# Patient Record
Sex: FEMALE | Race: WHITE | NOT HISPANIC OR LATINO
[De-identification: names, ages, dates, MRNs, and addresses within clinical notes are randomized per-mention and may not be internally consistent; named-entity substitution may affect disease eponyms.]

---

## 2016-12-05 RX ORDER — LANOLIN ALCOHOL/MO/W.PET/CERES
1 CREAM (GRAM) TOPICAL
Qty: 0 | Refills: 0 | DISCHARGE
Start: 2016-12-05

## 2016-12-05 RX ORDER — LANOLIN ALCOHOL/MO/W.PET/CERES
0.5 CREAM (GRAM) TOPICAL
Qty: 0 | Refills: 0 | COMMUNITY
Start: 2016-12-05

## 2016-12-05 RX ORDER — ALPRAZOLAM 0.25 MG
0.75 TABLET ORAL
Qty: 0 | Refills: 0 | COMMUNITY
Start: 2016-12-05

## 2016-12-05 RX ORDER — ALPRAZOLAM 0.25 MG
0.5 TABLET ORAL
Qty: 0 | Refills: 0 | COMMUNITY
Start: 2016-12-05

## 2017-01-05 ENCOUNTER — APPOINTMENT (OUTPATIENT)
Dept: THORACIC SURGERY | Facility: CLINIC | Age: 72
End: 2017-01-05

## 2017-01-18 ENCOUNTER — OUTPATIENT (OUTPATIENT)
Dept: OUTPATIENT SERVICES | Facility: HOSPITAL | Age: 72
LOS: 1 days | End: 2017-01-18
Payer: MEDICARE

## 2017-01-18 DIAGNOSIS — C50.919 MALIGNANT NEOPLASM OF UNSPECIFIED SITE OF UNSPECIFIED FEMALE BREAST: Chronic | ICD-10-CM

## 2017-01-18 DIAGNOSIS — C22.9 MALIGNANT NEOPLASM OF LIVER, NOT SPECIFIED AS PRIMARY OR SECONDARY: Chronic | ICD-10-CM

## 2017-01-18 PROCEDURE — A9577: CPT

## 2017-01-18 PROCEDURE — 74183 MRI ABD W/O CNTR FLWD CNTR: CPT | Mod: 26

## 2017-01-18 PROCEDURE — 74183 MRI ABD W/O CNTR FLWD CNTR: CPT

## 2017-03-22 ENCOUNTER — OUTPATIENT (OUTPATIENT)
Dept: OUTPATIENT SERVICES | Facility: HOSPITAL | Age: 72
LOS: 1 days | End: 2017-03-22
Payer: MEDICARE

## 2017-03-22 DIAGNOSIS — C50.919 MALIGNANT NEOPLASM OF UNSPECIFIED SITE OF UNSPECIFIED FEMALE BREAST: Chronic | ICD-10-CM

## 2017-03-22 DIAGNOSIS — C22.9 MALIGNANT NEOPLASM OF LIVER, NOT SPECIFIED AS PRIMARY OR SECONDARY: Chronic | ICD-10-CM

## 2017-03-22 PROCEDURE — 71250 CT THORAX DX C-: CPT

## 2017-03-22 PROCEDURE — 71250 CT THORAX DX C-: CPT | Mod: 26

## 2017-03-24 ENCOUNTER — APPOINTMENT (OUTPATIENT)
Dept: THORACIC SURGERY | Facility: CLINIC | Age: 72
End: 2017-03-24

## 2017-03-24 VITALS
RESPIRATION RATE: 18 BRPM | HEART RATE: 82 BPM | TEMPERATURE: 97.1 F | SYSTOLIC BLOOD PRESSURE: 135 MMHG | OXYGEN SATURATION: 97 % | DIASTOLIC BLOOD PRESSURE: 60 MMHG

## 2017-03-31 ENCOUNTER — APPOINTMENT (OUTPATIENT)
Dept: NEUROSURGERY | Facility: CLINIC | Age: 72
End: 2017-03-31

## 2017-03-31 VITALS
SYSTOLIC BLOOD PRESSURE: 115 MMHG | DIASTOLIC BLOOD PRESSURE: 70 MMHG | HEART RATE: 74 BPM | HEIGHT: 62 IN | OXYGEN SATURATION: 98 % | BODY MASS INDEX: 30.18 KG/M2 | WEIGHT: 164 LBS

## 2017-04-05 ENCOUNTER — RESULT REVIEW (OUTPATIENT)
Age: 72
End: 2017-04-05

## 2017-04-05 VITALS
OXYGEN SATURATION: 97 % | TEMPERATURE: 98 F | HEART RATE: 68 BPM | HEIGHT: 63 IN | WEIGHT: 170.2 LBS | RESPIRATION RATE: 16 BRPM | DIASTOLIC BLOOD PRESSURE: 64 MMHG | SYSTOLIC BLOOD PRESSURE: 132 MMHG

## 2017-04-05 NOTE — PATIENT PROFILE ADULT. - PMH
Carcinoid tumor    Malignant neoplasm of bronchus and lung, unspecified site  RUL Lobectomy, RLL wedge resection  Malignant neoplasm of female breast  Left breast CA with liver mets Brain tumor    Carcinoid tumor    Liver cancer    Malignant neoplasm of bronchus and lung, unspecified site  RUL Lobectomy, RLL wedge resection  Malignant neoplasm of female breast  Left breast CA with liver mets Brain tumor    Carcinoid tumor    COPD (chronic obstructive pulmonary disease)    Liver cancer    Malignant neoplasm of bronchus and lung, unspecified site  RUL Lobectomy, RLL wedge resection  Malignant neoplasm of female breast  Left breast CA with liver mets Brain tumor    Carcinoid tumor    COPD (chronic obstructive pulmonary disease)    Liver cancer    Lung collapse  2x  Malignant neoplasm of bronchus and lung, unspecified site  RUL Lobectomy, RLL wedge resection  Malignant neoplasm of female breast  Left breast CA with liver mets

## 2017-04-05 NOTE — PATIENT PROFILE ADULT. - PSH
Breast cancer  left and right  Cancer of liver  mets from breast  Surgery, elective  Ovaries removed 1980"s  Surgery, elective  Left wrist Breast cancer  left mastectomy  Surgery, elective  Thoractomy with lung resection-  Right upper lobe lobectomy  Right  lower lobe wedge  Surgery, elective  Ovaries removed 1980"s  Surgery, elective  Left wrist

## 2017-04-05 NOTE — PATIENT PROFILE ADULT. - ANESTHESIA, PREVIOUS REACTION, PROFILE
woke up during surgery, aspiration woke up during surgery, aspiration during colonoscopy  resulting in Pn

## 2017-04-06 ENCOUNTER — RESULT REVIEW (OUTPATIENT)
Age: 72
End: 2017-04-06

## 2017-04-06 ENCOUNTER — INPATIENT (INPATIENT)
Facility: HOSPITAL | Age: 72
LOS: 1 days | Discharge: HOME | DRG: 26 | End: 2017-04-08
Attending: NEUROLOGICAL SURGERY | Admitting: NEUROLOGICAL SURGERY
Payer: MEDICARE

## 2017-04-06 ENCOUNTER — APPOINTMENT (OUTPATIENT)
Dept: NEUROSURGERY | Facility: HOSPITAL | Age: 72
End: 2017-04-06

## 2017-04-06 DIAGNOSIS — Z41.9 ENCOUNTER FOR PROCEDURE FOR PURPOSES OTHER THAN REMEDYING HEALTH STATE, UNSPECIFIED: Chronic | ICD-10-CM

## 2017-04-06 DIAGNOSIS — C50.919 MALIGNANT NEOPLASM OF UNSPECIFIED SITE OF UNSPECIFIED FEMALE BREAST: Chronic | ICD-10-CM

## 2017-04-06 LAB
ALBUMIN SERPL ELPH-MCNC: 2.9 G/DL — LOW (ref 3.4–5)
ALP SERPL-CCNC: 85 U/L — SIGNIFICANT CHANGE UP (ref 40–120)
ALT FLD-CCNC: 72 U/L — HIGH (ref 12–42)
ANION GAP SERPL CALC-SCNC: 9 MMOL/L — SIGNIFICANT CHANGE UP (ref 9–16)
APTT BLD: 22.5 SEC — LOW (ref 27.5–37.4)
AST SERPL-CCNC: 22 U/L — SIGNIFICANT CHANGE UP (ref 15–37)
BASE EXCESS BLDA CALC-SCNC: 0.7 MMOL/L — SIGNIFICANT CHANGE UP (ref -2–3)
BASOPHILS NFR BLD AUTO: 0 % — SIGNIFICANT CHANGE UP (ref 0–2)
BILIRUB SERPL-MCNC: 0.2 MG/DL — SIGNIFICANT CHANGE UP (ref 0.2–1.2)
BUN SERPL-MCNC: 24 MG/DL — HIGH (ref 7–23)
CA-I BLDA-SCNC: 1.04 MMOL/L — LOW (ref 1.1–1.3)
CALCIUM SERPL-MCNC: 8 MG/DL — LOW (ref 8.5–10.5)
CHLORIDE SERPL-SCNC: 104 MMOL/L — SIGNIFICANT CHANGE UP (ref 96–108)
CO2 SERPL-SCNC: 25 MMOL/L — SIGNIFICANT CHANGE UP (ref 22–31)
COHGB MFR BLDA: 0.3 % — SIGNIFICANT CHANGE UP
CREAT SERPL-MCNC: 0.65 MG/DL — SIGNIFICANT CHANGE UP (ref 0.5–1.3)
EOSINOPHIL NFR BLD AUTO: 0 % — SIGNIFICANT CHANGE UP (ref 0–6)
GAS PNL BLDA: SIGNIFICANT CHANGE UP
GLUCOSE SERPL-MCNC: 105 MG/DL — HIGH (ref 70–99)
HCO3 BLDA-SCNC: 24 MMOL/L — SIGNIFICANT CHANGE UP (ref 21–28)
HCT VFR BLD CALC: 31.1 % — LOW (ref 34.5–45)
HGB BLD-MCNC: 10.2 G/DL — LOW (ref 11.5–15.5)
HGB BLDA-MCNC: 10.3 G/DL — LOW (ref 11.5–15.5)
INR BLD: 0.91 — SIGNIFICANT CHANGE UP (ref 0.88–1.16)
LYMPHOCYTES # BLD AUTO: 13.1 % — SIGNIFICANT CHANGE UP (ref 13–44)
MCHC RBC-ENTMCNC: 27.9 PG — SIGNIFICANT CHANGE UP (ref 27–34)
MCHC RBC-ENTMCNC: 32.8 G/DL — SIGNIFICANT CHANGE UP (ref 32–36)
MCV RBC AUTO: 85.2 FL — SIGNIFICANT CHANGE UP (ref 80–100)
METHGB MFR BLDA: 0.6 % — SIGNIFICANT CHANGE UP
MONOCYTES NFR BLD AUTO: 6.1 % — SIGNIFICANT CHANGE UP (ref 2–14)
NEUTROPHILS NFR BLD AUTO: 80.8 % — HIGH (ref 43–77)
O2 CT VFR BLDA CALC: 15.3 ML/DL — SIGNIFICANT CHANGE UP (ref 15–23)
OXYHGB MFR BLDA: 98 % — SIGNIFICANT CHANGE UP (ref 94–100)
PCO2 BLDA: 34 MMHG — SIGNIFICANT CHANGE UP (ref 32–45)
PH BLDA: 7.47 — HIGH (ref 7.35–7.45)
PLATELET # BLD AUTO: 345 K/UL — SIGNIFICANT CHANGE UP (ref 150–400)
PO2 BLDA: 396 MMHG — HIGH (ref 83–108)
POTASSIUM BLDA-SCNC: 4.4 MMOL/L — SIGNIFICANT CHANGE UP (ref 3.5–4.9)
POTASSIUM SERPL-MCNC: 4.6 MMOL/L — SIGNIFICANT CHANGE UP (ref 3.5–5.3)
POTASSIUM SERPL-SCNC: 4.6 MMOL/L — SIGNIFICANT CHANGE UP (ref 3.5–5.3)
PROT SERPL-MCNC: 5.8 G/DL — LOW (ref 6.4–8.2)
PROTHROM AB SERPL-ACNC: 10.1 SEC — SIGNIFICANT CHANGE UP (ref 9.8–12.7)
RBC # BLD: 3.65 M/UL — LOW (ref 3.8–5.2)
RBC # FLD: 14.9 % — SIGNIFICANT CHANGE UP (ref 10.3–16.9)
SAO2 % BLDA: 99 % — SIGNIFICANT CHANGE UP (ref 95–100)
SODIUM BLDA-SCNC: 133 MMOL/L — LOW (ref 138–146)
SODIUM SERPL-SCNC: 138 MMOL/L — SIGNIFICANT CHANGE UP (ref 135–145)
WBC # BLD: 14.2 K/UL — HIGH (ref 3.8–10.5)
WBC # FLD AUTO: 14.2 K/UL — HIGH (ref 3.8–10.5)

## 2017-04-06 PROCEDURE — 61510 CRNEC TREPH EXC BRN TUM STTL: CPT

## 2017-04-06 PROCEDURE — 70552 MRI BRAIN STEM W/DYE: CPT | Mod: 26

## 2017-04-06 PROCEDURE — 61781 SCAN PROC CRANIAL INTRA: CPT

## 2017-04-06 PROCEDURE — 61510 CRNEC TREPH EXC BRN TUM STTL: CPT | Mod: AS

## 2017-04-06 PROCEDURE — 15750 NEUROVASCULAR PEDICLE FLAP: CPT | Mod: AS

## 2017-04-06 PROCEDURE — 15750 NEUROVASCULAR PEDICLE FLAP: CPT

## 2017-04-06 PROCEDURE — 61781 SCAN PROC CRANIAL INTRA: CPT | Mod: AS

## 2017-04-06 RX ORDER — LABETALOL HCL 100 MG
10 TABLET ORAL
Qty: 0 | Refills: 0 | Status: DISCONTINUED | OUTPATIENT
Start: 2017-04-06 | End: 2017-04-08

## 2017-04-06 RX ORDER — CEFAZOLIN SODIUM 1 G
1000 VIAL (EA) INJECTION EVERY 8 HOURS
Qty: 0 | Refills: 0 | Status: COMPLETED | OUTPATIENT
Start: 2017-04-06 | End: 2017-04-07

## 2017-04-06 RX ORDER — FAMOTIDINE 10 MG/ML
20 INJECTION INTRAVENOUS ONCE
Qty: 0 | Refills: 0 | Status: COMPLETED | OUTPATIENT
Start: 2017-04-06 | End: 2017-04-06

## 2017-04-06 RX ORDER — ESCITALOPRAM OXALATE 10 MG/1
30 TABLET, FILM COATED ORAL DAILY
Qty: 0 | Refills: 0 | Status: DISCONTINUED | OUTPATIENT
Start: 2017-04-06 | End: 2017-04-08

## 2017-04-06 RX ORDER — DEXAMETHASONE 0.5 MG/5ML
4 ELIXIR ORAL EVERY 6 HOURS
Qty: 0 | Refills: 0 | Status: DISCONTINUED | OUTPATIENT
Start: 2017-04-06 | End: 2017-04-07

## 2017-04-06 RX ORDER — MORPHINE SULFATE 50 MG/1
2 CAPSULE, EXTENDED RELEASE ORAL ONCE
Qty: 0 | Refills: 0 | Status: DISCONTINUED | OUTPATIENT
Start: 2017-04-06 | End: 2017-04-06

## 2017-04-06 RX ORDER — ALBUTEROL 90 UG/1
2.5 AEROSOL, METERED ORAL ONCE
Qty: 0 | Refills: 0 | Status: DISCONTINUED | OUTPATIENT
Start: 2017-04-06 | End: 2017-04-08

## 2017-04-06 RX ORDER — LETROZOLE 2.5 MG/1
2.5 TABLET, FILM COATED ORAL DAILY
Qty: 0 | Refills: 0 | Status: DISCONTINUED | OUTPATIENT
Start: 2017-04-06 | End: 2017-04-08

## 2017-04-06 RX ORDER — ACETAMINOPHEN 500 MG
1000 TABLET ORAL EVERY 6 HOURS
Qty: 0 | Refills: 0 | Status: COMPLETED | OUTPATIENT
Start: 2017-04-06 | End: 2017-04-06

## 2017-04-06 RX ORDER — LORATADINE 10 MG/1
10 TABLET ORAL DAILY
Qty: 0 | Refills: 0 | Status: DISCONTINUED | OUTPATIENT
Start: 2017-04-06 | End: 2017-04-08

## 2017-04-06 RX ORDER — HYDRALAZINE HCL 50 MG
10 TABLET ORAL
Qty: 0 | Refills: 0 | Status: DISCONTINUED | OUTPATIENT
Start: 2017-04-06 | End: 2017-04-08

## 2017-04-06 RX ORDER — ONDANSETRON 8 MG/1
4 TABLET, FILM COATED ORAL EVERY 6 HOURS
Qty: 0 | Refills: 0 | Status: DISCONTINUED | OUTPATIENT
Start: 2017-04-06 | End: 2017-04-08

## 2017-04-06 RX ORDER — INSULIN LISPRO 100/ML
VIAL (ML) SUBCUTANEOUS
Qty: 0 | Refills: 0 | Status: DISCONTINUED | OUTPATIENT
Start: 2017-04-06 | End: 2017-04-08

## 2017-04-06 RX ORDER — FAMOTIDINE 10 MG/ML
20 INJECTION INTRAVENOUS
Qty: 0 | Refills: 0 | Status: DISCONTINUED | OUTPATIENT
Start: 2017-04-06 | End: 2017-04-07

## 2017-04-06 RX ORDER — LEVETIRACETAM 250 MG/1
500 TABLET, FILM COATED ORAL EVERY 12 HOURS
Qty: 0 | Refills: 0 | Status: DISCONTINUED | OUTPATIENT
Start: 2017-04-06 | End: 2017-04-07

## 2017-04-06 RX ORDER — PANTOPRAZOLE SODIUM 20 MG/1
40 TABLET, DELAYED RELEASE ORAL
Qty: 0 | Refills: 0 | Status: DISCONTINUED | OUTPATIENT
Start: 2017-04-06 | End: 2017-04-06

## 2017-04-06 RX ORDER — SENNA PLUS 8.6 MG/1
1 TABLET ORAL THREE TIMES A DAY
Qty: 0 | Refills: 0 | Status: DISCONTINUED | OUTPATIENT
Start: 2017-04-06 | End: 2017-04-08

## 2017-04-06 RX ORDER — ESCITALOPRAM OXALATE 10 MG/1
30 TABLET, FILM COATED ORAL DAILY
Qty: 0 | Refills: 0 | Status: DISCONTINUED | OUTPATIENT
Start: 2017-04-06 | End: 2017-04-06

## 2017-04-06 RX ORDER — SODIUM CHLORIDE 9 MG/ML
1000 INJECTION INTRAMUSCULAR; INTRAVENOUS; SUBCUTANEOUS
Qty: 0 | Refills: 0 | Status: DISCONTINUED | OUTPATIENT
Start: 2017-04-06 | End: 2017-04-07

## 2017-04-06 RX ADMIN — LEVETIRACETAM 420 MILLIGRAM(S): 250 TABLET, FILM COATED ORAL at 23:38

## 2017-04-06 RX ADMIN — SODIUM CHLORIDE 75 MILLILITER(S): 9 INJECTION INTRAMUSCULAR; INTRAVENOUS; SUBCUTANEOUS at 16:07

## 2017-04-06 RX ADMIN — MORPHINE SULFATE 2 MILLIGRAM(S): 50 CAPSULE, EXTENDED RELEASE ORAL at 22:00

## 2017-04-06 RX ADMIN — Medication 4 MILLIGRAM(S): at 23:42

## 2017-04-06 RX ADMIN — MORPHINE SULFATE 2 MILLIGRAM(S): 50 CAPSULE, EXTENDED RELEASE ORAL at 20:00

## 2017-04-06 RX ADMIN — Medication 400 MILLIGRAM(S): at 17:50

## 2017-04-06 RX ADMIN — Medication 100 MILLIGRAM(S): at 20:17

## 2017-04-06 RX ADMIN — Medication 1000 MILLIGRAM(S): at 18:08

## 2017-04-06 RX ADMIN — MORPHINE SULFATE 2 MILLIGRAM(S): 50 CAPSULE, EXTENDED RELEASE ORAL at 21:40

## 2017-04-06 RX ADMIN — Medication 4 MILLIGRAM(S): at 18:08

## 2017-04-06 RX ADMIN — SENNA PLUS 1 TABLET(S): 8.6 TABLET ORAL at 21:40

## 2017-04-06 RX ADMIN — FAMOTIDINE 20 MILLIGRAM(S): 10 INJECTION INTRAVENOUS at 21:40

## 2017-04-06 RX ADMIN — MORPHINE SULFATE 2 MILLIGRAM(S): 50 CAPSULE, EXTENDED RELEASE ORAL at 18:35

## 2017-04-06 NOTE — H&P ADULT - ASSESSMENT
This is a 69 y/o female presenting with history of lung and breast cancer.  She is here after discovering she had a right frontal brain tumor secondary to a fatigue work up.  Plan for today is elective resection of right frontal brain tumor.    1)  Consent signed by patient and attending in chart  2)  PRE- op medical clearance in chart  3)  Will continue oral chemo regimen for breast ca  4)  will continue bronchodialators  5)  Plan for SICU admission post op  6)  Mechanical DVT prophylaxis

## 2017-04-06 NOTE — H&P ADULT - NSHPREVIEWOFSYSTEMS_GEN_ALL_CORE
REVIEW OF SYSTEMS      General:	NAD, well nourished, well groomed    Skin/Breast:  intact  	  Ophthalmologic:  negative   	  ENMT:	negative    Respiratory and Thorax: no coughing, wheezing, recent URI  	  Cardiovascular: no chest pain, DAMON    Gastrointestinal:	soft, non tender    Genitourinary: no frequency, dysuria    Musculoskeletal:	negative    Neurological:	see HPI    Psychiatric:	negative    Hematology/Lymphatics:	negative    Endocrine:	negative    Allergic/Immunologic:  Negative

## 2017-04-06 NOTE — PROGRESS NOTE ADULT - SUBJECTIVE AND OBJECTIVE BOX
HPI:  This is a 70 y/o female presenting with history of breast and lung Ca.  She reported fatigue to her oncologist and further brain imaging revealed a right frontal enhancing mass.    She has some left sided facial drooping but denies any siezures, vision loss, numbness or tingling. (06 Apr 2017 09:41)    She had a R frontal craniotomy,       PAST MEDICAL & SURGICAL HISTORY:  Lung collapse: 2x  COPD (chronic obstructive pulmonary disease)  Brain tumor  Liver cancer  Carcinoid tumor  Malignant neoplasm of female breast: Left breast CA with liver mets  Malignant neoplasm of bronchus and lung, unspecified site: RUL Lobectomy, RLL wedge resection  Surgery, elective: Thoractomy with lung resection-  Right upper lobe lobectomy  Right  lower lobe wedge  Surgery, elective: Ovaries removed 1980&quot;s  Surgery, elective: Left wrist  Cancer of liver: mets from breast  Breast cancer: left mastectomy      ROS: See HPI    MEDICATIONS  (STANDING):  escitalopram Solution 30milliGRAM(s) Oral daily  loratadine 10milliGRAM(s) Oral daily  letrozole 2.5milliGRAM(s) Oral daily  pantoprazole    Tablet 40milliGRAM(s) Oral before breakfast  sodium chloride 0.9%. 1000milliLiter(s) IV Continuous <Continuous>  dexamethasone  Injectable 4milliGRAM(s) IV Push every 6 hours  ceFAZolin   IVPB 1000milliGRAM(s) IV Intermittent every 8 hours  senna 1Tablet(s) Oral three times a day  levETIRAcetam  IVPB 500milliGRAM(s) IV Intermittent every 12 hours  insulin lispro (HumaLOG) corrective regimen sliding scale  SubCutaneous Before meals and at bedtime    MEDICATIONS  (PRN):  ALBUTerol    0.083%. 2.5milliGRAM(s) Nebulizer once PRN Shortness of Breath  labetalol Injectable 10milliGRAM(s) IV Push every 1 hour PRN Systolic blood pressure > 160mmHg and HR > 65  hydrALAZINE Injectable 10milliGRAM(s) IV Push every 1 hour PRN SBP > 160 HR <65  ondansetron Injectable 4milliGRAM(s) IV Push every 6 hours PRN Nausea and/or Vomiting      Allergies    adhesives (Rash)  No Known Drug Allergies  Originally Entered as [Unknown] reaction to [Other][Tape] (Unknown)    Intolerances        SOCIAL HISTORY:  Smoke: Never Smoker  EtOH: occasional    Vital Signs Last 24 Hrs  T(C): 36.2, Max: 36.9 (04-06 @ 14:25)  T(F): 97.1, Max: 98.4 (04-06 @ 14:25)  HR: 78 (75 - 79)  BP: 106/48 (106/48 - 125/48)  BP(mean): --  RR: 18 (16 - 18)  SpO2: 99% (97% - 100%)    PHYSICAL EXAM  Neurological:A&OX3 Cranial nerves intact  RIOS 5/5 without drift dysmetria  Crani incision CDI  BRYANT  Cardiovascular:RRR  Respiratory:Lungs CTA thru out O2 Sat 100% on 2L nasal canula  Gastrointestinal:+Bowel Sounds  denies nausea or vomiting  Genitourinary :Voids via zepeda clear yellow urine  Extremities: warm and dry  + Capillary refill  no edema  Incision/Wound:  BRYANT CDI      LABS:                        10.2   14.2  )-----------( 345      ( 06 Apr 2017 14:58 )             31.1     04-06    138  |  104  |  24<H>  ----------------------------<  105<H>  4.6   |  25  |  0.65    Ca    8.0<L>      06 Apr 2017 14:58    TPro  5.8<L>  /  Alb  2.9<L>  /  TBili  0.2  /  DBili  x   /  AST  22  /  ALT  72<H>  /  AlkPhos  85  04-06    PT/INR - ( 06 Apr 2017 14:58 )   PT: 10.1 sec;   INR: 0.91          PTT - ( 06 Apr 2017 14:58 )  PTT:22.5 sec

## 2017-04-06 NOTE — H&P ADULT - HISTORY OF PRESENT ILLNESS
This is a 72 y/o female presenting with history of breast and lung Ca.  She reported fatigue to her oncologist and further brain imaging revealed a right frontal enhancing mass.    She has some left sided facial drooping but denies any siezures, vision loss, numbness or tingling.

## 2017-04-06 NOTE — PROGRESS NOTE ADULT - ASSESSMENT
72 yo F hx lung CA and second primary breast CA with mets to liver p/w fatigue found to have R frontal metastatic tumor.     Neuro: decadron 4q6, q1h neuro checks, keppra 500 BID, zofran prn, lexapro, tylenol PRN  CV: HD stable, hydralazine prn SBP goal  100- 160, labetalol prn  Pulm: albuterol prn, claritin, singulair  GI/FEN: NPO,  NS@75, colace, protonix, senna  : Matias  ID: Ancef post op  Endo: ISS  Heme: Femara   PPX: SCDs  Lines: Prema

## 2017-04-06 NOTE — H&P ADULT - NSHPPHYSICALEXAM_GEN_ALL_CORE
AAOX3. Verbal function intact  Cranial Nerves: II-XII intact  Motor: 5/5 power in b/l UE and LE  Sensation: intact to touch in al extremities  Pronator Drift: ***  Dysmetria: ***

## 2017-04-06 NOTE — PROGRESS NOTE ADULT - SUBJECTIVE AND OBJECTIVE BOX
NP note Neurosurgery Post Op Note  Dr Muniz          HPI:  This is a 72 y/o female presenting with history of breast and lung Ca.  She reported fatigue to her oncologist and further brain imaging revealed a right frontal enhancing mass.    She has some left sided facial drooping but denies any siezures, vision loss, numbness or tingling. (06 Apr 2017 09:41)    OVERNIGHT EVENTS:  Vital Signs Last 24 Hrs  T(C): 36.2, Max: 36.9 (04-06 @ 14:25)  T(F): 97.1, Max: 98.4 (04-06 @ 14:25)  HR: 78 (75 - 79)  BP: 106/48 (106/48 - 125/48)  BP(mean): --  RR: 18 (16 - 18)  SpO2: 99% (97% - 100%)    I&O's Summary    I & Os for current day (as of 06 Apr 2017 17:07)  =============================================  IN: 150 ml / OUT: 310 ml / NET: -160 ml      PHYSICAL EXAM:  Neurological:A&OX3 Cranial nerves intact  RIOS 5/5 without drift dysmetria  Crani incision CDI  BRYANT      Cardiovascular:RRR  Respiratory:Lungs CTA thru out O2 Sat 100% on 2L nasal cnnula  Gastrointestinal:+Bowel Sounds  denies nausea or vomiting  Genitourinary :Voids via zepeda clear yellow urine  Extremities: warm and dry  + Capillary refill  no edema  Incision/Wound:  BRYANT CDI    TUBES/LINES:  [x] Zepeda  Boyle      DIET:  [x] NPO  [] Mechanical  [] Tube feeds    LABS:                        10.2   14.2  )-----------( 345      ( 06 Apr 2017 14:58 )             31.1     04-06    138  |  104  |  24<H>  ----------------------------<  105<H>  4.6   |  25  |  0.65    Ca    8.0<L>      06 Apr 2017 14:58    TPro  5.8<L>  /  Alb  2.9<L>  /  TBili  0.2  /  DBili  x   /  AST  22  /  ALT  72<H>  /  AlkPhos  85  04-06    PT/INR - ( 06 Apr 2017 14:58 )   PT: 10.1 sec;   INR: 0.91          PTT - ( 06 Apr 2017 14:58 )  PTT:22.5 sec        CAPILLARY BLOOD GLUCOSE  98 (06 Apr 2017 14:25)      Drug Levels: [] N/A    CSF Analysis: [] N/A      Allergies    adhesives (Rash)  No Known Drug Allergies  Originally Entered as [Unknown] reaction to [Other][Tape] (Unknown)    Intolerances      MEDICATIONS:  Antibiotics:  ceFAZolin   IVPB 1000milliGRAM(s) IV Intermittent every 8 hours    Neuro:  ondansetron Injectable 4milliGRAM(s) IV Push every 6 hours PRN  levETIRAcetam  IVPB 500milliGRAM(s) IV Intermittent every 12 hours    Anticoagulation:    OTHER:  ALBUTerol    0.083%. 2.5milliGRAM(s) Nebulizer once PRN  labetalol Injectable 10milliGRAM(s) IV Push every 1 hour PRN  hydrALAZINE Injectable 10milliGRAM(s) IV Push every 1 hour PRN  dexamethasone  Injectable 4milliGRAM(s) IV Push every 6 hours  insulin lispro (HumaLOG) corrective regimen sliding scale  SubCutaneous Before meals and at bedtime    IVF:  sodium chloride 0.9%. 1000milliLiter(s) IV Continuous <Continuous>    CULTURES:    RADIOLOGY & ADDITIONAL TESTS:      ASSESSMENT:  71y Female s/p POD#0 Crani resection mass    PLAN:  NEURO:    Monitor neuro status  Continue steroids and AED  MRi Head w/i  48 hrs post op  Pain Management  OT/PT in AM      CARDIOVASCULAR:  Continue telemetry  -140sbp    PULMONARY:  Monitor O2 Sats  Incentive Spirometry prn    RENAL:  Zepeda to SDB  Monitor I/O  DC Zepeda in AM      GI:   Antiemetics prn  Advance diet as tolerated  Bowel Regime     HEME:  F/U Post op Labs    ID:  Ancef X3 doses total post op  Monitor VS      DVT PROPHYLAXIS:  [ Venodynes   Start lovenox in AM                                  FALL RISK:  [] Low Risk                                    [] Impulsive    Dispo: Discussed with attending

## 2017-04-07 LAB
ALBUMIN SERPL ELPH-MCNC: 2.8 G/DL — LOW (ref 3.4–5)
ALP SERPL-CCNC: 90 U/L — SIGNIFICANT CHANGE UP (ref 40–120)
ALT FLD-CCNC: 58 U/L — HIGH (ref 12–42)
ANION GAP SERPL CALC-SCNC: 10 MMOL/L — SIGNIFICANT CHANGE UP (ref 9–16)
AST SERPL-CCNC: 13 U/L — LOW (ref 15–37)
BASOPHILS NFR BLD AUTO: 0.1 % — SIGNIFICANT CHANGE UP (ref 0–2)
BILIRUB DIRECT SERPL-MCNC: 0.08 MG/DL — SIGNIFICANT CHANGE UP
BILIRUB INDIRECT FLD-MCNC: 0.3 MG/DL — SIGNIFICANT CHANGE UP (ref 0.2–1)
BILIRUB SERPL-MCNC: 0.4 MG/DL — SIGNIFICANT CHANGE UP (ref 0.2–1.2)
BUN SERPL-MCNC: 20 MG/DL — SIGNIFICANT CHANGE UP (ref 7–23)
CALCIUM SERPL-MCNC: 8.1 MG/DL — LOW (ref 8.5–10.5)
CHLORIDE SERPL-SCNC: 100 MMOL/L — SIGNIFICANT CHANGE UP (ref 96–108)
CO2 SERPL-SCNC: 27 MMOL/L — SIGNIFICANT CHANGE UP (ref 22–31)
CREAT SERPL-MCNC: 0.65 MG/DL — SIGNIFICANT CHANGE UP (ref 0.5–1.3)
GLUCOSE SERPL-MCNC: 109 MG/DL — HIGH (ref 70–99)
HCT VFR BLD CALC: 32.2 % — LOW (ref 34.5–45)
HGB BLD-MCNC: 10.9 G/DL — LOW (ref 11.5–15.5)
LYMPHOCYTES # BLD AUTO: 7.9 % — LOW (ref 13–44)
MAGNESIUM SERPL-MCNC: 2.1 MG/DL — SIGNIFICANT CHANGE UP (ref 1.6–2.4)
MCHC RBC-ENTMCNC: 29 PG — SIGNIFICANT CHANGE UP (ref 27–34)
MCHC RBC-ENTMCNC: 33.9 G/DL — SIGNIFICANT CHANGE UP (ref 32–36)
MCV RBC AUTO: 85.6 FL — SIGNIFICANT CHANGE UP (ref 80–100)
MONOCYTES NFR BLD AUTO: 6.5 % — SIGNIFICANT CHANGE UP (ref 2–14)
NEUTROPHILS NFR BLD AUTO: 85.5 % — HIGH (ref 43–77)
PHOSPHATE SERPL-MCNC: 4.9 MG/DL — HIGH (ref 2.5–4.5)
PLATELET # BLD AUTO: 341 K/UL — SIGNIFICANT CHANGE UP (ref 150–400)
POTASSIUM SERPL-MCNC: 4.3 MMOL/L — SIGNIFICANT CHANGE UP (ref 3.5–5.3)
POTASSIUM SERPL-SCNC: 4.3 MMOL/L — SIGNIFICANT CHANGE UP (ref 3.5–5.3)
PROT SERPL-MCNC: 5.9 G/DL — LOW (ref 6.4–8.2)
RBC # BLD: 3.76 M/UL — LOW (ref 3.8–5.2)
RBC # FLD: 14.9 % — SIGNIFICANT CHANGE UP (ref 10.3–16.9)
SODIUM SERPL-SCNC: 137 MMOL/L — SIGNIFICANT CHANGE UP (ref 135–145)
WBC # BLD: 17 K/UL — HIGH (ref 3.8–10.5)
WBC # FLD AUTO: 17 K/UL — HIGH (ref 3.8–10.5)

## 2017-04-07 PROCEDURE — 93970 EXTREMITY STUDY: CPT | Mod: 26

## 2017-04-07 PROCEDURE — 99291 CRITICAL CARE FIRST HOUR: CPT | Mod: 24

## 2017-04-07 RX ORDER — DEXAMETHASONE 0.5 MG/5ML
4 ELIXIR ORAL EVERY 8 HOURS
Qty: 0 | Refills: 0 | Status: DISCONTINUED | OUTPATIENT
Start: 2017-04-07 | End: 2017-04-08

## 2017-04-07 RX ORDER — ACETAMINOPHEN 500 MG
650 TABLET ORAL EVERY 6 HOURS
Qty: 0 | Refills: 0 | Status: DISCONTINUED | OUTPATIENT
Start: 2017-04-07 | End: 2017-04-08

## 2017-04-07 RX ORDER — LEVETIRACETAM 250 MG/1
500 TABLET, FILM COATED ORAL
Qty: 0 | Refills: 0 | Status: DISCONTINUED | OUTPATIENT
Start: 2017-04-07 | End: 2017-04-08

## 2017-04-07 RX ORDER — FAMOTIDINE 10 MG/ML
20 INJECTION INTRAVENOUS DAILY
Qty: 0 | Refills: 0 | Status: DISCONTINUED | OUTPATIENT
Start: 2017-04-07 | End: 2017-04-08

## 2017-04-07 RX ORDER — DOCUSATE SODIUM 100 MG
100 CAPSULE ORAL THREE TIMES A DAY
Qty: 0 | Refills: 0 | Status: DISCONTINUED | OUTPATIENT
Start: 2017-04-07 | End: 2017-04-08

## 2017-04-07 RX ORDER — MORPHINE SULFATE 50 MG/1
2 CAPSULE, EXTENDED RELEASE ORAL ONCE
Qty: 0 | Refills: 0 | Status: DISCONTINUED | OUTPATIENT
Start: 2017-04-07 | End: 2017-04-07

## 2017-04-07 RX ORDER — FLUTICASONE PROPIONATE 50 MCG
1 SPRAY, SUSPENSION NASAL
Qty: 0 | Refills: 0 | Status: DISCONTINUED | OUTPATIENT
Start: 2017-04-07 | End: 2017-04-08

## 2017-04-07 RX ORDER — ENOXAPARIN SODIUM 100 MG/ML
40 INJECTION SUBCUTANEOUS AT BEDTIME
Qty: 0 | Refills: 0 | Status: DISCONTINUED | OUTPATIENT
Start: 2017-04-07 | End: 2017-04-08

## 2017-04-07 RX ORDER — ALPRAZOLAM 0.25 MG
1 TABLET ORAL AT BEDTIME
Qty: 0 | Refills: 0 | Status: DISCONTINUED | OUTPATIENT
Start: 2017-04-07 | End: 2017-04-08

## 2017-04-07 RX ORDER — PANTOPRAZOLE SODIUM 20 MG/1
40 TABLET, DELAYED RELEASE ORAL
Qty: 0 | Refills: 0 | Status: DISCONTINUED | OUTPATIENT
Start: 2017-04-07 | End: 2017-04-07

## 2017-04-07 RX ORDER — MONTELUKAST 4 MG/1
10 TABLET, CHEWABLE ORAL DAILY
Qty: 0 | Refills: 0 | Status: DISCONTINUED | OUTPATIENT
Start: 2017-04-07 | End: 2017-04-08

## 2017-04-07 RX ADMIN — ENOXAPARIN SODIUM 40 MILLIGRAM(S): 100 INJECTION SUBCUTANEOUS at 22:11

## 2017-04-07 RX ADMIN — LEVETIRACETAM 500 MILLIGRAM(S): 250 TABLET, FILM COATED ORAL at 11:56

## 2017-04-07 RX ADMIN — Medication 4 MILLIGRAM(S): at 06:10

## 2017-04-07 RX ADMIN — MORPHINE SULFATE 2 MILLIGRAM(S): 50 CAPSULE, EXTENDED RELEASE ORAL at 03:37

## 2017-04-07 RX ADMIN — FAMOTIDINE 20 MILLIGRAM(S): 10 INJECTION INTRAVENOUS at 21:01

## 2017-04-07 RX ADMIN — SENNA PLUS 1 TABLET(S): 8.6 TABLET ORAL at 06:10

## 2017-04-07 RX ADMIN — LEVETIRACETAM 500 MILLIGRAM(S): 250 TABLET, FILM COATED ORAL at 23:43

## 2017-04-07 RX ADMIN — LETROZOLE 2.5 MILLIGRAM(S): 2.5 TABLET, FILM COATED ORAL at 13:10

## 2017-04-07 RX ADMIN — Medication 4 MILLIGRAM(S): at 17:13

## 2017-04-07 RX ADMIN — Medication 650 MILLIGRAM(S): at 22:14

## 2017-04-07 RX ADMIN — Medication 4 MILLIGRAM(S): at 11:00

## 2017-04-07 RX ADMIN — SENNA PLUS 1 TABLET(S): 8.6 TABLET ORAL at 22:11

## 2017-04-07 RX ADMIN — LORATADINE 10 MILLIGRAM(S): 10 TABLET ORAL at 11:55

## 2017-04-07 RX ADMIN — Medication 650 MILLIGRAM(S): at 16:15

## 2017-04-07 RX ADMIN — ESCITALOPRAM OXALATE 30 MILLIGRAM(S): 10 TABLET, FILM COATED ORAL at 11:55

## 2017-04-07 RX ADMIN — FAMOTIDINE 20 MILLIGRAM(S): 10 INJECTION INTRAVENOUS at 06:10

## 2017-04-07 RX ADMIN — Medication 100 MILLIGRAM(S): at 13:04

## 2017-04-07 RX ADMIN — Medication 100 MILLIGRAM(S): at 03:53

## 2017-04-07 RX ADMIN — Medication 100 MILLIGRAM(S): at 22:11

## 2017-04-07 RX ADMIN — Medication 650 MILLIGRAM(S): at 17:12

## 2017-04-07 RX ADMIN — SENNA PLUS 1 TABLET(S): 8.6 TABLET ORAL at 13:04

## 2017-04-07 RX ADMIN — MORPHINE SULFATE 2 MILLIGRAM(S): 50 CAPSULE, EXTENDED RELEASE ORAL at 04:00

## 2017-04-07 NOTE — PHYSICAL THERAPY INITIAL EVALUATION ADULT - ADDITIONAL COMMENTS
Pt lives at home with spouse with 1 FLORES and elevator access. prior to admission: independent with functional mobility, No AD. Left hand dominant; +eyeglasses for reading.

## 2017-04-07 NOTE — OCCUPATIONAL THERAPY INITIAL EVALUATION ADULT - PERTINENT HX OF CURRENT PROBLEM, REHAB EVAL
72 y/o female presenting with history of breast and lung Ca.  She reported fatigue to her oncologist and further brain imaging revealed a right frontal enhancing mass. She has some left sided facial drooping but denies any seizures, vision loss, numbness or tingling. 04/06/2017 Right Frontal Craniotomy for brain tumor removal.

## 2017-04-07 NOTE — PROGRESS NOTE ADULT - ASSESSMENT
71F with R frontal mass s/p en bloc excision 71F with R frontal mass s/p en bloc excision, h/o COPD, liver cancer, carcinoid tumor, malignant neoplasm of female breast, L breast CA, neoplasm bronchus and lung

## 2017-04-07 NOTE — DIETITIAN INITIAL EVALUATION ADULT. - OTHER INFO
70 yo F hx lung CA and second primary breast CA with mets to liver p/w fatigue found to have R frontal metastatic tumor. Ate a small amount of breakfast this morning, & states appetite was good PTA. States she has gained weight recently & would like to try to lose some weight. Encouraged adequate nutrition s/p surgery. Has not had a BM since PTA.

## 2017-04-07 NOTE — OCCUPATIONAL THERAPY INITIAL EVALUATION ADULT - GENERAL OBSERVATIONS, REHAB EVAL
Left hand dominant. Patient cleared for Occupational Therapy by AMBER Keith. Patient received seated up in B/S chair in non-acute distress, +EKG, +IV heplock, +radial a-line, +R cranial incision with staples C/D/I. Patient denies pain.

## 2017-04-07 NOTE — PHYSICAL THERAPY INITIAL EVALUATION ADULT - GENERAL OBSERVATIONS, REHAB EVAL
Patient cleared for PT and OOB activity by  by AMBER Keith. Patient received seated up in B/S chair in non-acute distress, +EKG, +IV heplock, +radial a-line, +R cranial incision with staples C/D/I. Patient denies pain.

## 2017-04-07 NOTE — PROGRESS NOTE ADULT - SUBJECTIVE AND OBJECTIVE BOX
HPI:  This is a 70 y/o female presenting with history of breast and lung Ca.  She reported fatigue to her oncologist and further brain imaging revealed a right frontal enhancing mass.    She has some left sided facial drooping but denies any siezures, vision loss, numbness or tingling. (06 Apr 2017 09:41)    OVERNIGHT EVENTS: No acute events overnight.   Vital Signs Last 24 Hrs  T(C): 36.2, Max: 36.9 (04-06 @ 14:25)  T(F): 97.2, Max: 98.4 (04-06 @ 14:25)  HR: 62 (60 - 79)  BP: 117/49 (92/49 - 143/63)  BP(mean): 74 (67 - 95)  RR: 22 (8 - 30)  SpO2: 92% (91% - 100%)    I&O's Detail  I & Os for 24h ending 07 Apr 2017 07:00  =============================================  IN:    sodium chloride 0.9%.: 1275 ml    IV PiggyBack: 350 ml    Oral Fluid: 225 ml    Total IN: 1850 ml  ---------------------------------------------  OUT:    Indwelling Catheter - Urethral: 2735 ml    Total OUT: 2735 ml  ---------------------------------------------  Total NET: -885 ml    I & Os for current day (as of 07 Apr 2017 08:12)  =============================================  IN:    Oral Fluid: 100 ml    Total IN: 100 ml  ---------------------------------------------  OUT:    Indwelling Catheter - Urethral: 65 ml    Total OUT: 65 ml  ---------------------------------------------  Total NET: 35 ml    I&O's Summary  I & Os for 24h ending 07 Apr 2017 07:00  =============================================  IN: 1850 ml / OUT: 2735 ml / NET: -885 ml    I & Os for current day (as of 07 Apr 2017 08:12)  =============================================  IN: 100 ml / OUT: 65 ml / NET: 35 ml      PHYSICAL EXAM:  Neurological: AAOx3, FC, speech coherent   CNII-    Motor exam:         [] Upper extremity              Bi(c5)  WE(c6)  EE(c7)   FF(c8)                                                R         5/5        5/5        5/5       5/5                                               L          5/5        5/5        5/5       5/5         [] Lower extremeity          HF(l2)   KE(l3)    TA(l4)   EHL(l5)  GS(s1)                                                 R        5/5        5/5        5/5       5/5         5/5                                               L         5/5        5/5       5/5       5/5          5/5                                                        [] warm well perfused; capillary refill <3 seconds     Sensation: [] intact to light touch  [] decreased:       Cardiovascular:  Respiratory:  Gastrointestinal:  Genitourinary:  Extremities:  Incision/Wound:    TUBES/LINES:  [] CVC  [] A-line  [] Lumbar Drain  [] Ventriculostomy  [] Other    DIET:  [] NPO  [] Mechanical  [] Tube feeds    LABS:                        10.9   17.0  )-----------( 341      ( 07 Apr 2017 04:59 )             32.2     04-07    137  |  100  |  20  ----------------------------<  109<H>  4.3   |  27  |  0.65    Ca    8.1<L>      07 Apr 2017 04:59  Phos  4.9     04-07  Mg     2.1     04-07    TPro  5.9<L>  /  Alb  2.8<L>  /  TBili  0.4  /  DBili  0.08  /  AST  13<L>  /  ALT  58<H>  /  AlkPhos  90  04-07    PT/INR - ( 06 Apr 2017 14:58 )   PT: 10.1 sec;   INR: 0.91          PTT - ( 06 Apr 2017 14:58 )  PTT:22.5 sec        CAPILLARY BLOOD GLUCOSE  102 (07 Apr 2017 07:00)  115 (06 Apr 2017 21:54)  115 (06 Apr 2017 21:51)  98 (06 Apr 2017 14:25)      Drug Levels: [] N/A    CSF Analysis: [] N/A      Allergies    adhesives (Rash)  No Known Drug Allergies  Originally Entered as [Unknown] reaction to [Other][Tape] (Unknown)    Intolerances      MEDICATIONS:  Antibiotics:    Neuro:  ondansetron Injectable 4milliGRAM(s) IV Push every 6 hours PRN  levETIRAcetam  IVPB 500milliGRAM(s) IV Intermittent every 12 hours  escitalopram 30milliGRAM(s) Oral daily    Anticoagulation:    OTHER:  loratadine 10milliGRAM(s) Oral daily  letrozole 2.5milliGRAM(s) Oral daily  ALBUTerol    0.083%. 2.5milliGRAM(s) Nebulizer once PRN  labetalol Injectable 10milliGRAM(s) IV Push every 1 hour PRN  hydrALAZINE Injectable 10milliGRAM(s) IV Push every 1 hour PRN  dexamethasone  Injectable 4milliGRAM(s) IV Push every 6 hours  senna 1Tablet(s) Oral three times a day  insulin lispro (HumaLOG) corrective regimen sliding scale  SubCutaneous Before meals and at bedtime  famotidine    Tablet 20milliGRAM(s) Oral before breakfast    IVF:  sodium chloride 0.9%. 1000milliLiter(s) IV Continuous <Continuous>    CULTURES:    RADIOLOGY & ADDITIONAL TESTS:      ASSESSMENT:  71y Female s/p    PLAN:  NEURO:    CARDIOVASCULAR:    PULMONARY:    RENAL:    GI:    HEME:    ID:    ENDO:    DVT PROPHYLAXIS:  [] Venodynes                                [] Heparin/Lovenox    FALL RISK:  [] Low Risk                                    [] Impulsive HPI:  This is a 70 y/o female presenting with history of breast and lung Ca.  She reported fatigue to her oncologist and further brain imaging revealed a right frontal enhancing mass.    She has some left sided facial drooping but denies any siezures, vision loss, numbness or tingling. (06 Apr 2017 09:41)    OVERNIGHT EVENTS: No acute events overnight. Pt states mild incision site pain. Denies acute changes in visio, acute loss of sensation/weakness of extremities.  Vital Signs Last 24 Hrs  T(C): 36.2, Max: 36.9 (04-06 @ 14:25)  T(F): 97.2, Max: 98.4 (04-06 @ 14:25)  HR: 62 (60 - 79)  BP: 117/49 (92/49 - 143/63)  BP(mean): 74 (67 - 95)  RR: 22 (8 - 30)  SpO2: 92% (91% - 100%)    I&O's Detail  I & Os for 24h ending 07 Apr 2017 07:00  =============================================  IN:    sodium chloride 0.9%.: 1275 ml    IV PiggyBack: 350 ml    Oral Fluid: 225 ml    Total IN: 1850 ml  ---------------------------------------------  OUT:    Indwelling Catheter - Urethral: 2735 ml    Total OUT: 2735 ml  ---------------------------------------------  Total NET: -885 ml    I & Os for current day (as of 07 Apr 2017 08:12)  =============================================  IN:    Oral Fluid: 100 ml    Total IN: 100 ml  ---------------------------------------------  OUT:    Indwelling Catheter - Urethral: 65 ml    Total OUT: 65 ml  ---------------------------------------------  Total NET: 35 ml    I&O's Summary  I & Os for 24h ending 07 Apr 2017 07:00  =============================================  IN: 1850 ml / OUT: 2735 ml / NET: -885 ml    I & Os for current day (as of 07 Apr 2017 08:12)  =============================================  IN: 100 ml / OUT: 65 ml / NET: 35 ml      PHYSICAL EXAM:  Neurological: AAOx3, FC, speech coherent   CNII-XII; EOM intact, PERRL  Motor: MAEx4 5/5 UE and LE B/L         [x] warm well perfused; capillary refill <3 seconds   Sensation: [x] intact to light touch  [] decreased:   Incision/Wound: Scalp incision site C/D/I, staples in place    TUBES/LINES:  [] CVC  [] A-line  [] Lumbar Drain  [] Ventriculostomy  [] Other    DIET:  [] NPO  [x] Mechanical  [] Tube feeds    LABS:                        10.9   17.0  )-----------( 341      ( 07 Apr 2017 04:59 )             32.2     04-07    137  |  100  |  20  ----------------------------<  109<H>  4.3   |  27  |  0.65    Ca    8.1<L>      07 Apr 2017 04:59  Phos  4.9     04-07  Mg     2.1     04-07    TPro  5.9<L>  /  Alb  2.8<L>  /  TBili  0.4  /  DBili  0.08  /  AST  13<L>  /  ALT  58<H>  /  AlkPhos  90  04-07    PT/INR - ( 06 Apr 2017 14:58 )   PT: 10.1 sec;   INR: 0.91          PTT - ( 06 Apr 2017 14:58 )  PTT:22.5 sec      CAPILLARY BLOOD GLUCOSE  102 (07 Apr 2017 07:00)  115 (06 Apr 2017 21:54)  115 (06 Apr 2017 21:51)  98 (06 Apr 2017 14:25)    Drug Levels: [] N/A  CSF Analysis: [] N/A    Allergies    adhesives (Rash)  No Known Drug Allergies  Originally Entered as [Unknown] reaction to [Other][Tape] (Unknown)    Intolerances      MEDICATIONS:  Antibiotics:    Neuro:  ondansetron Injectable 4milliGRAM(s) IV Push every 6 hours PRN  levETIRAcetam  IVPB 500milliGRAM(s) IV Intermittent every 12 hours  escitalopram 30milliGRAM(s) Oral daily    Anticoagulation:    OTHER:  loratadine 10milliGRAM(s) Oral daily  letrozole 2.5milliGRAM(s) Oral daily  ALBUTerol    0.083%. 2.5milliGRAM(s) Nebulizer once PRN  labetalol Injectable 10milliGRAM(s) IV Push every 1 hour PRN  hydrALAZINE Injectable 10milliGRAM(s) IV Push every 1 hour PRN  dexamethasone  Injectable 4milliGRAM(s) IV Push every 6 hours  senna 1Tablet(s) Oral three times a day  insulin lispro (HumaLOG) corrective regimen sliding scale  SubCutaneous Before meals and at bedtime  famotidine    Tablet 20milliGRAM(s) Oral before breakfast    IVF:  sodium chloride 0.9%. 1000milliLiter(s) IV Continuous <Continuous>    CULTURES:    RADIOLOGY & ADDITIONAL TESTS:      ASSESSMENT:  71y Female s/p right craniotomy for tumor resection    PLAN:  NEURO:  -neuro checks  -pain control  -continue keppra 500 BID  -continue decadron 4q6  -f/u with Dr. Wright for liver lesion ablation prior to dispo  -MRI tmrw    CARDIOVASCULAR:  -SBP goal 100-150    PULMONARY:  -room air    RENAL:  -IVL    GI:  -docusate/senna  -famotidine while on steroids    HEME:  -H/H stable, no issues    ID:  -afebrile, no issues    ENDO:  -ISS    DVT PROPHYLAXIS:  [x] Venodynes                                [] Heparin/Lovenox    -D/w Dr. Muniz

## 2017-04-07 NOTE — PROGRESS NOTE ADULT - ATTENDING COMMENTS
PLAN:   NEURO: neurochecks q2h, pain control with tylenol, opiates  mass: f/u histopathology; taper decadron 4mg PO q8h  seizure prophylaxis: levetiracetam 500 PO BID (stop date 04/10)  depression:  continue lexapro  REHAB:  physical therapy evaluation and management    EARLY MOB:      PULM:  Room air, incentive spirometry; COPD, cont home meds, PRN nebs  CARDIO:  SBP goal 100-150mm Hg  ENDO:  Blood sugar goals 140-180 mg/dL, continue insulin sliding scale while on steroids  GI:  PPI for GI prophylaxis wihle on steroids (switch famotidine to PPI)  DIET: regular, docusate senna  RENAL:  IV lock  HEM/ONC: Hb stable  VTE Prophylaxis:  SCDs, start SQL tonight, baseline doppler for DVT suspecgted on admission (malignancy)  ID: afebrile, no leukocytosis  Social: will update family    Patient at high risk for neurological deterioration or death due to:  seizure, intracranial bleed.  Critical care time, excluding procedures: 45 minutes. PLAN:   NEURO: neurochecks q2h, pain control with tylenol, opiates  mass: f/u histopathology; taper decadron 4mg PO q8h  seizure prophylaxis: levetiracetam 500 PO BID (stop date 04/10)  depression:  continue Lexapro  REHAB:  physical therapy evaluation and management    EARLY MOB:      PULM:  Room air, incentive spirometry; COPD, cont home meds, PRN nebs  CARDIO:  SBP goal 100-150mm Hg  ENDO:  Blood sugar goals 140-180 mg/dL, continue insulin sliding scale while on steroids  GI:  PPI for GI prophylaxis while on steroids (switch famotidine to PPI)  DIET: regular, docusate senna  RENAL:  IV lock  HEM/ONC: Hb stable  VTE Prophylaxis:  SCDs, start SQL tonight, baseline doppler for DVT suspected on admission (malignancy)  ID: afebrile, no leukocytosis  Social: will update family    Patient at high risk for neurological deterioration or death due to:  seizure, intracranial bleed.  Critical care time, excluding procedures: 45 minutes.

## 2017-04-07 NOTE — PROGRESS NOTE ADULT - SUBJECTIVE AND OBJECTIVE BOX
=================================  NEUROCRITICAL CARE ATTENDING NOTE  =================================    RAFY LO   MRN-[id number]  Summary:  71F with breast and lung CA, on imagging noted R frontal enhancing mass.  s/p R crani, en bloc excision  Overnight Events: no over night events    PAST MEDICAL & SURGICAL HISTORY:  Lung collapse: 2x COPD (chronic obstructive pulmonary disease) Brain tumor Liver cancer Carcinoid tumor Malignant neoplasm of female breast: Left breast CA with liver metsMalignant neoplasm of bronchus and lung, unspecified site: RUL Lobectomy, RLL wedge resection Surgery, elective: Thoractomy with lung resection- Right upper lobe lobectomy Right  lower lobe wedge Surgery, elective: Ovaries removed 1980&quot;s Surgery, elective: Left wrist Cancer of liver: mets from breast Breast cancer: left mastectomy  Home meds: xanax 0.5 PRN, singulair, lexapro 30, respimat 2 puffs daily, PRN ventolin, femara (letrozole), decadron 4mg dailiy, zyrtec PRN, acipex    PHYSICAL EXAMINATION  T(C): , Max: 36.9 (04-06 @ 14:25) HR: 66 (60 - 79) BP: 134/60 (92/49 - 143/63) RR: 38 (8 - 38) SpO2: 95% (91% - 100%)  NEUROLOGIC EXAMINATION:  Patient is awake, alert, fully oriented, pupils 3mm equal and briskly reactive to light, EOMs intact, muscle strength 5/5 on all 4 extremities  GENERAL:  not intubated, not in cardiorespiratory distress  EENT: anicteric  CARDIOVASC:  (+) S1 S2, normal rate and regular rhythm  PULMONARY:  clear to auscultation bilaterally  ABDOMEN:  soft, nontender, with normoactive bowel sounds  EXTREMITIES:  no edema  SKIN:  no rash  MISC:      I & Os for 24h ending 04-07-17  =============================================  IN: 1850 ml / OUT: 2735 ml / NET: -885 ml    LABS:  CAPILLARY BLOOD GLUCOSE 102 (07 Apr 2017 07:00) 115 (06 Apr 2017 21:54)  115 (06 Apr 2017 21:51) 98 (06 Apr 2017 14:25)                        10.9   17.0  )-----------( 341      ( 07 Apr 2017 04:59 )             32.2       137  |  100  |  20  ----------------------------<  109<H>  4.3   |  27  |  0.65    Ca    8.1<L>      07 Apr 2017 04:59  Phos  4.9     04-07  Mg     2.1     04-07    TPro  5.9<L>  /  Alb  2.8<L>  /  TBili  0.4  /  DBili  0.08  /  AST  13<L>  /  ALT  58<H>  /  AlkPhos  90  04-07    Neuroimaging: reviewed  Other imaging:    MEDICATIONS: loratadine 10mg daily letrozole 2.5 mg daily dexamethasone 4mg IV q6h senna levetiracetam 500 IV q12h mod ISS escitalopram 30mg daily famotidine 20mg pre breakfast albuterol PRN     IV FLUIDS: heplocked  DRIPS:  DIET: regular diet  Lines / Drains: no drains    CODE STATUS:  full code                       GOALS OF CARE:  aggressive                      DISPOSITION:  ICU =================================  NEUROCRITICAL CARE ATTENDING NOTE  =================================    RAFY LO   [id number]  Summary:  71F with breast and lung CA, on imagging noted R frontal enhancing mass.  s/p R crani, en bloc excision  Overnight Events: no over night events    PAST MEDICAL & SURGICAL HISTORY:  Lung collapse: 2x COPD (chronic obstructive pulmonary disease) Brain tumor Liver cancer Carcinoid tumor Malignant neoplasm of female breast: Left breast CA with liver metsMalignant neoplasm of bronchus and lung, unspecified site: RUL Lobectomy, RLL wedge resection Surgery, elective: Thoractomy with lung resection- Right upper lobe lobectomy Right  lower lobe wedge Surgery, elective: Ovaries removed 1980&quot;s Surgery, elective: Left wrist Cancer of liver: mets from breast Breast cancer: left mastectomy  Home meds: xanax 0.5 PRN, singulair, lexapro 30, respimat 2 puffs daily, PRN ventolin, femara (letrozole), decadron 4mg dailiy, zyrtec PRN, acipex    PHYSICAL EXAMINATION  T(C): , Max: 36.9 (04-06 @ 14:25) HR: 66 (60 - 79) BP: 134/60 (92/49 - 143/63) RR: 38 (8 - 38) SpO2: 95% (91% - 100%)  NEUROLOGIC EXAMINATION:  Patient is awake, alert, fully oriented, pupils 3mm equal and briskly reactive to light, EOMs intact, muscle strength 5/5 on all 4 extremities  GENERAL:  not intubated, not in cardiorespiratory distress  EENT: anicteric  CARDIOVASC:  (+) S1 S2, normal rate and regular rhythm  PULMONARY:  clear to auscultation bilaterally  ABDOMEN:  soft, nontender, with normoactive bowel sounds  EXTREMITIES:  no edema  SKIN:  no rash    I & Os for 24h ending 04-07-17  IN: 1850 ml / OUT: 2735 ml / NET: -885 ml    LABS:  CAPILLARY BLOOD GLUCOSE 102 (07 Apr 2017 07:00) 115 (06 Apr 2017 21:54)  115 (06 Apr 2017 21:51) 98 (06 Apr 2017 14:25)                        10.9   17.0  )-----------( 341      ( 07 Apr 2017 04:59 )             32.2       137  |  100  |  20  ----------------------------<  109<H>  4.3   |  27  |  0.65    Ca    8.1<L>      07 Apr 2017 04:59  Phos  4.9     04-07  Mg     2.1     04-07    TPro  5.9<L>  /  Alb  2.8<L>  /  TBili  0.4  /  DBili  0.08  /  AST  13<L>  /  ALT  58<H>  /  AlkPhos  90  04-07    Neuroimaging: reviewed  Other imaging:    MEDICATIONS: loratadine 10mg daily letrozole 2.5 mg daily dexamethasone 4mg IV q6h senna levetiracetam 500 IV q12h mod ISS escitalopram 30mg daily famotidine 20mg pre breakfast albuterol PRN     IV FLUIDS: heplocked  DRIPS:  DIET: regular diet  Lines / Drains: no drains    CODE STATUS:  full code                       GOALS OF CARE:  aggressive                      DISPOSITION:  ICU

## 2017-04-07 NOTE — PHYSICAL THERAPY INITIAL EVALUATION ADULT - PERTINENT HX OF CURRENT PROBLEM, REHAB EVAL
70 y/o female presenting with history of breast and lung Ca.  She reported fatigue to her oncologist and further brain imaging revealed a right frontal enhancing mass. She has some left sided facial drooping but denies any seizures, vision loss, numbness or tingling.

## 2017-04-07 NOTE — DIETITIAN INITIAL EVALUATION ADULT. - ENERGY NEEDS
79 kg ABW; 50 kg IBW; BMI 32; 158% IBW; IBW used due to pt > 120% of IBW. Increased needs secondary to catabolic state & Surgery

## 2017-04-08 ENCOUNTER — TRANSCRIPTION ENCOUNTER (OUTPATIENT)
Age: 72
End: 2017-04-08

## 2017-04-08 VITALS — TEMPERATURE: 99 F

## 2017-04-08 LAB
ANION GAP SERPL CALC-SCNC: 8 MMOL/L — LOW (ref 9–16)
BUN SERPL-MCNC: 32 MG/DL — HIGH (ref 7–23)
CALCIUM SERPL-MCNC: 8.6 MG/DL — SIGNIFICANT CHANGE UP (ref 8.5–10.5)
CHLORIDE SERPL-SCNC: 101 MMOL/L — SIGNIFICANT CHANGE UP (ref 96–108)
CO2 SERPL-SCNC: 27 MMOL/L — SIGNIFICANT CHANGE UP (ref 22–31)
CREAT SERPL-MCNC: 0.72 MG/DL — SIGNIFICANT CHANGE UP (ref 0.5–1.3)
GLUCOSE SERPL-MCNC: 78 MG/DL — SIGNIFICANT CHANGE UP (ref 70–99)
HCT VFR BLD CALC: 33.4 % — LOW (ref 34.5–45)
HGB BLD-MCNC: 11.2 G/DL — LOW (ref 11.5–15.5)
MAGNESIUM SERPL-MCNC: 2.5 MG/DL — HIGH (ref 1.6–2.4)
MCHC RBC-ENTMCNC: 28.9 PG — SIGNIFICANT CHANGE UP (ref 27–34)
MCHC RBC-ENTMCNC: 33.5 G/DL — SIGNIFICANT CHANGE UP (ref 32–36)
MCV RBC AUTO: 86.3 FL — SIGNIFICANT CHANGE UP (ref 80–100)
PHOSPHATE SERPL-MCNC: 3.5 MG/DL — SIGNIFICANT CHANGE UP (ref 2.5–4.5)
PLATELET # BLD AUTO: 316 K/UL — SIGNIFICANT CHANGE UP (ref 150–400)
POTASSIUM SERPL-MCNC: 4.4 MMOL/L — SIGNIFICANT CHANGE UP (ref 3.5–5.3)
POTASSIUM SERPL-SCNC: 4.4 MMOL/L — SIGNIFICANT CHANGE UP (ref 3.5–5.3)
RBC # BLD: 3.87 M/UL — SIGNIFICANT CHANGE UP (ref 3.8–5.2)
RBC # FLD: 14.8 % — SIGNIFICANT CHANGE UP (ref 10.3–16.9)
SODIUM SERPL-SCNC: 136 MMOL/L — SIGNIFICANT CHANGE UP (ref 135–145)
WBC # BLD: 13.3 K/UL — HIGH (ref 3.8–10.5)
WBC # FLD AUTO: 13.3 K/UL — HIGH (ref 3.8–10.5)

## 2017-04-08 PROCEDURE — 86901 BLOOD TYPING SEROLOGIC RH(D): CPT

## 2017-04-08 PROCEDURE — 85025 COMPLETE CBC W/AUTO DIFF WBC: CPT

## 2017-04-08 PROCEDURE — 85610 PROTHROMBIN TIME: CPT

## 2017-04-08 PROCEDURE — A9585: CPT

## 2017-04-08 PROCEDURE — 80053 COMPREHEN METABOLIC PANEL: CPT

## 2017-04-08 PROCEDURE — 70553 MRI BRAIN STEM W/O & W/DYE: CPT

## 2017-04-08 PROCEDURE — 36415 COLL VENOUS BLD VENIPUNCTURE: CPT

## 2017-04-08 PROCEDURE — 88307 TISSUE EXAM BY PATHOLOGIST: CPT

## 2017-04-08 PROCEDURE — 70553 MRI BRAIN STEM W/O & W/DYE: CPT | Mod: 26

## 2017-04-08 PROCEDURE — 86850 RBC ANTIBODY SCREEN: CPT

## 2017-04-08 PROCEDURE — 88360 TUMOR IMMUNOHISTOCHEM/MANUAL: CPT

## 2017-04-08 PROCEDURE — 85018 HEMOGLOBIN: CPT

## 2017-04-08 PROCEDURE — 86900 BLOOD TYPING SEROLOGIC ABO: CPT

## 2017-04-08 PROCEDURE — 88331 PATH CONSLTJ SURG 1 BLK 1SPC: CPT

## 2017-04-08 PROCEDURE — 85027 COMPLETE CBC AUTOMATED: CPT

## 2017-04-08 PROCEDURE — 88341 IMHCHEM/IMCYTCHM EA ADD ANTB: CPT

## 2017-04-08 PROCEDURE — 84295 ASSAY OF SERUM SODIUM: CPT

## 2017-04-08 PROCEDURE — 97161 PT EVAL LOW COMPLEX 20 MIN: CPT

## 2017-04-08 PROCEDURE — 80048 BASIC METABOLIC PNL TOTAL CA: CPT

## 2017-04-08 PROCEDURE — 84100 ASSAY OF PHOSPHORUS: CPT

## 2017-04-08 PROCEDURE — 97116 GAIT TRAINING THERAPY: CPT

## 2017-04-08 PROCEDURE — 70552 MRI BRAIN STEM W/DYE: CPT

## 2017-04-08 PROCEDURE — 85730 THROMBOPLASTIN TIME PARTIAL: CPT

## 2017-04-08 PROCEDURE — 82330 ASSAY OF CALCIUM: CPT

## 2017-04-08 PROCEDURE — 93970 EXTREMITY STUDY: CPT

## 2017-04-08 PROCEDURE — 80076 HEPATIC FUNCTION PANEL: CPT

## 2017-04-08 PROCEDURE — 84132 ASSAY OF SERUM POTASSIUM: CPT

## 2017-04-08 PROCEDURE — 83735 ASSAY OF MAGNESIUM: CPT

## 2017-04-08 RX ORDER — RABEPRAZOLE 20 MG/1
1 TABLET, DELAYED RELEASE ORAL
Qty: 30 | Refills: 0 | OUTPATIENT
Start: 2017-04-08 | End: 2017-05-08

## 2017-04-08 RX ORDER — DEXAMETHASONE 0.5 MG/5ML
4 ELIXIR ORAL
Qty: 0 | Refills: 0 | COMMUNITY

## 2017-04-08 RX ORDER — DEXAMETHASONE 0.5 MG/5ML
1 ELIXIR ORAL
Qty: 60 | Refills: 0 | OUTPATIENT
Start: 2017-04-08 | End: 2017-05-08

## 2017-04-08 RX ORDER — LORATADINE 10 MG/1
1 TABLET ORAL
Qty: 0 | Refills: 0 | COMMUNITY
Start: 2017-04-08

## 2017-04-08 RX ORDER — LEVETIRACETAM 250 MG/1
1 TABLET, FILM COATED ORAL
Qty: 60 | Refills: 0 | OUTPATIENT
Start: 2017-04-08 | End: 2017-05-08

## 2017-04-08 RX ORDER — DOCUSATE SODIUM 100 MG
1 CAPSULE ORAL
Qty: 90 | Refills: 0 | OUTPATIENT
Start: 2017-04-08 | End: 2017-05-08

## 2017-04-08 RX ORDER — RABEPRAZOLE 20 MG/1
1 TABLET, DELAYED RELEASE ORAL
Qty: 0 | Refills: 0 | COMMUNITY

## 2017-04-08 RX ORDER — ACETAMINOPHEN 500 MG
2 TABLET ORAL
Qty: 0 | Refills: 0 | COMMUNITY
Start: 2017-04-08

## 2017-04-08 RX ADMIN — SENNA PLUS 1 TABLET(S): 8.6 TABLET ORAL at 06:56

## 2017-04-08 RX ADMIN — FAMOTIDINE 20 MILLIGRAM(S): 10 INJECTION INTRAVENOUS at 07:02

## 2017-04-08 RX ADMIN — Medication 100 MILLIGRAM(S): at 06:56

## 2017-04-08 RX ADMIN — Medication 650 MILLIGRAM(S): at 14:32

## 2017-04-08 RX ADMIN — Medication 4 MILLIGRAM(S): at 04:04

## 2017-04-08 RX ADMIN — Medication 100 MILLIGRAM(S): at 14:33

## 2017-04-08 RX ADMIN — SENNA PLUS 1 TABLET(S): 8.6 TABLET ORAL at 14:34

## 2017-04-08 RX ADMIN — Medication 1 MILLIGRAM(S): at 00:57

## 2017-04-08 RX ADMIN — Medication 650 MILLIGRAM(S): at 15:33

## 2017-04-08 RX ADMIN — Medication 4 MILLIGRAM(S): at 11:57

## 2017-04-08 RX ADMIN — LEVETIRACETAM 500 MILLIGRAM(S): 250 TABLET, FILM COATED ORAL at 11:57

## 2017-04-08 NOTE — DISCHARGE NOTE ADULT - MEDICATION SUMMARY - MEDICATIONS TO TAKE
I will START or STAY ON the medications listed below when I get home from the hospital:    dexamethasone 2 mg oral tablet  -- 1 tab(s) by mouth 2 times a day MDD:2  -- Indication: For BRAIN TUMOR    Tylenol Caplet 325 mg oral tablet  -- 2 tab(s) by mouth every 6 hours, As needed, Moderate Pain (4 - 6)  -- Indication: For pain    Percocet 5/325 oral tablet  -- 1 tab(s) by mouth every 6 hours, As needed, Severe Pain (7 - 10) MDD:4  -- Indication: For pain    aluminum hydroxide-magnesium hydroxide 200 mg-200 mg/5 mL oral suspension  -- 30 milliliter(s) by mouth every 6 hours, As needed, Dyspepsia  -- Indication: For GERD    Keppra 500 mg oral tablet  -- 1 tab(s) by mouth 2 times a day MDD:2  -- Indication: For Seizure prevention    Lexapro  -- 30 milligram(s) by mouth once a day  -- Indication: For depression    ZyrTEC 10 mg oral tablet  -- 1 tab(s) by mouth once a day (at bedtime)  -- Indication: For allergy    loratadine 10 mg oral tablet  -- 1 tab(s) by mouth once a day  -- Indication: For allergy    Femara 2.5 mg oral tablet  -- 1 tab(s) by mouth once a day  -- Indication: For BRAIN TUMOR    ALPRAZolam 0.5 mg oral tablet  -- 1 tab(s) by mouth once a day (at bedtime), As needed, sleep  -- Indication: For anxiety    Stiolto Respimat 2.5 mcg-2.5 mcg inhalation aerosol  -- 2 puff(s) inhaled every 24 hours  -- Indication: For COPD (chronic obstructive pulmonary disease)    Ventolin Nebules 2.5 mg/3 mL (0.083%) inhalation solution  --  inhaled , As Needed - for bronchospasm  -- Indication: For COPD (chronic obstructive pulmonary disease)    Colace 100 mg oral capsule  -- 1 cap(s) by mouth 3 times a day MDD:3  -- Indication: For Bowell regimen    Singulair  --  by mouth   -- Indication: For COPD (chronic obstructive pulmonary disease)    melatonin 3 mg oral tablet  -- 1 tab(s) by mouth once a day (at bedtime), As needed, Insomnia  -- Indication: For Liver cancer    AcipHex 20 mg oral delayed release tablet  -- 1 tab(s) by mouth once a day MDD:1  -- Indication: For GERD prevention

## 2017-04-08 NOTE — DISCHARGE NOTE ADULT - CARE PROVIDERS DIRECT ADDRESSES
[email or web address]

## 2017-04-08 NOTE — DISCHARGE NOTE ADULT - PATIENT PORTAL LINK FT
“You can access the Follow[employer] Patient Portal, offered by [facility], by registering with the following website: [email or web address]”

## 2017-04-08 NOTE — DISCHARGE NOTE ADULT - CARE PROVIDER_API CALL
Nishant Muniz), Neurological Surgery  [address] [address]  Phone: [phone]  Fax: [phone]

## 2017-04-08 NOTE — DISCHARGE NOTE ADULT - HOSPITAL COURSE
Patient went for scheduled craniotomy for resection of brain tumor. There were no intra nor postoperative complication. She was cleared for discharge by PT.

## 2017-04-08 NOTE — DISCHARGE NOTE ADULT - PLAN OF CARE
Tumor resected Resume home diet and medications, limited physical activity, Follow up with Dr. Muniz . Call  for any question or problem, make appointment to remove pia in Dr. Muniz office.

## 2017-04-08 NOTE — DISCHARGE NOTE ADULT - CARE PLAN
Principal Discharge DX:	Brain tumor  Goal:	Tumor resected  Instructions for follow-up, activity and diet:	Resume home diet and medications, limited physical activity, Follow up with Dr. Muniz .  Instructions for follow-up, activity and diet:	Call  for any question or problem, make appointment to remove pia in Dr. Muniz office.

## 2017-04-10 LAB — SURGICAL PATHOLOGY STUDY: SIGNIFICANT CHANGE UP

## 2017-04-11 DIAGNOSIS — K21.9 GASTRO-ESOPHAGEAL REFLUX DISEASE WITHOUT ESOPHAGITIS: ICD-10-CM

## 2017-04-11 DIAGNOSIS — Z87.891 PERSONAL HISTORY OF NICOTINE DEPENDENCE: ICD-10-CM

## 2017-04-11 DIAGNOSIS — Z85.3 PERSONAL HISTORY OF MALIGNANT NEOPLASM OF BREAST: ICD-10-CM

## 2017-04-11 DIAGNOSIS — G47.00 INSOMNIA, UNSPECIFIED: ICD-10-CM

## 2017-04-11 DIAGNOSIS — C78.7 SECONDARY MALIGNANT NEOPLASM OF LIVER AND INTRAHEPATIC BILE DUCT: ICD-10-CM

## 2017-04-11 DIAGNOSIS — Z90.12 ACQUIRED ABSENCE OF LEFT BREAST AND NIPPLE: ICD-10-CM

## 2017-04-11 DIAGNOSIS — C79.31 SECONDARY MALIGNANT NEOPLASM OF BRAIN: ICD-10-CM

## 2017-04-11 DIAGNOSIS — Z90.722 ACQUIRED ABSENCE OF OVARIES, BILATERAL: ICD-10-CM

## 2017-04-11 DIAGNOSIS — J44.9 CHRONIC OBSTRUCTIVE PULMONARY DISEASE, UNSPECIFIED: ICD-10-CM

## 2017-04-11 DIAGNOSIS — Z85.118 PERSONAL HISTORY OF OTHER MALIGNANT NEOPLASM OF BRONCHUS AND LUNG: ICD-10-CM

## 2017-04-11 DIAGNOSIS — F41.8 OTHER SPECIFIED ANXIETY DISORDERS: ICD-10-CM

## 2017-04-11 DIAGNOSIS — Z91.09 OTHER ALLERGY STATUS, OTHER THAN TO DRUGS AND BIOLOGICAL SUBSTANCES: ICD-10-CM

## 2017-04-11 PROBLEM — D49.6 NEOPLASM OF UNSPECIFIED BEHAVIOR OF BRAIN: Chronic | Status: ACTIVE | Noted: 2017-04-05

## 2017-04-11 PROBLEM — Z87.09: Status: RESOLVED | Noted: 2017-03-31 | Resolved: 2017-04-11

## 2017-04-17 ENCOUNTER — APPOINTMENT (OUTPATIENT)
Dept: NEUROSURGERY | Facility: CLINIC | Age: 72
End: 2017-04-17

## 2017-04-17 VITALS
TEMPERATURE: 98.5 F | BODY MASS INDEX: 32.02 KG/M2 | HEART RATE: 78 BPM | HEIGHT: 62 IN | OXYGEN SATURATION: 96 % | DIASTOLIC BLOOD PRESSURE: 67 MMHG | WEIGHT: 174 LBS | SYSTOLIC BLOOD PRESSURE: 111 MMHG

## 2017-04-17 DIAGNOSIS — Z87.09 PERSONAL HISTORY OF OTHER DISEASES OF THE RESPIRATORY SYSTEM: ICD-10-CM

## 2017-04-17 RX ORDER — DEXAMETHASONE 6 MG/1
TABLET ORAL
Refills: 0 | Status: COMPLETED | COMMUNITY
End: 2017-04-17

## 2017-04-20 ENCOUNTER — RX RENEWAL (OUTPATIENT)
Age: 72
End: 2017-04-20

## 2017-04-24 ENCOUNTER — APPOINTMENT (OUTPATIENT)
Dept: RADIATION ONCOLOGY | Facility: CLINIC | Age: 72
End: 2017-04-24

## 2017-04-24 VITALS
WEIGHT: 172.5 LBS | SYSTOLIC BLOOD PRESSURE: 113 MMHG | HEART RATE: 76 BPM | DIASTOLIC BLOOD PRESSURE: 66 MMHG | OXYGEN SATURATION: 95 % | BODY MASS INDEX: 31.55 KG/M2

## 2017-04-25 ENCOUNTER — RESULT REVIEW (OUTPATIENT)
Age: 72
End: 2017-04-25

## 2017-04-26 ENCOUNTER — FORM ENCOUNTER (OUTPATIENT)
Age: 72
End: 2017-04-26

## 2017-04-26 ENCOUNTER — OUTPATIENT (OUTPATIENT)
Dept: OUTPATIENT SERVICES | Facility: HOSPITAL | Age: 72
LOS: 1 days | End: 2017-04-26
Payer: MEDICARE

## 2017-04-26 ENCOUNTER — RESULT REVIEW (OUTPATIENT)
Age: 72
End: 2017-04-26

## 2017-04-26 DIAGNOSIS — Z41.9 ENCOUNTER FOR PROCEDURE FOR PURPOSES OTHER THAN REMEDYING HEALTH STATE, UNSPECIFIED: Chronic | ICD-10-CM

## 2017-04-26 DIAGNOSIS — C50.919 MALIGNANT NEOPLASM OF UNSPECIFIED SITE OF UNSPECIFIED FEMALE BREAST: Chronic | ICD-10-CM

## 2017-04-26 PROCEDURE — 88360 TUMOR IMMUNOHISTOCHEM/MANUAL: CPT

## 2017-04-26 PROCEDURE — 47000 NEEDLE BIOPSY OF LIVER PERQ: CPT

## 2017-04-26 PROCEDURE — 77012 CT SCAN FOR NEEDLE BIOPSY: CPT | Mod: 26

## 2017-04-26 PROCEDURE — 77012 CT SCAN FOR NEEDLE BIOPSY: CPT

## 2017-04-26 PROCEDURE — 88307 TISSUE EXAM BY PATHOLOGIST: CPT

## 2017-04-26 PROCEDURE — 88341 IMHCHEM/IMCYTCHM EA ADD ANTB: CPT

## 2017-04-26 PROCEDURE — 88173 CYTOPATH EVAL FNA REPORT: CPT

## 2017-04-26 PROCEDURE — 88305 TISSUE EXAM BY PATHOLOGIST: CPT

## 2017-04-26 PROCEDURE — 88342 IMHCHEM/IMCYTCHM 1ST ANTB: CPT

## 2017-04-27 ENCOUNTER — OUTPATIENT (OUTPATIENT)
Dept: OUTPATIENT SERVICES | Facility: HOSPITAL | Age: 72
LOS: 1 days | End: 2017-04-27

## 2017-04-27 ENCOUNTER — APPOINTMENT (OUTPATIENT)
Dept: MRI IMAGING | Facility: CLINIC | Age: 72
End: 2017-04-27

## 2017-04-27 DIAGNOSIS — Z41.9 ENCOUNTER FOR PROCEDURE FOR PURPOSES OTHER THAN REMEDYING HEALTH STATE, UNSPECIFIED: Chronic | ICD-10-CM

## 2017-04-27 DIAGNOSIS — C50.919 MALIGNANT NEOPLASM OF UNSPECIFIED SITE OF UNSPECIFIED FEMALE BREAST: Chronic | ICD-10-CM

## 2017-04-28 LAB — NON-GYNECOLOGICAL CYTOLOGY STUDY: SIGNIFICANT CHANGE UP

## 2017-05-02 LAB — SURGICAL PATHOLOGY STUDY: SIGNIFICANT CHANGE UP

## 2017-05-04 ENCOUNTER — APPOINTMENT (OUTPATIENT)
Dept: RADIATION ONCOLOGY | Facility: CLINIC | Age: 72
End: 2017-05-04

## 2017-05-04 VITALS
DIASTOLIC BLOOD PRESSURE: 66 MMHG | BODY MASS INDEX: 31.13 KG/M2 | WEIGHT: 170.19 LBS | SYSTOLIC BLOOD PRESSURE: 102 MMHG | OXYGEN SATURATION: 95 % | HEART RATE: 98 BPM

## 2017-05-09 ENCOUNTER — RESULT REVIEW (OUTPATIENT)
Age: 72
End: 2017-05-09

## 2017-05-18 ENCOUNTER — APPOINTMENT (OUTPATIENT)
Dept: THORACIC SURGERY | Facility: CLINIC | Age: 72
End: 2017-05-18

## 2017-05-18 VITALS
SYSTOLIC BLOOD PRESSURE: 137 MMHG | RESPIRATION RATE: 19 BRPM | OXYGEN SATURATION: 98 % | DIASTOLIC BLOOD PRESSURE: 63 MMHG | TEMPERATURE: 97.3 F | HEART RATE: 97 BPM

## 2017-05-22 VITALS — OXYGEN SATURATION: 94 % | HEART RATE: 102 BPM | SYSTOLIC BLOOD PRESSURE: 122 MMHG | DIASTOLIC BLOOD PRESSURE: 71 MMHG

## 2017-05-25 ENCOUNTER — FORM ENCOUNTER (OUTPATIENT)
Age: 72
End: 2017-05-25

## 2017-05-26 ENCOUNTER — APPOINTMENT (OUTPATIENT)
Dept: NEUROSURGERY | Facility: CLINIC | Age: 72
End: 2017-05-26

## 2017-05-26 ENCOUNTER — OUTPATIENT (OUTPATIENT)
Dept: OUTPATIENT SERVICES | Facility: HOSPITAL | Age: 72
LOS: 1 days | End: 2017-05-26
Payer: MEDICARE

## 2017-05-26 VITALS
BODY MASS INDEX: 30.18 KG/M2 | SYSTOLIC BLOOD PRESSURE: 103 MMHG | HEIGHT: 62 IN | OXYGEN SATURATION: 97 % | WEIGHT: 164 LBS | HEART RATE: 88 BPM | DIASTOLIC BLOOD PRESSURE: 66 MMHG

## 2017-05-26 DIAGNOSIS — Z41.9 ENCOUNTER FOR PROCEDURE FOR PURPOSES OTHER THAN REMEDYING HEALTH STATE, UNSPECIFIED: Chronic | ICD-10-CM

## 2017-05-26 DIAGNOSIS — C50.919 MALIGNANT NEOPLASM OF UNSPECIFIED SITE OF UNSPECIFIED FEMALE BREAST: Chronic | ICD-10-CM

## 2017-05-26 DIAGNOSIS — Z78.9 OTHER SPECIFIED HEALTH STATUS: ICD-10-CM

## 2017-05-26 PROCEDURE — A9552: CPT

## 2017-05-26 PROCEDURE — 78815 PET IMAGE W/CT SKULL-THIGH: CPT | Mod: 26

## 2017-05-26 PROCEDURE — 78815 PET IMAGE W/CT SKULL-THIGH: CPT

## 2017-06-01 ENCOUNTER — APPOINTMENT (OUTPATIENT)
Dept: THORACIC SURGERY | Facility: CLINIC | Age: 72
End: 2017-06-01

## 2017-06-01 VITALS
SYSTOLIC BLOOD PRESSURE: 117 MMHG | TEMPERATURE: 98.1 F | DIASTOLIC BLOOD PRESSURE: 57 MMHG | HEART RATE: 76 BPM | OXYGEN SATURATION: 95 % | RESPIRATION RATE: 18 BRPM

## 2017-06-02 RX ORDER — DEXAMETHASONE 6 MG/1
TABLET ORAL
Refills: 0 | Status: COMPLETED | COMMUNITY
End: 2017-06-02

## 2017-06-02 RX ORDER — DEXAMETHASONE 1 MG/1
1 TABLET ORAL EVERY MORNING
Qty: 30 | Refills: 0 | Status: COMPLETED | COMMUNITY
Start: 2017-04-12 | End: 2017-06-02

## 2017-06-06 ENCOUNTER — OUTPATIENT (OUTPATIENT)
Dept: OUTPATIENT SERVICES | Facility: HOSPITAL | Age: 72
LOS: 1 days | End: 2017-06-06
Payer: MEDICARE

## 2017-06-06 DIAGNOSIS — Z41.9 ENCOUNTER FOR PROCEDURE FOR PURPOSES OTHER THAN REMEDYING HEALTH STATE, UNSPECIFIED: Chronic | ICD-10-CM

## 2017-06-06 DIAGNOSIS — C50.919 MALIGNANT NEOPLASM OF UNSPECIFIED SITE OF UNSPECIFIED FEMALE BREAST: Chronic | ICD-10-CM

## 2017-06-06 PROCEDURE — 77013 CT GUIDE FOR TISSUE ABLATION: CPT

## 2017-06-06 PROCEDURE — 77013 CT GUIDE FOR TISSUE ABLATION: CPT | Mod: 26

## 2017-06-06 PROCEDURE — C1886: CPT

## 2017-06-06 RX ORDER — BUPIVACAINE 13.3 MG/ML
20 INJECTION, SUSPENSION, LIPOSOMAL INFILTRATION ONCE
Qty: 0 | Refills: 0 | Status: DISCONTINUED | OUTPATIENT
Start: 2017-06-06 | End: 2017-06-06

## 2017-06-06 RX ORDER — OXYCODONE HYDROCHLORIDE 5 MG/1
1 TABLET ORAL
Qty: 32 | Refills: 0 | OUTPATIENT
Start: 2017-06-06 | End: 2017-06-14

## 2017-06-06 RX ORDER — BUPIVACAINE 13.3 MG/ML
20 INJECTION, SUSPENSION, LIPOSOMAL INFILTRATION ONCE
Qty: 0 | Refills: 0 | Status: DISCONTINUED | OUTPATIENT
Start: 2017-06-06 | End: 2017-06-21

## 2017-06-20 ENCOUNTER — FORM ENCOUNTER (OUTPATIENT)
Age: 72
End: 2017-06-20

## 2017-06-21 ENCOUNTER — APPOINTMENT (OUTPATIENT)
Dept: RADIATION ONCOLOGY | Facility: CLINIC | Age: 72
End: 2017-06-21

## 2017-06-21 ENCOUNTER — OUTPATIENT (OUTPATIENT)
Dept: OUTPATIENT SERVICES | Facility: HOSPITAL | Age: 72
LOS: 1 days | End: 2017-06-21
Payer: MEDICARE

## 2017-06-21 VITALS — HEART RATE: 89 BPM | DIASTOLIC BLOOD PRESSURE: 64 MMHG | SYSTOLIC BLOOD PRESSURE: 115 MMHG | OXYGEN SATURATION: 96 %

## 2017-06-21 DIAGNOSIS — C50.919 MALIGNANT NEOPLASM OF UNSPECIFIED SITE OF UNSPECIFIED FEMALE BREAST: Chronic | ICD-10-CM

## 2017-06-21 DIAGNOSIS — Z41.9 ENCOUNTER FOR PROCEDURE FOR PURPOSES OTHER THAN REMEDYING HEALTH STATE, UNSPECIFIED: Chronic | ICD-10-CM

## 2017-06-21 PROCEDURE — 70553 MRI BRAIN STEM W/O & W/DYE: CPT | Mod: 26

## 2017-06-21 PROCEDURE — 70553 MRI BRAIN STEM W/O & W/DYE: CPT

## 2017-06-21 PROCEDURE — A9585: CPT

## 2017-06-21 RX ORDER — TRIAMCINOLONE ACETONIDE 55 UG/1
55 SPRAY, METERED NASAL
Refills: 0 | Status: DISCONTINUED | COMMUNITY
End: 2017-06-21

## 2017-06-21 RX ORDER — FAMOTIDINE 10 MG/1
10 TABLET, FILM COATED ORAL
Refills: 0 | Status: DISCONTINUED | COMMUNITY
End: 2017-06-21

## 2017-06-22 ENCOUNTER — APPOINTMENT (OUTPATIENT)
Dept: PULMONOLOGY | Facility: CLINIC | Age: 72
End: 2017-06-22

## 2017-06-22 VITALS
SYSTOLIC BLOOD PRESSURE: 90 MMHG | WEIGHT: 163 LBS | DIASTOLIC BLOOD PRESSURE: 46 MMHG | OXYGEN SATURATION: 97 % | HEART RATE: 81 BPM | HEIGHT: 62 IN | TEMPERATURE: 98.2 F | BODY MASS INDEX: 30 KG/M2

## 2017-06-28 ENCOUNTER — OUTPATIENT (OUTPATIENT)
Dept: OUTPATIENT SERVICES | Facility: HOSPITAL | Age: 72
LOS: 1 days | End: 2017-06-28
Payer: MEDICARE

## 2017-06-28 DIAGNOSIS — Z41.9 ENCOUNTER FOR PROCEDURE FOR PURPOSES OTHER THAN REMEDYING HEALTH STATE, UNSPECIFIED: Chronic | ICD-10-CM

## 2017-06-28 DIAGNOSIS — R06.02 SHORTNESS OF BREATH: ICD-10-CM

## 2017-06-28 DIAGNOSIS — C50.919 MALIGNANT NEOPLASM OF UNSPECIFIED SITE OF UNSPECIFIED FEMALE BREAST: Chronic | ICD-10-CM

## 2017-06-28 PROCEDURE — 94726 PLETHYSMOGRAPHY LUNG VOLUMES: CPT

## 2017-06-28 PROCEDURE — 94760 N-INVAS EAR/PLS OXIMETRY 1: CPT

## 2017-06-28 PROCEDURE — 94729 DIFFUSING CAPACITY: CPT

## 2017-06-28 PROCEDURE — 94010 BREATHING CAPACITY TEST: CPT | Mod: 26

## 2017-06-28 PROCEDURE — 94060 EVALUATION OF WHEEZING: CPT

## 2017-06-28 PROCEDURE — 94726 PLETHYSMOGRAPHY LUNG VOLUMES: CPT | Mod: 26

## 2017-06-28 PROCEDURE — 94729 DIFFUSING CAPACITY: CPT | Mod: 26

## 2017-06-29 ENCOUNTER — OUTPATIENT (OUTPATIENT)
Dept: OUTPATIENT SERVICES | Facility: HOSPITAL | Age: 72
LOS: 1 days | Discharge: HOME | End: 2017-06-29
Payer: MEDICARE

## 2017-06-29 ENCOUNTER — APPOINTMENT (OUTPATIENT)
Dept: GASTROENTEROLOGY | Facility: HOSPITAL | Age: 72
End: 2017-06-29

## 2017-06-29 DIAGNOSIS — Z41.9 ENCOUNTER FOR PROCEDURE FOR PURPOSES OTHER THAN REMEDYING HEALTH STATE, UNSPECIFIED: Chronic | ICD-10-CM

## 2017-06-29 DIAGNOSIS — C50.919 MALIGNANT NEOPLASM OF UNSPECIFIED SITE OF UNSPECIFIED FEMALE BREAST: Chronic | ICD-10-CM

## 2017-06-29 PROCEDURE — 43236 UPPR GI SCOPE W/SUBMUC INJ: CPT

## 2017-06-29 PROCEDURE — 45381 COLONOSCOPY SUBMUCOUS NJX: CPT

## 2017-07-06 ENCOUNTER — RESULT REVIEW (OUTPATIENT)
Age: 72
End: 2017-07-06

## 2017-07-06 ENCOUNTER — INPATIENT (INPATIENT)
Facility: HOSPITAL | Age: 72
LOS: 2 days | Discharge: HOME | DRG: 330 | End: 2017-07-09
Attending: SURGERY | Admitting: SURGERY
Payer: MEDICARE

## 2017-07-06 VITALS
RESPIRATION RATE: 16 BRPM | TEMPERATURE: 98 F | OXYGEN SATURATION: 99 % | HEART RATE: 89 BPM | DIASTOLIC BLOOD PRESSURE: 56 MMHG | SYSTOLIC BLOOD PRESSURE: 110 MMHG

## 2017-07-06 DIAGNOSIS — K21.9 GASTRO-ESOPHAGEAL REFLUX DISEASE WITHOUT ESOPHAGITIS: ICD-10-CM

## 2017-07-06 DIAGNOSIS — Z85.118 PERSONAL HISTORY OF OTHER MALIGNANT NEOPLASM OF BRONCHUS AND LUNG: ICD-10-CM

## 2017-07-06 DIAGNOSIS — Z79.899 OTHER LONG TERM (CURRENT) DRUG THERAPY: ICD-10-CM

## 2017-07-06 DIAGNOSIS — Z41.9 ENCOUNTER FOR PROCEDURE FOR PURPOSES OTHER THAN REMEDYING HEALTH STATE, UNSPECIFIED: Chronic | ICD-10-CM

## 2017-07-06 DIAGNOSIS — I10 ESSENTIAL (PRIMARY) HYPERTENSION: ICD-10-CM

## 2017-07-06 DIAGNOSIS — C79.31 SECONDARY MALIGNANT NEOPLASM OF BRAIN: ICD-10-CM

## 2017-07-06 DIAGNOSIS — C50.919 MALIGNANT NEOPLASM OF UNSPECIFIED SITE OF UNSPECIFIED FEMALE BREAST: Chronic | ICD-10-CM

## 2017-07-06 DIAGNOSIS — K57.90 DIVERTICULOSIS OF INTESTINE, PART UNSPECIFIED, WITHOUT PERFORATION OR ABSCESS WITHOUT BLEEDING: ICD-10-CM

## 2017-07-06 DIAGNOSIS — C7A.012 MALIGNANT CARCINOID TUMOR OF THE ILEUM: ICD-10-CM

## 2017-07-06 RX ORDER — ONDANSETRON 8 MG/1
4 TABLET, FILM COATED ORAL EVERY 6 HOURS
Qty: 0 | Refills: 0 | Status: DISCONTINUED | OUTPATIENT
Start: 2017-07-06 | End: 2017-07-09

## 2017-07-06 RX ORDER — CIPROFLOXACIN LACTATE 400MG/40ML
400 VIAL (ML) INTRAVENOUS ONCE
Qty: 0 | Refills: 0 | Status: COMPLETED | OUTPATIENT
Start: 2017-07-06 | End: 2017-07-06

## 2017-07-06 RX ORDER — HYDROMORPHONE HYDROCHLORIDE 2 MG/ML
0.5 INJECTION INTRAMUSCULAR; INTRAVENOUS; SUBCUTANEOUS
Qty: 0 | Refills: 0 | Status: DISCONTINUED | OUTPATIENT
Start: 2017-07-06 | End: 2017-07-07

## 2017-07-06 RX ORDER — HYDROMORPHONE HYDROCHLORIDE 2 MG/ML
0.5 INJECTION INTRAMUSCULAR; INTRAVENOUS; SUBCUTANEOUS EVERY 4 HOURS
Qty: 0 | Refills: 0 | Status: DISCONTINUED | OUTPATIENT
Start: 2017-07-06 | End: 2017-07-07

## 2017-07-06 RX ORDER — HYDROMORPHONE HYDROCHLORIDE 2 MG/ML
1 INJECTION INTRAMUSCULAR; INTRAVENOUS; SUBCUTANEOUS EVERY 4 HOURS
Qty: 0 | Refills: 0 | Status: DISCONTINUED | OUTPATIENT
Start: 2017-07-06 | End: 2017-07-07

## 2017-07-06 RX ORDER — ESCITALOPRAM OXALATE 10 MG/1
30 TABLET, FILM COATED ORAL
Qty: 0 | Refills: 0 | COMMUNITY

## 2017-07-06 RX ORDER — CIPROFLOXACIN LACTATE 400MG/40ML
400 VIAL (ML) INTRAVENOUS EVERY 12 HOURS
Qty: 0 | Refills: 0 | Status: COMPLETED | OUTPATIENT
Start: 2017-07-07 | End: 2017-07-07

## 2017-07-06 RX ORDER — METRONIDAZOLE 500 MG
500 TABLET ORAL EVERY 8 HOURS
Qty: 0 | Refills: 0 | Status: COMPLETED | OUTPATIENT
Start: 2017-07-06 | End: 2017-07-07

## 2017-07-06 RX ORDER — CETIRIZINE HYDROCHLORIDE 10 MG/1
1 TABLET ORAL
Qty: 0 | Refills: 0 | COMMUNITY

## 2017-07-06 RX ORDER — CIPROFLOXACIN LACTATE 400MG/40ML
VIAL (ML) INTRAVENOUS
Qty: 0 | Refills: 0 | Status: COMPLETED | OUTPATIENT
Start: 2017-07-07 | End: 2017-07-08

## 2017-07-06 RX ORDER — DEXTROSE MONOHYDRATE, SODIUM CHLORIDE, AND POTASSIUM CHLORIDE 50; .745; 4.5 G/1000ML; G/1000ML; G/1000ML
1000 INJECTION, SOLUTION INTRAVENOUS
Qty: 0 | Refills: 0 | Status: DISCONTINUED | OUTPATIENT
Start: 2017-07-06 | End: 2017-07-09

## 2017-07-06 RX ORDER — ALBUTEROL 90 UG/1
2.5 AEROSOL, METERED ORAL ONCE
Qty: 0 | Refills: 0 | Status: DISCONTINUED | OUTPATIENT
Start: 2017-07-06 | End: 2017-07-09

## 2017-07-06 RX ADMIN — HYDROMORPHONE HYDROCHLORIDE 0.5 MILLIGRAM(S): 2 INJECTION INTRAMUSCULAR; INTRAVENOUS; SUBCUTANEOUS at 21:54

## 2017-07-06 RX ADMIN — Medication 100 MILLIGRAM(S): at 22:00

## 2017-07-06 RX ADMIN — HYDROMORPHONE HYDROCHLORIDE 0.5 MILLIGRAM(S): 2 INJECTION INTRAMUSCULAR; INTRAVENOUS; SUBCUTANEOUS at 23:00

## 2017-07-06 RX ADMIN — HYDROMORPHONE HYDROCHLORIDE 0.5 MILLIGRAM(S): 2 INJECTION INTRAMUSCULAR; INTRAVENOUS; SUBCUTANEOUS at 22:46

## 2017-07-06 RX ADMIN — Medication 200 MILLIGRAM(S): at 22:00

## 2017-07-06 RX ADMIN — HYDROMORPHONE HYDROCHLORIDE 0.5 MILLIGRAM(S): 2 INJECTION INTRAMUSCULAR; INTRAVENOUS; SUBCUTANEOUS at 22:15

## 2017-07-06 NOTE — H&P ADULT - HISTORY OF PRESENT ILLNESS
70yo F with hx breast cancer, lung ca, brain metastasis, bronchitis, hx of mastectomy and brain metastatectomy presents with carcinoid tumor for lap R hemicolectomy.

## 2017-07-06 NOTE — BRIEF OPERATIVE NOTE - PROCEDURE
Hemicolectomy, right, laparoscopic, with anastomosis of small intestine to transverse colon  07/06/2017    Active  ROMARIO

## 2017-07-06 NOTE — H&P ADULT - ASSESSMENT
70yo F with carcinoid tumor now s/p lap R hemicolectomy on 7/6  Ice chips/D5.1/2NS.20KCl@80cc/hr  Pain/nausea control  SCD/SQH  Cipro/[id number]  OOBA/IS  Salcedo to gravity

## 2017-07-06 NOTE — H&P ADULT - PMH
Brain tumor    Carcinoid tumor    COPD (chronic obstructive pulmonary disease)    Liver cancer    Lung collapse  2x  Malignant neoplasm of bronchus and lung, unspecified site  RUL Lobectomy, RLL wedge resection  Malignant neoplasm of female breast  Left breast CA with liver mets

## 2017-07-06 NOTE — BRIEF OPERATIVE NOTE - OPERATION/FINDINGS
Patient with pre-operatively tattooed carcinoid tumor of cecum. Underwent laparoscopic R hemicolectomy with stapled anastamosis of terminal ileum to transverse colon

## 2017-07-06 NOTE — H&P ADULT - PSH
Breast cancer  left mastectomy  Surgery, elective  Thoractomy with lung resection-  Right upper lobe lobectomy  Right  lower lobe wedge  Surgery, elective  Ovaries removed 1980"s  Surgery, elective  Left wrist

## 2017-07-07 LAB
ANION GAP SERPL CALC-SCNC: 12 MMOL/L — SIGNIFICANT CHANGE UP (ref 5–17)
BUN SERPL-MCNC: 11 MG/DL — SIGNIFICANT CHANGE UP (ref 7–23)
CALCIUM SERPL-MCNC: 8.3 MG/DL — LOW (ref 8.4–10.5)
CHLORIDE SERPL-SCNC: 101 MMOL/L — SIGNIFICANT CHANGE UP (ref 96–108)
CO2 SERPL-SCNC: 23 MMOL/L — SIGNIFICANT CHANGE UP (ref 22–31)
CREAT SERPL-MCNC: 0.8 MG/DL — SIGNIFICANT CHANGE UP (ref 0.5–1.3)
GLUCOSE SERPL-MCNC: 182 MG/DL — HIGH (ref 70–99)
HCT VFR BLD CALC: 30.8 % — LOW (ref 34.5–45)
HGB BLD-MCNC: 10.2 G/DL — LOW (ref 11.5–15.5)
MAGNESIUM SERPL-MCNC: 1.6 MG/DL — SIGNIFICANT CHANGE UP (ref 1.6–2.6)
MCHC RBC-ENTMCNC: 28.7 PG — SIGNIFICANT CHANGE UP (ref 27–34)
MCHC RBC-ENTMCNC: 33.1 G/DL — SIGNIFICANT CHANGE UP (ref 32–36)
MCV RBC AUTO: 86.8 FL — SIGNIFICANT CHANGE UP (ref 80–100)
PHOSPHATE SERPL-MCNC: 3.6 MG/DL — SIGNIFICANT CHANGE UP (ref 2.5–4.5)
PLATELET # BLD AUTO: 263 K/UL — SIGNIFICANT CHANGE UP (ref 150–400)
POTASSIUM SERPL-MCNC: 4.4 MMOL/L — SIGNIFICANT CHANGE UP (ref 3.5–5.3)
POTASSIUM SERPL-SCNC: 4.4 MMOL/L — SIGNIFICANT CHANGE UP (ref 3.5–5.3)
RBC # BLD: 3.55 M/UL — LOW (ref 3.8–5.2)
RBC # FLD: 17.7 % — HIGH (ref 10.3–16.9)
SODIUM SERPL-SCNC: 136 MMOL/L — SIGNIFICANT CHANGE UP (ref 135–145)
WBC # BLD: 6 K/UL — SIGNIFICANT CHANGE UP (ref 3.8–10.5)
WBC # FLD AUTO: 6 K/UL — SIGNIFICANT CHANGE UP (ref 3.8–10.5)

## 2017-07-07 RX ORDER — HYDROMORPHONE HYDROCHLORIDE 2 MG/ML
0.5 INJECTION INTRAMUSCULAR; INTRAVENOUS; SUBCUTANEOUS EVERY 6 HOURS
Qty: 0 | Refills: 0 | Status: DISCONTINUED | OUTPATIENT
Start: 2017-07-07 | End: 2017-07-08

## 2017-07-07 RX ORDER — ALPRAZOLAM 0.25 MG
0.5 TABLET ORAL AT BEDTIME
Qty: 0 | Refills: 0 | Status: DISCONTINUED | OUTPATIENT
Start: 2017-07-07 | End: 2017-07-09

## 2017-07-07 RX ORDER — ACETAMINOPHEN 500 MG
650 TABLET ORAL EVERY 6 HOURS
Qty: 0 | Refills: 0 | Status: DISCONTINUED | OUTPATIENT
Start: 2017-07-07 | End: 2017-07-08

## 2017-07-07 RX ORDER — HEPARIN SODIUM 5000 [USP'U]/ML
5000 INJECTION INTRAVENOUS; SUBCUTANEOUS EVERY 8 HOURS
Qty: 0 | Refills: 0 | Status: DISCONTINUED | OUTPATIENT
Start: 2017-07-07 | End: 2017-07-09

## 2017-07-07 RX ORDER — MAGNESIUM SULFATE 500 MG/ML
2 VIAL (ML) INJECTION EVERY 6 HOURS
Qty: 0 | Refills: 0 | Status: COMPLETED | OUTPATIENT
Start: 2017-07-07 | End: 2017-07-07

## 2017-07-07 RX ORDER — ESCITALOPRAM OXALATE 10 MG/1
10 TABLET, FILM COATED ORAL DAILY
Qty: 0 | Refills: 0 | Status: DISCONTINUED | OUTPATIENT
Start: 2017-07-07 | End: 2017-07-08

## 2017-07-07 RX ORDER — KETOROLAC TROMETHAMINE 30 MG/ML
15 SYRINGE (ML) INJECTION EVERY 6 HOURS
Qty: 0 | Refills: 0 | Status: DISCONTINUED | OUTPATIENT
Start: 2017-07-07 | End: 2017-07-08

## 2017-07-07 RX ADMIN — HEPARIN SODIUM 5000 UNIT(S): 5000 INJECTION INTRAVENOUS; SUBCUTANEOUS at 22:13

## 2017-07-07 RX ADMIN — Medication 15 MILLIGRAM(S): at 23:23

## 2017-07-07 RX ADMIN — HYDROMORPHONE HYDROCHLORIDE 0.5 MILLIGRAM(S): 2 INJECTION INTRAMUSCULAR; INTRAVENOUS; SUBCUTANEOUS at 03:34

## 2017-07-07 RX ADMIN — HYDROMORPHONE HYDROCHLORIDE 0.5 MILLIGRAM(S): 2 INJECTION INTRAMUSCULAR; INTRAVENOUS; SUBCUTANEOUS at 18:31

## 2017-07-07 RX ADMIN — Medication 50 GRAM(S): at 14:58

## 2017-07-07 RX ADMIN — HYDROMORPHONE HYDROCHLORIDE 0.5 MILLIGRAM(S): 2 INJECTION INTRAMUSCULAR; INTRAVENOUS; SUBCUTANEOUS at 04:00

## 2017-07-07 RX ADMIN — HYDROMORPHONE HYDROCHLORIDE 0.5 MILLIGRAM(S): 2 INJECTION INTRAMUSCULAR; INTRAVENOUS; SUBCUTANEOUS at 12:19

## 2017-07-07 RX ADMIN — HYDROMORPHONE HYDROCHLORIDE 0.5 MILLIGRAM(S): 2 INJECTION INTRAMUSCULAR; INTRAVENOUS; SUBCUTANEOUS at 12:35

## 2017-07-07 RX ADMIN — Medication 200 MILLIGRAM(S): at 23:23

## 2017-07-07 RX ADMIN — Medication 50 GRAM(S): at 19:32

## 2017-07-07 RX ADMIN — Medication 100 MILLIGRAM(S): at 13:37

## 2017-07-07 RX ADMIN — Medication 15 MILLIGRAM(S): at 23:50

## 2017-07-07 RX ADMIN — Medication 100 MILLIGRAM(S): at 05:59

## 2017-07-07 RX ADMIN — HEPARIN SODIUM 5000 UNIT(S): 5000 INJECTION INTRAVENOUS; SUBCUTANEOUS at 13:37

## 2017-07-07 RX ADMIN — HYDROMORPHONE HYDROCHLORIDE 0.5 MILLIGRAM(S): 2 INJECTION INTRAMUSCULAR; INTRAVENOUS; SUBCUTANEOUS at 18:01

## 2017-07-07 RX ADMIN — Medication 200 MILLIGRAM(S): at 12:20

## 2017-07-07 NOTE — PROGRESS NOTE ADULT - SUBJECTIVE AND OBJECTIVE BOX
Procedure: R hemicolectomy  Surgeon: Dr. Davis    S: Pt has no complaints. Denies CP, SOB, DAMON, calf tenderness. Pain controlled with medication.    O:  T(C): 36.7 (07-06-17 @ 21:34), Max: 36.7 (07-06-17 @ 21:34)  T(F): 98.1 (07-06-17 @ 21:34), Max: 98.1 (07-06-17 @ 21:34)  HR: 83 (07-06-17 @ 23:15) (80 - 89)  BP: 103/46 (07-06-17 @ 23:15) (90/47 - 127/52)  RR: 14 (07-06-17 @ 23:15) (12 - 16)  SpO2: 96% (07-06-17 @ 23:15) (96% - 99%)  Wt(kg): --            Gen: NAD, resting comfortably in bed  C/V: NSR  Pulm: Nonlabored breathing, no respiratory distress  Abd: soft, NT/ND Incision: CDI  Extrem: WWP, no calf edema, SCDs in place      A/P: 71yFemale s/p above procedure  Diet: NPO with ice chips  IVF: D51/2NS  Pain/nausea control  DVT ppx: SQH/SCDS  Dispo plan: TELE

## 2017-07-07 NOTE — PROGRESS NOTE ADULT - ASSESSMENT
70yo F with carcinoid tumor now s/p lap R hemicolectomy on 7/6    NPO w/ ice chips  IVF D51/2NS  pain nausea/control  SQH/scds  [id number]  OOBA/IS  Salcedo to gravity  am labs 72yo F with carcinoid tumor now s/p lap R hemicolectomy on 7/6    Restarted Home Meds   NPO w/ ice chips  IVF D51/2NS  pain nausea/control  SQH/scds  Cipro/[id number]  OOBA/IS  Salcedo to gravity  am labs 70yo F with carcinoid tumor now s/p lap R hemicolectomy on 7/6    CLD  Monitor bowel function  Regional unit transfer  Lexapro increased to 20mg  Pain control adjusted- motrin, tylenol  IVF D51/2NS  pain nausea/control  SQH/scds  OOBA/IS  Salcedo to gravity

## 2017-07-07 NOTE — PROGRESS NOTE ADULT - SUBJECTIVE AND OBJECTIVE BOX
O/N:  POC wnl. VSS, adequate UO      status post: R-coy     POD # 1 O/N:  POC wnl. VSS, adequate UO      status post: R-hemicolectomy    POD # 1      SUBJECTIVE: Patient seen and examined bedside by chief resident. Patient doing well this AM. No c/o pain. No N/V/D. No flatus    ciprofloxacin   IVPB   IV Intermittent   metroNIDAZOLE  IVPB 500 milliGRAM(s) IV Intermittent every 8 hours  ciprofloxacin   IVPB 400 milliGRAM(s) IV Intermittent every 12 hours  heparin  Injectable 5000 Unit(s) SubCutaneous every 8 hours      Vital Signs Last 24 Hrs  T(C): 36.1 (07 Jul 2017 02:40), Max: 36.7 (06 Jul 2017 21:34)  T(F): 97 (07 Jul 2017 02:40), Max: 98.1 (06 Jul 2017 21:34)  HR: 84 (07 Jul 2017 04:47) (72 - 89)  BP: 118/56 (07 Jul 2017 04:47) (90/47 - 127/52)  BP(mean): 80 (07 Jul 2017 04:47) (80 - 84)  RR: 16 (07 Jul 2017 04:47) (12 - 16)  SpO2: 97% (07 Jul 2017 04:47) (96% - 99%)  I&O's Detail    06 Jul 2017 07:01  -  07 Jul 2017 07:00  --------------------------------------------------------  IN:    dextrose 5% + sodium chloride 0.45% with potassium chloride 20 mEq/L: 800 mL  Total IN: 800 mL    OUT:    Indwelling Catheter - Urethral: 1175 mL  Total OUT: 1175 mL    Total NET: -375 mL          General: NAD, resting comfortably in bed  C/V: NSR  Pulm: Nonlabored breathing, no respiratory distress  Abd: soft, NT/ND. incisions C/D/I   Extrem: WWP, no edema, SCDs in place        LABS:                        10.2   6.0   )-----------( 263      ( 07 Jul 2017 06:20 )             30.8     07-07    136  |  101  |  11  ----------------------------<  182<H>  4.4   |  23  |  0.80    Ca    8.3<L>      07 Jul 2017 06:20  Phos  3.6     07-07  Mg     1.6     07-07 SUBJECTIVE: Patient seen and examined bedside by chief resident. Pain control needs to be adjusted. OOB, NPO to be advanced to CLD.      Vital Signs Last 24 Hrs  T(C): 36.8 (08 Jul 2017 20:40), Max: 36.8 (08 Jul 2017 20:40)  T(F): 98.3 (08 Jul 2017 20:40), Max: 98.3 (08 Jul 2017 20:40)  HR: 87 (08 Jul 2017 20:40) (76 - 90)  BP: 146/82 (08 Jul 2017 20:40) (118/59 - 146/82)  BP(mean): 90 (08 Jul 2017 14:12) (82 - 90)  RR: 16 (08 Jul 2017 20:40) (16 - 18)  SpO2: 98% (08 Jul 2017 20:40) (97% - 99%)  I&O's Detail    07 Jul 2017 07:01  -  08 Jul 2017 07:00  --------------------------------------------------------  IN:    dextrose 5% + sodium chloride 0.45% with potassium chloride 20 mEq/L: 1050 mL    Solution: 400 mL    Solution: 100 mL    Solution: 100 mL  Total IN: 1650 mL    OUT:    Indwelling Catheter - Urethral: 1025 mL    Voided: 700 mL  Total OUT: 1725 mL    Total NET: -75 mL      08 Jul 2017 07:01  -  08 Jul 2017 23:24  --------------------------------------------------------  IN:    dextrose 5% + sodium chloride 0.45% with potassium chloride 20 mEq/L: 750 mL    Oral Fluid: 360 mL  Total IN: 1110 mL    OUT:    Voided: 700 mL  Total OUT: 700 mL    Total NET: 410 mL          General: NAD, resting comfortably in bed  Pulm: Nonlabored breathing, no respiratory distress  Abd: soft, NT/ND.          LABS:                        10.4   5.3   )-----------( 305      ( 08 Jul 2017 07:44 )             32.9     07-08    137  |  100  |  12  ----------------------------<  102<H>  4.2   |  20<L>  |  0.90    Ca    8.9      08 Jul 2017 07:44  Phos  2.7     07-08  Mg     2.4     07-08    TPro  7.0  /  Alb  3.8  /  TBili  0.3  /  DBili  x   /  AST  34  /  ALT  37  /  AlkPhos  91  07-08          RADIOLOGY & ADDITIONAL STUDIES:

## 2017-07-08 LAB
ALBUMIN SERPL ELPH-MCNC: 3.8 G/DL — SIGNIFICANT CHANGE UP (ref 3.3–5)
ALP SERPL-CCNC: 91 U/L — SIGNIFICANT CHANGE UP (ref 40–120)
ALT FLD-CCNC: 37 U/L — SIGNIFICANT CHANGE UP (ref 10–45)
ANION GAP SERPL CALC-SCNC: 17 MMOL/L — SIGNIFICANT CHANGE UP (ref 5–17)
AST SERPL-CCNC: 34 U/L — SIGNIFICANT CHANGE UP (ref 10–40)
BILIRUB SERPL-MCNC: 0.3 MG/DL — SIGNIFICANT CHANGE UP (ref 0.2–1.2)
BUN SERPL-MCNC: 12 MG/DL — SIGNIFICANT CHANGE UP (ref 7–23)
CALCIUM SERPL-MCNC: 8.9 MG/DL — SIGNIFICANT CHANGE UP (ref 8.4–10.5)
CHLORIDE SERPL-SCNC: 100 MMOL/L — SIGNIFICANT CHANGE UP (ref 96–108)
CO2 SERPL-SCNC: 20 MMOL/L — LOW (ref 22–31)
CREAT SERPL-MCNC: 0.9 MG/DL — SIGNIFICANT CHANGE UP (ref 0.5–1.3)
GLUCOSE SERPL-MCNC: 102 MG/DL — HIGH (ref 70–99)
HCT VFR BLD CALC: 32.9 % — LOW (ref 34.5–45)
HGB BLD-MCNC: 10.4 G/DL — LOW (ref 11.5–15.5)
MAGNESIUM SERPL-MCNC: 2.4 MG/DL — SIGNIFICANT CHANGE UP (ref 1.6–2.6)
MCHC RBC-ENTMCNC: 28 PG — SIGNIFICANT CHANGE UP (ref 27–34)
MCHC RBC-ENTMCNC: 31.6 G/DL — LOW (ref 32–36)
MCV RBC AUTO: 88.4 FL — SIGNIFICANT CHANGE UP (ref 80–100)
PHOSPHATE SERPL-MCNC: 2.7 MG/DL — SIGNIFICANT CHANGE UP (ref 2.5–4.5)
PLATELET # BLD AUTO: 305 K/UL — SIGNIFICANT CHANGE UP (ref 150–400)
POTASSIUM SERPL-MCNC: 4.2 MMOL/L — SIGNIFICANT CHANGE UP (ref 3.5–5.3)
POTASSIUM SERPL-SCNC: 4.2 MMOL/L — SIGNIFICANT CHANGE UP (ref 3.5–5.3)
PROT SERPL-MCNC: 7 G/DL — SIGNIFICANT CHANGE UP (ref 6–8.3)
RBC # BLD: 3.72 M/UL — LOW (ref 3.8–5.2)
RBC # FLD: 17.9 % — HIGH (ref 10.3–16.9)
SODIUM SERPL-SCNC: 137 MMOL/L — SIGNIFICANT CHANGE UP (ref 135–145)
WBC # BLD: 5.3 K/UL — SIGNIFICANT CHANGE UP (ref 3.8–10.5)
WBC # FLD AUTO: 5.3 K/UL — SIGNIFICANT CHANGE UP (ref 3.8–10.5)

## 2017-07-08 RX ORDER — ACETAMINOPHEN 500 MG
1000 TABLET ORAL THREE TIMES A DAY
Qty: 0 | Refills: 0 | Status: DISCONTINUED | OUTPATIENT
Start: 2017-07-08 | End: 2017-07-08

## 2017-07-08 RX ORDER — IBUPROFEN 200 MG
600 TABLET ORAL
Qty: 0 | Refills: 0 | Status: DISCONTINUED | OUTPATIENT
Start: 2017-07-08 | End: 2017-07-09

## 2017-07-08 RX ORDER — PANTOPRAZOLE SODIUM 20 MG/1
20 TABLET, DELAYED RELEASE ORAL ONCE
Qty: 0 | Refills: 0 | Status: COMPLETED | OUTPATIENT
Start: 2017-07-08 | End: 2017-07-08

## 2017-07-08 RX ORDER — ESCITALOPRAM OXALATE 10 MG/1
10 TABLET, FILM COATED ORAL ONCE
Qty: 0 | Refills: 0 | Status: COMPLETED | OUTPATIENT
Start: 2017-07-08 | End: 2017-07-08

## 2017-07-08 RX ORDER — ACETAMINOPHEN 500 MG
1000 TABLET ORAL EVERY 8 HOURS
Qty: 0 | Refills: 0 | Status: DISCONTINUED | OUTPATIENT
Start: 2017-07-08 | End: 2017-07-09

## 2017-07-08 RX ORDER — ESCITALOPRAM OXALATE 10 MG/1
20 TABLET, FILM COATED ORAL EVERY 24 HOURS
Qty: 0 | Refills: 0 | Status: DISCONTINUED | OUTPATIENT
Start: 2017-07-09 | End: 2017-07-09

## 2017-07-08 RX ORDER — IBUPROFEN 200 MG
600 TABLET ORAL
Qty: 0 | Refills: 0 | Status: DISCONTINUED | OUTPATIENT
Start: 2017-07-08 | End: 2017-07-08

## 2017-07-08 RX ORDER — POTASSIUM PHOSPHATE, MONOBASIC POTASSIUM PHOSPHATE, DIBASIC 236; 224 MG/ML; MG/ML
15 INJECTION, SOLUTION INTRAVENOUS ONCE
Qty: 0 | Refills: 0 | Status: COMPLETED | OUTPATIENT
Start: 2017-07-08 | End: 2017-07-08

## 2017-07-08 RX ADMIN — HYDROMORPHONE HYDROCHLORIDE 0.5 MILLIGRAM(S): 2 INJECTION INTRAMUSCULAR; INTRAVENOUS; SUBCUTANEOUS at 01:39

## 2017-07-08 RX ADMIN — Medication 600 MILLIGRAM(S): at 17:32

## 2017-07-08 RX ADMIN — ESCITALOPRAM OXALATE 10 MILLIGRAM(S): 10 TABLET, FILM COATED ORAL at 12:38

## 2017-07-08 RX ADMIN — HEPARIN SODIUM 5000 UNIT(S): 5000 INJECTION INTRAVENOUS; SUBCUTANEOUS at 14:13

## 2017-07-08 RX ADMIN — DEXTROSE MONOHYDRATE, SODIUM CHLORIDE, AND POTASSIUM CHLORIDE 75 MILLILITER(S): 50; .745; 4.5 INJECTION, SOLUTION INTRAVENOUS at 21:30

## 2017-07-08 RX ADMIN — Medication 650 MILLIGRAM(S): at 00:34

## 2017-07-08 RX ADMIN — Medication 600 MILLIGRAM(S): at 18:32

## 2017-07-08 RX ADMIN — POTASSIUM PHOSPHATE, MONOBASIC POTASSIUM PHOSPHATE, DIBASIC 62.5 MILLIMOLE(S): 236; 224 INJECTION, SOLUTION INTRAVENOUS at 11:27

## 2017-07-08 RX ADMIN — Medication 1000 MILLIGRAM(S): at 23:00

## 2017-07-08 RX ADMIN — Medication 650 MILLIGRAM(S): at 01:40

## 2017-07-08 RX ADMIN — Medication 650 MILLIGRAM(S): at 08:11

## 2017-07-08 RX ADMIN — Medication 650 MILLIGRAM(S): at 06:42

## 2017-07-08 RX ADMIN — HEPARIN SODIUM 5000 UNIT(S): 5000 INJECTION INTRAVENOUS; SUBCUTANEOUS at 05:36

## 2017-07-08 RX ADMIN — HYDROMORPHONE HYDROCHLORIDE 0.5 MILLIGRAM(S): 2 INJECTION INTRAMUSCULAR; INTRAVENOUS; SUBCUTANEOUS at 01:57

## 2017-07-08 RX ADMIN — Medication 600 MILLIGRAM(S): at 14:30

## 2017-07-08 RX ADMIN — Medication 0.5 MILLIGRAM(S): at 01:02

## 2017-07-08 RX ADMIN — PANTOPRAZOLE SODIUM 20 MILLIGRAM(S): 20 TABLET, DELAYED RELEASE ORAL at 00:33

## 2017-07-08 RX ADMIN — ESCITALOPRAM OXALATE 10 MILLIGRAM(S): 10 TABLET, FILM COATED ORAL at 09:31

## 2017-07-08 RX ADMIN — HEPARIN SODIUM 5000 UNIT(S): 5000 INJECTION INTRAVENOUS; SUBCUTANEOUS at 22:00

## 2017-07-08 RX ADMIN — Medication 1000 MILLIGRAM(S): at 22:00

## 2017-07-08 NOTE — PROGRESS NOTE ADULT - ASSESSMENT
70yo F with carcinoid tumor now s/p lap R hemicolectomy on 7/6    NPO w/ ice chips  IVF D51/2NS  Home Medications incl. Xanax  pain nausea/control  SQH/scds  OOBA/IS  am labs 72yo F with carcinoid tumor now s/p lap R hemicolectomy on 7/6, POD 2.    NPO w/ ice chips  IVF D51/2NS + KCl  Home Medications incl. Xanax  pain nausea/control  SQH/scds  OOBA/IS  am labs

## 2017-07-08 NOTE — PROVIDER CONTACT NOTE (CHANGE IN STATUS NOTIFICATION) - ASSESSMENT
pts lexapro order changed by md villagran as pt states she takesmore than dose ordered. pts pain meds clarified with md as well, plan to transfer to 9th floor later today.

## 2017-07-08 NOTE — PROGRESS NOTE ADULT - SUBJECTIVE AND OBJECTIVE BOX
O/N: zepeda d/c'd as per Dr. Davis. d/c'd dilaudid. started Toradol and Tylenol prn. no BF. OOBA. IS 1.5L.   7/7: Restarted Xanax, Lexapro, Subq Heparin.     Status post: R coy    POD # 2 Paulina Rivers is a 71 Y/F with PMH of cancer and COPD who presented with a carcinoid tumor s/p laparoscopic R hemicolectomy POD 2.    7/8: No acute events overnight. Patient denies flatus or BM. Is ambulating. IS 1.5 L. Pain well controlled with Tylenol, but not Toradol.  O/N: Salcedo d/c'd as per Dr. Davis. Patient passed trial of void. D/c'd dilaudid. Started Toradol and Tylenol prn. no BF. OOBA. IS 1.5L.   7/7: Restarted Xanax, Lexapro, Subq Heparin. Paulina Rivers is a 71 Y/F with PMH of cancer and COPD who presented with a carcinoid tumor s/p laparoscopic R hemicolectomy POD 2.    7/8: No acute events overnight. Patient denies flatus or BM. Is ambulating. IS 1.5 L. Pain well controlled with Tylenol, but not Toradol.  O/N: Salcedo d/c'd as per Dr. Davis. Patient passed trial of void. D/c'd dilaudid. Started Toradol and Tylenol prn. no BF. OOBA. IS 1.5L.   7/7: Restarted Xanax, Lexapro, Subq Heparin.     Physical Exam:  General: Patient is sitting up in bed in no acute distress. Paulina Rivers is a 71 Y/F with PMH of cancer and COPD who presented with a carcinoid tumor s/p laparoscopic R hemicolectomy POD 2.    7/8: No acute events overnight. Patient denies flatus or BM. Is ambulating. IS 1.5 L. Pain well controlled with Tylenol, but not Toradol.  O/N: Salcedo d/c'd as per Dr. Davis. Patient passed trial of void. D/c'd dilaudid. Started Toradol and Tylenol prn. no BF. OOBA. IS 1.5L.   7/7: Restarted Xanax, Lexapro, Subq Heparin.     Physical Exam:  General: Patient is sitting up in bed in no acute distress.  HEENT: EOMI. CN II-XII grossly intact.  Abdomen: Incisions clean, dry, intact. Soft, nontender, nondistended. No rigidity, rebound, or guarding.  Extremities: No edema, cyanosis, or clubbing.

## 2017-07-09 ENCOUNTER — TRANSCRIPTION ENCOUNTER (OUTPATIENT)
Age: 72
End: 2017-07-09

## 2017-07-09 VITALS
OXYGEN SATURATION: 100 % | TEMPERATURE: 98 F | DIASTOLIC BLOOD PRESSURE: 76 MMHG | RESPIRATION RATE: 19 BRPM | HEART RATE: 95 BPM | SYSTOLIC BLOOD PRESSURE: 128 MMHG

## 2017-07-09 LAB
ANION GAP SERPL CALC-SCNC: 13 MMOL/L — SIGNIFICANT CHANGE UP (ref 5–17)
BUN SERPL-MCNC: 6 MG/DL — LOW (ref 7–23)
CALCIUM SERPL-MCNC: 8.9 MG/DL — SIGNIFICANT CHANGE UP (ref 8.4–10.5)
CHLORIDE SERPL-SCNC: 106 MMOL/L — SIGNIFICANT CHANGE UP (ref 96–108)
CO2 SERPL-SCNC: 24 MMOL/L — SIGNIFICANT CHANGE UP (ref 22–31)
CREAT SERPL-MCNC: 0.8 MG/DL — SIGNIFICANT CHANGE UP (ref 0.5–1.3)
GLUCOSE SERPL-MCNC: 97 MG/DL — SIGNIFICANT CHANGE UP (ref 70–99)
HCT VFR BLD CALC: 31.1 % — LOW (ref 34.5–45)
HGB BLD-MCNC: 10.1 G/DL — LOW (ref 11.5–15.5)
MAGNESIUM SERPL-MCNC: 1.9 MG/DL — SIGNIFICANT CHANGE UP (ref 1.6–2.6)
MCHC RBC-ENTMCNC: 28.5 PG — SIGNIFICANT CHANGE UP (ref 27–34)
MCHC RBC-ENTMCNC: 32.5 G/DL — SIGNIFICANT CHANGE UP (ref 32–36)
MCV RBC AUTO: 87.6 FL — SIGNIFICANT CHANGE UP (ref 80–100)
PHOSPHATE SERPL-MCNC: 2.7 MG/DL — SIGNIFICANT CHANGE UP (ref 2.5–4.5)
PLATELET # BLD AUTO: 328 K/UL — SIGNIFICANT CHANGE UP (ref 150–400)
POTASSIUM SERPL-MCNC: 3.8 MMOL/L — SIGNIFICANT CHANGE UP (ref 3.5–5.3)
POTASSIUM SERPL-SCNC: 3.8 MMOL/L — SIGNIFICANT CHANGE UP (ref 3.5–5.3)
RBC # BLD: 3.55 M/UL — LOW (ref 3.8–5.2)
RBC # FLD: 17.7 % — HIGH (ref 10.3–16.9)
SODIUM SERPL-SCNC: 143 MMOL/L — SIGNIFICANT CHANGE UP (ref 135–145)
WBC # BLD: 4.4 K/UL — SIGNIFICANT CHANGE UP (ref 3.8–10.5)
WBC # FLD AUTO: 4.4 K/UL — SIGNIFICANT CHANGE UP (ref 3.8–10.5)

## 2017-07-09 RX ORDER — ACETAMINOPHEN 500 MG
2 TABLET ORAL
Qty: 0 | Refills: 0 | COMMUNITY
Start: 2017-07-09

## 2017-07-09 RX ORDER — POTASSIUM PHOSPHATE, MONOBASIC POTASSIUM PHOSPHATE, DIBASIC 236; 224 MG/ML; MG/ML
15 INJECTION, SOLUTION INTRAVENOUS ONCE
Qty: 0 | Refills: 0 | Status: COMPLETED | OUTPATIENT
Start: 2017-07-09 | End: 2017-07-09

## 2017-07-09 RX ORDER — IBUPROFEN 200 MG
1 TABLET ORAL
Qty: 0 | Refills: 0 | COMMUNITY
Start: 2017-07-09

## 2017-07-09 RX ORDER — ALBUTEROL 90 UG/1
0 AEROSOL, METERED ORAL
Qty: 0 | Refills: 0 | COMMUNITY

## 2017-07-09 RX ORDER — TIOTROPIUM BROMIDE AND OLODATEROL 3.124; 2.736 UG/1; UG/1
2 SPRAY, METERED RESPIRATORY (INHALATION)
Qty: 0 | Refills: 0 | COMMUNITY

## 2017-07-09 RX ADMIN — Medication 0.5 MILLIGRAM(S): at 00:00

## 2017-07-09 RX ADMIN — Medication 600 MILLIGRAM(S): at 00:00

## 2017-07-09 RX ADMIN — Medication 600 MILLIGRAM(S): at 07:15

## 2017-07-09 RX ADMIN — ESCITALOPRAM OXALATE 20 MILLIGRAM(S): 10 TABLET, FILM COATED ORAL at 07:29

## 2017-07-09 RX ADMIN — Medication 1000 MILLIGRAM(S): at 05:22

## 2017-07-09 RX ADMIN — HEPARIN SODIUM 5000 UNIT(S): 5000 INJECTION INTRAVENOUS; SUBCUTANEOUS at 06:00

## 2017-07-09 RX ADMIN — Medication 600 MILLIGRAM(S): at 06:15

## 2017-07-09 RX ADMIN — Medication 1000 MILLIGRAM(S): at 06:22

## 2017-07-09 RX ADMIN — POTASSIUM PHOSPHATE, MONOBASIC POTASSIUM PHOSPHATE, DIBASIC 62.5 MILLIMOLE(S): 236; 224 INJECTION, SOLUTION INTRAVENOUS at 11:07

## 2017-07-09 RX ADMIN — Medication 600 MILLIGRAM(S): at 01:00

## 2017-07-09 RX ADMIN — Medication 1000 MILLIGRAM(S): at 14:23

## 2017-07-09 RX ADMIN — Medication 600 MILLIGRAM(S): at 14:23

## 2017-07-09 NOTE — DISCHARGE NOTE ADULT - CARE PLAN
Principal Discharge DX:	S/P right hemicolectomy  Goal:	Recovery  Instructions for follow-up, activity and diet:	Continue a low fiber diet  You may shower, let water and soap run over your incisions, pat dry  NO heavy lifting of more than 15lbs, no strenuous activity  Take tylenol and Ibuprofen as needed for pain  The staples will be removed at your follow up appointment  If you experience fever, abdominal pain not controlled with tylenol and ibuprofen, chest pain, severe nausea, please call your physician or go to the ER.  Follow up with Dr. Davis in 1 to 2 weeks, please call the office to make an appointment.

## 2017-07-09 NOTE — DISCHARGE NOTE ADULT - PATIENT PORTAL LINK FT
“You can access the FollowHealth Patient Portal, offered by [facility], by registering with the following website: [email or web address]”

## 2017-07-09 NOTE — PROGRESS NOTE ADULT - ATTENDING COMMENTS
POD 3 s/p lap R hemicolect  pt looks good;   afeb,  VSS  cosme soft diet  +flatus;  +BM  abdom   soft   non tender   wds ok   clips in place    walking well in hallway no help needed      OK for disch to home    office f/u    Disc w/ team
pt looks good  POD2  s/p lap R hemicolect  no flatus yet  denies N/V  Salina clears  Abdom soft,  incis ok    Salcedo out voiding no prob  labs noted  replete Phos    AMBULATION, Incentive spir,  SQ hep    Await flatus    disc'd team

## 2017-07-09 NOTE — DISCHARGE NOTE ADULT - HOSPITAL COURSE
72yo F with hx breast cancer, lung ca, brain metastasis, bronchitis, hx of mastectomy and brain metastatectomy presented with carcinoid tumor for lap R hemicolectomy on July 6, 2017. Procedure was uncomplicated and tolerated well by the patient. Post operatively return of bowel function was noted, diet advanced as tolerated. At time of discharge, VSS, patient tolerating a low fiber diet.

## 2017-07-09 NOTE — PROGRESS NOTE ADULT - ASSESSMENT
72 yo F POD 3 from right hemicolectomy     Soft diet  SQH  F/U am labs  IS  OOBA  Pain/nausea control

## 2017-07-09 NOTE — DISCHARGE NOTE ADULT - PLAN OF CARE
Recovery Continue a low fiber diet  You may shower, let water and soap run over your incisions, pat dry  NO heavy lifting of more than 15lbs, no strenuous activity  Take tylenol and Ibuprofen as needed for pain  The staples will be removed at your follow up appointment  If you experience fever, abdominal pain not controlled with tylenol and ibuprofen, chest pain, severe nausea, please call your physician or go to the ER.  Follow up with Dr. Davis in 1 to 2 weeks, please call the office to make an appointment.

## 2017-07-09 NOTE — DISCHARGE NOTE ADULT - CARE PROVIDER_API CALL
Frank Davis (MD), Surgery  [address]  [place] [address]  Phone: [phone]  Fax: [phone]

## 2017-07-09 NOTE — DISCHARGE NOTE ADULT - MEDICATION SUMMARY - MEDICATIONS TO TAKE
I will START or STAY ON the medications listed below when I get home from the hospital:    acetaminophen 500 mg oral tablet  -- 2 tab(s) by mouth every 8 hours  -- Indication: For Post surgical pain    ibuprofen 600 mg oral tablet  -- 1 tab(s) by mouth 4 times a day  -- Indication: For Post surgical pain    Lexapro  -- 10 milligram(s) by mouth once a day  -- Indication: For home med    ZyrTEC 10 mg oral tablet  -- 2 tab(s) by mouth once a day (at bedtime)  -- Indication: For Home med    Femara 2.5 mg oral tablet  -- 1 tab(s) by mouth once a day  -- Indication: For Home med    ALPRAZolam 0.5 mg oral tablet  -- 1 tab(s) by mouth once a day (at bedtime), As needed, sleep  -- Indication: For Home med    Singulair  --  by mouth   -- Indication: For Home med    melatonin 3 mg oral tablet  -- 1 tab(s) by mouth once a day (at bedtime), As needed, Insomnia  -- Indication: For Home med

## 2017-07-09 NOTE — PROGRESS NOTE ADULT - SUBJECTIVE AND OBJECTIVE BOX
O/N: VSS, NAD, tolerating CLD diet, pain controlled  7/8: Passed TOV. Started CLD. Dressings changed. Lexapro increased from 10 to 20 (home dose = 30). Phos repleted. D/C'd Toradol. Started Motrin 600 mg Q6H RTC (pt may refuse) + Tylenol 1 g Q8H PO (pt may refuse). No BF.    STATUS POST:  right hemicolectomy     POST OPERATIVE DAY #: 3      Patient seen and examined bedside with chief resident, pain controlled with pain medication, OOBA, denies nausea, vomiting. +flatus         Vital Signs Last 24 Hrs  T(C): 36.3 (09 Jul 2017 05:48), Max: 36.8 (08 Jul 2017 20:40)  T(F): 97.3 (09 Jul 2017 05:48), Max: 98.3 (08 Jul 2017 20:40)  HR: 76 (09 Jul 2017 05:48) (76 - 87)  BP: 132/83 (09 Jul 2017 05:48) (129/64 - 146/82)  BP(mean): 90 (08 Jul 2017 14:12) (90 - 90)  RR: 16 (09 Jul 2017 05:48) (16 - 18)  SpO2: 97% (09 Jul 2017 05:48) (97% - 99%)    I&O's Detail    08 Jul 2017 07:01  -  09 Jul 2017 07:00  --------------------------------------------------------  IN:    dextrose 5% + sodium chloride 0.45% with potassium chloride 20 mEq/L: 1275 mL    Oral Fluid: 360 mL  Total IN: 1635 mL    OUT:    Voided: 1550 mL  Total OUT: 1550 mL    Total NET: 85 mL      09 Jul 2017 07:01  -  09 Jul 2017 07:53  --------------------------------------------------------  IN:    dextrose 5% + sodium chloride 0.45% with potassium chloride 20 mEq/L: 75 mL  Total IN: 75 mL    OUT:  Total OUT: 0 mL    Total NET: 75 mL        PHYSICAL EXAM:      Constitutional: NAD    Gastrointestinal: softly distended, daniel tender, incisions CDI  with staples in place

## 2017-07-11 DIAGNOSIS — C18.0 MALIGNANT NEOPLASM OF CECUM: ICD-10-CM

## 2017-07-11 DIAGNOSIS — Z88.8 ALLERGY STATUS TO OTHER DRUGS, MEDICAMENTS AND BIOLOGICAL SUBSTANCES STATUS: ICD-10-CM

## 2017-07-11 DIAGNOSIS — J44.9 CHRONIC OBSTRUCTIVE PULMONARY DISEASE, UNSPECIFIED: ICD-10-CM

## 2017-07-11 DIAGNOSIS — J40 BRONCHITIS, NOT SPECIFIED AS ACUTE OR CHRONIC: ICD-10-CM

## 2017-07-11 DIAGNOSIS — C79.31 SECONDARY MALIGNANT NEOPLASM OF BRAIN: ICD-10-CM

## 2017-07-11 DIAGNOSIS — Z79.52 LONG TERM (CURRENT) USE OF SYSTEMIC STEROIDS: ICD-10-CM

## 2017-07-11 DIAGNOSIS — Z87.891 PERSONAL HISTORY OF NICOTINE DEPENDENCE: ICD-10-CM

## 2017-07-11 DIAGNOSIS — Z79.891 LONG TERM (CURRENT) USE OF OPIATE ANALGESIC: ICD-10-CM

## 2017-07-11 DIAGNOSIS — Z85.3 PERSONAL HISTORY OF MALIGNANT NEOPLASM OF BREAST: ICD-10-CM

## 2017-07-11 DIAGNOSIS — Z85.118 PERSONAL HISTORY OF OTHER MALIGNANT NEOPLASM OF BRONCHUS AND LUNG: ICD-10-CM

## 2017-07-14 LAB — SURGICAL PATHOLOGY STUDY: SIGNIFICANT CHANGE UP

## 2017-07-21 ENCOUNTER — APPOINTMENT (OUTPATIENT)
Dept: INTERNAL MEDICINE | Facility: CLINIC | Age: 72
End: 2017-07-21

## 2017-07-21 VITALS
DIASTOLIC BLOOD PRESSURE: 62 MMHG | BODY MASS INDEX: 28.52 KG/M2 | OXYGEN SATURATION: 98 % | WEIGHT: 155 LBS | HEIGHT: 62 IN | SYSTOLIC BLOOD PRESSURE: 96 MMHG | HEART RATE: 85 BPM

## 2017-07-24 ENCOUNTER — APPOINTMENT (OUTPATIENT)
Dept: PULMONOLOGY | Facility: CLINIC | Age: 72
End: 2017-07-24

## 2017-07-24 VITALS
WEIGHT: 153 LBS | DIASTOLIC BLOOD PRESSURE: 60 MMHG | SYSTOLIC BLOOD PRESSURE: 100 MMHG | HEART RATE: 82 BPM | BODY MASS INDEX: 27.98 KG/M2 | OXYGEN SATURATION: 98 %

## 2017-07-25 ENCOUNTER — APPOINTMENT (OUTPATIENT)
Dept: GASTROENTEROLOGY | Facility: CLINIC | Age: 72
End: 2017-07-25

## 2017-07-25 VITALS
SYSTOLIC BLOOD PRESSURE: 115 MMHG | HEART RATE: 81 BPM | RESPIRATION RATE: 16 BRPM | BODY MASS INDEX: 28.16 KG/M2 | HEIGHT: 62 IN | TEMPERATURE: 98.4 F | OXYGEN SATURATION: 98 % | WEIGHT: 153 LBS | DIASTOLIC BLOOD PRESSURE: 86 MMHG

## 2017-08-10 ENCOUNTER — OUTPATIENT (OUTPATIENT)
Dept: OUTPATIENT SERVICES | Facility: HOSPITAL | Age: 72
LOS: 1 days | End: 2017-08-10
Payer: MEDICARE

## 2017-08-10 DIAGNOSIS — Z41.9 ENCOUNTER FOR PROCEDURE FOR PURPOSES OTHER THAN REMEDYING HEALTH STATE, UNSPECIFIED: Chronic | ICD-10-CM

## 2017-08-10 DIAGNOSIS — C50.919 MALIGNANT NEOPLASM OF UNSPECIFIED SITE OF UNSPECIFIED FEMALE BREAST: Chronic | ICD-10-CM

## 2017-08-10 PROCEDURE — 74183 MRI ABD W/O CNTR FLWD CNTR: CPT

## 2017-08-10 PROCEDURE — A9585: CPT

## 2017-08-10 PROCEDURE — 74183 MRI ABD W/O CNTR FLWD CNTR: CPT | Mod: 26

## 2017-09-12 ENCOUNTER — FORM ENCOUNTER (OUTPATIENT)
Age: 72
End: 2017-09-12

## 2017-09-13 ENCOUNTER — OUTPATIENT (OUTPATIENT)
Dept: OUTPATIENT SERVICES | Facility: HOSPITAL | Age: 72
LOS: 1 days | End: 2017-09-13
Payer: MEDICARE

## 2017-09-13 DIAGNOSIS — Z41.9 ENCOUNTER FOR PROCEDURE FOR PURPOSES OTHER THAN REMEDYING HEALTH STATE, UNSPECIFIED: Chronic | ICD-10-CM

## 2017-09-13 DIAGNOSIS — C50.919 MALIGNANT NEOPLASM OF UNSPECIFIED SITE OF UNSPECIFIED FEMALE BREAST: Chronic | ICD-10-CM

## 2017-09-13 PROCEDURE — 71250 CT THORAX DX C-: CPT

## 2017-09-13 PROCEDURE — 71250 CT THORAX DX C-: CPT | Mod: 26

## 2017-09-14 ENCOUNTER — APPOINTMENT (OUTPATIENT)
Dept: THORACIC SURGERY | Facility: CLINIC | Age: 72
End: 2017-09-14

## 2017-09-14 ENCOUNTER — APPOINTMENT (OUTPATIENT)
Dept: PULMONOLOGY | Facility: CLINIC | Age: 72
End: 2017-09-14
Payer: MEDICARE

## 2017-09-14 VITALS
WEIGHT: 154 LBS | HEIGHT: 62 IN | OXYGEN SATURATION: 97 % | BODY MASS INDEX: 28.34 KG/M2 | HEART RATE: 77 BPM | TEMPERATURE: 98.8 F | SYSTOLIC BLOOD PRESSURE: 100 MMHG | DIASTOLIC BLOOD PRESSURE: 62 MMHG

## 2017-09-14 PROCEDURE — 99214 OFFICE O/P EST MOD 30 MIN: CPT

## 2017-09-15 ENCOUNTER — APPOINTMENT (OUTPATIENT)
Dept: THORACIC SURGERY | Facility: CLINIC | Age: 72
End: 2017-09-15
Payer: MEDICARE

## 2017-09-15 VITALS
DIASTOLIC BLOOD PRESSURE: 56 MMHG | SYSTOLIC BLOOD PRESSURE: 112 MMHG | RESPIRATION RATE: 18 BRPM | HEART RATE: 75 BPM | OXYGEN SATURATION: 98 %

## 2017-09-15 PROCEDURE — 99214 OFFICE O/P EST MOD 30 MIN: CPT

## 2017-09-20 RX ORDER — GLUCOSAMINE HCL/CHONDROITIN SU 500-400 MG
3 CAPSULE ORAL
Refills: 0 | Status: ACTIVE | COMMUNITY

## 2017-10-09 ENCOUNTER — FORM ENCOUNTER (OUTPATIENT)
Age: 72
End: 2017-10-09

## 2017-10-10 ENCOUNTER — OUTPATIENT (OUTPATIENT)
Dept: OUTPATIENT SERVICES | Facility: HOSPITAL | Age: 72
LOS: 1 days | End: 2017-10-10
Payer: MEDICARE

## 2017-10-10 ENCOUNTER — FORM ENCOUNTER (OUTPATIENT)
Age: 72
End: 2017-10-10

## 2017-10-10 DIAGNOSIS — C50.919 MALIGNANT NEOPLASM OF UNSPECIFIED SITE OF UNSPECIFIED FEMALE BREAST: Chronic | ICD-10-CM

## 2017-10-10 DIAGNOSIS — Z41.9 ENCOUNTER FOR PROCEDURE FOR PURPOSES OTHER THAN REMEDYING HEALTH STATE, UNSPECIFIED: Chronic | ICD-10-CM

## 2017-10-10 PROCEDURE — 70553 MRI BRAIN STEM W/O & W/DYE: CPT

## 2017-10-10 PROCEDURE — A9585: CPT

## 2017-10-10 PROCEDURE — 70553 MRI BRAIN STEM W/O & W/DYE: CPT | Mod: 26

## 2017-10-11 ENCOUNTER — OUTPATIENT (OUTPATIENT)
Dept: OUTPATIENT SERVICES | Facility: HOSPITAL | Age: 72
LOS: 1 days | End: 2017-10-11
Payer: MEDICARE

## 2017-10-11 ENCOUNTER — APPOINTMENT (OUTPATIENT)
Dept: RADIATION ONCOLOGY | Facility: CLINIC | Age: 72
End: 2017-10-11
Payer: MEDICARE

## 2017-10-11 VITALS — DIASTOLIC BLOOD PRESSURE: 61 MMHG | OXYGEN SATURATION: 95 % | HEART RATE: 94 BPM | SYSTOLIC BLOOD PRESSURE: 102 MMHG

## 2017-10-11 DIAGNOSIS — Z41.9 ENCOUNTER FOR PROCEDURE FOR PURPOSES OTHER THAN REMEDYING HEALTH STATE, UNSPECIFIED: Chronic | ICD-10-CM

## 2017-10-11 DIAGNOSIS — C50.919 MALIGNANT NEOPLASM OF UNSPECIFIED SITE OF UNSPECIFIED FEMALE BREAST: Chronic | ICD-10-CM

## 2017-10-11 PROCEDURE — 99214 OFFICE O/P EST MOD 30 MIN: CPT

## 2017-10-11 PROCEDURE — 93880 EXTRACRANIAL BILAT STUDY: CPT

## 2017-10-11 PROCEDURE — 93880 EXTRACRANIAL BILAT STUDY: CPT | Mod: 26

## 2017-10-18 ENCOUNTER — APPOINTMENT (OUTPATIENT)
Dept: INTERNAL MEDICINE | Facility: CLINIC | Age: 72
End: 2017-10-18
Payer: MEDICARE

## 2017-10-18 VITALS — OXYGEN SATURATION: 97 % | HEART RATE: 74 BPM | SYSTOLIC BLOOD PRESSURE: 110 MMHG | DIASTOLIC BLOOD PRESSURE: 64 MMHG

## 2017-10-18 PROCEDURE — 99213 OFFICE O/P EST LOW 20 MIN: CPT | Mod: 25

## 2017-10-18 PROCEDURE — G0008: CPT

## 2017-10-18 PROCEDURE — 90662 IIV NO PRSV INCREASED AG IM: CPT

## 2017-10-24 ENCOUNTER — APPOINTMENT (OUTPATIENT)
Dept: VASCULAR SURGERY | Facility: CLINIC | Age: 72
End: 2017-10-24
Payer: MEDICARE

## 2017-10-24 VITALS — OXYGEN SATURATION: 99 % | DIASTOLIC BLOOD PRESSURE: 63 MMHG | HEART RATE: 75 BPM | SYSTOLIC BLOOD PRESSURE: 99 MMHG

## 2017-10-24 PROCEDURE — 99203 OFFICE O/P NEW LOW 30 MIN: CPT

## 2017-11-09 ENCOUNTER — APPOINTMENT (OUTPATIENT)
Dept: PULMONOLOGY | Facility: CLINIC | Age: 72
End: 2017-11-09
Payer: MEDICARE

## 2017-11-09 VITALS
SYSTOLIC BLOOD PRESSURE: 108 MMHG | BODY MASS INDEX: 28.35 KG/M2 | OXYGEN SATURATION: 99 % | HEART RATE: 71 BPM | WEIGHT: 155 LBS | DIASTOLIC BLOOD PRESSURE: 66 MMHG

## 2017-11-09 PROCEDURE — 99214 OFFICE O/P EST MOD 30 MIN: CPT

## 2017-12-05 ENCOUNTER — APPOINTMENT (OUTPATIENT)
Dept: INTERNAL MEDICINE | Facility: CLINIC | Age: 72
End: 2017-12-05
Payer: MEDICARE

## 2017-12-05 PROCEDURE — 99213 OFFICE O/P EST LOW 20 MIN: CPT

## 2018-01-06 NOTE — PROGRESS NOTE ADULT - SUBJECTIVE AND OBJECTIVE BOX
Subjective: Seen/evaluated at bedside.  Doing well, no new complaints.  No overnight events    T(C): 36.4, Max: 36.4 (04-07 @ 14:26)  HR: 69 (60 - 80)  BP: 118/58 (100/50 - 134/60)  RR: 16 (16 - 38)  SpO2: 96% (91% - 100%)  Wt(kg): --    Exam: A/Ox3, FC, speech clear  RIOS 5/5 strength, no drift  CN II-XII grossly intact    CBC Full  -  ( 07 Apr 2017 04:59 )  WBC Count : 17.0 K/uL  Hemoglobin : 10.9 g/dL  Hematocrit : 32.2 %  Platelet Count - Automated : 341 K/uL  Mean Cell Volume : 85.6 fL  Mean Cell Hemoglobin : 29.0 pg  Mean Cell Hemoglobin Concentration : 33.9 g/dL  Auto Neutrophil # : x  Auto Lymphocyte # : x  Auto Monocyte # : x  Auto Eosinophil # : x  Auto Basophil # : x  Auto Neutrophil % : 85.5 %  Auto Lymphocyte % : 7.9 %  Auto Monocyte % : 6.5 %  Auto Eosinophil % : x  Auto Basophil % : 0.1 %    04-07    137  |  100  |  20  ----------------------------<  109<H>  4.3   |  27  |  0.65    Ca    8.1<L>      07 Apr 2017 04:59  Phos  4.9     04-07  Mg     2.1     04-07    TPro  5.9<L>  /  Alb  2.8<L>  /  TBili  0.4  /  DBili  0.08  /  AST  13<L>  /  ALT  58<H>  /  AlkPhos  90  04-07    PT/INR - ( 06 Apr 2017 14:58 )   PT: 10.1 sec;   INR: 0.91          PTT - ( 06 Apr 2017 14:58 )  PTT:22.5 sec      Wound: C/D/I, +staples    Assessment/Plan: s/p right frontal crani for met  -PT/OT/OOB  -MRI pending  -DVT/GI ppx  -AEDs  -D/W Dr. Muniz Ephraim PT eval and treat

## 2018-01-11 ENCOUNTER — INPATIENT (INPATIENT)
Facility: HOSPITAL | Age: 73
LOS: 4 days | Discharge: HOME | DRG: 389 | End: 2018-01-16
Attending: SURGERY | Admitting: SURGERY
Payer: MEDICARE

## 2018-01-11 VITALS
HEART RATE: 77 BPM | OXYGEN SATURATION: 97 % | WEIGHT: 145.06 LBS | RESPIRATION RATE: 17 BRPM | TEMPERATURE: 98 F | SYSTOLIC BLOOD PRESSURE: 120 MMHG | DIASTOLIC BLOOD PRESSURE: 76 MMHG

## 2018-01-11 DIAGNOSIS — Z90.49 ACQUIRED ABSENCE OF OTHER SPECIFIED PARTS OF DIGESTIVE TRACT: Chronic | ICD-10-CM

## 2018-01-11 DIAGNOSIS — Z41.9 ENCOUNTER FOR PROCEDURE FOR PURPOSES OTHER THAN REMEDYING HEALTH STATE, UNSPECIFIED: Chronic | ICD-10-CM

## 2018-01-11 DIAGNOSIS — C50.919 MALIGNANT NEOPLASM OF UNSPECIFIED SITE OF UNSPECIFIED FEMALE BREAST: Chronic | ICD-10-CM

## 2018-01-11 LAB — LACTATE SERPL-SCNC: 1.1 MMOL/L — SIGNIFICANT CHANGE UP (ref 0.5–2)

## 2018-01-11 PROCEDURE — 74177 CT ABD & PELVIS W/CONTRAST: CPT | Mod: 26

## 2018-01-11 PROCEDURE — 99285 EMERGENCY DEPT VISIT HI MDM: CPT

## 2018-01-11 RX ORDER — ONDANSETRON 8 MG/1
8 TABLET, FILM COATED ORAL EVERY 6 HOURS
Qty: 0 | Refills: 0 | Status: DISCONTINUED | OUTPATIENT
Start: 2018-01-11 | End: 2018-01-16

## 2018-01-11 RX ORDER — ONDANSETRON 8 MG/1
4 TABLET, FILM COATED ORAL ONCE
Qty: 0 | Refills: 0 | Status: COMPLETED | OUTPATIENT
Start: 2018-01-11 | End: 2018-01-11

## 2018-01-11 RX ORDER — IOHEXOL 300 MG/ML
50 INJECTION, SOLUTION INTRAVENOUS ONCE
Qty: 0 | Refills: 0 | Status: COMPLETED | OUTPATIENT
Start: 2018-01-11 | End: 2018-01-11

## 2018-01-11 RX ORDER — ONDANSETRON 8 MG/1
8 TABLET, FILM COATED ORAL ONCE
Qty: 0 | Refills: 0 | Status: COMPLETED | OUTPATIENT
Start: 2018-01-11 | End: 2018-01-11

## 2018-01-11 RX ORDER — HEPARIN SODIUM 5000 [USP'U]/ML
5000 INJECTION INTRAVENOUS; SUBCUTANEOUS EVERY 8 HOURS
Qty: 0 | Refills: 0 | Status: DISCONTINUED | OUTPATIENT
Start: 2018-01-11 | End: 2018-01-16

## 2018-01-11 RX ORDER — ONDANSETRON 8 MG/1
4 TABLET, FILM COATED ORAL EVERY 6 HOURS
Qty: 0 | Refills: 0 | Status: DISCONTINUED | OUTPATIENT
Start: 2018-01-11 | End: 2018-01-11

## 2018-01-11 RX ORDER — SODIUM CHLORIDE 9 MG/ML
1000 INJECTION, SOLUTION INTRAVENOUS
Qty: 0 | Refills: 0 | Status: DISCONTINUED | OUTPATIENT
Start: 2018-01-11 | End: 2018-01-14

## 2018-01-11 RX ADMIN — Medication 30 MILLILITER(S): at 18:57

## 2018-01-11 RX ADMIN — ONDANSETRON 4 MILLIGRAM(S): 8 TABLET, FILM COATED ORAL at 15:00

## 2018-01-11 RX ADMIN — ONDANSETRON 8 MILLIGRAM(S): 8 TABLET, FILM COATED ORAL at 18:57

## 2018-01-11 RX ADMIN — IOHEXOL 50 MILLILITER(S): 300 INJECTION, SOLUTION INTRAVENOUS at 14:18

## 2018-01-11 NOTE — H&P ADULT - ASSESSMENT
71 yo F  with PMH metastatic breast Ca s/p mastectomy, brain metastectomy, lung Ca s/p resection, carcinoid tumor s/p R hemicolectomy with SBO  -admit to regional  -NPO/IVF  -NGT to [facility]WS  -pain/nausea control  -repeat lactate in AM  -HSQ/SCDs  -discussed with Dr Davis

## 2018-01-11 NOTE — H&P ADULT - NSHPLABSRESULTS_GEN_ALL_CORE
INTERPRETATION:      CT of the ABDOMEN and PELVIS with intravenous contrast dated 1/11/2018   4:51 PM    INDICATION: Abdominal pain for 4 days. Past medical history of numerous   surgeries for lung,  colon,  and brain carcinoma.    TECHNIQUE: CT of the abdomen and pelvis with intravenous and oral   contrast. Axial, sagittal, and coronal images were obtained and reviewed.    COMPARISON: CT chest dated 9/13/2017.    FINDINGS:    Lower chest: Small right-sided pleural effusion. A suture line is also   noted within the medial right middle lobe. No pulmonary nodules or masses   are identified. The heart is normal in size. No pericardial effusion.    Liver: Smooth in contour. There is a 4.0 x 2.8 x 3.5 cm hypodense lesion   within the right hepatic lobe, which measures 54HU, higher density than a   simple cyst. Portal and hepatic veins are patent.    Biliary system: Gallbladder is normal in size. Sludge versus gravel   stones is seen within the dependent aspect of the gallbladder. No biliary   ductal dilatation.    Pancreas: Unremarkable.    Spleen: Unremarkable.    Adrenal glands: Unremarkable.    Kidneys: Symmetric parenchymal enhancement. No renal mass. No   hydronephrosis. No renal or ureteral stone.     Urinary Bladder: The urinary bladder is collapsed.    Reproductive organs: Unremarkable.     Bowel/Peritoneum: The patient is noted to be status post right   hemicolectomy with an ileocolic anastomosis, which appears intact. There   are dilated loops of small bowel measuring up to 4.4 cm in diameter,   consistent with a small bowel obstruction. There is possible transition   point from dilated bowel to collapsed bowel within the mid upper abdomen.   This loop of bowel appears to course between the adjacent loop of   unobstructed bowel, suggestive of an internal hernia. The possibility of   intra-abdominal adhesions with obstruction however cannot be excluded in   view the patient's history. There is engorgement of the vasa recta. There   is a mild amount of ascites adjacent to a loop of distended small bowel   within the left upper abdomen. Ascites is also seen in the right upper   quadrant and pelvis. No pneumatosis or free air is identified. No portal   venous gas. There is redemonstration of a moderate type I hiatal hernia   with mural thickening involving the distal esophagus. Ingested contrast   is seen reaching the rectum. The descending and sigmoid colonic wall   appears thickened, which is likely secondary to underdistention however   an infectious or inflammatory etiology cannot be excluded.    Lymph nodes: No lymphadenopathy.    Aorta/IVC: Normal caliber.    Abdominal wall: No hernia.    Bones/Soft tissues: No acute abnormality. Moderate degenerative changes   spine.      IMPRESSION:   1.  Findings consistent with a small bowel obstruction measuring up to   4.4 cm in diameter. There is associated ascites. No pneumoperitoneum or   pneumatosis however. Possible transition point from dilated bowel to   collapsed bowel within the mid upper abdomen. This loop of bowel which   appears to course between the adjacent loop of unobstructed bowel,   suggestive of an internal hernia versus intra-abdominal adhesion.   2.  Thickening of the descending and sigmoid colonic wall, which is   likely secondary to underdistention, however an infectious or   inflammatory etiology cannot be excluded.  3.  Mild abdominal ascites as well as engorgement of the vasa recta   suggests inflammation without mariaa incarceration. No evidence of   pneumoperitoneum or portal venous gas. Surgical consultation is suggested.

## 2018-01-11 NOTE — H&P ADULT - HISTORY OF PRESENT ILLNESS
71 yo F with PMH metastatic breast Ca s/p mastectomy, brain metastectomy, lung Ca s/p resection, carcinoid tumor s/p R hemicolectomy presented to ED with 5 days of right sided abdominal pain.  Patient notes associated nausea and several episodes of vomiting.  She notes NBNB emesis and feeling constipated since symptom onset.  Reports small BM today. Denies fever, chills, chest pain, SOB Was seen last sunday in ED in [place] at which time CT and labs were normal as per pt.

## 2018-01-11 NOTE — ED ADULT NURSE NOTE - OBJECTIVE STATEMENT
pt has had rlq pain with nausea and diarrhea for 1 week.  seen in ER in [place] on sunday and had a normal ct scan but had elevated LFTs.  pt returned home for f/u for abnormal labs.  pt states she continues to have nausea, intermittent pain and diarrhea.  pt also states she stopped all her regular meds on monday in case one of them was the cause.

## 2018-01-11 NOTE — ED ADULT NURSE REASSESSMENT NOTE - NS ED NURSE REASSESS COMMENT FT1
pt remains aaox3 no deficits no sob no chest pain.  nausea better.  continues to c/o epigastric burning.  pt ambulatory ad willa.  pending surgery sign out.

## 2018-01-11 NOTE — ED ADULT NURSE NOTE - PSH
Breast cancer  left mastectomy  History of colon resection    Surgery, elective  Thoractomy with lung resection-  Right upper lobe lobectomy  Right  lower lobe wedge  Surgery, elective  Ovaries removed 1980"s  Surgery, elective  Left wrist

## 2018-01-11 NOTE — PROGRESS NOTE ADULT - SUBJECTIVE AND OBJECTIVE BOX
pt known to me  Has 3-4 days of abdom pain  Was in [place]  - went to ER there and had CAT ---> no obstruction, no mass, no hernia, etc  Pt not improving Flew in to [place] this afternoon  S/p R hemicolect for carcinoid tumor in ~May 2017 laparoscopic  Also hist signif for breast Ca w/ mets liver ~under control, rescted frontal lobe tumor (met), S/p mult VATS - lung Ca ex smoker, L mastect  -  w/ resection chest wall recurr( ~12 yrs ago)  On mult meds  including INHALERS      :::::::::::::::::::::::::::::::::::::::::      Dr Chantel Tellez is PMD ::::::::::::::::::::::::::::::::::       on PE she's stable but seems more distended than usual with mild tenderness --?lower abdom? - difficult to say ----- no guarding   No obvious hernia    LFTs ~ok    CAT rev'd   --- looks like pSBO   See contrast in Transverse colon,  but also see slightly dil Sm Bowel (4.5 cm)      Disc'd w/ pt, abad Olivo, Dr Tellez, Dr Cates  -----> will admit surg, NPO, IVF, NGT, serial exams, serial labs, may need repeat imaging    Will also get LACTATE

## 2018-01-11 NOTE — ED ADULT TRIAGE NOTE - CHIEF COMPLAINT QUOTE
sharp RLQ abdominal pain, nausea and epigastric pressure x 1 week.  Last BM 2 hours ago.  Hx liver cancer, Right lobectomy 2014

## 2018-01-11 NOTE — ED ADULT NURSE REASSESSMENT NOTE - NS ED NURSE REASSESS COMMENT FT1
Rec'd pt calm, in no acute distress, breathing with ease on room air. Pt admitted awaiting dispo. Pt pending NGT. Denies n/v. HL RAC noted, patent. Reports mild epigastric discomfort. Denies nausea. Denies pain. Will follow up.

## 2018-01-11 NOTE — H&P ADULT - NSHPPHYSICALEXAM_GEN_ALL_CORE
Gen: AOx3, NAD    CV: RRR, no m/r/g    Pulm: no resp distress, CTABL    Abdomen: softly distended, mildly tender on R side of abdomen    Ext: WWP, no edema

## 2018-01-11 NOTE — ED PROVIDER NOTE - OBJECTIVE STATEMENT
72 year old female with history of metastatic breast cancer presents to ED with chief complaint abdominal discomfort x several days.  Patient notes associated nausea and vomiting x "a few" episodes.  She notes non-bloody, non-bilious emesis and feeling constipated since symptom onset.  Patient states she passed a small amount of non bloody, non melanotic stool today.  She denies associated fever, chills, chest pain, shortness of breath or any additional acute complaints/concerns at this time.  Of note, she was seen in another ED at symptom onset with what she believes was a negative work up.  She was ultimately discharged home without formal diagnosis and advised to follow up with her PCP.

## 2018-01-11 NOTE — ED PROVIDER NOTE - MEDICAL DECISION MAKING DETAILS
Patient in ED with cc abdominal discomfort.  All labs reviewed.  CT imaging is reviewed by patient's general surgeon, Dr. Frank Davis, who is in ED to evaluate patient.  Per sx, concern for possible obstruction at this time.  Of note, there is no formal radiology read of imaging study.  Gen sx requests admission to surgical service for close monitoring.  Request is made for maalox, more zofran.  No NG tube to be inserted at this time.  Patient aware of plan for admission and agreeable.

## 2018-01-12 ENCOUNTER — APPOINTMENT (OUTPATIENT)
Dept: INTERNAL MEDICINE | Facility: CLINIC | Age: 73
End: 2018-01-12

## 2018-01-12 DIAGNOSIS — C50.919 MALIGNANT NEOPLASM OF UNSPECIFIED SITE OF UNSPECIFIED FEMALE BREAST: ICD-10-CM

## 2018-01-12 DIAGNOSIS — K56.609 UNSPECIFIED INTESTINAL OBSTRUCTION, UNSPECIFIED AS TO PARTIAL VERSUS COMPLETE OBSTRUCTION: ICD-10-CM

## 2018-01-12 DIAGNOSIS — C34.90 MALIGNANT NEOPLASM OF UNSPECIFIED PART OF UNSPECIFIED BRONCHUS OR LUNG: ICD-10-CM

## 2018-01-12 DIAGNOSIS — D49.6 NEOPLASM OF UNSPECIFIED BEHAVIOR OF BRAIN: ICD-10-CM

## 2018-01-12 DIAGNOSIS — J44.9 CHRONIC OBSTRUCTIVE PULMONARY DISEASE, UNSPECIFIED: ICD-10-CM

## 2018-01-12 DIAGNOSIS — D3A.00 BENIGN CARCINOID TUMOR OF UNSPECIFIED SITE: ICD-10-CM

## 2018-01-12 LAB
ANION GAP SERPL CALC-SCNC: 18 MMOL/L — HIGH (ref 5–17)
BUN SERPL-MCNC: 21 MG/DL — SIGNIFICANT CHANGE UP (ref 7–23)
CALCIUM SERPL-MCNC: 9.6 MG/DL — SIGNIFICANT CHANGE UP (ref 8.4–10.5)
CHLORIDE SERPL-SCNC: 91 MMOL/L — LOW (ref 96–108)
CO2 SERPL-SCNC: 24 MMOL/L — SIGNIFICANT CHANGE UP (ref 22–31)
CREAT SERPL-MCNC: 0.98 MG/DL — SIGNIFICANT CHANGE UP (ref 0.5–1.3)
GLUCOSE SERPL-MCNC: 112 MG/DL — HIGH (ref 70–99)
HCT VFR BLD CALC: 34.1 % — LOW (ref 34.5–45)
HGB BLD-MCNC: 11.7 G/DL — SIGNIFICANT CHANGE UP (ref 11.5–15.5)
LACTATE SERPL-SCNC: 1.2 MMOL/L — SIGNIFICANT CHANGE UP (ref 0.5–2)
MAGNESIUM SERPL-MCNC: 2 MG/DL — SIGNIFICANT CHANGE UP (ref 1.6–2.6)
MCHC RBC-ENTMCNC: 32.6 PG — SIGNIFICANT CHANGE UP (ref 27–34)
MCHC RBC-ENTMCNC: 34.3 G/DL — SIGNIFICANT CHANGE UP (ref 32–36)
MCV RBC AUTO: 95 FL — SIGNIFICANT CHANGE UP (ref 80–100)
PHOSPHATE SERPL-MCNC: 3.6 MG/DL — SIGNIFICANT CHANGE UP (ref 2.5–4.5)
PLATELET # BLD AUTO: 296 K/UL — SIGNIFICANT CHANGE UP (ref 150–400)
POTASSIUM SERPL-MCNC: 3.9 MMOL/L — SIGNIFICANT CHANGE UP (ref 3.5–5.3)
POTASSIUM SERPL-SCNC: 3.9 MMOL/L — SIGNIFICANT CHANGE UP (ref 3.5–5.3)
RBC # BLD: 3.59 M/UL — LOW (ref 3.8–5.2)
RBC # FLD: 15.8 % — SIGNIFICANT CHANGE UP (ref 10.3–16.9)
SODIUM SERPL-SCNC: 133 MMOL/L — LOW (ref 135–145)
WBC # BLD: 3.8 K/UL — SIGNIFICANT CHANGE UP (ref 3.8–10.5)
WBC # FLD AUTO: 3.8 K/UL — SIGNIFICANT CHANGE UP (ref 3.8–10.5)

## 2018-01-12 PROCEDURE — 99221 1ST HOSP IP/OBS SF/LOW 40: CPT | Mod: GC

## 2018-01-12 PROCEDURE — 71045 X-RAY EXAM CHEST 1 VIEW: CPT | Mod: 26

## 2018-01-12 PROCEDURE — 74019 RADEX ABDOMEN 2 VIEWS: CPT | Mod: 26

## 2018-01-12 RX ORDER — ACETAMINOPHEN 500 MG
1000 TABLET ORAL ONCE
Qty: 0 | Refills: 0 | Status: DISCONTINUED | OUTPATIENT
Start: 2018-01-12 | End: 2018-01-12

## 2018-01-12 RX ORDER — KETOROLAC TROMETHAMINE 30 MG/ML
15 SYRINGE (ML) INJECTION ONCE
Qty: 0 | Refills: 0 | Status: DISCONTINUED | OUTPATIENT
Start: 2018-01-12 | End: 2018-01-12

## 2018-01-12 RX ORDER — BENZOCAINE AND MENTHOL 5; 1 G/100ML; G/100ML
1 LIQUID ORAL
Qty: 0 | Refills: 0 | Status: DISCONTINUED | OUTPATIENT
Start: 2018-01-12 | End: 2018-01-16

## 2018-01-12 RX ORDER — HYDROMORPHONE HYDROCHLORIDE 2 MG/ML
0.5 INJECTION INTRAMUSCULAR; INTRAVENOUS; SUBCUTANEOUS EVERY 4 HOURS
Qty: 0 | Refills: 0 | Status: DISCONTINUED | OUTPATIENT
Start: 2018-01-12 | End: 2018-01-12

## 2018-01-12 RX ORDER — HYDROMORPHONE HYDROCHLORIDE 2 MG/ML
0.5 INJECTION INTRAMUSCULAR; INTRAVENOUS; SUBCUTANEOUS ONCE
Qty: 0 | Refills: 0 | Status: DISCONTINUED | OUTPATIENT
Start: 2018-01-12 | End: 2018-01-12

## 2018-01-12 RX ORDER — BENZOCAINE 10 %
1 GEL (GRAM) MUCOUS MEMBRANE THREE TIMES A DAY
Qty: 0 | Refills: 0 | Status: DISCONTINUED | OUTPATIENT
Start: 2018-01-12 | End: 2018-01-12

## 2018-01-12 RX ORDER — BENZOCAINE AND MENTHOL 5; 1 G/100ML; G/100ML
1 LIQUID ORAL
Qty: 0 | Refills: 0 | Status: DISCONTINUED | OUTPATIENT
Start: 2018-01-12 | End: 2018-01-12

## 2018-01-12 RX ORDER — PANTOPRAZOLE SODIUM 20 MG/1
40 TABLET, DELAYED RELEASE ORAL DAILY
Qty: 0 | Refills: 0 | Status: DISCONTINUED | OUTPATIENT
Start: 2018-01-12 | End: 2018-01-15

## 2018-01-12 RX ADMIN — Medication 15 MILLIGRAM(S): at 19:58

## 2018-01-12 RX ADMIN — PANTOPRAZOLE SODIUM 40 MILLIGRAM(S): 20 TABLET, DELAYED RELEASE ORAL at 06:33

## 2018-01-12 RX ADMIN — Medication 1 SPRAY(S): at 05:58

## 2018-01-12 RX ADMIN — Medication 15 MILLIGRAM(S): at 20:30

## 2018-01-12 RX ADMIN — BENZOCAINE AND MENTHOL 1 LOZENGE: 5; 1 LIQUID ORAL at 17:41

## 2018-01-12 RX ADMIN — HYDROMORPHONE HYDROCHLORIDE 0.5 MILLIGRAM(S): 2 INJECTION INTRAMUSCULAR; INTRAVENOUS; SUBCUTANEOUS at 19:00

## 2018-01-12 RX ADMIN — HYDROMORPHONE HYDROCHLORIDE 0.5 MILLIGRAM(S): 2 INJECTION INTRAMUSCULAR; INTRAVENOUS; SUBCUTANEOUS at 05:42

## 2018-01-12 RX ADMIN — HYDROMORPHONE HYDROCHLORIDE 0.5 MILLIGRAM(S): 2 INJECTION INTRAMUSCULAR; INTRAVENOUS; SUBCUTANEOUS at 18:43

## 2018-01-12 NOTE — PROGRESS NOTE ADULT - ASSESSMENT
71 yo F PMH  COPD,metastatic breast Ca s/p mastectomy, brain metastectomy, lung Ca s/p resection, carcinoid tumor s/p R hemicolectomy with SBO  pain control   dvt ppx   am labs   serial abdominal exam   NGT to [facility]   OOB

## 2018-01-12 NOTE — PROGRESS NOTE ADULT - SUBJECTIVE AND OBJECTIVE BOX
o/n: admitted w/ sbo     Vital Signs Last 24 Hrs  T(C): 36.8 (12 Jan 2018 05:08), Max: 37.1 (11 Jan 2018 22:21)  T(F): 98.3 (12 Jan 2018 05:08), Max: 98.7 (11 Jan 2018 22:21)  HR: 95 (12 Jan 2018 05:08) (77 - 124)  BP: 109/67 (12 Jan 2018 05:08) (104/54 - 123/75)  BP(mean): --  RR: 18 (12 Jan 2018 05:08) (17 - 18)  SpO2: 97% (12 Jan 2018 05:08) (97% - 100%)    Gen: NAD   Abd: soft, mildly ttp / nd

## 2018-01-12 NOTE — CHART NOTE - NSCHARTNOTEFT_GEN_A_CORE
Patient examined at bedside. Complained NG tube irritation and flatus.  Flushed NG tube.     Abd: soft non tender, non distended.     Pt report feeling better after flushing. Will continue to monitor.

## 2018-01-12 NOTE — PROGRESS NOTE ADULT - SUBJECTIVE AND OBJECTIVE BOX
INTERVAL HPI/OVERNIGHT EVENTS:  Events well known to me; Had seen her in ED last night; CT noted with small bowel obstruction: Has NG tube in place. States she feels better today:   Other medical known to me:      MEDICATIONS  (STANDING):  heparin  Injectable 5000 Unit(s) SubCutaneous every 8 hours  lactated ringers. 1000 milliLiter(s) (110 mL/Hr) IV Continuous <Continuous>  pantoprazole  Injectable 40 milliGRAM(s) IV Push daily    MEDICATIONS  (PRN):  ondansetron Injectable 8 milliGRAM(s) IV Push every 6 hours PRN Nausea and/or Vomiting      Allergies    adhesives (Rash)  No Known Drug Allergies  Originally Entered as [Unknown] reaction to [Other][Tape] (Unknown)    Intolerances        Vital Signs Last 24 Hrs  T(C): 36.8 (2018 05:08), Max: 37.1 (2018 22:21)  T(F): 98.3 (2018 05:08), Max: 98.7 (2018 22:21)  HR: 95 (2018 05:08) (77 - 124)  BP: 109/67 (2018 05:08) (104/54 - 123/75)  BP(mean): --  RR: 18 (2018 05:08) (17 - 18)  SpO2: 97% (2018 05:08) (97% - 100%)          Constitutional:  Awake    Eyes: DANAE    ENMT: Negative    Neck: Supple    Back:  no tenderness     Respiratory:  clear    Cardiovascular: S1 S2    Gastrointestinal: soft and less distended    Genitourinary:    Extremities: no edema    Vascular:    Neurological:  intact    Skin:    Lymph Nodes:             @ 07:  -   @ 10:38  --------------------------------------------------------  IN: 220 mL / OUT: 0 mL / NET: 220 mL      LABS:                        11.7   3.8   )-----------( 296      ( 2018 06:07 )             34.1         133<L>  |  91<L>  |  21  ----------------------------<  112<H>  3.9   |  24  |  0.98    Ca    9.6      2018 06:07  Phos  3.6       Mg     2.0         TPro  6.8  /  Alb  3.4  /  TBili  1.0  /  DBili  x   /  AST  27  /  ALT  72<H>  /  AlkPhos  91      PT/INR - ( 2018 13:26 )   PT: 10.4 sec;   INR: 0.94          PTT - ( 2018 13:26 )  PTT:25.7 sec  Urinalysis Basic - ( 2018 15:47 )    Color: Yellow / Appearance: SL Cloudy / S.025 / pH: x  Gluc: x / Ketone: 40 mg/dL  / Bili: Small / Urobili: 1.0 E.U./dL   Blood: x / Protein: Trace mg/dL / Nitrite: NEGATIVE   Leuk Esterase: NEGATIVE / RBC: > 10 /HPF / WBC < 5 /HPF   Sq Epi: x / Non Sq Epi: 0-5 /HPF / Bacteria: Present /HPF        RADIOLOGY & ADDITIONAL TESTS:

## 2018-01-13 LAB
ANION GAP SERPL CALC-SCNC: 17 MMOL/L — SIGNIFICANT CHANGE UP (ref 5–17)
BUN SERPL-MCNC: 26 MG/DL — HIGH (ref 7–23)
CALCIUM SERPL-MCNC: 9.1 MG/DL — SIGNIFICANT CHANGE UP (ref 8.4–10.5)
CHLORIDE SERPL-SCNC: 96 MMOL/L — SIGNIFICANT CHANGE UP (ref 96–108)
CO2 SERPL-SCNC: 24 MMOL/L — SIGNIFICANT CHANGE UP (ref 22–31)
CREAT SERPL-MCNC: 1.19 MG/DL — SIGNIFICANT CHANGE UP (ref 0.5–1.3)
GLUCOSE SERPL-MCNC: 86 MG/DL — SIGNIFICANT CHANGE UP (ref 70–99)
HCT VFR BLD CALC: 31.7 % — LOW (ref 34.5–45)
HGB BLD-MCNC: 10.9 G/DL — LOW (ref 11.5–15.5)
MAGNESIUM SERPL-MCNC: 2.2 MG/DL — SIGNIFICANT CHANGE UP (ref 1.6–2.6)
MCHC RBC-ENTMCNC: 33.4 PG — SIGNIFICANT CHANGE UP (ref 27–34)
MCHC RBC-ENTMCNC: 34.4 G/DL — SIGNIFICANT CHANGE UP (ref 32–36)
MCV RBC AUTO: 97.2 FL — SIGNIFICANT CHANGE UP (ref 80–100)
PHOSPHATE SERPL-MCNC: 3.5 MG/DL — SIGNIFICANT CHANGE UP (ref 2.5–4.5)
PLATELET # BLD AUTO: 269 K/UL — SIGNIFICANT CHANGE UP (ref 150–400)
POTASSIUM SERPL-MCNC: 3.7 MMOL/L — SIGNIFICANT CHANGE UP (ref 3.5–5.3)
POTASSIUM SERPL-SCNC: 3.7 MMOL/L — SIGNIFICANT CHANGE UP (ref 3.5–5.3)
RBC # BLD: 3.26 M/UL — LOW (ref 3.8–5.2)
RBC # FLD: 16.8 % — SIGNIFICANT CHANGE UP (ref 10.3–16.9)
SODIUM SERPL-SCNC: 137 MMOL/L — SIGNIFICANT CHANGE UP (ref 135–145)
WBC # BLD: 3.1 K/UL — LOW (ref 3.8–10.5)
WBC # FLD AUTO: 3.1 K/UL — LOW (ref 3.8–10.5)

## 2018-01-13 RX ORDER — POTASSIUM CHLORIDE 20 MEQ
40 PACKET (EA) ORAL ONCE
Qty: 0 | Refills: 0 | Status: COMPLETED | OUTPATIENT
Start: 2018-01-13 | End: 2018-01-13

## 2018-01-13 RX ORDER — LIDOCAINE HCL 20 MG/ML
25 VIAL (ML) INJECTION ONCE
Qty: 0 | Refills: 0 | Status: DISCONTINUED | OUTPATIENT
Start: 2018-01-13 | End: 2018-01-14

## 2018-01-13 RX ADMIN — PANTOPRAZOLE SODIUM 40 MILLIGRAM(S): 20 TABLET, DELAYED RELEASE ORAL at 11:30

## 2018-01-13 RX ADMIN — Medication 0.5 MILLIGRAM(S): at 23:51

## 2018-01-13 RX ADMIN — Medication 40 MILLIEQUIVALENT(S): at 21:08

## 2018-01-13 NOTE — PROGRESS NOTE ADULT - ASSESSMENT
71 yo F PMH  COPD,metastatic breast Ca s/p mastectomy, brain metastectomy, lung Ca s/p resection, carcinoid tumor s/p R hemicolectomy with SBO    1. SBO   NPO   IVF  maalox  regional  SQH/SCDs/ IS 73 yo F PMH  COPD,metastatic breast Ca s/p mastectomy, brain metastectomy, lung Ca s/p resection, carcinoid tumor s/p R hemicolectomy with SBO    1. SBO   NPO   IVF  maalox  regional  SQH/SCDs/ IS

## 2018-01-13 NOTE — PROGRESS NOTE ADULT - SUBJECTIVE AND OBJECTIVE BOX
O/N:  patients NGT tube fell out. +flatus, +BM  1/12: positive flatus, positive Bowel movement, labs WNL, abdomen soft, pain improved, NGT w/ approx 200 cc output OVERNIGHT EVENTS: patients NGT tube fell out. +flatus, +BM  : positive flatus, positive Bowel movement, labs WNL, abdomen soft, pain improved, NGT w/ approx 200 cc output   OVERNIGHT EVENTS:    SUBJECTIVE: Patient examined bedside reports she is passing gas and tolerating diet   Flatus: [X] YES [] NO             Bowel Movement: [X ] YES [ ] NO  Pain (0-10):            Pain Control Adequate: [ X] YES [ ] NO  Nausea: [ ] YES [ X] NO            Vomiting: [ ] YES [ X] NO  Diarrhea: [ ] YES X ] NO         Constipation: [ ] YES [X ] NO     Chest Pain: [ ] YES [X ] NO    SOB:  [ ] YES [X ] NO    heparin  Injectable 5000 Unit(s) SubCutaneous every 8 hours      Vital Signs Last 24 Hrs  T(C): 36.4 (2018 08:45), Max: 37.1 (2018 12:54)  T(F): 97.6 (2018 08:45), Max: 98.8 (2018 12:54)  HR: 77 (2018 08:45) (77 - 93)  BP: 105/53 (2018 08:45) (102/56 - 122/64)  BP(mean): --  RR: 18 (2018 08:45) (17 - 18)  SpO2: 95% (2018 08:45) (95% - 100%)  I&O's Detail    2018 07:01  -  2018 07:00  --------------------------------------------------------  IN:    lactated ringers.: 1430 mL  Total IN: 1430 mL    OUT:    Nasoenteral Tube: 200 mL    Voided: 470 mL  Total OUT: 670 mL    Total NET: 760 mL          General: NAD, resting comfortably in bed  C/V: NSR  Pulm: Nonlabored breathing, no respiratory distress  Abd: soft, NT/ND.  Extrem: WWP, no edema, SCDs in place        LABS:                        10.9   3.1   )-----------( 269      ( 2018 07:27 )             31.7         137  |  96  |  26<H>  ----------------------------<  86  3.7   |  24  |  1.19    Ca    9.1      2018 07:27  Phos  3.5       Mg     2.2         TPro  6.8  /  Alb  3.4  /  TBili  1.0  /  DBili  x   /  AST  27  /  ALT  72<H>  /  AlkPhos  91      PT/INR - ( 2018 13:26 )   PT: 10.4 sec;   INR: 0.94          PTT - ( 2018 13:26 )  PTT:25.7 sec  Urinalysis Basic - ( 2018 15:47 )    Color: Yellow / Appearance: SL Cloudy / S.025 / pH: x  Gluc: x / Ketone: 40 mg/dL  / Bili: Small / Urobili: 1.0 E.U./dL   Blood: x / Protein: Trace mg/dL / Nitrite: NEGATIVE   Leuk Esterase: NEGATIVE / RBC: > 10 /HPF / WBC < 5 /HPF   Sq Epi: x / Non Sq Epi: 0-5 /HPF / Bacteria: Present /HPF

## 2018-01-14 LAB
ANION GAP SERPL CALC-SCNC: 15 MMOL/L — SIGNIFICANT CHANGE UP (ref 5–17)
BLD GP AB SCN SERPL QL: NEGATIVE — SIGNIFICANT CHANGE UP
BUN SERPL-MCNC: 21 MG/DL — SIGNIFICANT CHANGE UP (ref 7–23)
CALCIUM SERPL-MCNC: 9.4 MG/DL — SIGNIFICANT CHANGE UP (ref 8.4–10.5)
CHLORIDE SERPL-SCNC: 101 MMOL/L — SIGNIFICANT CHANGE UP (ref 96–108)
CO2 SERPL-SCNC: 23 MMOL/L — SIGNIFICANT CHANGE UP (ref 22–31)
CREAT SERPL-MCNC: 0.97 MG/DL — SIGNIFICANT CHANGE UP (ref 0.5–1.3)
GLUCOSE SERPL-MCNC: 88 MG/DL — SIGNIFICANT CHANGE UP (ref 70–99)
HCT VFR BLD CALC: 32.5 % — LOW (ref 34.5–45)
HGB BLD-MCNC: 11.1 G/DL — LOW (ref 11.5–15.5)
INR BLD: 1.01 — SIGNIFICANT CHANGE UP (ref 0.88–1.16)
MAGNESIUM SERPL-MCNC: 2.1 MG/DL — SIGNIFICANT CHANGE UP (ref 1.6–2.6)
MCHC RBC-ENTMCNC: 33.7 PG — SIGNIFICANT CHANGE UP (ref 27–34)
MCHC RBC-ENTMCNC: 34.2 G/DL — SIGNIFICANT CHANGE UP (ref 32–36)
MCV RBC AUTO: 98.8 FL — SIGNIFICANT CHANGE UP (ref 80–100)
PHOSPHATE SERPL-MCNC: 2.2 MG/DL — LOW (ref 2.5–4.5)
PLATELET # BLD AUTO: 311 K/UL — SIGNIFICANT CHANGE UP (ref 150–400)
POTASSIUM SERPL-MCNC: 4.3 MMOL/L — SIGNIFICANT CHANGE UP (ref 3.5–5.3)
POTASSIUM SERPL-SCNC: 4.3 MMOL/L — SIGNIFICANT CHANGE UP (ref 3.5–5.3)
PROTHROM AB SERPL-ACNC: 11.2 SEC — SIGNIFICANT CHANGE UP (ref 9.8–12.7)
RBC # BLD: 3.29 M/UL — LOW (ref 3.8–5.2)
RBC # FLD: 16 % — SIGNIFICANT CHANGE UP (ref 10.3–16.9)
RH IG SCN BLD-IMP: POSITIVE — SIGNIFICANT CHANGE UP
SODIUM SERPL-SCNC: 139 MMOL/L — SIGNIFICANT CHANGE UP (ref 135–145)
WBC # BLD: 3.3 K/UL — LOW (ref 3.8–10.5)
WBC # FLD AUTO: 3.3 K/UL — LOW (ref 3.8–10.5)

## 2018-01-14 PROCEDURE — 99232 SBSQ HOSP IP/OBS MODERATE 35: CPT | Mod: GC

## 2018-01-14 RX ORDER — LANOLIN ALCOHOL/MO/W.PET/CERES
3 CREAM (GRAM) TOPICAL AT BEDTIME
Qty: 0 | Refills: 0 | Status: DISCONTINUED | OUTPATIENT
Start: 2018-01-14 | End: 2018-01-16

## 2018-01-14 RX ORDER — ESCITALOPRAM OXALATE 10 MG/1
10 TABLET, FILM COATED ORAL DAILY
Qty: 0 | Refills: 0 | Status: DISCONTINUED | OUTPATIENT
Start: 2018-01-14 | End: 2018-01-16

## 2018-01-14 RX ORDER — MONTELUKAST 4 MG/1
4 TABLET, CHEWABLE ORAL DAILY
Qty: 0 | Refills: 0 | Status: DISCONTINUED | OUTPATIENT
Start: 2018-01-14 | End: 2018-01-14

## 2018-01-14 RX ORDER — LORATADINE 10 MG/1
10 TABLET ORAL DAILY
Qty: 0 | Refills: 0 | Status: DISCONTINUED | OUTPATIENT
Start: 2018-01-14 | End: 2018-01-16

## 2018-01-14 RX ORDER — MONTELUKAST 4 MG/1
10 TABLET, CHEWABLE ORAL DAILY
Qty: 0 | Refills: 0 | Status: DISCONTINUED | OUTPATIENT
Start: 2018-01-14 | End: 2018-01-16

## 2018-01-14 RX ORDER — ALPRAZOLAM 0.25 MG
0.5 TABLET ORAL AT BEDTIME
Qty: 0 | Refills: 0 | Status: DISCONTINUED | OUTPATIENT
Start: 2018-01-14 | End: 2018-01-16

## 2018-01-14 RX ADMIN — SODIUM CHLORIDE 75 MILLILITER(S): 9 INJECTION, SOLUTION INTRAVENOUS at 17:48

## 2018-01-14 RX ADMIN — PANTOPRAZOLE SODIUM 40 MILLIGRAM(S): 20 TABLET, DELAYED RELEASE ORAL at 13:00

## 2018-01-14 RX ADMIN — Medication 0.5 MILLIGRAM(S): at 22:28

## 2018-01-14 NOTE — PROGRESS NOTE ADULT - SUBJECTIVE AND OBJECTIVE BOX
Interval Events: reviewed  Patient seen and examined at bedside.    Patient is a 72y old  Female who presents with a chief complaint of abdominal pain (11 Jan 2018 21:07)    she is doing OK but had diarrhoes  PAST MEDICAL & SURGICAL HISTORY:  Lung collapse: 2x  COPD (chronic obstructive pulmonary disease)  Brain tumor  Liver cancer  Carcinoid tumor  Malignant neoplasm of female breast: Left breast CA with liver mets  Malignant neoplasm of bronchus and lung, unspecified site: RUL Lobectomy, RLL wedge resection  History of colon resection  Surgery, elective: Thoractomy with lung resection-  Right upper lobe lobectomy  Right  lower lobe wedge  Surgery, elective: Ovaries removed 1980&quot;s  Surgery, elective: Left wrist  Breast cancer: left mastectomy      MEDICATIONS:  Pulmonary:    Antimicrobials:    Anticoagulants:  heparin  Injectable 5000 Unit(s) SubCutaneous every 8 hours    Cardiac:      Allergies    adhesives (Rash)  No Known Drug Allergies  Originally Entered as [Unknown] reaction to [Other][Tape] (Unknown)    Intolerances        Vital Signs Last 24 Hrs  T(C): 37.1 (14 Jan 2018 19:46), Max: 37.3 (14 Jan 2018 05:43)  T(F): 98.8 (14 Jan 2018 19:46), Max: 99.1 (14 Jan 2018 05:43)  HR: 64 (14 Jan 2018 19:46) (64 - 84)  BP: 108/70 (14 Jan 2018 19:46) (95/57 - 114/70)  BP(mean): --  RR: 17 (14 Jan 2018 19:46) (14 - 17)  SpO2: 100% (14 Jan 2018 19:46) (100% - 100%)    01-13 @ 07:01 - 01-14 @ 07:00  --------------------------------------------------------  IN: 2260 mL / OUT: 1200 mL / NET: 1060 mL    01-14 @ 07:01 - 01-14 @ 22:12  --------------------------------------------------------  IN: 1395 mL / OUT: 0 mL / NET: 1395 mL          LABS:      CBC Full  -  ( 14 Jan 2018 08:48 )  WBC Count : 3.3 K/uL  Hemoglobin : 11.1 g/dL  Hematocrit : 32.5 %  Platelet Count - Automated : 311 K/uL  Mean Cell Volume : 98.8 fL  Mean Cell Hemoglobin : 33.7 pg  Mean Cell Hemoglobin Concentration : 34.2 g/dL  Auto Neutrophil # : x  Auto Lymphocyte # : x  Auto Monocyte # : x  Auto Eosinophil # : x  Auto Basophil # : x  Auto Neutrophil % : x  Auto Lymphocyte % : x  Auto Monocyte % : x  Auto Eosinophil % : x  Auto Basophil % : x    01-14    139  |  101  |  21  ----------------------------<  88  4.3   |  23  |  0.97    Ca    9.4      14 Jan 2018 08:48  Phos  2.2     01-14  Mg     2.1     01-14      PT/INR - ( 14 Jan 2018 08:49 )   PT: 11.2 sec;   INR: 1.01                              RADIOLOGY & ADDITIONAL STUDIES (The following images were personally reviewed):  Salcedo:                            Yes        No  Urine output:               Yes        No  DVT prophylaxis:         Yes        No  Flattus:                          Yes        No  Bowel movement:       Yes        No

## 2018-01-14 NOTE — PROGRESS NOTE ADULT - SUBJECTIVE AND OBJECTIVE BOX
O/N: FRANK, VSS,  tolerating CLD, made NPO@[facility] inc[facility] OR in AM  1/13: Patient started clears tolerating , + flatus/+bm O/N: FRANK, VSS,  tolerating CLD, made NPO@[facility] inc[facility] OR in AM  1/13: Patient started clears tolerating , + flatus/+bm        SUBJECTIVE:  Flatus: [x ] YES [ ] NO             Bowel Movement: [x ] YES [ ] NO  Pain (0-10):            Pain Control Adequate: [x ] YES [ ] NO  Nausea: [ ] YES [x ] NO            Vomiting: [ ] YES [x ] NO  Diarrhea: [ ] YES [x ] NO         Constipation: [ ] YES [x ] NO     Chest Pain: [ ] YES [x ] NO    SOB:  [ ] YES [x] NO    MEDICATIONS  (STANDING):  heparin  Injectable 5000 Unit(s) SubCutaneous every 8 hours  lactated ringers. 1000 milliLiter(s) (75 mL/Hr) IV Continuous <Continuous>  lidocaine 1% Injectable 25 milliLiter(s) Local Injection once  pantoprazole  Injectable 40 milliGRAM(s) IV Push daily    MEDICATIONS  (PRN):  benzocaine 15 mG/menthol 3.6 mG Lozenge 1 Lozenge Oral two times a day PRN Sore Throat  LORazepam     Tablet 0.5 milliGRAM(s) Oral at bedtime PRN Anxiety  ondansetron Injectable 8 milliGRAM(s) IV Push every 6 hours PRN Nausea and/or Vomiting      Vital Signs Last 24 Hrs  T(C): 37.3 (14 Jan 2018 05:43), Max: 37.3 (14 Jan 2018 05:43)  T(F): 99.1 (14 Jan 2018 05:43), Max: 99.1 (14 Jan 2018 05:43)  HR: 65 (14 Jan 2018 05:43) (65 - 87)  BP: 95/57 (14 Jan 2018 05:43) (95/57 - 113/68)  BP(mean): --  RR: 17 (14 Jan 2018 05:43) (16 - 18)  SpO2: 100% (14 Jan 2018 05:43) (95% - 100%)    PHYSICAL EXAM:      Constitutional: A&Ox3      Respiratory: non labored breathing, no respiratory distress    Cardiovascular: NSR, RRR    Gastrointestinal: Soft ND,NT                     Genitourinary: Voiding    Extremities: (-) edema                  I&O's Detail    13 Jan 2018 07:01  -  14 Jan 2018 07:00  --------------------------------------------------------  IN:    lactated ringers.: 1320 mL    Oral Fluid: 940 mL  Total IN: 2260 mL    OUT:    Voided: 1200 mL  Total OUT: 1200 mL    Total NET: 1060 mL      14 Jan 2018 07:01  -  14 Jan 2018 07:48  --------------------------------------------------------  IN:    lactated ringers.: 75 mL  Total IN: 75 mL    OUT:  Total OUT: 0 mL    Total NET: 75 mL          LABS:                        10.9   3.1   )-----------( 269      ( 13 Jan 2018 07:27 )             31.7     01-13    137  |  96  |  26<H>  ----------------------------<  86  3.7   |  24  |  1.19    Ca    9.1      13 Jan 2018 07:27  Phos  3.5     01-13  Mg     2.2     01-13            RADIOLOGY & ADDITIONAL STUDIES:

## 2018-01-14 NOTE — PROGRESS NOTE ADULT - ASSESSMENT
71 yo F PMH  COPD,metastatic breast Ca s/p mastectomy, brain metastectomy, lung Ca s/p resection, carcinoid tumor s/p R hemicolectomy with SBO    1. SBO, resolving  CLD  IVF  maalox  SQH/SCDs  IS/OOBA    2. Dispo  Pending hospital course 73 yo F PMH  COPD,metastatic breast Ca s/p mastectomy, brain metastectomy, lung Ca s/p resection, carcinoid tumor s/p R hemicolectomy with SBO    1. SBO, resolving  CLD and regular diet for lunch  IVF  maalox  SQH/SCDs  IS/OOBA    2. Dispo  Pending hospital course    possible d/c today

## 2018-01-14 NOTE — PROGRESS NOTE ADULT - ASSESSMENT
73 yo F  with PMH metastatic breast Ca s/p mastectomy, brain metastectomy, lung Ca s/p resection, carcinoid tumor s/p R hemicolectomy with SBO  -admit to regional  -NPO/IVF  -NGT to LIWS  -pain/nausea control  -repeat lactate in AM  -HSQ/SCDs  -discussed with Dr Davis

## 2018-01-15 PROCEDURE — 99232 SBSQ HOSP IP/OBS MODERATE 35: CPT | Mod: GC

## 2018-01-15 PROCEDURE — 70553 MRI BRAIN STEM W/O & W/DYE: CPT | Mod: 26

## 2018-01-15 RX ORDER — PANTOPRAZOLE SODIUM 20 MG/1
40 TABLET, DELAYED RELEASE ORAL
Qty: 0 | Refills: 0 | Status: DISCONTINUED | OUTPATIENT
Start: 2018-01-15 | End: 2018-01-16

## 2018-01-15 RX ADMIN — MONTELUKAST 10 MILLIGRAM(S): 4 TABLET, CHEWABLE ORAL at 21:38

## 2018-01-15 RX ADMIN — PANTOPRAZOLE SODIUM 40 MILLIGRAM(S): 20 TABLET, DELAYED RELEASE ORAL at 07:00

## 2018-01-15 RX ADMIN — ESCITALOPRAM OXALATE 10 MILLIGRAM(S): 10 TABLET, FILM COATED ORAL at 11:15

## 2018-01-15 RX ADMIN — Medication 0.5 MILLIGRAM(S): at 23:15

## 2018-01-15 NOTE — PROGRESS NOTE ADULT - SUBJECTIVE AND OBJECTIVE BOX
O/N: FRANK, VSS  1/14: adv to CLD and Reg diet and tolerating. O/N: FRANK, VSS  1/14: adv to CLD and Reg diet and tolerating.         Vital Signs Last 24 Hrs  T(C): 36.7 (15 Adan 2018 05:36), Max: 37.1 (14 Jan 2018 19:46)  T(F): 98 (15 Adan 2018 05:36), Max: 98.8 (14 Jan 2018 19:46)  HR: 70 (15 Adan 2018 05:36) (64 - 84)  BP: 116/70 (15 Adan 2018 05:36) (102/65 - 135/70)  BP(mean): --  RR: 16 (15 Adan 2018 05:36) (14 - 17)  SpO2: 100% (15 Adan 2018 05:36) (99% - 100%)        I&O's Summary    13 Jan 2018 07:01  -  14 Jan 2018 07:00  --------------------------------------------------------  IN: 2260 mL / OUT: 1200 mL / NET: 1060 mL    14 Jan 2018 07:01  -  15 Adan 2018 06:08  --------------------------------------------------------  IN: 1395 mL / OUT: 0 mL / NET: 1395 mL          Gen: NAD  Abd: soft, nt / nd

## 2018-01-15 NOTE — PROGRESS NOTE ADULT - ASSESSMENT
71 yo F PMH  COPD,metastatic breast Ca s/p mastectomy, brain metastectomy, lung Ca s/p resection, carcinoid tumor s/p R hemicolectomy with SBO    1. SBO, resolving  Soft diet  maalox  SQH/SCDs  IS/OOBA    2. Dispo  Pending hospital course

## 2018-01-15 NOTE — PROGRESS NOTE ADULT - PROBLEM SELECTOR PROBLEM 1
Small bowel obstruction
COPD (chronic obstructive pulmonary disease)
COPD (chronic obstructive pulmonary disease)

## 2018-01-15 NOTE — PROGRESS NOTE ADULT - ATTENDING COMMENTS
Patient seen and examined; Appears comfortable art this time. Respiratory status is stable; Would use nebulizer treatments as needed; Call for problems;
Patient seen and examined; MRI to be ordered; Will try to contact MRI since she was scheduled as an outpatient; Dr. Davis to return tomorrow;

## 2018-01-15 NOTE — PROGRESS NOTE ADULT - PROBLEM SELECTOR PLAN 1
pulmonary status is stable and she is not on inhalers
pulmonary status is stable and she is not on inhalers

## 2018-01-15 NOTE — PROGRESS NOTE ADULT - SUBJECTIVE AND OBJECTIVE BOX
INTERVAL HPI/OVERNIGHT EVENTS:    Interim reviewed; Now on regular diet and tolerating so far; Having Bowel Movements; Patient needs MRI of brain with and without which was scheduled for tomorrow which I have ordered' Neurosurgery has been following patient very closely. Dr. Yuan.    MEDICATIONS  (STANDING):  escitalopram 10 milliGRAM(s) Oral daily  heparin  Injectable 5000 Unit(s) SubCutaneous every 8 hours  loratadine 10 milliGRAM(s) Oral daily  montelukast 10 milliGRAM(s) Oral daily  pantoprazole    Tablet 40 milliGRAM(s) Oral before breakfast    MEDICATIONS  (PRN):  ALPRAZolam 0.5 milliGRAM(s) Oral at bedtime PRN insomnia  benzocaine 15 mG/menthol 3.6 mG Lozenge 1 Lozenge Oral two times a day PRN Sore Throat  melatonin 3 milliGRAM(s) Oral at bedtime PRN Insomnia  ondansetron Injectable 8 milliGRAM(s) IV Push every 6 hours PRN Nausea and/or Vomiting      Allergies    adhesives (Rash)  No Known Drug Allergies  Originally Entered as [Unknown] reaction to [Other][Tape] (Unknown)    Intolerances        Vital Signs Last 24 Hrs  T(C): 36.6 (15 Adan 2018 08:48), Max: 37.1 (14 Jan 2018 19:46)  T(F): 97.9 (15 Adan 2018 08:48), Max: 98.8 (14 Jan 2018 19:46)  HR: 69 (15 Adan 2018 08:48) (64 - 84)  BP: 107/69 (15 Adan 2018 08:48) (107/69 - 135/70)  BP(mean): --  RR: 15 (15 Adan 2018 08:48) (15 - 17)  SpO2: 99% (15 Adan 2018 08:48) (99% - 100%)          Constitutional:  Awake    Eyes: DANAE    ENMT: Negative    Neck: Supple    Back:  no tenderness     Respiratory:  clear    Cardiovascular: S1 S2    Gastrointestinal: soft slight distension    Genitourinary:    Extremities: no edema    Vascular:    Neurological: intact    Skin:    Lymph Nodes:            01-14 @ 07:01  -  01-15 @ 07:00  --------------------------------------------------------  IN: 1395 mL / OUT: 0 mL / NET: 1395 mL    01-15 @ 07:01  -  01-15 @ 12:14  --------------------------------------------------------  IN: 560 mL / OUT: 0 mL / NET: 560 mL      LABS:                        11.1   3.3   )-----------( 311      ( 14 Jan 2018 08:48 )             32.5     01-14    139  |  101  |  21  ----------------------------<  88  4.3   |  23  |  0.97    Ca    9.4      14 Jan 2018 08:48  Phos  2.2     01-14  Mg     2.1     01-14      PT/INR - ( 14 Jan 2018 08:49 )   PT: 11.2 sec;   INR: 1.01                RADIOLOGY & ADDITIONAL TESTS:

## 2018-01-16 ENCOUNTER — APPOINTMENT (OUTPATIENT)
Dept: INTERNAL MEDICINE | Facility: CLINIC | Age: 73
End: 2018-01-16

## 2018-01-16 ENCOUNTER — TRANSCRIPTION ENCOUNTER (OUTPATIENT)
Age: 73
End: 2018-01-16

## 2018-01-16 VITALS
OXYGEN SATURATION: 99 % | TEMPERATURE: 97 F | RESPIRATION RATE: 16 BRPM | DIASTOLIC BLOOD PRESSURE: 62 MMHG | HEART RATE: 64 BPM | SYSTOLIC BLOOD PRESSURE: 98 MMHG

## 2018-01-16 LAB
ALBUMIN SERPL ELPH-MCNC: 3.3 G/DL — SIGNIFICANT CHANGE UP (ref 3.3–5)
ALP SERPL-CCNC: 62 U/L — SIGNIFICANT CHANGE UP (ref 40–120)
ALT FLD-CCNC: 39 U/L — SIGNIFICANT CHANGE UP (ref 10–45)
ANION GAP SERPL CALC-SCNC: 11 MMOL/L — SIGNIFICANT CHANGE UP (ref 5–17)
AST SERPL-CCNC: 29 U/L — SIGNIFICANT CHANGE UP (ref 10–40)
BILIRUB DIRECT SERPL-MCNC: <0.2 MG/DL — SIGNIFICANT CHANGE UP (ref 0–0.2)
BILIRUB INDIRECT FLD-MCNC: >0.1 MG/DL — LOW (ref 0.2–1)
BILIRUB SERPL-MCNC: 0.3 MG/DL — SIGNIFICANT CHANGE UP (ref 0.2–1.2)
BUN SERPL-MCNC: 16 MG/DL — SIGNIFICANT CHANGE UP (ref 7–23)
CALCIUM SERPL-MCNC: 9 MG/DL — SIGNIFICANT CHANGE UP (ref 8.4–10.5)
CHLORIDE SERPL-SCNC: 104 MMOL/L — SIGNIFICANT CHANGE UP (ref 96–108)
CO2 SERPL-SCNC: 23 MMOL/L — SIGNIFICANT CHANGE UP (ref 22–31)
CREAT SERPL-MCNC: 0.96 MG/DL — SIGNIFICANT CHANGE UP (ref 0.5–1.3)
GLUCOSE SERPL-MCNC: 91 MG/DL — SIGNIFICANT CHANGE UP (ref 70–99)
HCT VFR BLD CALC: 29.3 % — LOW (ref 34.5–45)
HGB BLD-MCNC: 9.9 G/DL — LOW (ref 11.5–15.5)
MAGNESIUM SERPL-MCNC: 2 MG/DL — SIGNIFICANT CHANGE UP (ref 1.6–2.6)
MCHC RBC-ENTMCNC: 33.4 PG — SIGNIFICANT CHANGE UP (ref 27–34)
MCHC RBC-ENTMCNC: 33.8 G/DL — SIGNIFICANT CHANGE UP (ref 32–36)
MCV RBC AUTO: 99 FL — SIGNIFICANT CHANGE UP (ref 80–100)
PHOSPHATE SERPL-MCNC: 3 MG/DL — SIGNIFICANT CHANGE UP (ref 2.5–4.5)
PLATELET # BLD AUTO: 305 K/UL — SIGNIFICANT CHANGE UP (ref 150–400)
POTASSIUM SERPL-MCNC: 3.9 MMOL/L — SIGNIFICANT CHANGE UP (ref 3.5–5.3)
POTASSIUM SERPL-SCNC: 3.9 MMOL/L — SIGNIFICANT CHANGE UP (ref 3.5–5.3)
PROT SERPL-MCNC: 5.8 G/DL — LOW (ref 6–8.3)
RBC # BLD: 2.96 M/UL — LOW (ref 3.8–5.2)
RBC # FLD: 15.5 % — SIGNIFICANT CHANGE UP (ref 10.3–16.9)
SODIUM SERPL-SCNC: 138 MMOL/L — SIGNIFICANT CHANGE UP (ref 135–145)
WBC # BLD: 4 K/UL — SIGNIFICANT CHANGE UP (ref 3.8–10.5)
WBC # FLD AUTO: 4 K/UL — SIGNIFICANT CHANGE UP (ref 3.8–10.5)

## 2018-01-16 PROCEDURE — 83605 ASSAY OF LACTIC ACID: CPT

## 2018-01-16 PROCEDURE — 80076 HEPATIC FUNCTION PANEL: CPT

## 2018-01-16 PROCEDURE — 83690 ASSAY OF LIPASE: CPT

## 2018-01-16 PROCEDURE — 70553 MRI BRAIN STEM W/O & W/DYE: CPT

## 2018-01-16 PROCEDURE — 93005 ELECTROCARDIOGRAM TRACING: CPT

## 2018-01-16 PROCEDURE — 80053 COMPREHEN METABOLIC PANEL: CPT

## 2018-01-16 PROCEDURE — 85610 PROTHROMBIN TIME: CPT

## 2018-01-16 PROCEDURE — 80048 BASIC METABOLIC PNL TOTAL CA: CPT

## 2018-01-16 PROCEDURE — 83735 ASSAY OF MAGNESIUM: CPT

## 2018-01-16 PROCEDURE — 86900 BLOOD TYPING SEROLOGIC ABO: CPT

## 2018-01-16 PROCEDURE — 84100 ASSAY OF PHOSPHORUS: CPT

## 2018-01-16 PROCEDURE — 74019 RADEX ABDOMEN 2 VIEWS: CPT

## 2018-01-16 PROCEDURE — 71045 X-RAY EXAM CHEST 1 VIEW: CPT

## 2018-01-16 PROCEDURE — 86850 RBC ANTIBODY SCREEN: CPT

## 2018-01-16 PROCEDURE — 85730 THROMBOPLASTIN TIME PARTIAL: CPT

## 2018-01-16 PROCEDURE — 85027 COMPLETE CBC AUTOMATED: CPT

## 2018-01-16 PROCEDURE — 82150 ASSAY OF AMYLASE: CPT

## 2018-01-16 PROCEDURE — 81001 URINALYSIS AUTO W/SCOPE: CPT

## 2018-01-16 PROCEDURE — 85025 COMPLETE CBC W/AUTO DIFF WBC: CPT

## 2018-01-16 PROCEDURE — 36415 COLL VENOUS BLD VENIPUNCTURE: CPT

## 2018-01-16 PROCEDURE — A9585: CPT

## 2018-01-16 PROCEDURE — 96374 THER/PROPH/DIAG INJ IV PUSH: CPT | Mod: XU

## 2018-01-16 PROCEDURE — 74177 CT ABD & PELVIS W/CONTRAST: CPT

## 2018-01-16 PROCEDURE — 99285 EMERGENCY DEPT VISIT HI MDM: CPT | Mod: 25

## 2018-01-16 PROCEDURE — 86901 BLOOD TYPING SEROLOGIC RH(D): CPT

## 2018-01-16 PROCEDURE — 96376 TX/PRO/DX INJ SAME DRUG ADON: CPT

## 2018-01-16 RX ADMIN — Medication 3 MILLIGRAM(S): at 00:53

## 2018-01-16 RX ADMIN — PANTOPRAZOLE SODIUM 40 MILLIGRAM(S): 20 TABLET, DELAYED RELEASE ORAL at 06:25

## 2018-01-16 NOTE — PROGRESS NOTE ADULT - SUBJECTIVE AND OBJECTIVE BOX
O/N: FRANK, VSS, MRI complete  1/15: MRI pending O/N: FRANK, VSS, MRI complete  1/15: MRI pending      Vital Signs Last 24 Hrs  T(C): 36 (16 Jan 2018 05:28), Max: 36.8 (15 Adan 2018 20:17)  T(F): 96.8 (16 Jan 2018 05:28), Max: 98.2 (15 Adan 2018 20:17)  HR: 64 (16 Jan 2018 05:28) (64 - 80)  BP: 98/62 (16 Jan 2018 05:28) (98/62 - 117/69)  BP(mean): --  RR: 16 (16 Jan 2018 05:28) (15 - 17)  SpO2: 99% (16 Jan 2018 05:28) (97% - 100%)      I&O's Summary    14 Jan 2018 07:01  -  15 Adan 2018 07:00  --------------------------------------------------------  IN: 1395 mL / OUT: 0 mL / NET: 1395 mL    15 Adan 2018 07:01  -  16 Jan 2018 06:15  --------------------------------------------------------  IN: 1040 mL / OUT: 300 mL / NET: 740 mL        Gen: NAD  Abd: soft, nt / nd

## 2018-01-16 NOTE — DISCHARGE NOTE ADULT - MEDICATION SUMMARY - MEDICATIONS TO TAKE
I will START or STAY ON the medications listed below when I get home from the hospital:    acetaminophen 500 mg oral tablet  -- 2 tab(s) by mouth every 8 hours  -- Indication: For prn pain    ibuprofen 600 mg oral tablet  -- 1 tab(s) by mouth 4 times a day  -- Indication: For prn pain    Lexapro  -- 10 milligram(s) by mouth once a day  -- Indication: For Home med     ZyrTEC 10 mg oral tablet  -- 2 tab(s) by mouth once a day (at bedtime)  -- Indication: For Home med     Femara 2.5 mg oral tablet  -- 1 tab(s) by mouth once a day  -- Indication: For Home med     ALPRAZolam 0.5 mg oral tablet  -- 1 tab(s) by mouth once a day (at bedtime), As needed, sleep  -- Indication: For Home med     Singulair  --  by mouth   -- Indication: For Home med     melatonin 3 mg oral tablet  -- 1 tab(s) by mouth once a day (at bedtime), As needed, Insomnia  -- Indication: For Home med

## 2018-01-16 NOTE — DISCHARGE NOTE ADULT - PATIENT PORTAL LINK FT
“You can access the Follow[facility] Patient Portal, offered by [facility], by registering with the following website: [email or web address]”

## 2018-01-16 NOTE — PROGRESS NOTE ADULT - ASSESSMENT
71 yo F PMH  COPD,metastatic breast Ca s/p mastectomy, brain metastectomy, lung Ca s/p resection, carcinoid tumor s/p R hemicolectomy with SBO    1. SBO, resolved  Soft diet  maalox  SQH/SCDs  IS/OOBA    2. Dispo  Pending hospital course

## 2018-01-16 NOTE — DISCHARGE NOTE ADULT - CARE PROVIDER_API CALL
Frank Davis (MD), Surgery  [address]  [address]  [place] [address]  Phone: [phone]  Fax: [phone]

## 2018-01-16 NOTE — DISCHARGE NOTE ADULT - PLAN OF CARE
follow up follow up in the office with dr. zendejas, call to schedule an appointment, call sooner if anything changes, i.e. cessation of bowel function or increased pain, also follow up with neurosurgeon regarding MRI findings

## 2018-01-16 NOTE — DISCHARGE NOTE ADULT - HOSPITAL COURSE
73 yo F with PMH  COPD,metastatic breast Ca s/p mastectomy, brain metastectomy, lung Ca s/p resection, carcinoid tumor s/p R hemicolectomy presented to ED with 5 days of abdominal pain, diagnosed w/ SBO. The patient was diagnosed w/ SBO. The patient was managed non operatively w/ NGT decompression. The patient did well and her diet was resumed upon return of bowel function. The patient was discharged home in stable condition. 71 yo F with PMH  COPD,metastatic breast Ca s/p mastectomy, brain metastectomy, lung Ca s/p resection, carcinoid tumor s/p R hemicolectomy presented to ED with 5 days of abdominal pain, diagnosed w/ SBO. The patient was diagnosed w/ SBO. The patient was managed non operatively w/ NGT decompression. The patient did well and her diet was resumed upon return of bowel function. The patient was discharged home in stable condition. Additionally the patient was found to have hyponatremia on admission for which they were treated for w/ Normal saline during the admission.

## 2018-01-16 NOTE — DISCHARGE NOTE ADULT - CARE PLAN
Principal Discharge DX:	Abdominal pain  Goal:	follow up  Instructions for follow-up, activity and diet:	follow up in the office with dr. zendejas, call to schedule an appointment, call sooner if anything changes, i.e. cessation of bowel function or increased pain, also follow up with neurosurgeon regarding MRI findings Principal Discharge DX:	Abdominal pain  Goal:	follow up  Assessment and plan of treatment:	follow up in the office with dr. zendejas, call to schedule an appointment, call sooner if anything changes, i.e. cessation of bowel function or increased pain, also follow up with neurosurgeon regarding MRI findings

## 2018-01-19 ENCOUNTER — APPOINTMENT (OUTPATIENT)
Dept: NEUROSURGERY | Facility: CLINIC | Age: 73
End: 2018-01-19
Payer: MEDICARE

## 2018-01-22 ENCOUNTER — APPOINTMENT (OUTPATIENT)
Dept: NEUROSURGERY | Facility: CLINIC | Age: 73
End: 2018-01-22
Payer: MEDICARE

## 2018-01-22 ENCOUNTER — OUTPATIENT (OUTPATIENT)
Dept: OUTPATIENT SERVICES | Facility: HOSPITAL | Age: 73
LOS: 1 days | End: 2018-01-22
Payer: MEDICARE

## 2018-01-22 VITALS
OXYGEN SATURATION: 96 % | BODY MASS INDEX: 25.58 KG/M2 | SYSTOLIC BLOOD PRESSURE: 103 MMHG | WEIGHT: 139 LBS | HEART RATE: 75 BPM | DIASTOLIC BLOOD PRESSURE: 57 MMHG | HEIGHT: 62 IN

## 2018-01-22 DIAGNOSIS — Z41.9 ENCOUNTER FOR PROCEDURE FOR PURPOSES OTHER THAN REMEDYING HEALTH STATE, UNSPECIFIED: Chronic | ICD-10-CM

## 2018-01-22 DIAGNOSIS — Z90.49 ACQUIRED ABSENCE OF OTHER SPECIFIED PARTS OF DIGESTIVE TRACT: Chronic | ICD-10-CM

## 2018-01-22 DIAGNOSIS — C50.919 MALIGNANT NEOPLASM OF UNSPECIFIED SITE OF UNSPECIFIED FEMALE BREAST: Chronic | ICD-10-CM

## 2018-01-22 PROCEDURE — 99214 OFFICE O/P EST MOD 30 MIN: CPT

## 2018-01-22 PROCEDURE — 73562 X-RAY EXAM OF KNEE 3: CPT | Mod: 26,RT

## 2018-01-22 PROCEDURE — 73562 X-RAY EXAM OF KNEE 3: CPT

## 2018-01-24 DIAGNOSIS — Z85.05 PERSONAL HISTORY OF MALIGNANT NEOPLASM OF LIVER: ICD-10-CM

## 2018-01-24 DIAGNOSIS — Z86.018 PERSONAL HISTORY OF OTHER BENIGN NEOPLASM: ICD-10-CM

## 2018-01-24 DIAGNOSIS — Z85.3 PERSONAL HISTORY OF MALIGNANT NEOPLASM OF BREAST: ICD-10-CM

## 2018-01-24 DIAGNOSIS — J44.9 CHRONIC OBSTRUCTIVE PULMONARY DISEASE, UNSPECIFIED: ICD-10-CM

## 2018-01-24 DIAGNOSIS — E87.1 HYPO-OSMOLALITY AND HYPONATREMIA: ICD-10-CM

## 2018-01-24 DIAGNOSIS — R10.9 UNSPECIFIED ABDOMINAL PAIN: ICD-10-CM

## 2018-01-24 DIAGNOSIS — K56.609 UNSPECIFIED INTESTINAL OBSTRUCTION, UNSPECIFIED AS TO PARTIAL VERSUS COMPLETE OBSTRUCTION: ICD-10-CM

## 2018-01-24 DIAGNOSIS — Z85.118 PERSONAL HISTORY OF OTHER MALIGNANT NEOPLASM OF BRONCHUS AND LUNG: ICD-10-CM

## 2018-02-02 ENCOUNTER — APPOINTMENT (OUTPATIENT)
Dept: INTERNAL MEDICINE | Facility: CLINIC | Age: 73
End: 2018-02-02
Payer: MEDICARE

## 2018-02-02 VITALS
OXYGEN SATURATION: 98 % | SYSTOLIC BLOOD PRESSURE: 100 MMHG | BODY MASS INDEX: 25.58 KG/M2 | HEIGHT: 62 IN | DIASTOLIC BLOOD PRESSURE: 60 MMHG | HEART RATE: 84 BPM | WEIGHT: 139 LBS

## 2018-02-02 PROCEDURE — 99212 OFFICE O/P EST SF 10 MIN: CPT

## 2018-02-06 ENCOUNTER — APPOINTMENT (OUTPATIENT)
Dept: INTERNAL MEDICINE | Facility: CLINIC | Age: 73
End: 2018-02-06
Payer: MEDICARE

## 2018-02-06 ENCOUNTER — INPATIENT (INPATIENT)
Facility: HOSPITAL | Age: 73
LOS: 3 days | Discharge: HOME | DRG: 394 | End: 2018-02-10
Attending: SURGERY | Admitting: SURGERY
Payer: MEDICARE

## 2018-02-06 VITALS
RESPIRATION RATE: 18 BRPM | DIASTOLIC BLOOD PRESSURE: 79 MMHG | WEIGHT: 147.05 LBS | SYSTOLIC BLOOD PRESSURE: 127 MMHG | HEART RATE: 106 BPM | TEMPERATURE: 98 F | OXYGEN SATURATION: 98 %

## 2018-02-06 VITALS — DIASTOLIC BLOOD PRESSURE: 70 MMHG | SYSTOLIC BLOOD PRESSURE: 100 MMHG

## 2018-02-06 VITALS — TEMPERATURE: 99.1 F

## 2018-02-06 DIAGNOSIS — Z41.9 ENCOUNTER FOR PROCEDURE FOR PURPOSES OTHER THAN REMEDYING HEALTH STATE, UNSPECIFIED: Chronic | ICD-10-CM

## 2018-02-06 DIAGNOSIS — Z90.49 ACQUIRED ABSENCE OF OTHER SPECIFIED PARTS OF DIGESTIVE TRACT: Chronic | ICD-10-CM

## 2018-02-06 DIAGNOSIS — Z98.890 OTHER SPECIFIED POSTPROCEDURAL STATES: Chronic | ICD-10-CM

## 2018-02-06 DIAGNOSIS — C50.919 MALIGNANT NEOPLASM OF UNSPECIFIED SITE OF UNSPECIFIED FEMALE BREAST: Chronic | ICD-10-CM

## 2018-02-06 LAB
ALBUMIN SERPL ELPH-MCNC: 4 G/DL — SIGNIFICANT CHANGE UP (ref 3.3–5)
ALP SERPL-CCNC: 126 U/L — HIGH (ref 40–120)
ALT FLD-CCNC: 196 U/L — HIGH (ref 10–45)
ANION GAP SERPL CALC-SCNC: 12 MMOL/L — SIGNIFICANT CHANGE UP (ref 5–17)
APPEARANCE UR: CLEAR — SIGNIFICANT CHANGE UP
APTT BLD: 27.6 SEC — SIGNIFICANT CHANGE UP (ref 27.5–37.4)
AST SERPL-CCNC: 137 U/L — HIGH (ref 10–40)
BACTERIA # UR AUTO: SIGNIFICANT CHANGE UP /HPF
BASOPHILS NFR BLD AUTO: 0.2 % — SIGNIFICANT CHANGE UP (ref 0–2)
BILIRUB SERPL-MCNC: 1.2 MG/DL — SIGNIFICANT CHANGE UP (ref 0.2–1.2)
BILIRUB UR-MCNC: NEGATIVE — SIGNIFICANT CHANGE UP
BUN SERPL-MCNC: 11 MG/DL — SIGNIFICANT CHANGE UP (ref 7–23)
CALCIUM SERPL-MCNC: 9.8 MG/DL — SIGNIFICANT CHANGE UP (ref 8.4–10.5)
CHLORIDE SERPL-SCNC: 96 MMOL/L — SIGNIFICANT CHANGE UP (ref 96–108)
CO2 SERPL-SCNC: 26 MMOL/L — SIGNIFICANT CHANGE UP (ref 22–31)
COLOR SPEC: YELLOW — SIGNIFICANT CHANGE UP
CREAT SERPL-MCNC: 0.72 MG/DL — SIGNIFICANT CHANGE UP (ref 0.5–1.3)
DIFF PNL FLD: (no result)
EOSINOPHIL NFR BLD AUTO: 0.9 % — SIGNIFICANT CHANGE UP (ref 0–6)
EPI CELLS # UR: SIGNIFICANT CHANGE UP /HPF (ref 0–5)
GLUCOSE SERPL-MCNC: 116 MG/DL — HIGH (ref 70–99)
GLUCOSE UR QL: NEGATIVE — SIGNIFICANT CHANGE UP
HCT VFR BLD CALC: 35.4 % — SIGNIFICANT CHANGE UP (ref 34.5–45)
HGB BLD-MCNC: 12 G/DL — SIGNIFICANT CHANGE UP (ref 11.5–15.5)
INR BLD: 0.92 — SIGNIFICANT CHANGE UP (ref 0.88–1.16)
KETONES UR-MCNC: NEGATIVE — SIGNIFICANT CHANGE UP
LACTATE SERPL-SCNC: 1.5 MMOL/L — SIGNIFICANT CHANGE UP (ref 0.5–2)
LEUKOCYTE ESTERASE UR-ACNC: NEGATIVE — SIGNIFICANT CHANGE UP
LYMPHOCYTES # BLD AUTO: 12.8 % — LOW (ref 13–44)
MCHC RBC-ENTMCNC: 32.6 PG — SIGNIFICANT CHANGE UP (ref 27–34)
MCHC RBC-ENTMCNC: 33.9 G/DL — SIGNIFICANT CHANGE UP (ref 32–36)
MCV RBC AUTO: 96.2 FL — SIGNIFICANT CHANGE UP (ref 80–100)
MONOCYTES NFR BLD AUTO: 6.9 % — SIGNIFICANT CHANGE UP (ref 2–14)
NEUTROPHILS NFR BLD AUTO: 79.2 % — HIGH (ref 43–77)
NITRITE UR-MCNC: NEGATIVE — SIGNIFICANT CHANGE UP
PH UR: 6 — SIGNIFICANT CHANGE UP (ref 5–8)
PLATELET # BLD AUTO: 270 K/UL — SIGNIFICANT CHANGE UP (ref 150–400)
POTASSIUM SERPL-MCNC: 4.4 MMOL/L — SIGNIFICANT CHANGE UP (ref 3.5–5.3)
POTASSIUM SERPL-SCNC: 4.4 MMOL/L — SIGNIFICANT CHANGE UP (ref 3.5–5.3)
PROT SERPL-MCNC: 7.4 G/DL — SIGNIFICANT CHANGE UP (ref 6–8.3)
PROT UR-MCNC: NEGATIVE MG/DL — SIGNIFICANT CHANGE UP
PROTHROM AB SERPL-ACNC: 10.2 SEC — SIGNIFICANT CHANGE UP (ref 9.8–12.7)
RBC # BLD: 3.68 M/UL — LOW (ref 3.8–5.2)
RBC # FLD: 15.3 % — SIGNIFICANT CHANGE UP (ref 10.3–16.9)
RBC CASTS # UR COMP ASSIST: < 5 /HPF — SIGNIFICANT CHANGE UP
SODIUM SERPL-SCNC: 134 MMOL/L — LOW (ref 135–145)
SP GR SPEC: 1.01 — SIGNIFICANT CHANGE UP (ref 1–1.03)
UROBILINOGEN FLD QL: 0.2 E.U./DL — SIGNIFICANT CHANGE UP
WBC # BLD: 13.9 K/UL — HIGH (ref 3.8–10.5)
WBC # FLD AUTO: 13.9 K/UL — HIGH (ref 3.8–10.5)
WBC UR QL: < 5 /HPF — SIGNIFICANT CHANGE UP

## 2018-02-06 PROCEDURE — 99213 OFFICE O/P EST LOW 20 MIN: CPT

## 2018-02-06 PROCEDURE — 74177 CT ABD & PELVIS W/CONTRAST: CPT | Mod: 26

## 2018-02-06 PROCEDURE — 71046 X-RAY EXAM CHEST 2 VIEWS: CPT | Mod: 26

## 2018-02-06 PROCEDURE — 99285 EMERGENCY DEPT VISIT HI MDM: CPT

## 2018-02-06 PROCEDURE — 74019 RADEX ABDOMEN 2 VIEWS: CPT | Mod: 26

## 2018-02-06 RX ORDER — ONDANSETRON 8 MG/1
4 TABLET, FILM COATED ORAL EVERY 6 HOURS
Qty: 0 | Refills: 0 | Status: DISCONTINUED | OUTPATIENT
Start: 2018-02-06 | End: 2018-02-10

## 2018-02-06 RX ORDER — SODIUM CHLORIDE 9 MG/ML
1000 INJECTION INTRAMUSCULAR; INTRAVENOUS; SUBCUTANEOUS
Qty: 0 | Refills: 0 | Status: DISCONTINUED | OUTPATIENT
Start: 2018-02-06 | End: 2018-02-06

## 2018-02-06 RX ORDER — ONDANSETRON 8 MG/1
4 TABLET, FILM COATED ORAL ONCE
Qty: 0 | Refills: 0 | Status: COMPLETED | OUTPATIENT
Start: 2018-02-06 | End: 2018-02-06

## 2018-02-06 RX ORDER — PIPERACILLIN AND TAZOBACTAM 4; .5 G/20ML; G/20ML
3.38 INJECTION, POWDER, LYOPHILIZED, FOR SOLUTION INTRAVENOUS EVERY 6 HOURS
Qty: 0 | Refills: 0 | Status: DISCONTINUED | OUTPATIENT
Start: 2018-02-06 | End: 2018-02-09

## 2018-02-06 RX ORDER — LANOLIN ALCOHOL/MO/W.PET/CERES
3 CREAM (GRAM) TOPICAL AT BEDTIME
Qty: 0 | Refills: 0 | Status: DISCONTINUED | OUTPATIENT
Start: 2018-02-06 | End: 2018-02-06

## 2018-02-06 RX ORDER — FAMOTIDINE 10 MG/ML
20 INJECTION INTRAVENOUS ONCE
Qty: 0 | Refills: 0 | Status: COMPLETED | OUTPATIENT
Start: 2018-02-06 | End: 2018-02-06

## 2018-02-06 RX ORDER — HYDROMORPHONE HYDROCHLORIDE 2 MG/ML
1 INJECTION INTRAMUSCULAR; INTRAVENOUS; SUBCUTANEOUS EVERY 4 HOURS
Qty: 0 | Refills: 0 | Status: DISCONTINUED | OUTPATIENT
Start: 2018-02-06 | End: 2018-02-09

## 2018-02-06 RX ORDER — PIPERACILLIN AND TAZOBACTAM 4; .5 G/20ML; G/20ML
3.38 INJECTION, POWDER, LYOPHILIZED, FOR SOLUTION INTRAVENOUS ONCE
Qty: 0 | Refills: 0 | Status: COMPLETED | OUTPATIENT
Start: 2018-02-06 | End: 2018-02-06

## 2018-02-06 RX ORDER — IOHEXOL 300 MG/ML
50 INJECTION, SOLUTION INTRAVENOUS ONCE
Qty: 0 | Refills: 0 | Status: COMPLETED | OUTPATIENT
Start: 2018-02-06 | End: 2018-02-06

## 2018-02-06 RX ORDER — LETROZOLE 2.5 MG/1
2.5 TABLET, FILM COATED ORAL DAILY
Qty: 0 | Refills: 0 | Status: DISCONTINUED | OUTPATIENT
Start: 2018-02-06 | End: 2018-02-07

## 2018-02-06 RX ORDER — HEPARIN SODIUM 5000 [USP'U]/ML
5000 INJECTION INTRAVENOUS; SUBCUTANEOUS EVERY 8 HOURS
Qty: 0 | Refills: 0 | Status: DISCONTINUED | OUTPATIENT
Start: 2018-02-06 | End: 2018-02-10

## 2018-02-06 RX ORDER — HYDROMORPHONE HYDROCHLORIDE 2 MG/ML
0.5 INJECTION INTRAMUSCULAR; INTRAVENOUS; SUBCUTANEOUS EVERY 4 HOURS
Qty: 0 | Refills: 0 | Status: DISCONTINUED | OUTPATIENT
Start: 2018-02-06 | End: 2018-02-09

## 2018-02-06 RX ORDER — SODIUM CHLORIDE 9 MG/ML
1000 INJECTION, SOLUTION INTRAVENOUS
Qty: 0 | Refills: 0 | Status: DISCONTINUED | OUTPATIENT
Start: 2018-02-06 | End: 2018-02-07

## 2018-02-06 RX ORDER — ASPIRIN/CALCIUM CARB/MAGNESIUM 324 MG
81 TABLET ORAL DAILY
Qty: 0 | Refills: 0 | Status: DISCONTINUED | OUTPATIENT
Start: 2018-02-06 | End: 2018-02-10

## 2018-02-06 RX ORDER — MORPHINE SULFATE 50 MG/1
4 CAPSULE, EXTENDED RELEASE ORAL ONCE
Qty: 0 | Refills: 0 | Status: DISCONTINUED | OUTPATIENT
Start: 2018-02-06 | End: 2018-02-06

## 2018-02-06 RX ORDER — ALPRAZOLAM 0.25 MG
0.5 TABLET ORAL AT BEDTIME
Qty: 0 | Refills: 0 | Status: DISCONTINUED | OUTPATIENT
Start: 2018-02-06 | End: 2018-02-10

## 2018-02-06 RX ORDER — SODIUM CHLORIDE 9 MG/ML
1000 INJECTION INTRAMUSCULAR; INTRAVENOUS; SUBCUTANEOUS ONCE
Qty: 0 | Refills: 0 | Status: COMPLETED | OUTPATIENT
Start: 2018-02-06 | End: 2018-02-06

## 2018-02-06 RX ORDER — LANOLIN ALCOHOL/MO/W.PET/CERES
3 CREAM (GRAM) TOPICAL AT BEDTIME
Qty: 0 | Refills: 0 | Status: DISCONTINUED | OUTPATIENT
Start: 2018-02-06 | End: 2018-02-10

## 2018-02-06 RX ORDER — ESCITALOPRAM OXALATE 10 MG/1
10 TABLET, FILM COATED ORAL DAILY
Qty: 0 | Refills: 0 | Status: DISCONTINUED | OUTPATIENT
Start: 2018-02-06 | End: 2018-02-07

## 2018-02-06 RX ADMIN — PIPERACILLIN AND TAZOBACTAM 200 GRAM(S): 4; .5 INJECTION, POWDER, LYOPHILIZED, FOR SOLUTION INTRAVENOUS at 15:38

## 2018-02-06 RX ADMIN — IOHEXOL 50 MILLILITER(S): 300 INJECTION, SOLUTION INTRAVENOUS at 14:45

## 2018-02-06 RX ADMIN — SODIUM CHLORIDE 1000 MILLILITER(S): 9 INJECTION INTRAMUSCULAR; INTRAVENOUS; SUBCUTANEOUS at 14:10

## 2018-02-06 RX ADMIN — SODIUM CHLORIDE 75 MILLILITER(S): 9 INJECTION, SOLUTION INTRAVENOUS at 22:04

## 2018-02-06 RX ADMIN — MORPHINE SULFATE 4 MILLIGRAM(S): 50 CAPSULE, EXTENDED RELEASE ORAL at 14:55

## 2018-02-06 RX ADMIN — SODIUM CHLORIDE 125 MILLILITER(S): 9 INJECTION INTRAMUSCULAR; INTRAVENOUS; SUBCUTANEOUS at 15:37

## 2018-02-06 RX ADMIN — SODIUM CHLORIDE 125 MILLILITER(S): 9 INJECTION INTRAMUSCULAR; INTRAVENOUS; SUBCUTANEOUS at 21:16

## 2018-02-06 RX ADMIN — MORPHINE SULFATE 4 MILLIGRAM(S): 50 CAPSULE, EXTENDED RELEASE ORAL at 14:40

## 2018-02-06 NOTE — H&P ADULT - HISTORY OF PRESENT ILLNESS
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 Pain is localized to RLQ, is constant, non-radiating, Patient reports on and off RLQ abdominal pain since discharge (1/2018), that usually subsides after bowel movement. This time pain is much worse and not alleviated by BM. Patient denies nausea, emesis, diarrhea, constipation or urinary symptoms. Had 4 soft BM today, + flatus. Reports low grade fever, no chills.

## 2018-02-06 NOTE — H&P ADULT - PSH
Breast cancer  left mastectomy  H/O right hemicolectomy    History of colon resection    History of craniotomy    Surgery, elective  Thoractomy with lung resection-  Right upper lobe lobectomy  Right  lower lobe wedge  Surgery, elective  Ovaries removed 1980"s  Surgery, elective  Left wrist

## 2018-02-06 NOTE — ED ADULT NURSE NOTE - OBJECTIVE STATEMENT
Pt presents to ED with c/o RLQ abdominal pain that began this AM with temp 100.5F at home. Afebrile in triage. Pt aox3 breathing unlabored, no n/v/d. Pt reports "this pain feels like last time, I had an obstruction in the beginning of January." Per pt she was scheduled for an MRI today at 530p but canceled it due to the pain. Labs drawn and sent, will carry out further orders and continue to monitor.

## 2018-02-06 NOTE — H&P ADULT - ASSESSMENT
71 yo female with metastatic breast ca (liver, brain), prior lung ca, cecum carcinoid s/p right hemicolectomy c/b SBO in January 2018 that was managed non operatively   Now presents with RLQ abdominal pain and CT concerning for phlegmonous change and abscesses near prior ileocolic anastomosis.    - admit to surgery team 1 under care of Dr. Davis  - clear liquid diet  - IV Zosyn  - IV fluid hydration  - pain management  - anti-nausea management  - DVT ppx  - pulm toilet/IS  - home meds as appropriate  - regional level of care

## 2018-02-06 NOTE — ED PROVIDER NOTE - OBJECTIVE STATEMENT
73 yo F with prior hx of PSBO  1 month ago at [facility] admitted and resolved with NG suction hx of met breast cancer to liver- now with onset fo RLQ pain at 10 am today no N/V pos flatus and BM earlier this am - not on chemo at this time  also hx of brain tumor and prior lung tumor that was resected

## 2018-02-06 NOTE — H&P ADULT - NSHPLABSRESULTS_GEN_ALL_CORE
Comprehensive Metabolic Panel (02.06.18 @ 13:47)    Sodium, Serum: 134 mmol/L    Potassium, Serum: 4.4 mmol/L    Chloride, Serum: 96 mmol/L    Carbon Dioxide, Serum: 26 mmol/L    Anion Gap, Serum: 12 mmol/L    Blood Urea Nitrogen, Serum: 11 mg/dL    Creatinine, Serum: 0.72 mg/dL    Glucose, Serum: 116 mg/dL    Calcium, Total Serum: 9.8 mg/dL    Protein Total, Serum: 7.4 g/dL    Albumin, Serum: 4.0 g/dL    Bilirubin Total, Serum: 1.2 mg/dL    Alkaline Phosphatase, Serum: 126 U/L    Aspartate Aminotransferase (AST/SGOT): 137 U/L    Alanine Aminotransferase (ALT/SGPT): 196 U/L    Lactate, Blood (02.06.18 @ 13:47)    Lactate, Blood: 1.5 mmoL/L    Complete Blood Count in AM (01.16.18 @ 06:33)    WBC Count: 4.0 K/uL    RBC Count: 2.96 M/uL    Hemoglobin: 9.9 g/dL    Hematocrit: 29.3 %    Mean Cell Volume: 99.0 fL    Mean Cell Hemoglobin: 33.4 pg    Mean Cell Hemoglobin Conc: 33.8 g/dL    Red Cell Distrib Width: 15.5 %    Platelet Count - Automated: 305 K/uL    Urinalysis (01.11.18 @ 15:47)    pH Urine: 6.0    Glucose Qualitative, Urine: NEGATIVE    Blood, Urine: Moderate    Color: Yellow    Urine Appearance: SL Cloudy    Bilirubin: Small    Ketone - Urine: 40 mg/dL    Specific Gravity: 1.025    Protein, Urine: Trace mg/dL    Urobilinogen: 1.0 E.U./dL    Nitrite: NEGATIVE    Leukocyte Esterase Concentration: NEGATIVE    < from: CT Abdomen and Pelvis w/ Oral Cont and w/ IV Cont (02.06.18 @ 19:14) >      IMPRESSION: 1. Since 1/11/2018, there is increased phlegmonous change   with new small fluid collections consistent with abscesses near the   suture material to the right of the ileocolic anastomosis in this patient   who is status post right hemicolectomy. This infectious process may be   secondary to a breakdown of the anastomosis, though no extravasated   contrast material is visualized.    2. Small ascites.    3. No change in appearance of liver metastasis that has been previously   treated.    4. No bowel obstruction.

## 2018-02-06 NOTE — ED ADULT NURSE REASSESSMENT NOTE - NS ED NURSE REASSESS COMMENT FT1
Pt has been received from [facility]. Pt is A&O x 3, resting on bed comfortably. Pt refused morphine and states "I'm ok now."

## 2018-02-06 NOTE — PROGRESS NOTE ADULT - SUBJECTIVE AND OBJECTIVE BOX
pt known to me  S/plap R hemicolect July 2017;  s/p  mult Rx for breast and Lung Ca    C/o to PMD Dr Chantel Tellez of severs RLQ abdom pain so sent to ER    VSS  Afeb   NAD  O2 sat good  WBC 13,   Hb 12,  Lactate 1.5,  LFTs slightly up  but T Bili 1.2  PE :  normoceph, non icteric         heart reg         non labored breathing         Abdom mildly distended, soft, ~Non tender (even at RLQ)    Will get CAT - awaiting    Pt states feels much better now compared to earlier - passed flatus and had BMs today...    Await  CAT    Disc'd w/ Dr Tellez and surg Res

## 2018-02-06 NOTE — ED PROVIDER NOTE - CARE PLAN
Principal Discharge DX:	Phlegmon  Secondary Diagnosis:	Abdominal pain  Secondary Diagnosis:	Metastatic breast cancer

## 2018-02-06 NOTE — H&P ADULT - NSHPPHYSICALEXAM_GEN_ALL_CORE
Alert and oriented x 3, in no acute distress  CTAB, non-labored breathing on RA  RRR, normocardic  Abdomen soft, mildly distended, tender to palpation in RLQ, no rebound or guarding  Extremities: WWP

## 2018-02-06 NOTE — ED PROVIDER NOTE - MEDICAL DECISION MAKING DETAILS
71 yo F with met breast cancer with prior carcinoid mass resection 1 year ago now with rlq pain x 2 days  noted phlegmon at anastomosis ? small abscess iv zosyn admit ? need to revise surgically

## 2018-02-07 DIAGNOSIS — L02.91 CUTANEOUS ABSCESS, UNSPECIFIED: ICD-10-CM

## 2018-02-07 DIAGNOSIS — C50.919 MALIGNANT NEOPLASM OF UNSPECIFIED SITE OF UNSPECIFIED FEMALE BREAST: ICD-10-CM

## 2018-02-07 DIAGNOSIS — R10.9 UNSPECIFIED ABDOMINAL PAIN: ICD-10-CM

## 2018-02-07 DIAGNOSIS — D3A.00 BENIGN CARCINOID TUMOR OF UNSPECIFIED SITE: ICD-10-CM

## 2018-02-07 LAB
ALBUMIN SERPL ELPH-MCNC: 3.7 G/DL — SIGNIFICANT CHANGE UP (ref 3.3–5)
ALP SERPL-CCNC: 103 U/L — SIGNIFICANT CHANGE UP (ref 40–120)
ALT FLD-CCNC: 152 U/L — HIGH (ref 10–45)
AMYLASE P1 CFR SERPL: 81 U/L — SIGNIFICANT CHANGE UP (ref 25–125)
ANION GAP SERPL CALC-SCNC: 13 MMOL/L — SIGNIFICANT CHANGE UP (ref 5–17)
AST SERPL-CCNC: 96 U/L — HIGH (ref 10–40)
BILIRUB SERPL-MCNC: 1.4 MG/DL — HIGH (ref 0.2–1.2)
BUN SERPL-MCNC: 9 MG/DL — SIGNIFICANT CHANGE UP (ref 7–23)
CALCIUM SERPL-MCNC: 8.9 MG/DL — SIGNIFICANT CHANGE UP (ref 8.4–10.5)
CHLORIDE SERPL-SCNC: 96 MMOL/L — SIGNIFICANT CHANGE UP (ref 96–108)
CO2 SERPL-SCNC: 25 MMOL/L — SIGNIFICANT CHANGE UP (ref 22–31)
CREAT SERPL-MCNC: 0.73 MG/DL — SIGNIFICANT CHANGE UP (ref 0.5–1.3)
CULTURE RESULTS: NO GROWTH — SIGNIFICANT CHANGE UP
GLUCOSE SERPL-MCNC: 113 MG/DL — HIGH (ref 70–99)
HCT VFR BLD CALC: 34.2 % — LOW (ref 34.5–45)
HGB BLD-MCNC: 11.3 G/DL — LOW (ref 11.5–15.5)
LACTATE SERPL-SCNC: 1.4 MMOL/L — SIGNIFICANT CHANGE UP (ref 0.5–2)
LIDOCAIN IGE QN: 48 U/L — SIGNIFICANT CHANGE UP (ref 7–60)
MAGNESIUM SERPL-MCNC: 1.9 MG/DL — SIGNIFICANT CHANGE UP (ref 1.6–2.6)
MCHC RBC-ENTMCNC: 32.3 PG — SIGNIFICANT CHANGE UP (ref 27–34)
MCHC RBC-ENTMCNC: 33 G/DL — SIGNIFICANT CHANGE UP (ref 32–36)
MCV RBC AUTO: 97.7 FL — SIGNIFICANT CHANGE UP (ref 80–100)
PHOSPHATE SERPL-MCNC: 3.7 MG/DL — SIGNIFICANT CHANGE UP (ref 2.5–4.5)
PLATELET # BLD AUTO: 250 K/UL — SIGNIFICANT CHANGE UP (ref 150–400)
POTASSIUM SERPL-MCNC: 4 MMOL/L — SIGNIFICANT CHANGE UP (ref 3.5–5.3)
POTASSIUM SERPL-SCNC: 4 MMOL/L — SIGNIFICANT CHANGE UP (ref 3.5–5.3)
PROT SERPL-MCNC: 6.8 G/DL — SIGNIFICANT CHANGE UP (ref 6–8.3)
RBC # BLD: 3.5 M/UL — LOW (ref 3.8–5.2)
RBC # FLD: 15.7 % — SIGNIFICANT CHANGE UP (ref 10.3–16.9)
SODIUM SERPL-SCNC: 134 MMOL/L — LOW (ref 135–145)
SPECIMEN SOURCE: SIGNIFICANT CHANGE UP
WBC # BLD: 11.9 K/UL — HIGH (ref 3.8–10.5)
WBC # FLD AUTO: 11.9 K/UL — HIGH (ref 3.8–10.5)

## 2018-02-07 PROCEDURE — 74019 RADEX ABDOMEN 2 VIEWS: CPT | Mod: 26

## 2018-02-07 RX ORDER — DEXTROSE MONOHYDRATE, SODIUM CHLORIDE, AND POTASSIUM CHLORIDE 50; .745; 4.5 G/1000ML; G/1000ML; G/1000ML
1000 INJECTION, SOLUTION INTRAVENOUS
Qty: 0 | Refills: 0 | Status: DISCONTINUED | OUTPATIENT
Start: 2018-02-07 | End: 2018-02-09

## 2018-02-07 RX ORDER — ESCITALOPRAM OXALATE 10 MG/1
10 TABLET, FILM COATED ORAL DAILY
Qty: 0 | Refills: 0 | Status: DISCONTINUED | OUTPATIENT
Start: 2018-02-07 | End: 2018-02-10

## 2018-02-07 RX ORDER — LETROZOLE 2.5 MG/1
2.5 TABLET, FILM COATED ORAL DAILY
Qty: 0 | Refills: 0 | Status: DISCONTINUED | OUTPATIENT
Start: 2018-02-07 | End: 2018-02-10

## 2018-02-07 RX ORDER — FAMOTIDINE 10 MG/ML
20 INJECTION INTRAVENOUS DAILY
Qty: 0 | Refills: 0 | Status: DISCONTINUED | OUTPATIENT
Start: 2018-02-07 | End: 2018-02-07

## 2018-02-07 RX ADMIN — PIPERACILLIN AND TAZOBACTAM 200 GRAM(S): 4; .5 INJECTION, POWDER, LYOPHILIZED, FOR SOLUTION INTRAVENOUS at 17:37

## 2018-02-07 RX ADMIN — LETROZOLE 2.5 MILLIGRAM(S): 2.5 TABLET, FILM COATED ORAL at 12:46

## 2018-02-07 RX ADMIN — PIPERACILLIN AND TAZOBACTAM 200 GRAM(S): 4; .5 INJECTION, POWDER, LYOPHILIZED, FOR SOLUTION INTRAVENOUS at 00:59

## 2018-02-07 RX ADMIN — Medication 3 MILLIGRAM(S): at 01:38

## 2018-02-07 RX ADMIN — PIPERACILLIN AND TAZOBACTAM 200 GRAM(S): 4; .5 INJECTION, POWDER, LYOPHILIZED, FOR SOLUTION INTRAVENOUS at 07:01

## 2018-02-07 RX ADMIN — HYDROMORPHONE HYDROCHLORIDE 1 MILLIGRAM(S): 2 INJECTION INTRAMUSCULAR; INTRAVENOUS; SUBCUTANEOUS at 00:16

## 2018-02-07 RX ADMIN — HYDROMORPHONE HYDROCHLORIDE 1 MILLIGRAM(S): 2 INJECTION INTRAMUSCULAR; INTRAVENOUS; SUBCUTANEOUS at 00:30

## 2018-02-07 RX ADMIN — HEPARIN SODIUM 5000 UNIT(S): 5000 INJECTION INTRAVENOUS; SUBCUTANEOUS at 00:18

## 2018-02-07 RX ADMIN — HYDROMORPHONE HYDROCHLORIDE 1 MILLIGRAM(S): 2 INJECTION INTRAMUSCULAR; INTRAVENOUS; SUBCUTANEOUS at 22:15

## 2018-02-07 RX ADMIN — Medication 30 MILLILITER(S): at 17:37

## 2018-02-07 RX ADMIN — Medication 0.5 MILLIGRAM(S): at 02:46

## 2018-02-07 RX ADMIN — HYDROMORPHONE HYDROCHLORIDE 1 MILLIGRAM(S): 2 INJECTION INTRAMUSCULAR; INTRAVENOUS; SUBCUTANEOUS at 23:00

## 2018-02-07 RX ADMIN — PIPERACILLIN AND TAZOBACTAM 200 GRAM(S): 4; .5 INJECTION, POWDER, LYOPHILIZED, FOR SOLUTION INTRAVENOUS at 23:58

## 2018-02-07 RX ADMIN — DEXTROSE MONOHYDRATE, SODIUM CHLORIDE, AND POTASSIUM CHLORIDE 100 MILLILITER(S): 50; .745; 4.5 INJECTION, SOLUTION INTRAVENOUS at 12:00

## 2018-02-07 RX ADMIN — ESCITALOPRAM OXALATE 10 MILLIGRAM(S): 10 TABLET, FILM COATED ORAL at 12:46

## 2018-02-07 RX ADMIN — Medication 0.5 MILLIGRAM(S): at 23:57

## 2018-02-07 RX ADMIN — PIPERACILLIN AND TAZOBACTAM 200 GRAM(S): 4; .5 INJECTION, POWDER, LYOPHILIZED, FOR SOLUTION INTRAVENOUS at 12:46

## 2018-02-07 RX ADMIN — Medication 81 MILLIGRAM(S): at 12:46

## 2018-02-07 NOTE — PROGRESS NOTE ADULT - ATTENDING COMMENTS
pt seen examined  pt states pain less/ better  only pssed flatus x 1 since was in ER; No BM since ER/ CAT  Abdom slightly distended,  tender mod RLQ,  Non Acute  Labs noted  Bili 1.4  WBC decreasing  CAT noted/ rev w/ Radiol  Cont ABX, clears,  repeat AXR flat/ upright STAT, check Amylase/ lipase,  OOB/ ambulate  Disc'd w/ team pt seen examined  pt states pain less/ better  only pssed flatus x 1 since was in ER; No BM since ER/ CAT  Abdom slightly distended,  tender mod RLQ,  Non Acute  Labs noted  Bili 1.4  WBC decreasing  CAT noted/ rev w/ Radiol  Cont ABX, clears,  repeat AXR flat/ upright STAT,   check Amylase/ lipase, LACTATE ToDay,     OOB/ ambulate  Disc'd w/ team

## 2018-02-07 NOTE — PROGRESS NOTE ADULT - ASSESSMENT
71 yo female with metastatic breast ca (liver, brain), prior lung ca, cecum carcinoid s/p right hemicolectomy c/b SBO in January 2018 that was managed non operatively   Now presents with RLQ abdominal pain and CT concerning for phlegmonous change and abscesses near prior ileocolic anastomosis.    - admit to surgery team 1 under care of Dr. Davis  - clear liquid diet  - IV Zosyn  - IV fluid hydration  - pain management  - anti-nausea management  - DVT ppx  - pulm toilet/IS  - home meds as appropriate

## 2018-02-07 NOTE — PROGRESS NOTE ADULT - SUBJECTIVE AND OBJECTIVE BOX
O/N: admitted with RLQ abd pain, CT with abscess near prio ileocolic anastomosis. VSS. FRANK O/N: admitted with RLQ abd pain, CT with abscess near prio ileocolic anastomosis. VSS. FRANK  STATUS POST:       SUBJECTIVE: Patient seen and examined bedside by chief resident and surgical team. Patient denies n/v/cp/sob.     aspirin  chewable 81 milliGRAM(s) Oral daily  heparin  Injectable 5000 Unit(s) SubCutaneous every 8 hours  piperacillin/tazobactam IVPB. 3.375 Gram(s) IV Intermittent every 6 hours      Vital Signs Last 24 Hrs  T(C): 37.3 (2018 05:58), Max: 37.8 (2018 21:31)  T(F): 99.1 (2018 05:58), Max: 100.1 (2018 21:31)  HR: 98 (2018 05:58) (96 - 106)  BP: 115/70 (2018 05:58) (102/62 - 129/77)  BP(mean): --  RR: 16 (2018 05:58) (16 - 18)  SpO2: 97% (2018 05:58) (97% - 98%)  I&O's Detail    2018 07:01  -  2018 07:00  --------------------------------------------------------  IN:    IV PiggyBack: 100 mL    lactated ringers.: 600 mL  Total IN: 700 mL    OUT:  Total OUT: 0 mL    Total NET: 700 mL      General: NAD, resting comfortably in bed  C/V: NSR  Pulm: Nonlabored breathing, no respiratory distress  Abd: soft, mildly tender non distended.  Extrem: WWP, no edema, SCDs in place      LABS:                        11.3   11.9  )-----------( 250      ( 2018 06:13 )             34.2     02-07    134<L>  |  96  |  9   ----------------------------<  113<H>  4.0   |  25  |  0.73    Ca    8.9      2018 06:13  Phos  3.7     02-  Mg     1.9     -07    TPro  6.8  /  Alb  3.7  /  TBili  1.4<H>  /  DBili  x   /  AST  96<H>  /  ALT  152<H>  /  AlkPhos  103  02-07    PT/INR - ( 2018 13:47 )   PT: 10.2 sec;   INR: 0.92          PTT - ( 2018 13:47 )  PTT:27.6 sec  Urinalysis Basic - ( 2018 13:49 )    Color: Yellow / Appearance: Clear / S.010 / pH: x  Gluc: x / Ketone: NEGATIVE  / Bili: Negative / Urobili: 0.2 E.U./dL   Blood: x / Protein: NEGATIVE mg/dL / Nitrite: NEGATIVE   Leuk Esterase: NEGATIVE / RBC: < 5 /HPF / WBC < 5 /HPF   Sq Epi: x / Non Sq Epi: 0-5 /HPF / Bacteria: None /HPF        RADIOLOGY & ADDITIONAL STUDIES:

## 2018-02-07 NOTE — PROGRESS NOTE ADULT - SUBJECTIVE AND OBJECTIVE BOX
INTERVAL HPI/OVERNIGHT EVENTS:  Interim reviewed: CT noted: On IV pip/TAZO  Pain improved. Will discuss plans with Dr. Davis    MEDICATIONS  (STANDING):  aspirin  chewable 81 milliGRAM(s) Oral daily  escitalopram 10 milliGRAM(s) Oral daily  escitalopram 10 milliGRAM(s) Oral daily  heparin  Injectable 5000 Unit(s) SubCutaneous every 8 hours  lactated ringers. 1000 milliLiter(s) (75 mL/Hr) IV Continuous <Continuous>  letrozole 2.5 milliGRAM(s) Oral daily  piperacillin/tazobactam IVPB. 3.375 Gram(s) IV Intermittent every 6 hours    MEDICATIONS  (PRN):  ALPRAZolam 0.5 milliGRAM(s) Oral at bedtime PRN sleep  HYDROmorphone  Injectable 0.5 milliGRAM(s) IV Push every 4 hours PRN Moderate Pain  HYDROmorphone  Injectable 1 milliGRAM(s) IV Push every 4 hours PRN Severe Pain  melatonin 3 milliGRAM(s) Oral at bedtime PRN Insomnia  ondansetron Injectable 4 milliGRAM(s) IV Push every 6 hours PRN Nausea      Allergies    adhesives (Rash)  No Known Drug Allergies  Originally Entered as [Unknown] reaction to [Other][Tape] (Unknown)    Intolerances        Vital Signs Last 24 Hrs  T(C): 36.8 (2018 09:25), Max: 37.8 (2018 21:31)  T(F): 98.2 (2018 09:25), Max: 100.1 (2018 21:31)  HR: 83 (2018 09:25) (83 - 106)  BP: 99/62 (2018 09:25) (99/62 - 129/77)  BP(mean): --  RR: 16 (2018 09:25) (16 - 18)  SpO2: 97% (2018 09:25) (97% - 98%)          Constitutional: Awake    Eyes: DANAE    ENMT: Negative    Neck: Supple    Back:  no tenderness     Respiratory:  clear    Cardiovascular: S1 S2    Gastrointestinal:  soft less pain    Genitourinary:    Extremities: no edema    Vascular:    Neurological:    Skin:    Lymph Nodes:             @ 07:01  -  02-07 @ 07:00  --------------------------------------------------------  IN: 700 mL / OUT: 0 mL / NET: 700 mL      LABS:                        11.3   11.9  )-----------( 250      ( 2018 06:13 )             34.2     02-07    134<L>  |  96  |  9   ----------------------------<  113<H>  4.0   |  25  |  0.73    Ca    8.9      2018 06:13  Phos  3.7     02-  Mg     1.9     -    TPro  6.8  /  Alb  3.7  /  TBili  1.4<H>  /  DBili  x   /  AST  96<H>  /  ALT  152<H>  /  AlkPhos  103  02-07    PT/INR - ( 2018 13:47 )   PT: 10.2 sec;   INR: 0.92          PTT - ( 2018 13:47 )  PTT:27.6 sec  Urinalysis Basic - ( 2018 13:49 )    Color: Yellow / Appearance: Clear / S.010 / pH: x  Gluc: x / Ketone: NEGATIVE  / Bili: Negative / Urobili: 0.2 E.U./dL   Blood: x / Protein: NEGATIVE mg/dL / Nitrite: NEGATIVE   Leuk Esterase: NEGATIVE / RBC: < 5 /HPF / WBC < 5 /HPF   Sq Epi: x / Non Sq Epi: 0-5 /HPF / Bacteria: None /HPF        RADIOLOGY & ADDITIONAL TESTS:

## 2018-02-07 NOTE — PROGRESS NOTE ADULT - ATTENDING COMMENTS
Patient seen and examined; Appears improved today; Will discuss plans with Dr. Davis considering findings on CT

## 2018-02-08 LAB
ALBUMIN SERPL ELPH-MCNC: 3.3 G/DL — SIGNIFICANT CHANGE UP (ref 3.3–5)
ALP SERPL-CCNC: 83 U/L — SIGNIFICANT CHANGE UP (ref 40–120)
ALT FLD-CCNC: 114 U/L — HIGH (ref 10–45)
AMYLASE P1 CFR SERPL: 84 U/L — SIGNIFICANT CHANGE UP (ref 25–125)
ANION GAP SERPL CALC-SCNC: 7 MMOL/L — SIGNIFICANT CHANGE UP (ref 5–17)
AST SERPL-CCNC: 64 U/L — HIGH (ref 10–40)
BILIRUB SERPL-MCNC: 1.1 MG/DL — SIGNIFICANT CHANGE UP (ref 0.2–1.2)
BUN SERPL-MCNC: 7 MG/DL — SIGNIFICANT CHANGE UP (ref 7–23)
CALCIUM SERPL-MCNC: 8.8 MG/DL — SIGNIFICANT CHANGE UP (ref 8.4–10.5)
CHLORIDE SERPL-SCNC: 101 MMOL/L — SIGNIFICANT CHANGE UP (ref 96–108)
CO2 SERPL-SCNC: 29 MMOL/L — SIGNIFICANT CHANGE UP (ref 22–31)
CREAT SERPL-MCNC: 0.87 MG/DL — SIGNIFICANT CHANGE UP (ref 0.5–1.3)
GLUCOSE SERPL-MCNC: 115 MG/DL — HIGH (ref 70–99)
HCT VFR BLD CALC: 30.3 % — LOW (ref 34.5–45)
HGB BLD-MCNC: 10 G/DL — LOW (ref 11.5–15.5)
LACTATE SERPL-SCNC: 0.8 MMOL/L — SIGNIFICANT CHANGE UP (ref 0.5–2)
LIDOCAIN IGE QN: 83 U/L — HIGH (ref 7–60)
MAGNESIUM SERPL-MCNC: 2 MG/DL — SIGNIFICANT CHANGE UP (ref 1.6–2.6)
MCHC RBC-ENTMCNC: 32.1 PG — SIGNIFICANT CHANGE UP (ref 27–34)
MCHC RBC-ENTMCNC: 33 G/DL — SIGNIFICANT CHANGE UP (ref 32–36)
MCV RBC AUTO: 97.1 FL — SIGNIFICANT CHANGE UP (ref 80–100)
PHOSPHATE SERPL-MCNC: 2.2 MG/DL — LOW (ref 2.5–4.5)
PLATELET # BLD AUTO: 212 K/UL — SIGNIFICANT CHANGE UP (ref 150–400)
POTASSIUM SERPL-MCNC: 4 MMOL/L — SIGNIFICANT CHANGE UP (ref 3.5–5.3)
POTASSIUM SERPL-SCNC: 4 MMOL/L — SIGNIFICANT CHANGE UP (ref 3.5–5.3)
PROT SERPL-MCNC: 6.2 G/DL — SIGNIFICANT CHANGE UP (ref 6–8.3)
RBC # BLD: 3.12 M/UL — LOW (ref 3.8–5.2)
RBC # FLD: 15.4 % — SIGNIFICANT CHANGE UP (ref 10.3–16.9)
SODIUM SERPL-SCNC: 137 MMOL/L — SIGNIFICANT CHANGE UP (ref 135–145)
WBC # BLD: 7.5 K/UL — SIGNIFICANT CHANGE UP (ref 3.8–10.5)
WBC # FLD AUTO: 7.5 K/UL — SIGNIFICANT CHANGE UP (ref 3.8–10.5)

## 2018-02-08 PROCEDURE — 74019 RADEX ABDOMEN 2 VIEWS: CPT | Mod: 26

## 2018-02-08 RX ORDER — POTASSIUM PHOSPHATE, MONOBASIC POTASSIUM PHOSPHATE, DIBASIC 236; 224 MG/ML; MG/ML
15 INJECTION, SOLUTION INTRAVENOUS ONCE
Qty: 0 | Refills: 0 | Status: COMPLETED | OUTPATIENT
Start: 2018-02-08 | End: 2018-02-08

## 2018-02-08 RX ADMIN — PIPERACILLIN AND TAZOBACTAM 200 GRAM(S): 4; .5 INJECTION, POWDER, LYOPHILIZED, FOR SOLUTION INTRAVENOUS at 05:15

## 2018-02-08 RX ADMIN — PIPERACILLIN AND TAZOBACTAM 200 GRAM(S): 4; .5 INJECTION, POWDER, LYOPHILIZED, FOR SOLUTION INTRAVENOUS at 18:00

## 2018-02-08 RX ADMIN — Medication 81 MILLIGRAM(S): at 12:34

## 2018-02-08 RX ADMIN — Medication 0.5 MILLIGRAM(S): at 23:45

## 2018-02-08 RX ADMIN — HYDROMORPHONE HYDROCHLORIDE 0.5 MILLIGRAM(S): 2 INJECTION INTRAMUSCULAR; INTRAVENOUS; SUBCUTANEOUS at 23:09

## 2018-02-08 RX ADMIN — PIPERACILLIN AND TAZOBACTAM 200 GRAM(S): 4; .5 INJECTION, POWDER, LYOPHILIZED, FOR SOLUTION INTRAVENOUS at 12:34

## 2018-02-08 RX ADMIN — DEXTROSE MONOHYDRATE, SODIUM CHLORIDE, AND POTASSIUM CHLORIDE 60 MILLILITER(S): 50; .745; 4.5 INJECTION, SOLUTION INTRAVENOUS at 19:17

## 2018-02-08 RX ADMIN — POTASSIUM PHOSPHATE, MONOBASIC POTASSIUM PHOSPHATE, DIBASIC 62.5 MILLIMOLE(S): 236; 224 INJECTION, SOLUTION INTRAVENOUS at 09:44

## 2018-02-08 RX ADMIN — DEXTROSE MONOHYDRATE, SODIUM CHLORIDE, AND POTASSIUM CHLORIDE 60 MILLILITER(S): 50; .745; 4.5 INJECTION, SOLUTION INTRAVENOUS at 02:30

## 2018-02-08 RX ADMIN — PIPERACILLIN AND TAZOBACTAM 200 GRAM(S): 4; .5 INJECTION, POWDER, LYOPHILIZED, FOR SOLUTION INTRAVENOUS at 23:09

## 2018-02-08 RX ADMIN — ESCITALOPRAM OXALATE 10 MILLIGRAM(S): 10 TABLET, FILM COATED ORAL at 12:34

## 2018-02-08 RX ADMIN — LETROZOLE 2.5 MILLIGRAM(S): 2.5 TABLET, FILM COATED ORAL at 12:34

## 2018-02-08 RX ADMIN — HYDROMORPHONE HYDROCHLORIDE 0.5 MILLIGRAM(S): 2 INJECTION INTRAMUSCULAR; INTRAVENOUS; SUBCUTANEOUS at 23:45

## 2018-02-08 NOTE — DIETITIAN INITIAL EVALUATION ADULT. - OTHER INFO
71 yo F w/ metastatic breast ca (liver, brain), prior lung ca, cecum carcinoid s/p right hemicolectomy c/b SBO in January 2018 that was managed non operatively. Now p/w RLQ abdominal pain and CT concerning for phlegmonous change and abscesses near prior ileocolic anastomosis.Pt currently on clear liquid diet x3 days and tolerating PO. Has been experiencing diarrhea 3-4x/day and c/o stomach pains. Reports UBW is 140-142lbs. Documented wt is 147lbs. NKFA or dietary restrictions. Skin: surgical incision; GI: passing flatus per flowsheet.

## 2018-02-08 NOTE — PROGRESS NOTE ADULT - SUBJECTIVE AND OBJECTIVE BOX
INTERVAL HPI/OVERNIGHT EVENTS:  Upset since she is unclear about plans regarding the abdomen:  Have contacted Dr. Davis;  Having some abdominal pain; Also frequent bowel movements with diarrhea at times;       MEDICATIONS  (STANDING):  aspirin  chewable 81 milliGRAM(s) Oral daily  dextrose 5% + sodium chloride 0.45% with potassium chloride 20 mEq/L 1000 milliLiter(s) (60 mL/Hr) IV Continuous <Continuous>  escitalopram 10 milliGRAM(s) Oral daily  heparin  Injectable 5000 Unit(s) SubCutaneous every 8 hours  letrozole 2.5 milliGRAM(s) Oral daily  piperacillin/tazobactam IVPB. 3.375 Gram(s) IV Intermittent every 6 hours    MEDICATIONS  (PRN):  ALPRAZolam 0.5 milliGRAM(s) Oral at bedtime PRN sleep  aluminum hydroxide/magnesium hydroxide/simethicone Suspension 30 milliLiter(s) Oral every 6 hours PRN Dyspepsia  HYDROmorphone  Injectable 0.5 milliGRAM(s) IV Push every 4 hours PRN Moderate Pain  HYDROmorphone  Injectable 1 milliGRAM(s) IV Push every 4 hours PRN Severe Pain  melatonin 3 milliGRAM(s) Oral at bedtime PRN Insomnia  ondansetron Injectable 4 milliGRAM(s) IV Push every 6 hours PRN Nausea      Allergies    adhesives (Rash)  No Known Drug Allergies  Originally Entered as [Unknown] reaction to [Other][Tape] (Unknown)    Intolerances        Vital Signs Last 24 Hrs  T(C): 37 (2018 06:02), Max: 37.8 (2018 13:09)  T(F): 98.6 (2018 06:02), Max: 100 (2018 13:09)  HR: 81 (2018 06:02) (81 - 87)  BP: 95/60 (2018 06:02) (95/60 - 107/56)  BP(mean): --  RR: 16 (2018 06:02) (16 - 17)  SpO2: 97% (2018 06:02) (97% - 99%)          Constitutional:  Awake    Eyes: DANAE    ENMT: Negative    Neck: Supple    Back:  no tenderness     Respiratory:  clear    Cardiovascular: S1 S2    Gastrointestinal:  soft some pain right side of abdomen mainly in lower quadrant.     Genitourinary:    Extremities:  no edema    Vascular:    Neurological:    Skin:    Lymph Nodes:             @ 07: @ 07:00  --------------------------------------------------------  IN: 2565 mL / OUT: 0 mL / NET: 2565 mL     @ 07: @ 10:30  --------------------------------------------------------  IN: 240 mL / OUT: 200 mL / NET: 40 mL      LABS:                        10.0   7.5   )-----------( 212      ( 2018 06:34 )             30.3     -    137  |  101  |  7   ----------------------------<  115<H>  4.0   |  29  |  0.87    Ca    8.8      2018 06:34  Phos  2.2     -08  Mg     2.0         TPro  6.2  /  Alb  3.3  /  TBili  1.1  /  DBili  x   /  AST  64<H>  /  ALT  114<H>  /  AlkPhos  83  -08    PT/INR - ( 2018 13:47 )   PT: 10.2 sec;   INR: 0.92          PTT - ( 2018 13:47 )  PTT:27.6 sec  Urinalysis Basic - ( 2018 13:49 )    Color: Yellow / Appearance: Clear / S.010 / pH: x  Gluc: x / Ketone: NEGATIVE  / Bili: Negative / Urobili: 0.2 E.U./dL   Blood: x / Protein: NEGATIVE mg/dL / Nitrite: NEGATIVE   Leuk Esterase: NEGATIVE / RBC: < 5 /HPF / WBC < 5 /HPF   Sq Epi: x / Non Sq Epi: 0-5 /HPF / Bacteria: None /HPF        RADIOLOGY & ADDITIONAL TESTS:

## 2018-02-08 NOTE — PROGRESS NOTE ADULT - ATTENDING COMMENTS
pt seen and examined by me personally 2/8/2018  VSS Afeb  C/o unclear about exact problem (though I had complete disc w/ her),  also c/o persist RLQ pain/ diarrhea  Tender RLQ persists; yet abdom non acute  Labs noted   improving LFTs  Cont clears and ABX  AXR pending

## 2018-02-08 NOTE — PROGRESS NOTE ADULT - SUBJECTIVE AND OBJECTIVE BOX
O/N: abd soft/ND, TTP in RLQ, OOBA, VSS  2/7: on home meds, abd x-ray, +BM, +F O/N: abd soft/ND, TTP in RLQ, OOBA, VSS  : on home meds, abd x-ray, +BM, +F     SUBJECTIVE: Patient seen and examined bedside by chief resident. No nausea/vomiting, having bowel movements, pain controlled, no fever    aspirin  chewable 81 milliGRAM(s) Oral daily  heparin  Injectable 5000 Unit(s) SubCutaneous every 8 hours  piperacillin/tazobactam IVPB. 3.375 Gram(s) IV Intermittent every 6 hours      Vital Signs Last 24 Hrs  T(C): 37 (2018 06:02), Max: 37.8 (2018 13:09)  T(F): 98.6 (2018 06:02), Max: 100 (2018 13:09)  HR: 81 (2018 06:02) (81 - 87)  BP: 95/60 (2018 06:02) (95/60 - 107/56)  BP(mean): --  RR: 16 (2018 06:02) (16 - 17)  SpO2: 97% (2018 06:02) (97% - 99%)  I&O's Detail    2018 07:01  -  2018 07:00  --------------------------------------------------------  IN:    dextrose 5% + sodium chloride 0.45% with potassium chloride 20 mEq/L: 1480 mL    IV PiggyBack: 200 mL    lactated ringers.: 225 mL    Oral Fluid: 660 mL  Total IN: 2565 mL    OUT:  Total OUT: 0 mL    Total NET: 2565 mL          General: NAD, resting comfortably in bed  C/V: NSR  Pulm: Nonlabored breathing, no respiratory distress  Abd: soft, mild tender at RLQ, non distended  Extrem: WWP, no edema, SCDs in place        LABS:                        10.0   7.5   )-----------( 212      ( 2018 06:34 )             30.3     02-08    137  |  101  |  7   ----------------------------<  115<H>  4.0   |  29  |  0.87    Ca    8.8      2018 06:34  Phos  2.2     02-08  Mg     2.0     02-08    TPro  6.2  /  Alb  3.3  /  TBili  1.1  /  DBili  x   /  AST  64<H>  /  ALT  114<H>  /  AlkPhos  83  02-08    PT/INR - ( 2018 13:47 )   PT: 10.2 sec;   INR: 0.92          PTT - ( 2018 13:47 )  PTT:27.6 sec  Urinalysis Basic - ( 2018 13:49 )    Color: Yellow / Appearance: Clear / S.010 / pH: x  Gluc: x / Ketone: NEGATIVE  / Bili: Negative / Urobili: 0.2 E.U./dL   Blood: x / Protein: NEGATIVE mg/dL / Nitrite: NEGATIVE   Leuk Esterase: NEGATIVE / RBC: < 5 /HPF / WBC < 5 /HPF   Sq Epi: x / Non Sq Epi: 0-5 /HPF / Bacteria: None /HPF        RADIOLOGY & ADDITIONAL STUDIES:

## 2018-02-08 NOTE — PROGRESS NOTE ADULT - ASSESSMENT
71 yo female with metastatic breast ca (liver, brain), prior lung ca, cecum carcinoid s/p right hemicolectomy c/b SBO in January 2018 that was managed non operatively   Now presents with RLQ abdominal pain and CT concerning for phlegmonous change and abscesses near prior ileocolic anastomosis.    - clear liquid diet  - IV Zosyn  - IV fluid hydration  - pain management  - anti-nausea management  - DVT ppx  - pulm toilet/IS  - home meds as appropriate

## 2018-02-08 NOTE — DIETITIAN INITIAL EVALUATION ADULT. - ENERGY NEEDS
Height: 5'3" Weight: 147lbs, IBW 115lbs+/-10%, %%, BMI 26  IBW used for calculations as pt >120% of IBW   Needs adjusted 2/2 post-op, age and metastatic Ca undergoing chemo

## 2018-02-09 LAB
ALBUMIN SERPL ELPH-MCNC: 3.4 G/DL — SIGNIFICANT CHANGE UP (ref 3.3–5)
ALP SERPL-CCNC: 97 U/L — SIGNIFICANT CHANGE UP (ref 40–120)
ALT FLD-CCNC: 94 U/L — HIGH (ref 10–45)
AMYLASE P1 CFR SERPL: 70 U/L — SIGNIFICANT CHANGE UP (ref 25–125)
ANION GAP SERPL CALC-SCNC: 7 MMOL/L — SIGNIFICANT CHANGE UP (ref 5–17)
AST SERPL-CCNC: 64 U/L — HIGH (ref 10–40)
BILIRUB SERPL-MCNC: 1.2 MG/DL — SIGNIFICANT CHANGE UP (ref 0.2–1.2)
BUN SERPL-MCNC: 6 MG/DL — LOW (ref 7–23)
CALCIUM SERPL-MCNC: 8.8 MG/DL — SIGNIFICANT CHANGE UP (ref 8.4–10.5)
CHLORIDE SERPL-SCNC: 101 MMOL/L — SIGNIFICANT CHANGE UP (ref 96–108)
CO2 SERPL-SCNC: 27 MMOL/L — SIGNIFICANT CHANGE UP (ref 22–31)
CREAT SERPL-MCNC: 0.84 MG/DL — SIGNIFICANT CHANGE UP (ref 0.5–1.3)
GLUCOSE SERPL-MCNC: 119 MG/DL — HIGH (ref 70–99)
HCT VFR BLD CALC: 30.6 % — LOW (ref 34.5–45)
HGB BLD-MCNC: 10 G/DL — LOW (ref 11.5–15.5)
LIDOCAIN IGE QN: 69 U/L — HIGH (ref 7–60)
MAGNESIUM SERPL-MCNC: 2 MG/DL — SIGNIFICANT CHANGE UP (ref 1.6–2.6)
MCHC RBC-ENTMCNC: 31.6 PG — SIGNIFICANT CHANGE UP (ref 27–34)
MCHC RBC-ENTMCNC: 32.7 G/DL — SIGNIFICANT CHANGE UP (ref 32–36)
MCV RBC AUTO: 96.8 FL — SIGNIFICANT CHANGE UP (ref 80–100)
PHOSPHATE SERPL-MCNC: 2.6 MG/DL — SIGNIFICANT CHANGE UP (ref 2.5–4.5)
PLATELET # BLD AUTO: 228 K/UL — SIGNIFICANT CHANGE UP (ref 150–400)
POTASSIUM SERPL-MCNC: 3.8 MMOL/L — SIGNIFICANT CHANGE UP (ref 3.5–5.3)
POTASSIUM SERPL-SCNC: 3.8 MMOL/L — SIGNIFICANT CHANGE UP (ref 3.5–5.3)
PROT SERPL-MCNC: 6.5 G/DL — SIGNIFICANT CHANGE UP (ref 6–8.3)
RBC # BLD: 3.16 M/UL — LOW (ref 3.8–5.2)
RBC # FLD: 15.1 % — SIGNIFICANT CHANGE UP (ref 10.3–16.9)
SODIUM SERPL-SCNC: 135 MMOL/L — SIGNIFICANT CHANGE UP (ref 135–145)
WBC # BLD: 7.8 K/UL — SIGNIFICANT CHANGE UP (ref 3.8–10.5)
WBC # FLD AUTO: 7.8 K/UL — SIGNIFICANT CHANGE UP (ref 3.8–10.5)

## 2018-02-09 PROCEDURE — 74019 RADEX ABDOMEN 2 VIEWS: CPT | Mod: 26

## 2018-02-09 RX ORDER — OXYCODONE AND ACETAMINOPHEN 5; 325 MG/1; MG/1
1 TABLET ORAL EVERY 4 HOURS
Qty: 0 | Refills: 0 | Status: DISCONTINUED | OUTPATIENT
Start: 2018-02-09 | End: 2018-02-09

## 2018-02-09 RX ORDER — OXYCODONE AND ACETAMINOPHEN 5; 325 MG/1; MG/1
2 TABLET ORAL EVERY 4 HOURS
Qty: 0 | Refills: 0 | Status: DISCONTINUED | OUTPATIENT
Start: 2018-02-09 | End: 2018-02-09

## 2018-02-09 RX ORDER — OXYCODONE HYDROCHLORIDE 5 MG/1
5 TABLET ORAL EVERY 4 HOURS
Qty: 0 | Refills: 0 | Status: DISCONTINUED | OUTPATIENT
Start: 2018-02-09 | End: 2018-02-10

## 2018-02-09 RX ORDER — DOCUSATE SODIUM 100 MG
100 CAPSULE ORAL
Qty: 0 | Refills: 0 | Status: DISCONTINUED | OUTPATIENT
Start: 2018-02-09 | End: 2018-02-10

## 2018-02-09 RX ORDER — OXYCODONE HYDROCHLORIDE 5 MG/1
10 TABLET ORAL EVERY 4 HOURS
Qty: 0 | Refills: 0 | Status: DISCONTINUED | OUTPATIENT
Start: 2018-02-09 | End: 2018-02-10

## 2018-02-09 RX ADMIN — Medication 30 MILLILITER(S): at 19:32

## 2018-02-09 RX ADMIN — ESCITALOPRAM OXALATE 10 MILLIGRAM(S): 10 TABLET, FILM COATED ORAL at 12:51

## 2018-02-09 RX ADMIN — Medication 81 MILLIGRAM(S): at 12:51

## 2018-02-09 RX ADMIN — LETROZOLE 2.5 MILLIGRAM(S): 2.5 TABLET, FILM COATED ORAL at 12:52

## 2018-02-09 RX ADMIN — PIPERACILLIN AND TAZOBACTAM 200 GRAM(S): 4; .5 INJECTION, POWDER, LYOPHILIZED, FOR SOLUTION INTRAVENOUS at 05:46

## 2018-02-09 RX ADMIN — Medication 1 TABLET(S): at 19:28

## 2018-02-09 RX ADMIN — Medication 30 MILLILITER(S): at 12:52

## 2018-02-09 RX ADMIN — PIPERACILLIN AND TAZOBACTAM 200 GRAM(S): 4; .5 INJECTION, POWDER, LYOPHILIZED, FOR SOLUTION INTRAVENOUS at 12:52

## 2018-02-09 RX ADMIN — Medication 0.5 MILLIGRAM(S): at 23:30

## 2018-02-09 NOTE — PROGRESS NOTE ADULT - SUBJECTIVE AND OBJECTIVE BOX
O/N: FRANK, VSS, AXR w/ no obstruction, gas in xverse colon  2/8 : FRANK, VSS, LFT's improving, WBC normalize. O/N: FRANK, VSS, AXR w/ no obstruction, gas in xverse colon  2/8 : FRANK, VSS, LFT's improving, WBC normalize.      SUBJECTIVE: Patient seen and examined bedside by chief resident. Regular bowel function, no nausea/vomiting. still complaining of minimal pain, no fever    aspirin  chewable 81 milliGRAM(s) Oral daily  heparin  Injectable 5000 Unit(s) SubCutaneous every 8 hours  piperacillin/tazobactam IVPB. 3.375 Gram(s) IV Intermittent every 6 hours      Vital Signs Last 24 Hrs  T(C): 36.6 (09 Feb 2018 05:39), Max: 37.5 (08 Feb 2018 09:35)  T(F): 97.8 (09 Feb 2018 05:39), Max: 99.5 (08 Feb 2018 09:35)  HR: 83 (09 Feb 2018 05:39) (78 - 98)  BP: 106/78 (09 Feb 2018 05:39) (92/59 - 106/78)  BP(mean): --  RR: 16 (09 Feb 2018 05:39) (16 - 17)  SpO2: 100% (09 Feb 2018 05:39) (96% - 100%)  I&O's Detail    08 Feb 2018 07:01  -  09 Feb 2018 07:00  --------------------------------------------------------  IN:    dextrose 5% + sodium chloride 0.45% with potassium chloride 20 mEq/L: 1320 mL    IV PiggyBack: 200 mL    Oral Fluid: 1380 mL  Total IN: 2900 mL    OUT:    Voided: 550 mL  Total OUT: 550 mL    Total NET: 2350 mL          General: NAD, resting comfortably in bed  C/V: NSR  Pulm: Nonlabored breathing, no respiratory distress  Abd: soft, ND, mildly tender over RLQ  Extrem: WWP, no edema, SCDs in place        LABS:                        10.0   7.8   )-----------( 228      ( 09 Feb 2018 07:51 )             30.6     02-09    135  |  101  |  6<L>  ----------------------------<  119<H>  3.8   |  27  |  0.84    Ca    8.8      09 Feb 2018 07:51  Phos  2.6     02-09  Mg     2.0     02-09    TPro  6.5  /  Alb  3.4  /  TBili  1.2  /  DBili  x   /  AST  64<H>  /  ALT  94<H>  /  AlkPhos  97  02-09          RADIOLOGY & ADDITIONAL STUDIES:

## 2018-02-09 NOTE — PROGRESS NOTE ADULT - ATTENDING COMMENTS
pt seen examined by me personally 2/9/2018  Afeb  VSS  NAD   still c/o on off pain RLQ  Tender RLQ  Also + Rovsing sign  (pt s/p R Hemicolect)  Phlegmon poss abscess near anast (9 months old) at RLQ  May switch to PO ABX  AXR today  Cont clears

## 2018-02-09 NOTE — PROGRESS NOTE ADULT - ASSESSMENT
71 yo female with metastatic breast ca (liver, brain), prior lung ca, cecum carcinoid s/p right hemicolectomy c/b SBO in January 2018 that was managed non operatively   Now presents with RLQ abdominal pain and CT concerning for phlegmonous change and abscesses near prior ileocolic anastomosis.    - clear liquid diet  - IV Zosyn  - IV fluid hydration  - pain management  - anti-nausea management  - DVT ppx  - pulm toilet/IS  - home meds as appropriate 71 yo female with metastatic breast ca (liver, brain), prior lung ca, cecum carcinoid s/p right hemicolectomy c/b SBO in January 2018 that was managed non operatively   Now presents with RLQ abdominal pain and CT concerning for phlegmonous change and abscesses near prior ileocolic anastomosis.    - abd xray today  - AM labs  - cont CLD  - clear liquid diet  - IV Zosyn

## 2018-02-09 NOTE — PROGRESS NOTE ADULT - SUBJECTIVE AND OBJECTIVE BOX
INTERVAL HPI/OVERNIGHT EVENTS:  Looks better today; Minimal pain and WBC now is normal      MEDICATIONS  (STANDING):  aspirin  chewable 81 milliGRAM(s) Oral daily  dextrose 5% + sodium chloride 0.45% with potassium chloride 20 mEq/L 1000 milliLiter(s) (60 mL/Hr) IV Continuous <Continuous>  escitalopram 10 milliGRAM(s) Oral daily  heparin  Injectable 5000 Unit(s) SubCutaneous every 8 hours  letrozole 2.5 milliGRAM(s) Oral daily  piperacillin/tazobactam IVPB. 3.375 Gram(s) IV Intermittent every 6 hours    MEDICATIONS  (PRN):  ALPRAZolam 0.5 milliGRAM(s) Oral at bedtime PRN sleep  aluminum hydroxide/magnesium hydroxide/simethicone Suspension 30 milliLiter(s) Oral every 6 hours PRN Dyspepsia  HYDROmorphone  Injectable 0.5 milliGRAM(s) IV Push every 4 hours PRN Moderate Pain  HYDROmorphone  Injectable 1 milliGRAM(s) IV Push every 4 hours PRN Severe Pain  melatonin 3 milliGRAM(s) Oral at bedtime PRN Insomnia  ondansetron Injectable 4 milliGRAM(s) IV Push every 6 hours PRN Nausea      Allergies    adhesives (Rash)  No Known Drug Allergies  Originally Entered as [Unknown] reaction to [Other][Tape] (Unknown)    Intolerances        Vital Signs Last 24 Hrs  T(C): 36.6 (09 Feb 2018 05:39), Max: 37.5 (08 Feb 2018 09:35)  T(F): 97.8 (09 Feb 2018 05:39), Max: 99.5 (08 Feb 2018 09:35)  HR: 83 (09 Feb 2018 05:39) (78 - 98)  BP: 106/78 (09 Feb 2018 05:39) (92/59 - 106/78)  BP(mean): --  RR: 16 (09 Feb 2018 05:39) (16 - 17)  SpO2: 100% (09 Feb 2018 05:39) (96% - 100%)          Constitutional:  Awake     Eyes: DANAE    ENMT: Negative    Neck: Supple    Back:  no tenderness     Respiratory:  clear    Cardiovascular: S1 S2    Gastrointestinal:  soft Minimal pain right lower quadrant    Genitourinary:    Extremities: no edema    Vascular:    Neurological:    Skin:    Lymph Nodes:            02-08 @ 07:01  -  02-09 @ 07:00  --------------------------------------------------------  IN: 2900 mL / OUT: 550 mL / NET: 2350 mL      LABS:                        10.0   7.8   )-----------( 228      ( 09 Feb 2018 07:51 )             30.6     02-09    135  |  101  |  6<L>  ----------------------------<  119<H>  3.8   |  27  |  0.84    Ca    8.8      09 Feb 2018 07:51  Phos  2.6     02-09  Mg     2.0     02-09    TPro  6.5  /  Alb  3.4  /  TBili  1.2  /  DBili  x   /  AST  64<H>  /  ALT  94<H>  /  AlkPhos  97  02-09          RADIOLOGY & ADDITIONAL TESTS:

## 2018-02-10 ENCOUNTER — TRANSCRIPTION ENCOUNTER (OUTPATIENT)
Age: 73
End: 2018-02-10

## 2018-02-10 VITALS
DIASTOLIC BLOOD PRESSURE: 57 MMHG | OXYGEN SATURATION: 100 % | TEMPERATURE: 98 F | SYSTOLIC BLOOD PRESSURE: 92 MMHG | HEART RATE: 69 BPM | RESPIRATION RATE: 15 BRPM

## 2018-02-10 LAB
ANION GAP SERPL CALC-SCNC: 10 MMOL/L — SIGNIFICANT CHANGE UP (ref 5–17)
BUN SERPL-MCNC: 7 MG/DL — SIGNIFICANT CHANGE UP (ref 7–23)
CALCIUM SERPL-MCNC: 9.6 MG/DL — SIGNIFICANT CHANGE UP (ref 8.4–10.5)
CHLORIDE SERPL-SCNC: 99 MMOL/L — SIGNIFICANT CHANGE UP (ref 96–108)
CO2 SERPL-SCNC: 27 MMOL/L — SIGNIFICANT CHANGE UP (ref 22–31)
CREAT SERPL-MCNC: 0.8 MG/DL — SIGNIFICANT CHANGE UP (ref 0.5–1.3)
GLUCOSE SERPL-MCNC: 95 MG/DL — SIGNIFICANT CHANGE UP (ref 70–99)
HCT VFR BLD CALC: 35.2 % — SIGNIFICANT CHANGE UP (ref 34.5–45)
HGB BLD-MCNC: 11.5 G/DL — SIGNIFICANT CHANGE UP (ref 11.5–15.5)
MAGNESIUM SERPL-MCNC: 2.3 MG/DL — SIGNIFICANT CHANGE UP (ref 1.6–2.6)
MCHC RBC-ENTMCNC: 32.1 PG — SIGNIFICANT CHANGE UP (ref 27–34)
MCHC RBC-ENTMCNC: 32.7 G/DL — SIGNIFICANT CHANGE UP (ref 32–36)
MCV RBC AUTO: 98.3 FL — SIGNIFICANT CHANGE UP (ref 80–100)
PHOSPHATE SERPL-MCNC: 3.1 MG/DL — SIGNIFICANT CHANGE UP (ref 2.5–4.5)
PLATELET # BLD AUTO: 282 K/UL — SIGNIFICANT CHANGE UP (ref 150–400)
POTASSIUM SERPL-MCNC: 3.9 MMOL/L — SIGNIFICANT CHANGE UP (ref 3.5–5.3)
POTASSIUM SERPL-SCNC: 3.9 MMOL/L — SIGNIFICANT CHANGE UP (ref 3.5–5.3)
RBC # BLD: 3.58 M/UL — LOW (ref 3.8–5.2)
RBC # FLD: 14.6 % — SIGNIFICANT CHANGE UP (ref 10.3–16.9)
SODIUM SERPL-SCNC: 136 MMOL/L — SIGNIFICANT CHANGE UP (ref 135–145)
WBC # BLD: 5.9 K/UL — SIGNIFICANT CHANGE UP (ref 3.8–10.5)
WBC # FLD AUTO: 5.9 K/UL — SIGNIFICANT CHANGE UP (ref 3.8–10.5)

## 2018-02-10 PROCEDURE — 83735 ASSAY OF MAGNESIUM: CPT

## 2018-02-10 PROCEDURE — 93005 ELECTROCARDIOGRAM TRACING: CPT

## 2018-02-10 PROCEDURE — 96375 TX/PRO/DX INJ NEW DRUG ADDON: CPT

## 2018-02-10 PROCEDURE — 74177 CT ABD & PELVIS W/CONTRAST: CPT

## 2018-02-10 PROCEDURE — 71046 X-RAY EXAM CHEST 2 VIEWS: CPT

## 2018-02-10 PROCEDURE — 82150 ASSAY OF AMYLASE: CPT

## 2018-02-10 PROCEDURE — 84100 ASSAY OF PHOSPHORUS: CPT

## 2018-02-10 PROCEDURE — 96374 THER/PROPH/DIAG INJ IV PUSH: CPT | Mod: XU

## 2018-02-10 PROCEDURE — 83690 ASSAY OF LIPASE: CPT

## 2018-02-10 PROCEDURE — 85027 COMPLETE CBC AUTOMATED: CPT

## 2018-02-10 PROCEDURE — 99285 EMERGENCY DEPT VISIT HI MDM: CPT | Mod: 25

## 2018-02-10 PROCEDURE — 80048 BASIC METABOLIC PNL TOTAL CA: CPT

## 2018-02-10 PROCEDURE — 74019 RADEX ABDOMEN 2 VIEWS: CPT

## 2018-02-10 PROCEDURE — 83605 ASSAY OF LACTIC ACID: CPT

## 2018-02-10 PROCEDURE — 80053 COMPREHEN METABOLIC PANEL: CPT

## 2018-02-10 PROCEDURE — 36415 COLL VENOUS BLD VENIPUNCTURE: CPT

## 2018-02-10 RX ORDER — OXYCODONE HYDROCHLORIDE 5 MG/1
1 TABLET ORAL
Qty: 10 | Refills: 0 | OUTPATIENT
Start: 2018-02-10 | End: 2018-02-19

## 2018-02-10 RX ADMIN — OXYCODONE HYDROCHLORIDE 5 MILLIGRAM(S): 5 TABLET ORAL at 00:54

## 2018-02-10 RX ADMIN — ESCITALOPRAM OXALATE 10 MILLIGRAM(S): 10 TABLET, FILM COATED ORAL at 10:07

## 2018-02-10 RX ADMIN — Medication 81 MILLIGRAM(S): at 10:07

## 2018-02-10 RX ADMIN — Medication 1 TABLET(S): at 06:08

## 2018-02-10 RX ADMIN — LETROZOLE 2.5 MILLIGRAM(S): 2.5 TABLET, FILM COATED ORAL at 10:08

## 2018-02-10 RX ADMIN — OXYCODONE HYDROCHLORIDE 5 MILLIGRAM(S): 5 TABLET ORAL at 00:24

## 2018-02-10 RX ADMIN — Medication 100 MILLIGRAM(S): at 06:09

## 2018-02-10 NOTE — PROGRESS NOTE ADULT - SUBJECTIVE AND OBJECTIVE BOX
O/N: FRANK, VSS  2/9 : abd xray showed slight improvements, DC-ed IV Zosyn, started augmentin O/N: FRANK, VSS  2/9 : abd xray showed slight improvements, DC-ed IV Zosyn, started augmentin       SUBJECTIVE: Patient seen and examined bedside by chief resident. Minimal sore but controlled, ambulating, tolerating CLD, no nausea/vomiting, no fever    amoxicillin  875 milliGRAM(s)/clavulanate 1 Tablet(s) Oral every 12 hours  aspirin  chewable 81 milliGRAM(s) Oral daily  heparin  Injectable 5000 Unit(s) SubCutaneous every 8 hours      Vital Signs Last 24 Hrs  T(C): 36.7 (10 Feb 2018 05:08), Max: 37.7 (09 Feb 2018 14:34)  T(F): 98.1 (10 Feb 2018 05:08), Max: 99.9 (09 Feb 2018 14:34)  HR: 69 (10 Feb 2018 05:08) (69 - 97)  BP: 92/57 (10 Feb 2018 05:08) (92/57 - 118/72)  BP(mean): --  RR: 15 (10 Feb 2018 05:08) (15 - 17)  SpO2: 100% (10 Feb 2018 05:08) (98% - 100%)  I&O's Detail    09 Feb 2018 07:01  -  10 Feb 2018 07:00  --------------------------------------------------------  IN:    Oral Fluid: 1700 mL  Total IN: 1700 mL    OUT:    Voided: 600 mL  Total OUT: 600 mL    Total NET: 1100 mL          General: NAD, resting comfortably in bed  C/V: NSR  Pulm: Nonlabored breathing, no respiratory distress  Abd: soft, NT/ND.  Extrem: WWP, no edema, SCDs in place        LABS:                        10.0   7.8   )-----------( 228      ( 09 Feb 2018 07:51 )             30.6     02-09    135  |  101  |  6<L>  ----------------------------<  119<H>  3.8   |  27  |  0.84    Ca    8.8      09 Feb 2018 07:51  Phos  2.6     02-09  Mg     2.0     02-09    TPro  6.5  /  Alb  3.4  /  TBili  1.2  /  DBili  x   /  AST  64<H>  /  ALT  94<H>  /  AlkPhos  97  02-09          RADIOLOGY & ADDITIONAL STUDIES:

## 2018-02-10 NOTE — PROGRESS NOTE ADULT - ASSESSMENT
73 yo female with metastatic breast ca (liver, brain), prior lung ca, cecum carcinoid s/p right hemicolectomy c/b SBO in January 2018 p/w RLQ abscess collection (1cm)    - clear liquid diet with ensure TID  - PO augmentin  - pain management  - anti-nausea management  - DVT ppx  - pulm toilet/IS  - home meds as appropriate

## 2018-02-10 NOTE — DISCHARGE NOTE ADULT - CARE PLAN
Principal Discharge DX:	Phlegmon  Goal:	Recovery  Assessment and plan of treatment:	Please resume all regular home medications unless specifically advised not to take a particular medication. Also, please take the antibiotic for 7 days and the pain medications. It has been sent to your pharmacy.  Please get plenty of rest, continue to ambulate several times per day, and drink adequate amounts of fluids. Avoid lifting weights greater than 5-10 lbs until you follow-up with your surgeon, who will instruct you further regarding activity restrictions.  Avoid driving or operating heavy machinery while taking pain medications.  Please follow-up with your surgeon,  in 1-2 weeks.  Goal:	Warning signs  Assessment and plan of treatment:	WARNING SIGNS:  Please call your doctor or nurse practitioner if you experience the following:  *You experience new chest pain, pressure, squeezing or tightness.  *New or worsening cough, shortness of breath, or wheeze.  *If you are vomiting and cannot keep down fluids or your medications.  *You are getting dehydrated due to continued vomiting, diarrhea, or other reasons. Signs of dehydration include dry mouth, rapid heartbeat, or feeling dizzy or faint when standing.  *You see blood or dark/black material when you vomit or have a bowel movement.  *You experience burning when you urinate, have blood in your urine, or experience a discharge.  *Your pain is not improving within 8-12 hours or is not gone within 24 hours. Call or return immediately if your pain is getting worse, changes location, or moves to your chest or back.  *You have shaking chills, or fever greater than 101.5 degrees Fahrenheit or 38 degrees Celsius.  *Any change in your symptoms, or any new symptoms that concern you. Principal Discharge DX:	Phlegmon  Goal:	Recovery  Assessment and plan of treatment:	Please resume all regular home medications unless specifically advised not to take a particular medication. Also, please take the antibiotic for 10 days and the pain medications. It has been sent to your pharmacy.  Please get plenty of rest, continue to ambulate several times per day, and drink adequate amounts of fluids. Avoid lifting weights greater than 5-10 lbs until you follow-up with your surgeon, who will instruct you further regarding activity restrictions.  Avoid driving or operating heavy machinery while taking pain medications.  Please follow-up with your surgeon,  in 1-2 weeks.  Goal:	Warning signs  Assessment and plan of treatment:	WARNING SIGNS:  Please call your doctor or nurse practitioner if you experience the following:  *You experience new chest pain, pressure, squeezing or tightness.  *New or worsening cough, shortness of breath, or wheeze.  *If you are vomiting and cannot keep down fluids or your medications.  *You are getting dehydrated due to continued vomiting, diarrhea, or other reasons. Signs of dehydration include dry mouth, rapid heartbeat, or feeling dizzy or faint when standing.  *You see blood or dark/black material when you vomit or have a bowel movement.  *You experience burning when you urinate, have blood in your urine, or experience a discharge.  *Your pain is not improving within 8-12 hours or is not gone within 24 hours. Call or return immediately if your pain is getting worse, changes location, or moves to your chest or back.  *You have shaking chills, or fever greater than 101.5 degrees Fahrenheit or 38 degrees Celsius.  *Any change in your symptoms, or any new symptoms that concern you.

## 2018-02-10 NOTE — DISCHARGE NOTE ADULT - HOSPITAL COURSE
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 CT revealed small phlegmon over the anastomosis site, however, due to its small size, we opted for non surgical management. Patient was on IV Zosyn for 5 days and discharge with PO Augmentin for 10days. Upon discharge, her WBC was normalized, no fever, clinically better and vital signs were WNL. She is required to follow up with  in 1-2 weeks at his office. Call the office for an appointment.

## 2018-02-10 NOTE — DISCHARGE NOTE ADULT - MEDICATION SUMMARY - MEDICATIONS TO TAKE
I will START or STAY ON the medications listed below when I get home from the hospital:    aspirin 81 mg oral tablet, chewable  -- 1 tab(s) by mouth once a day  -- Indication: For Atherosclerosis     oxyCODONE 5 mg oral capsule  -- 1 cap(s) by mouth once a day, As Needed -for severe pain MDD:2 tabs   -- Caution federal law prohibits the transfer of this drug to any person other  than the person for whom it was prescribed.  It is very important that you take or use this exactly as directed.  Do not skip doses or discontinue unless directed by your doctor.  May cause drowsiness.  Alcohol may intensify this effect.  Use care when operating dangerous machinery.  This prescription cannot be refilled.  Using more of this medication than prescribed may cause serious breathing problems.    -- Indication: For Phlegmon    Lexapro  -- 10 milligram(s) by mouth once a day  -- Indication: For Home meds    ZyrTEC 10 mg oral tablet  -- 2 tab(s) by mouth once a day (at bedtime)  -- Indication: For Home meds    Femara 2.5 mg oral tablet  -- 1 tab(s) by mouth once a day  -- Indication: For Metastatic breast cancer    ALPRAZolam 0.5 mg oral tablet  -- 1 tab(s) by mouth once a day (at bedtime), As needed, sleep  -- Indication: For Anxiety    Singulair  --  by mouth   -- Indication: For Metastatic breast cancer    melatonin 3 mg oral tablet  -- 1 tab(s) by mouth once a day (at bedtime), As needed, Insomnia  -- Indication: For Insomnia    Augmentin 875 mg-125 mg oral tablet  -- 1 tab(s) by mouth 2 times a day MDD:2 tabs  -- Finish all this medication unless otherwise directed by prescriber.  Take with food or milk.    -- Indication: For Phlegmon

## 2018-02-10 NOTE — DISCHARGE NOTE ADULT - CARE PROVIDER_API CALL
Mian Davis (DDS), Surgery  [address] [place] [address]  Phone: [phone]  Fax: [phone] Frank Davis (MD), Surgery  [address]  [place] [address]  Phone: [phone]  Fax: [phone]

## 2018-02-10 NOTE — DISCHARGE NOTE ADULT - PLAN OF CARE
Recovery Please resume all regular home medications unless specifically advised not to take a particular medication. Also, please take the antibiotic for 7 days and the pain medications. It has been sent to your pharmacy.  Please get plenty of rest, continue to ambulate several times per day, and drink adequate amounts of fluids. Avoid lifting weights greater than 5-10 lbs until you follow-up with your surgeon, who will instruct you further regarding activity restrictions.  Avoid driving or operating heavy machinery while taking pain medications.  Please follow-up with your surgeon,  in 1-2 weeks. Warning signs WARNING SIGNS:  Please call your doctor or nurse practitioner if you experience the following:  *You experience new chest pain, pressure, squeezing or tightness.  *New or worsening cough, shortness of breath, or wheeze.  *If you are vomiting and cannot keep down fluids or your medications.  *You are getting dehydrated due to continued vomiting, diarrhea, or other reasons. Signs of dehydration include dry mouth, rapid heartbeat, or feeling dizzy or faint when standing.  *You see blood or dark/black material when you vomit or have a bowel movement.  *You experience burning when you urinate, have blood in your urine, or experience a discharge.  *Your pain is not improving within 8-12 hours or is not gone within 24 hours. Call or return immediately if your pain is getting worse, changes location, or moves to your chest or back.  *You have shaking chills, or fever greater than 101.5 degrees Fahrenheit or 38 degrees Celsius.  *Any change in your symptoms, or any new symptoms that concern you. Please resume all regular home medications unless specifically advised not to take a particular medication. Also, please take the antibiotic for 10 days and the pain medications. It has been sent to your pharmacy.  Please get plenty of rest, continue to ambulate several times per day, and drink adequate amounts of fluids. Avoid lifting weights greater than 5-10 lbs until you follow-up with your surgeon, who will instruct you further regarding activity restrictions.  Avoid driving or operating heavy machinery while taking pain medications.  Please follow-up with your surgeon,  in 1-2 weeks.

## 2018-02-10 NOTE — DISCHARGE NOTE ADULT - PATIENT PORTAL LINK FT
You can access the [facility] Patient Portal, offered by [facility], by registering with the following website: [email or web address]

## 2018-02-11 LAB
CULTURE RESULTS: SIGNIFICANT CHANGE UP
CULTURE RESULTS: SIGNIFICANT CHANGE UP
SPECIMEN SOURCE: SIGNIFICANT CHANGE UP
SPECIMEN SOURCE: SIGNIFICANT CHANGE UP

## 2018-02-14 ENCOUNTER — APPOINTMENT (OUTPATIENT)
Dept: INTERNAL MEDICINE | Facility: CLINIC | Age: 73
End: 2018-02-14
Payer: MEDICARE

## 2018-02-14 VITALS — OXYGEN SATURATION: 97 % | DIASTOLIC BLOOD PRESSURE: 60 MMHG | SYSTOLIC BLOOD PRESSURE: 98 MMHG | HEART RATE: 77 BPM

## 2018-02-14 DIAGNOSIS — J44.9 CHRONIC OBSTRUCTIVE PULMONARY DISEASE, UNSPECIFIED: ICD-10-CM

## 2018-02-14 DIAGNOSIS — D3A.00 BENIGN CARCINOID TUMOR OF UNSPECIFIED SITE: ICD-10-CM

## 2018-02-14 DIAGNOSIS — R18.8 OTHER ASCITES: ICD-10-CM

## 2018-02-14 DIAGNOSIS — K91.89 OTHER POSTPROCEDURAL COMPLICATIONS AND DISORDERS OF DIGESTIVE SYSTEM: ICD-10-CM

## 2018-02-14 DIAGNOSIS — L02.91 CUTANEOUS ABSCESS, UNSPECIFIED: ICD-10-CM

## 2018-02-14 DIAGNOSIS — Z90.49 ACQUIRED ABSENCE OF OTHER SPECIFIED PARTS OF DIGESTIVE TRACT: ICD-10-CM

## 2018-02-14 DIAGNOSIS — C50.919 MALIGNANT NEOPLASM OF UNSPECIFIED SITE OF UNSPECIFIED FEMALE BREAST: ICD-10-CM
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
## 2018-02-14 DIAGNOSIS — C78.7 SECONDARY MALIGNANT NEOPLASM OF LIVER AND INTRAHEPATIC BILE DUCT: ICD-10-CM

## 2018-02-14 DIAGNOSIS — R10.9 UNSPECIFIED ABDOMINAL PAIN: ICD-10-CM

## 2018-02-14 DIAGNOSIS — Z98.890 OTHER SPECIFIED POSTPROCEDURAL STATES: ICD-10-CM

## 2018-02-14 PROCEDURE — 99213 OFFICE O/P EST LOW 20 MIN: CPT

## 2018-02-15 ENCOUNTER — APPOINTMENT (OUTPATIENT)
Dept: MRI IMAGING | Facility: HOSPITAL | Age: 73
End: 2018-02-15

## 2018-02-15 LAB
BASOPHILS # BLD AUTO: 0.07 K/UL
BASOPHILS NFR BLD AUTO: 1 %
EOSINOPHIL # BLD AUTO: 0.31 K/UL
EOSINOPHIL NFR BLD AUTO: 4.4 %
HCT VFR BLD CALC: 34.6 %
HGB BLD-MCNC: 10.9 G/DL
IMM GRANULOCYTES NFR BLD AUTO: 0.1 %
LYMPHOCYTES # BLD AUTO: 1.7 K/UL
LYMPHOCYTES NFR BLD AUTO: 24.4 %
MAN DIFF?: NORMAL
MCHC RBC-ENTMCNC: 31.5 GM/DL
MCHC RBC-ENTMCNC: 32.3 PG
MCV RBC AUTO: 102.7 FL
MONOCYTES # BLD AUTO: 0.66 K/UL
MONOCYTES NFR BLD AUTO: 9.5 %
NEUTROPHILS # BLD AUTO: 4.22 K/UL
NEUTROPHILS NFR BLD AUTO: 60.6 %
PLATELET # BLD AUTO: 295 K/UL
RBC # BLD: 3.37 M/UL
RBC # FLD: 15 %
WBC # FLD AUTO: 6.97 K/UL

## 2018-02-27 ENCOUNTER — FORM ENCOUNTER (OUTPATIENT)
Age: 73
End: 2018-02-27

## 2018-02-28 ENCOUNTER — APPOINTMENT (OUTPATIENT)
Dept: MRI IMAGING | Facility: HOSPITAL | Age: 73
End: 2018-02-28

## 2018-02-28 ENCOUNTER — OUTPATIENT (OUTPATIENT)
Dept: OUTPATIENT SERVICES | Facility: HOSPITAL | Age: 73
LOS: 1 days | End: 2018-02-28
Payer: MEDICARE

## 2018-02-28 ENCOUNTER — APPOINTMENT (OUTPATIENT)
Dept: INTERNAL MEDICINE | Facility: CLINIC | Age: 73
End: 2018-02-28
Payer: MEDICARE

## 2018-02-28 VITALS — OXYGEN SATURATION: 94 % | SYSTOLIC BLOOD PRESSURE: 114 MMHG | HEART RATE: 87 BPM | DIASTOLIC BLOOD PRESSURE: 65 MMHG

## 2018-02-28 DIAGNOSIS — C50.919 MALIGNANT NEOPLASM OF UNSPECIFIED SITE OF UNSPECIFIED FEMALE BREAST: Chronic | ICD-10-CM

## 2018-02-28 DIAGNOSIS — Z90.49 ACQUIRED ABSENCE OF OTHER SPECIFIED PARTS OF DIGESTIVE TRACT: Chronic | ICD-10-CM

## 2018-02-28 DIAGNOSIS — Z98.890 OTHER SPECIFIED POSTPROCEDURAL STATES: Chronic | ICD-10-CM

## 2018-02-28 DIAGNOSIS — Z41.9 ENCOUNTER FOR PROCEDURE FOR PURPOSES OTHER THAN REMEDYING HEALTH STATE, UNSPECIFIED: Chronic | ICD-10-CM

## 2018-02-28 PROCEDURE — A9585: CPT

## 2018-02-28 PROCEDURE — 74183 MRI ABD W/O CNTR FLWD CNTR: CPT

## 2018-02-28 PROCEDURE — 99213 OFFICE O/P EST LOW 20 MIN: CPT

## 2018-02-28 PROCEDURE — 74183 MRI ABD W/O CNTR FLWD CNTR: CPT | Mod: 26

## 2018-03-02 ENCOUNTER — APPOINTMENT (OUTPATIENT)
Dept: MRI IMAGING | Facility: CLINIC | Age: 73
End: 2018-03-02

## 2018-03-05 ENCOUNTER — FORM ENCOUNTER (OUTPATIENT)
Age: 73
End: 2018-03-05

## 2018-03-06 ENCOUNTER — APPOINTMENT (OUTPATIENT)
Dept: CT IMAGING | Facility: HOSPITAL | Age: 73
End: 2018-03-06

## 2018-03-06 ENCOUNTER — OUTPATIENT (OUTPATIENT)
Dept: OUTPATIENT SERVICES | Facility: HOSPITAL | Age: 73
LOS: 1 days | End: 2018-03-06
Payer: MEDICARE

## 2018-03-06 DIAGNOSIS — C50.919 MALIGNANT NEOPLASM OF UNSPECIFIED SITE OF UNSPECIFIED FEMALE BREAST: Chronic | ICD-10-CM

## 2018-03-06 DIAGNOSIS — Z41.9 ENCOUNTER FOR PROCEDURE FOR PURPOSES OTHER THAN REMEDYING HEALTH STATE, UNSPECIFIED: Chronic | ICD-10-CM

## 2018-03-06 DIAGNOSIS — Z98.890 OTHER SPECIFIED POSTPROCEDURAL STATES: Chronic | ICD-10-CM

## 2018-03-06 DIAGNOSIS — Z90.49 ACQUIRED ABSENCE OF OTHER SPECIFIED PARTS OF DIGESTIVE TRACT: Chronic | ICD-10-CM

## 2018-03-06 PROCEDURE — 71260 CT THORAX DX C+: CPT

## 2018-03-06 PROCEDURE — 71260 CT THORAX DX C+: CPT | Mod: 26

## 2018-03-07 PROBLEM — Z86.39 HISTORY OF DEHYDRATION: Status: RESOLVED | Noted: 2018-02-06 | Resolved: 2018-03-07

## 2018-03-07 PROBLEM — Z87.898 HISTORY OF DIARRHEA: Status: RESOLVED | Noted: 2017-07-25 | Resolved: 2018-03-07

## 2018-03-07 PROBLEM — Z23 NEED FOR VACCINATION: Status: RESOLVED | Noted: 2017-10-18 | Resolved: 2018-03-07

## 2018-03-08 ENCOUNTER — APPOINTMENT (OUTPATIENT)
Dept: THORACIC SURGERY | Facility: CLINIC | Age: 73
End: 2018-03-08
Payer: MEDICARE

## 2018-03-08 VITALS
DIASTOLIC BLOOD PRESSURE: 58 MMHG | SYSTOLIC BLOOD PRESSURE: 120 MMHG | TEMPERATURE: 97.8 F | HEART RATE: 99 BPM | RESPIRATION RATE: 20 BRPM | OXYGEN SATURATION: 96 %

## 2018-03-08 DIAGNOSIS — D3A.012 BENIGN CARCINOID TUMOR OF THE ILEUM: ICD-10-CM

## 2018-03-08 DIAGNOSIS — Z86.39 PERSONAL HISTORY OF OTHER ENDOCRINE, NUTRITIONAL AND METABOLIC DISEASE: ICD-10-CM

## 2018-03-08 DIAGNOSIS — Z23 ENCOUNTER FOR IMMUNIZATION: ICD-10-CM

## 2018-03-08 DIAGNOSIS — Z87.09 PERSONAL HISTORY OF OTHER DISEASES OF THE RESPIRATORY SYSTEM: ICD-10-CM

## 2018-03-08 DIAGNOSIS — R10.11 RIGHT UPPER QUADRANT PAIN: ICD-10-CM

## 2018-03-08 DIAGNOSIS — C34.91 MALIGNANT NEOPLASM OF UNSPECIFIED PART OF RIGHT BRONCHUS OR LUNG: ICD-10-CM

## 2018-03-08 DIAGNOSIS — C34.30 MALIGNANT NEOPLASM OF LOWER LOBE, UNSPECIFIED BRONCHUS OR LUNG: ICD-10-CM

## 2018-03-08 DIAGNOSIS — Z87.898 PERSONAL HISTORY OF OTHER SPECIFIED CONDITIONS: ICD-10-CM

## 2018-03-08 PROCEDURE — 99215 OFFICE O/P EST HI 40 MIN: CPT

## 2018-03-08 RX ORDER — OMEPRAZOLE 40 MG/1
40 CAPSULE, DELAYED RELEASE ORAL
Refills: 0 | Status: DISCONTINUED | COMMUNITY
Start: 2017-06-21 | End: 2018-03-08

## 2018-03-08 RX ORDER — SODIUM BICARBONATE 325 MG/1
325 TABLET ORAL
Refills: 0 | Status: DISCONTINUED | COMMUNITY
Start: 2017-06-21 | End: 2018-03-08

## 2018-03-08 RX ORDER — OMEPRAZOLE 40 MG/1
40 CAPSULE, DELAYED RELEASE ORAL
Qty: 30 | Refills: 0 | Status: DISCONTINUED | COMMUNITY
Start: 2017-07-25 | End: 2018-03-08

## 2018-03-08 RX ORDER — ALPRAZOLAM 0.5 MG/1
0.5 TABLET ORAL 3 TIMES DAILY
Qty: 90 | Refills: 0 | Status: DISCONTINUED | COMMUNITY
Start: 2017-10-18 | End: 2018-03-08

## 2018-03-08 RX ORDER — RIBOCICLIB 200 MG/1
200 TABLET, FILM COATED ORAL
Refills: 0 | Status: DISCONTINUED | COMMUNITY
Start: 2017-06-21 | End: 2018-03-08

## 2018-03-08 RX ORDER — RIBOCICLIB 200 MG/1
200 TABLET, FILM COATED ORAL
Refills: 0 | Status: DISCONTINUED | COMMUNITY
End: 2018-03-08

## 2018-03-08 RX ORDER — CHOLESTYRAMINE 4 G/5.5G
4 POWDER, FOR SUSPENSION ORAL TWICE DAILY
Qty: 30 | Refills: 0 | Status: DISCONTINUED | COMMUNITY
Start: 2017-07-25 | End: 2018-03-08

## 2018-03-15 ENCOUNTER — OUTPATIENT (OUTPATIENT)
Dept: OUTPATIENT SERVICES | Facility: HOSPITAL | Age: 73
LOS: 1 days | End: 2018-03-15
Payer: MEDICARE

## 2018-03-15 DIAGNOSIS — C50.919 MALIGNANT NEOPLASM OF UNSPECIFIED SITE OF UNSPECIFIED FEMALE BREAST: Chronic | ICD-10-CM

## 2018-03-15 DIAGNOSIS — Z41.9 ENCOUNTER FOR PROCEDURE FOR PURPOSES OTHER THAN REMEDYING HEALTH STATE, UNSPECIFIED: Chronic | ICD-10-CM

## 2018-03-15 DIAGNOSIS — C34.90 MALIGNANT NEOPLASM OF UNSPECIFIED PART OF UNSPECIFIED BRONCHUS OR LUNG: ICD-10-CM

## 2018-03-15 DIAGNOSIS — Z98.890 OTHER SPECIFIED POSTPROCEDURAL STATES: Chronic | ICD-10-CM

## 2018-03-15 DIAGNOSIS — J44.0 CHRONIC OBSTRUCTIVE PULMONARY DISEASE WITH (ACUTE) LOWER RESPIRATORY INFECTION: ICD-10-CM

## 2018-03-15 DIAGNOSIS — Z90.49 ACQUIRED ABSENCE OF OTHER SPECIFIED PARTS OF DIGESTIVE TRACT: Chronic | ICD-10-CM

## 2018-03-15 PROCEDURE — 94729 DIFFUSING CAPACITY: CPT

## 2018-03-15 PROCEDURE — 94060 EVALUATION OF WHEEZING: CPT

## 2018-03-15 PROCEDURE — 94760 N-INVAS EAR/PLS OXIMETRY 1: CPT

## 2018-03-15 PROCEDURE — 94729 DIFFUSING CAPACITY: CPT | Mod: 26

## 2018-03-15 PROCEDURE — 94726 PLETHYSMOGRAPHY LUNG VOLUMES: CPT

## 2018-03-15 PROCEDURE — 94010 BREATHING CAPACITY TEST: CPT | Mod: 26

## 2018-03-15 PROCEDURE — 94726 PLETHYSMOGRAPHY LUNG VOLUMES: CPT | Mod: 26

## 2018-03-22 ENCOUNTER — APPOINTMENT (OUTPATIENT)
Dept: MRI IMAGING | Facility: HOSPITAL | Age: 73
End: 2018-03-22
Payer: MEDICARE

## 2018-03-22 ENCOUNTER — OUTPATIENT (OUTPATIENT)
Dept: OUTPATIENT SERVICES | Facility: HOSPITAL | Age: 73
LOS: 1 days | End: 2018-03-22
Payer: MEDICARE

## 2018-03-22 DIAGNOSIS — Z41.9 ENCOUNTER FOR PROCEDURE FOR PURPOSES OTHER THAN REMEDYING HEALTH STATE, UNSPECIFIED: Chronic | ICD-10-CM

## 2018-03-22 DIAGNOSIS — Z98.890 OTHER SPECIFIED POSTPROCEDURAL STATES: Chronic | ICD-10-CM

## 2018-03-22 DIAGNOSIS — C50.919 MALIGNANT NEOPLASM OF UNSPECIFIED SITE OF UNSPECIFIED FEMALE BREAST: Chronic | ICD-10-CM

## 2018-03-22 DIAGNOSIS — Z90.49 ACQUIRED ABSENCE OF OTHER SPECIFIED PARTS OF DIGESTIVE TRACT: Chronic | ICD-10-CM

## 2018-03-22 PROCEDURE — 70553 MRI BRAIN STEM W/O & W/DYE: CPT | Mod: 26

## 2018-03-22 PROCEDURE — A9585: CPT

## 2018-03-22 PROCEDURE — 70553 MRI BRAIN STEM W/O & W/DYE: CPT

## 2018-03-23 ENCOUNTER — APPOINTMENT (OUTPATIENT)
Dept: NEUROSURGERY | Facility: CLINIC | Age: 73
End: 2018-03-23
Payer: MEDICARE

## 2018-03-23 VITALS
HEIGHT: 62 IN | OXYGEN SATURATION: 96 % | DIASTOLIC BLOOD PRESSURE: 69 MMHG | RESPIRATION RATE: 18 BRPM | BODY MASS INDEX: 27.05 KG/M2 | SYSTOLIC BLOOD PRESSURE: 114 MMHG | HEART RATE: 81 BPM | TEMPERATURE: 97.8 F | WEIGHT: 147 LBS

## 2018-03-23 PROCEDURE — 99214 OFFICE O/P EST MOD 30 MIN: CPT

## 2018-04-03 ENCOUNTER — APPOINTMENT (OUTPATIENT)
Dept: INTERNAL MEDICINE | Facility: CLINIC | Age: 73
End: 2018-04-03
Payer: MEDICARE

## 2018-04-03 VITALS
HEART RATE: 89 BPM | DIASTOLIC BLOOD PRESSURE: 63 MMHG | SYSTOLIC BLOOD PRESSURE: 115 MMHG | BODY MASS INDEX: 27.05 KG/M2 | HEIGHT: 62 IN | OXYGEN SATURATION: 96 % | WEIGHT: 147 LBS

## 2018-04-03 PROCEDURE — 99213 OFFICE O/P EST LOW 20 MIN: CPT

## 2018-04-09 ENCOUNTER — APPOINTMENT (OUTPATIENT)
Dept: PULMONOLOGY | Facility: CLINIC | Age: 73
End: 2018-04-09
Payer: MEDICARE

## 2018-04-09 VITALS — HEART RATE: 72 BPM | SYSTOLIC BLOOD PRESSURE: 116 MMHG | OXYGEN SATURATION: 98 % | DIASTOLIC BLOOD PRESSURE: 62 MMHG

## 2018-04-09 DIAGNOSIS — R91.1 SOLITARY PULMONARY NODULE: ICD-10-CM

## 2018-04-09 PROCEDURE — 99215 OFFICE O/P EST HI 40 MIN: CPT

## 2018-04-24 ENCOUNTER — APPOINTMENT (OUTPATIENT)
Dept: VASCULAR SURGERY | Facility: CLINIC | Age: 73
End: 2018-04-24
Payer: MEDICARE

## 2018-04-24 VITALS — OXYGEN SATURATION: 96 % | HEART RATE: 88 BPM | DIASTOLIC BLOOD PRESSURE: 66 MMHG | SYSTOLIC BLOOD PRESSURE: 116 MMHG

## 2018-04-24 PROCEDURE — 99213 OFFICE O/P EST LOW 20 MIN: CPT | Mod: 25

## 2018-04-24 PROCEDURE — 93880 EXTRACRANIAL BILAT STUDY: CPT

## 2018-04-30 ENCOUNTER — APPOINTMENT (OUTPATIENT)
Dept: NEUROSURGERY | Facility: CLINIC | Age: 73
End: 2018-04-30
Payer: MEDICARE

## 2018-04-30 PROCEDURE — 99214 OFFICE O/P EST MOD 30 MIN: CPT

## 2018-05-01 ENCOUNTER — FORM ENCOUNTER (OUTPATIENT)
Age: 73
End: 2018-05-01

## 2018-05-01 ENCOUNTER — APPOINTMENT (OUTPATIENT)
Dept: INTERNAL MEDICINE | Facility: CLINIC | Age: 73
End: 2018-05-01
Payer: MEDICARE

## 2018-05-01 VITALS
HEIGHT: 62 IN | OXYGEN SATURATION: 96 % | DIASTOLIC BLOOD PRESSURE: 64 MMHG | SYSTOLIC BLOOD PRESSURE: 103 MMHG | HEART RATE: 92 BPM

## 2018-05-01 DIAGNOSIS — R21 RASH AND OTHER NONSPECIFIC SKIN ERUPTION: ICD-10-CM

## 2018-05-01 PROCEDURE — 99213 OFFICE O/P EST LOW 20 MIN: CPT

## 2018-05-02 ENCOUNTER — APPOINTMENT (OUTPATIENT)
Dept: NEUROSURGERY | Facility: CLINIC | Age: 73
End: 2018-05-02
Payer: MEDICARE

## 2018-05-02 ENCOUNTER — APPOINTMENT (OUTPATIENT)
Dept: CT IMAGING | Facility: HOSPITAL | Age: 73
End: 2018-05-02
Payer: MEDICARE

## 2018-05-02 ENCOUNTER — OUTPATIENT (OUTPATIENT)
Dept: OUTPATIENT SERVICES | Facility: HOSPITAL | Age: 73
LOS: 1 days | End: 2018-05-02
Payer: MEDICARE

## 2018-05-02 VITALS
HEIGHT: 62 IN | OXYGEN SATURATION: 98 % | RESPIRATION RATE: 14 BRPM | DIASTOLIC BLOOD PRESSURE: 57 MMHG | BODY MASS INDEX: 27.42 KG/M2 | WEIGHT: 149 LBS | HEART RATE: 83 BPM | SYSTOLIC BLOOD PRESSURE: 102 MMHG | TEMPERATURE: 97.3 F

## 2018-05-02 DIAGNOSIS — Z98.890 OTHER SPECIFIED POSTPROCEDURAL STATES: Chronic | ICD-10-CM

## 2018-05-02 DIAGNOSIS — Z41.9 ENCOUNTER FOR PROCEDURE FOR PURPOSES OTHER THAN REMEDYING HEALTH STATE, UNSPECIFIED: Chronic | ICD-10-CM

## 2018-05-02 DIAGNOSIS — C50.919 MALIGNANT NEOPLASM OF UNSPECIFIED SITE OF UNSPECIFIED FEMALE BREAST: Chronic | ICD-10-CM

## 2018-05-02 DIAGNOSIS — Z90.49 ACQUIRED ABSENCE OF OTHER SPECIFIED PARTS OF DIGESTIVE TRACT: Chronic | ICD-10-CM

## 2018-05-02 PROCEDURE — 70450 CT HEAD/BRAIN W/O DYE: CPT

## 2018-05-02 PROCEDURE — 70450 CT HEAD/BRAIN W/O DYE: CPT | Mod: 26

## 2018-05-02 PROCEDURE — 99213 OFFICE O/P EST LOW 20 MIN: CPT

## 2018-05-03 ENCOUNTER — RESULT CHARGE (OUTPATIENT)
Age: 73
End: 2018-05-03

## 2018-05-04 PROBLEM — Z01.818 PRE-OP EXAM: Status: ACTIVE | Noted: 2018-05-04

## 2018-05-07 ENCOUNTER — APPOINTMENT (OUTPATIENT)
Dept: INTERNAL MEDICINE | Facility: CLINIC | Age: 73
End: 2018-05-07
Payer: MEDICARE

## 2018-05-07 ENCOUNTER — APPOINTMENT (OUTPATIENT)
Dept: NEUROSURGERY | Facility: CLINIC | Age: 73
End: 2018-05-07

## 2018-05-07 ENCOUNTER — APPOINTMENT (OUTPATIENT)
Dept: RHEUMATOLOGY | Facility: CLINIC | Age: 73
End: 2018-05-07

## 2018-05-07 PROCEDURE — 36415 COLL VENOUS BLD VENIPUNCTURE: CPT

## 2018-05-08 RX ORDER — CEPHALEXIN 500 MG/1
500 TABLET ORAL
Qty: 14 | Refills: 0 | Status: DISCONTINUED | COMMUNITY
Start: 2018-04-30 | End: 2018-05-08

## 2018-05-09 VITALS
HEART RATE: 78 BPM | SYSTOLIC BLOOD PRESSURE: 116 MMHG | OXYGEN SATURATION: 97 % | HEIGHT: 63 IN | RESPIRATION RATE: 18 BRPM | TEMPERATURE: 98 F | DIASTOLIC BLOOD PRESSURE: 55 MMHG | WEIGHT: 151.9 LBS

## 2018-05-09 LAB
ALBUMIN SERPL ELPH-MCNC: 4.4 G/DL
ALP BLD-CCNC: 79 U/L
ALT SERPL-CCNC: 33 U/L
ANION GAP SERPL CALC-SCNC: 11 MMOL/L
APPEARANCE: CLEAR
APTT BLD: 33.4 SEC
AST SERPL-CCNC: 33 U/L
BASOPHILS # BLD AUTO: 0.04 K/UL
BASOPHILS NFR BLD AUTO: 0.7 %
BILIRUB SERPL-MCNC: 0.2 MG/DL
BILIRUBIN URINE: NEGATIVE
BLOOD URINE: NEGATIVE
BUN SERPL-MCNC: 19 MG/DL
CALCIUM SERPL-MCNC: 10.2 MG/DL
CHLORIDE SERPL-SCNC: 102 MMOL/L
CO2 SERPL-SCNC: 27 MMOL/L
COLOR: YELLOW
CREAT SERPL-MCNC: 0.78 MG/DL
EOSINOPHIL # BLD AUTO: 0.23 K/UL
EOSINOPHIL NFR BLD AUTO: 3.8 %
GLUCOSE QUALITATIVE U: NEGATIVE MG/DL
GLUCOSE SERPL-MCNC: 92 MG/DL
HCT VFR BLD CALC: 39.6 %
HGB BLD-MCNC: 12.1 G/DL
IMM GRANULOCYTES NFR BLD AUTO: 0.2 %
INR PPP: 0.84 RATIO
KETONES URINE: NEGATIVE
LEUKOCYTE ESTERASE URINE: NEGATIVE
LYMPHOCYTES # BLD AUTO: 2.05 K/UL
LYMPHOCYTES NFR BLD AUTO: 33.8 %
MAN DIFF?: NORMAL
MCHC RBC-ENTMCNC: 29.2 PG
MCHC RBC-ENTMCNC: 30.6 GM/DL
MCV RBC AUTO: 95.7 FL
MONOCYTES # BLD AUTO: 0.39 K/UL
MONOCYTES NFR BLD AUTO: 6.4 %
NEUTROPHILS # BLD AUTO: 3.34 K/UL
NEUTROPHILS NFR BLD AUTO: 55.1 %
NITRITE URINE: NEGATIVE
PH URINE: 5
PLATELET # BLD AUTO: 243 K/UL
POTASSIUM SERPL-SCNC: 4.5 MMOL/L
PROT SERPL-MCNC: 7.3 G/DL
PROTEIN URINE: NEGATIVE MG/DL
PT BLD: 9.5 SEC
RBC # BLD: 4.14 M/UL
RBC # FLD: 15.7 %
SODIUM SERPL-SCNC: 140 MMOL/L
SPECIFIC GRAVITY URINE: 1.02
UROBILINOGEN URINE: NEGATIVE MG/DL
WBC # FLD AUTO: 6.06 K/UL

## 2018-05-09 RX ORDER — ESCITALOPRAM OXALATE 10 MG/1
10 TABLET, FILM COATED ORAL
Qty: 0 | Refills: 0 | COMMUNITY

## 2018-05-09 NOTE — PATIENT PROFILE ADULT. - PSH
Breast cancer  left mastectomy  H/O right hemicolectomy    History of colon resection    History of craniotomy    Surgery, elective  Thoractomy with lung resection-  Right upper lobe lobectomy  Right  lower lobe wedge  Surgery, elective  Ovaries removed 1980"s  Surgery, elective  Left wrist Breast cancer  left mastectomy  H/O right hemicolectomy    History of colon resection    History of craniotomy    Surgery, elective  Thoractomy with lung resection-  Right upper lobe removed, left  lower lobe wedge  Surgery, elective  Ovaries removed 1980"s  Surgery, elective  Left wrist Breast cancer  left mastectomy  H/O right hemicolectomy    History of colon resection    History of craniotomy    Surgery, elective  Thoractomy with lung resection-  Right upper lobe removed, left  lower lobe wedge, right lower wedge  Surgery, elective  Ovaries removed 1980"s  Surgery, elective  Left wrist

## 2018-05-09 NOTE — PATIENT PROFILE ADULT. - PMH
Brain tumor    Carcinoid tumor    COPD (chronic obstructive pulmonary disease)    Liver cancer    Lung collapse  2x  Malignant neoplasm of bronchus and lung, unspecified site  RUL Lobectomy, RLL wedge resection  Malignant neoplasm of female breast  Left breast CA with liver mets Asthma    Brain tumor    Carcinoid tumor    COPD (chronic obstructive pulmonary disease)    GERD (gastroesophageal reflux disease)    Liver cancer    Lung collapse  2x  Malignant neoplasm of bronchus and lung, unspecified site  RUL Lobectomy, RLL wedge resection  Malignant neoplasm of female breast  Left breast CA with liver mets Asthma    Brain tumor    Carcinoid tumor  abdomen  COPD (chronic obstructive pulmonary disease)    GERD (gastroesophageal reflux disease)    Liver cancer  left breast cancer spread to liver and chest wall  Lung collapse  2x  Malignant neoplasm of bronchus and lung, unspecified site  RUL Lobectomy, RLL wedge resection  Malignant neoplasm of female breast  Left breast CA with liver mets

## 2018-05-10 ENCOUNTER — RESULT REVIEW (OUTPATIENT)
Age: 73
End: 2018-05-10

## 2018-05-10 ENCOUNTER — INPATIENT (INPATIENT)
Facility: HOSPITAL | Age: 73
LOS: 0 days | Discharge: HOME | DRG: 830 | End: 2018-05-11
Attending: NEUROLOGICAL SURGERY | Admitting: NEUROLOGICAL SURGERY
Payer: MEDICARE

## 2018-05-10 ENCOUNTER — APPOINTMENT (OUTPATIENT)
Dept: NEUROSURGERY | Facility: HOSPITAL | Age: 73
End: 2018-05-10

## 2018-05-10 DIAGNOSIS — Z98.890 OTHER SPECIFIED POSTPROCEDURAL STATES: Chronic | ICD-10-CM

## 2018-05-10 DIAGNOSIS — Z90.12 ACQUIRED ABSENCE OF LEFT BREAST AND NIPPLE: ICD-10-CM

## 2018-05-10 DIAGNOSIS — Z87.891 PERSONAL HISTORY OF NICOTINE DEPENDENCE: ICD-10-CM

## 2018-05-10 DIAGNOSIS — Z90.722 ACQUIRED ABSENCE OF OVARIES, BILATERAL: ICD-10-CM

## 2018-05-10 DIAGNOSIS — C78.7 SECONDARY MALIGNANT NEOPLASM OF LIVER AND INTRAHEPATIC BILE DUCT: ICD-10-CM

## 2018-05-10 DIAGNOSIS — Z41.9 ENCOUNTER FOR PROCEDURE FOR PURPOSES OTHER THAN REMEDYING HEALTH STATE, UNSPECIFIED: Chronic | ICD-10-CM

## 2018-05-10 DIAGNOSIS — Z88.1 ALLERGY STATUS TO OTHER ANTIBIOTIC AGENTS STATUS: ICD-10-CM

## 2018-05-10 DIAGNOSIS — Z92.3 PERSONAL HISTORY OF IRRADIATION: ICD-10-CM

## 2018-05-10 DIAGNOSIS — C79.89 SECONDARY MALIGNANT NEOPLASM OF OTHER SPECIFIED SITES: ICD-10-CM

## 2018-05-10 DIAGNOSIS — J44.9 CHRONIC OBSTRUCTIVE PULMONARY DISEASE, UNSPECIFIED: ICD-10-CM

## 2018-05-10 DIAGNOSIS — Z85.030 PERSONAL HISTORY OF MALIGNANT CARCINOID TUMOR OF LARGE INTESTINE: ICD-10-CM

## 2018-05-10 DIAGNOSIS — Z91.09 OTHER ALLERGY STATUS, OTHER THAN TO DRUGS AND BIOLOGICAL SUBSTANCES: ICD-10-CM

## 2018-05-10 DIAGNOSIS — Z01.818 ENCOUNTER FOR OTHER PREPROCEDURAL EXAMINATION: ICD-10-CM

## 2018-05-10 DIAGNOSIS — Z90.49 ACQUIRED ABSENCE OF OTHER SPECIFIED PARTS OF DIGESTIVE TRACT: ICD-10-CM

## 2018-05-10 DIAGNOSIS — Z90.2 ACQUIRED ABSENCE OF LUNG [PART OF]: ICD-10-CM

## 2018-05-10 DIAGNOSIS — Z90.49 ACQUIRED ABSENCE OF OTHER SPECIFIED PARTS OF DIGESTIVE TRACT: Chronic | ICD-10-CM

## 2018-05-10 DIAGNOSIS — Z85.841 PERSONAL HISTORY OF MALIGNANT NEOPLASM OF BRAIN: ICD-10-CM

## 2018-05-10 DIAGNOSIS — Z85.3 PERSONAL HISTORY OF MALIGNANT NEOPLASM OF BREAST: ICD-10-CM

## 2018-05-10 DIAGNOSIS — Z85.118 PERSONAL HISTORY OF OTHER MALIGNANT NEOPLASM OF BRONCHUS AND LUNG: ICD-10-CM

## 2018-05-10 DIAGNOSIS — K21.9 GASTRO-ESOPHAGEAL REFLUX DISEASE WITHOUT ESOPHAGITIS: ICD-10-CM

## 2018-05-10 DIAGNOSIS — C50.919 MALIGNANT NEOPLASM OF UNSPECIFIED SITE OF UNSPECIFIED FEMALE BREAST: Chronic | ICD-10-CM

## 2018-05-10 LAB
ANION GAP SERPL CALC-SCNC: 11 MMOL/L — SIGNIFICANT CHANGE UP (ref 5–17)
BASOPHILS NFR BLD AUTO: 0.2 % — SIGNIFICANT CHANGE UP (ref 0–2)
BUN SERPL-MCNC: 18 MG/DL — SIGNIFICANT CHANGE UP (ref 7–23)
CALCIUM SERPL-MCNC: 8.8 MG/DL — SIGNIFICANT CHANGE UP (ref 8.4–10.5)
CHLORIDE SERPL-SCNC: 102 MMOL/L — SIGNIFICANT CHANGE UP (ref 96–108)
CO2 SERPL-SCNC: 23 MMOL/L — SIGNIFICANT CHANGE UP (ref 22–31)
CREAT SERPL-MCNC: 0.71 MG/DL — SIGNIFICANT CHANGE UP (ref 0.5–1.3)
EOSINOPHIL NFR BLD AUTO: 0.3 % — SIGNIFICANT CHANGE UP (ref 0–6)
GLUCOSE BLDC GLUCOMTR-MCNC: 137 MG/DL — HIGH (ref 70–99)
GLUCOSE BLDC GLUCOMTR-MCNC: 188 MG/DL — HIGH (ref 70–99)
GLUCOSE SERPL-MCNC: 123 MG/DL — HIGH (ref 70–99)
HCT VFR BLD CALC: 34.7 % — SIGNIFICANT CHANGE UP (ref 34.5–45)
HGB BLD-MCNC: 11.6 G/DL — SIGNIFICANT CHANGE UP (ref 11.5–15.5)
LYMPHOCYTES # BLD AUTO: 15.3 % — SIGNIFICANT CHANGE UP (ref 13–44)
MAGNESIUM SERPL-MCNC: 1.8 MG/DL — SIGNIFICANT CHANGE UP (ref 1.6–2.6)
MCHC RBC-ENTMCNC: 29.2 PG — SIGNIFICANT CHANGE UP (ref 27–34)
MCHC RBC-ENTMCNC: 33.4 G/DL — SIGNIFICANT CHANGE UP (ref 32–36)
MCV RBC AUTO: 87.4 FL — SIGNIFICANT CHANGE UP (ref 80–100)
MONOCYTES NFR BLD AUTO: 1.3 % — LOW (ref 2–14)
NEUTROPHILS NFR BLD AUTO: 82.9 % — HIGH (ref 43–77)
PHOSPHATE SERPL-MCNC: 3.6 MG/DL — SIGNIFICANT CHANGE UP (ref 2.5–4.5)
PLATELET # BLD AUTO: 204 K/UL — SIGNIFICANT CHANGE UP (ref 150–400)
POTASSIUM SERPL-MCNC: 4.4 MMOL/L — SIGNIFICANT CHANGE UP (ref 3.5–5.3)
POTASSIUM SERPL-SCNC: 4.4 MMOL/L — SIGNIFICANT CHANGE UP (ref 3.5–5.3)
RBC # BLD: 3.97 M/UL — SIGNIFICANT CHANGE UP (ref 3.8–5.2)
RBC # FLD: 14.6 % — SIGNIFICANT CHANGE UP (ref 10.3–16.9)
SODIUM SERPL-SCNC: 136 MMOL/L — SIGNIFICANT CHANGE UP (ref 135–145)
WBC # BLD: 8.8 K/UL — SIGNIFICANT CHANGE UP (ref 3.8–10.5)
WBC # FLD AUTO: 8.8 K/UL — SIGNIFICANT CHANGE UP (ref 3.8–10.5)

## 2018-05-10 PROCEDURE — 61510 CRNEC TREPH EXC BRN TUM STTL: CPT

## 2018-05-10 PROCEDURE — 99291 CRITICAL CARE FIRST HOUR: CPT | Mod: 24

## 2018-05-10 PROCEDURE — 61781 SCAN PROC CRANIAL INTRA: CPT

## 2018-05-10 RX ORDER — ALBUTEROL 90 UG/1
2 AEROSOL, METERED ORAL EVERY 6 HOURS
Qty: 0 | Refills: 0 | Status: DISCONTINUED | OUTPATIENT
Start: 2018-05-10 | End: 2018-05-11

## 2018-05-10 RX ORDER — DOCUSATE SODIUM 100 MG
100 CAPSULE ORAL THREE TIMES A DAY
Qty: 0 | Refills: 0 | Status: DISCONTINUED | OUTPATIENT
Start: 2018-05-10 | End: 2018-05-11

## 2018-05-10 RX ORDER — ONDANSETRON 8 MG/1
4 TABLET, FILM COATED ORAL EVERY 6 HOURS
Qty: 0 | Refills: 0 | Status: DISCONTINUED | OUTPATIENT
Start: 2018-05-10 | End: 2018-05-11

## 2018-05-10 RX ORDER — SODIUM CHLORIDE 9 MG/ML
1000 INJECTION, SOLUTION INTRAVENOUS
Qty: 0 | Refills: 0 | Status: DISCONTINUED | OUTPATIENT
Start: 2018-05-10 | End: 2018-05-11

## 2018-05-10 RX ORDER — CEFAZOLIN SODIUM 1 G
2000 VIAL (EA) INJECTION EVERY 8 HOURS
Qty: 0 | Refills: 0 | Status: DISCONTINUED | OUTPATIENT
Start: 2018-05-10 | End: 2018-05-10

## 2018-05-10 RX ORDER — DEXTROSE 50 % IN WATER 50 %
12.5 SYRINGE (ML) INTRAVENOUS ONCE
Qty: 0 | Refills: 0 | Status: DISCONTINUED | OUTPATIENT
Start: 2018-05-10 | End: 2018-05-11

## 2018-05-10 RX ORDER — LORATADINE 10 MG/1
10 TABLET ORAL DAILY
Qty: 0 | Refills: 0 | Status: DISCONTINUED | OUTPATIENT
Start: 2018-05-10 | End: 2018-05-11

## 2018-05-10 RX ORDER — DEXTROSE 50 % IN WATER 50 %
25 SYRINGE (ML) INTRAVENOUS ONCE
Qty: 0 | Refills: 0 | Status: DISCONTINUED | OUTPATIENT
Start: 2018-05-10 | End: 2018-05-11

## 2018-05-10 RX ORDER — PANTOPRAZOLE SODIUM 20 MG/1
40 TABLET, DELAYED RELEASE ORAL
Qty: 0 | Refills: 0 | Status: DISCONTINUED | OUTPATIENT
Start: 2018-05-10 | End: 2018-05-11

## 2018-05-10 RX ORDER — OXYCODONE HYDROCHLORIDE 5 MG/1
5 TABLET ORAL EVERY 4 HOURS
Qty: 0 | Refills: 0 | Status: DISCONTINUED | OUTPATIENT
Start: 2018-05-10 | End: 2018-05-11

## 2018-05-10 RX ORDER — DEXTROSE MONOHYDRATE, SODIUM CHLORIDE, AND POTASSIUM CHLORIDE 50; .745; 4.5 G/1000ML; G/1000ML; G/1000ML
1000 INJECTION, SOLUTION INTRAVENOUS
Qty: 0 | Refills: 0 | Status: DISCONTINUED | OUTPATIENT
Start: 2018-05-10 | End: 2018-05-11

## 2018-05-10 RX ORDER — SENNA PLUS 8.6 MG/1
2 TABLET ORAL AT BEDTIME
Qty: 0 | Refills: 0 | Status: DISCONTINUED | OUTPATIENT
Start: 2018-05-10 | End: 2018-05-11

## 2018-05-10 RX ORDER — FOLIC ACID/VIT B COMPLEX AND C 400 MCG
10000 TABLET ORAL DAILY
Qty: 0 | Refills: 0 | Status: DISCONTINUED | OUTPATIENT
Start: 2018-05-10 | End: 2018-05-11

## 2018-05-10 RX ORDER — CETIRIZINE HYDROCHLORIDE 10 MG/1
2 TABLET ORAL
Qty: 0 | Refills: 0 | COMMUNITY

## 2018-05-10 RX ORDER — MONTELUKAST 4 MG/1
10 TABLET, CHEWABLE ORAL DAILY
Qty: 0 | Refills: 0 | Status: DISCONTINUED | OUTPATIENT
Start: 2018-05-10 | End: 2018-05-11

## 2018-05-10 RX ORDER — ALPRAZOLAM 0.25 MG
0.5 TABLET ORAL AT BEDTIME
Qty: 0 | Refills: 0 | Status: DISCONTINUED | OUTPATIENT
Start: 2018-05-10 | End: 2018-05-11

## 2018-05-10 RX ORDER — HYDROMORPHONE HYDROCHLORIDE 2 MG/ML
0.5 INJECTION INTRAMUSCULAR; INTRAVENOUS; SUBCUTANEOUS ONCE
Qty: 0 | Refills: 0 | Status: DISCONTINUED | OUTPATIENT
Start: 2018-05-10 | End: 2018-05-10

## 2018-05-10 RX ORDER — ESCITALOPRAM OXALATE 10 MG/1
20 TABLET, FILM COATED ORAL DAILY
Qty: 0 | Refills: 0 | Status: DISCONTINUED | OUTPATIENT
Start: 2018-05-10 | End: 2018-05-11

## 2018-05-10 RX ORDER — INSULIN LISPRO 100/ML
VIAL (ML) SUBCUTANEOUS
Qty: 0 | Refills: 0 | Status: DISCONTINUED | OUTPATIENT
Start: 2018-05-10 | End: 2018-05-11

## 2018-05-10 RX ORDER — DEXTROSE 50 % IN WATER 50 %
15 SYRINGE (ML) INTRAVENOUS ONCE
Qty: 0 | Refills: 0 | Status: DISCONTINUED | OUTPATIENT
Start: 2018-05-10 | End: 2018-05-11

## 2018-05-10 RX ORDER — LEVETIRACETAM 250 MG/1
500 TABLET, FILM COATED ORAL EVERY 12 HOURS
Qty: 0 | Refills: 0 | Status: DISCONTINUED | OUTPATIENT
Start: 2018-05-10 | End: 2018-05-11

## 2018-05-10 RX ORDER — GLUCAGON INJECTION, SOLUTION 0.5 MG/.1ML
1 INJECTION, SOLUTION SUBCUTANEOUS ONCE
Qty: 0 | Refills: 0 | Status: DISCONTINUED | OUTPATIENT
Start: 2018-05-10 | End: 2018-05-11

## 2018-05-10 RX ORDER — CEFAZOLIN SODIUM 1 G
2000 VIAL (EA) INJECTION EVERY 8 HOURS
Qty: 0 | Refills: 0 | Status: COMPLETED | OUTPATIENT
Start: 2018-05-10 | End: 2018-05-11

## 2018-05-10 RX ADMIN — OXYCODONE HYDROCHLORIDE 5 MILLIGRAM(S): 5 TABLET ORAL at 15:52

## 2018-05-10 RX ADMIN — Medication 2000 MILLIGRAM(S): at 17:30

## 2018-05-10 RX ADMIN — HYDROMORPHONE HYDROCHLORIDE 0.5 MILLIGRAM(S): 2 INJECTION INTRAMUSCULAR; INTRAVENOUS; SUBCUTANEOUS at 20:09

## 2018-05-10 RX ADMIN — Medication 1 TABLET(S): at 15:13

## 2018-05-10 RX ADMIN — OXYCODONE HYDROCHLORIDE 5 MILLIGRAM(S): 5 TABLET ORAL at 18:41

## 2018-05-10 RX ADMIN — LEVETIRACETAM 500 MILLIGRAM(S): 250 TABLET, FILM COATED ORAL at 15:09

## 2018-05-10 RX ADMIN — HYDROMORPHONE HYDROCHLORIDE 0.5 MILLIGRAM(S): 2 INJECTION INTRAMUSCULAR; INTRAVENOUS; SUBCUTANEOUS at 19:15

## 2018-05-10 RX ADMIN — Medication 2: at 22:27

## 2018-05-10 RX ADMIN — HYDROMORPHONE HYDROCHLORIDE 0.5 MILLIGRAM(S): 2 INJECTION INTRAMUSCULAR; INTRAVENOUS; SUBCUTANEOUS at 12:29

## 2018-05-10 RX ADMIN — DEXTROSE MONOHYDRATE, SODIUM CHLORIDE, AND POTASSIUM CHLORIDE 75 MILLILITER(S): 50; .745; 4.5 INJECTION, SOLUTION INTRAVENOUS at 13:32

## 2018-05-10 RX ADMIN — ESCITALOPRAM OXALATE 20 MILLIGRAM(S): 10 TABLET, FILM COATED ORAL at 14:58

## 2018-05-10 RX ADMIN — PANTOPRAZOLE SODIUM 40 MILLIGRAM(S): 20 TABLET, DELAYED RELEASE ORAL at 15:16

## 2018-05-10 RX ADMIN — HYDROMORPHONE HYDROCHLORIDE 0.5 MILLIGRAM(S): 2 INJECTION INTRAMUSCULAR; INTRAVENOUS; SUBCUTANEOUS at 12:00

## 2018-05-10 RX ADMIN — MONTELUKAST 10 MILLIGRAM(S): 4 TABLET, CHEWABLE ORAL at 15:19

## 2018-05-10 RX ADMIN — Medication 10000 UNIT(S): at 15:04

## 2018-05-10 NOTE — PROGRESS NOTE ADULT - SUBJECTIVE AND OBJECTIVE BOX
NEUROCRITICAL CARE PROGRESS NOTE    RAFY LO   MRN-[id number]  Summary:  /  HPI:  This patient is a 72-year old woman with a history of both breast and lung cancers. Last year she had a craniotomy for metastatic tumor w/ pathology c/w lung primary. She underwent postoperative radiation w/ Dr. Owens, which was finished in late May 2017. She presented with tenderness over her incision and pain from her cranial hardware. She presents today for revision of her incision/cranioplasty. She is intact neurologically.    The last note from Dr. Muniz's office is pasted below.     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 Owens, completed 5/22/17.      She was seen on 4/30/18 and reported an area of tenderness to palpation near her incision. She was started on keflex. After starting keflex she developed sore throat and headache. No fever or chills. No shortness of breath or rash.     She reported today that she forgot to mention hitting her head near incision after falling in [place] in December 2017. (10 May 2018 07:53    Vital Signs Last 24 Hrs  T(C): 36.6 (10 May 2018 13:00), Max: 36.6 (10 May 2018 13:00)  T(F): 97.9 (10 May 2018 13:00), Max: 97.9 (10 May 2018 13:00)  HR: 70 (10 May 2018 14:01) (70 - 86)  BP: 90/43 (10 May 2018 14:01) (81/38 - 110/47)  BP(mean): 57 (10 May 2018 14:01) (52 - 71)  RR: 8 (10 May 2018 14:01) (8 - 33)  SpO2: 98% (10 May 2018 14:01) (95% - 100%)  I&O's Detail    10 May 2018 07:01  -  10 May 2018 14:36  --------------------------------------------------------  IN:    sodium chloride 0.9% with potassium chloride 20 mEq/L: 225 mL  Total IN: 225 mL    OUT:    Indwelling Catheter - Urethral: 325 mL  Total OUT: 325 mL    Total NET: -100 mL    LABS:                        11.6   8.8   )-----------( 204      ( 10 May 2018 11:46 )             34.7     05-10    136  |  102  |  18  ----------------------------<  123<H>  4.4   |  23  |  0.71    Ca    8.8      10 May 2018 11:08  Phos  3.6     05-10  Mg     1.8     05-10    CAPILLARY BLOOD GLUCOSE    Drug Levels: [] N/A    CSF Analysis: [] N/A    Allergies    adhesives (Rash)  No Known Drug Allergies  Originally Entered as [Unknown] reaction to [Other][Tape] (Unknown)    Intolerances    MEDICATIONS:  Antibiotics:  ceFAZolin  Injectable. 2000 milliGRAM(s) IV Push every 8 hours    Neuro:  ALPRAZolam 0.5 milliGRAM(s) Oral at bedtime PRN  escitalopram 20 milliGRAM(s) Oral daily  levETIRAcetam 500 milliGRAM(s) Oral every 12 hours  ondansetron Injectable 4 milliGRAM(s) IV Push every 6 hours PRN  oxyCODONE    IR 5 milliGRAM(s) Oral every 4 hours PRN    Anticoagulation:    OTHER:  ALBUTerol    90 MICROgram(s) HFA Inhaler 2 Puff(s) Inhalation every 6 hours PRN  dextrose 40% Gel 15 Gram(s) Oral once PRN  dextrose 50% Injectable 12.5 Gram(s) IV Push once  dextrose 50% Injectable 25 Gram(s) IV Push once  dextrose 50% Injectable 25 Gram(s) IV Push once  docusate sodium 100 milliGRAM(s) Oral three times a day  glucagon  Injectable 1 milliGRAM(s) IntraMuscular once PRN  insulin lispro (HumaLOG) corrective regimen sliding scale   SubCutaneous Before meals and at bedtime  loratadine 10 milliGRAM(s) Oral daily  montelukast 10 milliGRAM(s) Oral daily  pantoprazole    Tablet 40 milliGRAM(s) Oral before breakfast  senna 2 Tablet(s) Oral at bedtime    IVF:  dextrose 5%. 1000 milliLiter(s) IV Continuous <Continuous>  multivitamin 1 Tablet(s) Oral daily  sodium chloride 0.9% with potassium chloride 20 mEq/L 1000 milliLiter(s) IV Continuous <Continuous>  vitamin A 87187 Unit(s) Oral daily    CULTURES:    RADIOLOGY & ADDITIONAL TESTS:    Physical exam  Awake, alert, oriented x 3, PERRL, EOMI  Follows commands, speech clear  RIOS X4 with good strength   Incision: C/D/I  neg drift  motor 5/5 throughout  sensation itnact b/l  radial a line  zepeda

## 2018-05-10 NOTE — H&P PST ADULT - PMH
Asthma    Brain tumor    Carcinoid tumor  abdomen  COPD (chronic obstructive pulmonary disease)    GERD (gastroesophageal reflux disease)    Liver cancer  left breast cancer spread to liver and chest wall  Lung collapse  2x  Malignant neoplasm of bronchus and lung, unspecified site  RUL Lobectomy, RLL wedge resection  Malignant neoplasm of female breast  Left breast CA with liver mets

## 2018-05-10 NOTE — PROGRESS NOTE ADULT - ASSESSMENT
73 y/o female history of both breast and lung cancers s/p craniotomy and resection of brain tumor.   q1h neuro checks  pain control  xanax prn  tylenol prn moderate  dilaudid prn break through  enc IS use, prn asthma home meds  keppra ppx  NS hydration  f/u post op labs  trx to ICU   SCDs  post op ancef  remove A line, and katelyn    I spent 45 minutes of critical care time examining patient, reviewing vitals, labs, medications, imaging and discussing with the team goals of care to prevent life-threatening in this patient who is at high risk for neurological deterioration or death due to:  cerebral edema

## 2018-05-10 NOTE — H&P PST ADULT - HISTORY OF PRESENT ILLNESS
This patient is a 72-year old woman with a history of both breast and lung cancers. Last year she had a craniotomy for metastatic tumor w/ pathology c/w lung primary. She underwent postoperative radiation w/ Dr. Owens, which was finished in late May 2017. She presented with tenderness over her incision and pain from her cranial hardware. She presents today for revision of her incision/cranioplasty. She is intact neurologically.    The last note from Dr. Muniz's office is pasted below.     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 Owens, completed 5/22/17.      She was seen on 4/30/18 and reported an area of tenderness to palpation near her incision. She was started on keflex. After starting keflex she developed sore throat and headache. No fever or chills. No shortness of breath or rash.     She reported today that she forgot to mention hitting her head near incision after falling in [place] in December 2017.

## 2018-05-10 NOTE — PROGRESS NOTE ADULT - SUBJECTIVE AND OBJECTIVE BOX
Post op Note    Dx: brain tumor    72y    Female   s/p  Exploration of right cranioplasty with excision of right scalp mass Frozen consistent w/ adenocarcinoma today. Patient extubated and taken to PACU post op pending ICU bed availability Patient reports moderate headache currently. Denies any nausea vomiting, dizziness, or difficulty breathing. Denies any new weakness numbness or tingling.           T(C): 36.3 (05-10-18 @ 10:56), Max: 36.3 (05-10-18 @ 10:56)  HR: 78 (05-10-18 @ 11:30) (74 - 78)  BP: 102/41 (05-10-18 @ 11:30) (96/44 - 108/47)  RR: 11 (05-10-18 @ 11:30) (11 - 29)  SpO2: 98% (05-10-18 @ 11:30) (97% - 100%)  Wt(kg): --      Physical exam  Awake, alert, oriented x 3, PERRL, EOMI  Follows commands, speech clear  RIOS X4 with good strength   Incision: C/D/I  neg drift  motor 5/5 throughout  sensation itnact b/l  rt radial a line  +ve katelyn

## 2018-05-10 NOTE — H&P PST ADULT - PSH
Breast cancer  left mastectomy  H/O right hemicolectomy    History of colon resection    History of craniotomy    Surgery, elective  Thoractomy with lung resection-  Right upper lobe removed, left  lower lobe wedge, right lower wedge  Surgery, elective  Ovaries removed 1980"s  Surgery, elective  Left wrist

## 2018-05-10 NOTE — BRIEF OPERATIVE NOTE - PROCEDURE
<<-----Click on this checkbox to enter Procedure Exploration and closure of wound  05/10/2018    Active  [email or web address]  Excision of mass of soft tissue of scalp  05/10/2018    Active  [email or web address]

## 2018-05-10 NOTE — BRIEF OPERATIVE NOTE - OPERATION/FINDINGS
Exploration of right cranioplasty with excision of right scalp mass  Frozen consistent w/ adenocarcinoma

## 2018-05-10 NOTE — PROGRESS NOTE ADULT - ASSESSMENT
71 y/o female history of both breast and lung cancers s/p craniotomy and resection of brain tumor.   q1h neuro checks  pain control  xanax prn  tylenol prn moderate  dilaudid prn break through  enc IS use, prn asthma home meds  keppra ppx  NS hydration  f/u post op labs  trx to ICU   SCDs  post op ancef  d/w Dr. Muniz and ICU house staff

## 2018-05-11 ENCOUNTER — TRANSCRIPTION ENCOUNTER (OUTPATIENT)
Age: 73
End: 2018-05-11

## 2018-05-11 VITALS — TEMPERATURE: 99 F

## 2018-05-11 LAB
ANION GAP SERPL CALC-SCNC: 11 MMOL/L — SIGNIFICANT CHANGE UP (ref 5–17)
BUN SERPL-MCNC: 14 MG/DL — SIGNIFICANT CHANGE UP (ref 7–23)
CALCIUM SERPL-MCNC: 8.5 MG/DL — SIGNIFICANT CHANGE UP (ref 8.4–10.5)
CHLORIDE SERPL-SCNC: 103 MMOL/L — SIGNIFICANT CHANGE UP (ref 96–108)
CO2 SERPL-SCNC: 25 MMOL/L — SIGNIFICANT CHANGE UP (ref 22–31)
CREAT SERPL-MCNC: 0.63 MG/DL — SIGNIFICANT CHANGE UP (ref 0.5–1.3)
GLUCOSE BLDC GLUCOMTR-MCNC: 105 MG/DL — HIGH (ref 70–99)
GLUCOSE BLDC GLUCOMTR-MCNC: 125 MG/DL — HIGH (ref 70–99)
GLUCOSE SERPL-MCNC: 116 MG/DL — HIGH (ref 70–99)
HBA1C BLD-MCNC: 5.5 % — SIGNIFICANT CHANGE UP (ref 4–5.6)
HCT VFR BLD CALC: 32.7 % — LOW (ref 34.5–45)
HGB BLD-MCNC: 10.7 G/DL — LOW (ref 11.5–15.5)
MAGNESIUM SERPL-MCNC: 1.8 MG/DL — SIGNIFICANT CHANGE UP (ref 1.6–2.6)
MCHC RBC-ENTMCNC: 29.2 PG — SIGNIFICANT CHANGE UP (ref 27–34)
MCHC RBC-ENTMCNC: 32.7 G/DL — SIGNIFICANT CHANGE UP (ref 32–36)
MCV RBC AUTO: 89.3 FL — SIGNIFICANT CHANGE UP (ref 80–100)
PHOSPHATE SERPL-MCNC: 3.1 MG/DL — SIGNIFICANT CHANGE UP (ref 2.5–4.5)
PLATELET # BLD AUTO: 206 K/UL — SIGNIFICANT CHANGE UP (ref 150–400)
POTASSIUM SERPL-MCNC: 4.1 MMOL/L — SIGNIFICANT CHANGE UP (ref 3.5–5.3)
POTASSIUM SERPL-SCNC: 4.1 MMOL/L — SIGNIFICANT CHANGE UP (ref 3.5–5.3)
RBC # BLD: 3.66 M/UL — LOW (ref 3.8–5.2)
RBC # FLD: 14.4 % — SIGNIFICANT CHANGE UP (ref 10.3–16.9)
SODIUM SERPL-SCNC: 139 MMOL/L — SIGNIFICANT CHANGE UP (ref 135–145)
WBC # BLD: 12.6 K/UL — HIGH (ref 3.8–10.5)
WBC # FLD AUTO: 12.6 K/UL — HIGH (ref 3.8–10.5)

## 2018-05-11 PROCEDURE — 70553 MRI BRAIN STEM W/O & W/DYE: CPT | Mod: 26

## 2018-05-11 PROCEDURE — 93970 EXTREMITY STUDY: CPT | Mod: 26

## 2018-05-11 PROCEDURE — 99233 SBSQ HOSP IP/OBS HIGH 50: CPT | Mod: 24

## 2018-05-11 RX ORDER — MAGNESIUM SULFATE 500 MG/ML
2 VIAL (ML) INJECTION ONCE
Qty: 0 | Refills: 0 | Status: COMPLETED | OUTPATIENT
Start: 2018-05-11 | End: 2018-05-11

## 2018-05-11 RX ORDER — HYDROMORPHONE HYDROCHLORIDE 2 MG/ML
1 INJECTION INTRAMUSCULAR; INTRAVENOUS; SUBCUTANEOUS EVERY 4 HOURS
Qty: 0 | Refills: 0 | Status: DISCONTINUED | OUTPATIENT
Start: 2018-05-11 | End: 2018-05-11

## 2018-05-11 RX ORDER — HYDROMORPHONE HYDROCHLORIDE 2 MG/ML
1 INJECTION INTRAMUSCULAR; INTRAVENOUS; SUBCUTANEOUS
Qty: 84 | Refills: 0 | OUTPATIENT
Start: 2018-05-11 | End: 2018-05-24

## 2018-05-11 RX ORDER — ASPIRIN/CALCIUM CARB/MAGNESIUM 324 MG
1 TABLET ORAL
Qty: 0 | Refills: 0 | COMMUNITY

## 2018-05-11 RX ORDER — SENNA PLUS 8.6 MG/1
2 TABLET ORAL
Qty: 28 | Refills: 0 | OUTPATIENT
Start: 2018-05-11 | End: 2018-05-24

## 2018-05-11 RX ORDER — HYDROMORPHONE HYDROCHLORIDE 2 MG/ML
0.25 INJECTION INTRAMUSCULAR; INTRAVENOUS; SUBCUTANEOUS ONCE
Qty: 0 | Refills: 0 | Status: DISCONTINUED | OUTPATIENT
Start: 2018-05-11 | End: 2018-05-11

## 2018-05-11 RX ORDER — ACETAMINOPHEN 500 MG
1000 TABLET ORAL ONCE
Qty: 0 | Refills: 0 | Status: COMPLETED | OUTPATIENT
Start: 2018-05-11 | End: 2018-05-11

## 2018-05-11 RX ORDER — SODIUM CHLORIDE 9 MG/ML
500 INJECTION INTRAMUSCULAR; INTRAVENOUS; SUBCUTANEOUS ONCE
Qty: 0 | Refills: 0 | Status: COMPLETED | OUTPATIENT
Start: 2018-05-11 | End: 2018-05-11

## 2018-05-11 RX ORDER — LEVETIRACETAM 250 MG/1
1 TABLET, FILM COATED ORAL
Qty: 28 | Refills: 0 | OUTPATIENT
Start: 2018-05-11 | End: 2018-05-24

## 2018-05-11 RX ORDER — SODIUM CHLORIDE 9 MG/ML
1000 INJECTION INTRAMUSCULAR; INTRAVENOUS; SUBCUTANEOUS
Qty: 0 | Refills: 0 | Status: DISCONTINUED | OUTPATIENT
Start: 2018-05-11 | End: 2018-05-11

## 2018-05-11 RX ADMIN — Medication 10000 UNIT(S): at 14:13

## 2018-05-11 RX ADMIN — PANTOPRAZOLE SODIUM 40 MILLIGRAM(S): 20 TABLET, DELAYED RELEASE ORAL at 06:48

## 2018-05-11 RX ADMIN — Medication 0.5 MILLIGRAM(S): at 01:32

## 2018-05-11 RX ADMIN — Medication 400 MILLIGRAM(S): at 13:07

## 2018-05-11 RX ADMIN — HYDROMORPHONE HYDROCHLORIDE 1 MILLIGRAM(S): 2 INJECTION INTRAMUSCULAR; INTRAVENOUS; SUBCUTANEOUS at 01:30

## 2018-05-11 RX ADMIN — ESCITALOPRAM OXALATE 20 MILLIGRAM(S): 10 TABLET, FILM COATED ORAL at 14:13

## 2018-05-11 RX ADMIN — HYDROMORPHONE HYDROCHLORIDE 1 MILLIGRAM(S): 2 INJECTION INTRAMUSCULAR; INTRAVENOUS; SUBCUTANEOUS at 05:59

## 2018-05-11 RX ADMIN — SODIUM CHLORIDE 1000 MILLILITER(S): 9 INJECTION INTRAMUSCULAR; INTRAVENOUS; SUBCUTANEOUS at 05:53

## 2018-05-11 RX ADMIN — LEVETIRACETAM 500 MILLIGRAM(S): 250 TABLET, FILM COATED ORAL at 05:48

## 2018-05-11 RX ADMIN — MONTELUKAST 10 MILLIGRAM(S): 4 TABLET, CHEWABLE ORAL at 14:12

## 2018-05-11 RX ADMIN — HYDROMORPHONE HYDROCHLORIDE 1 MILLIGRAM(S): 2 INJECTION INTRAMUSCULAR; INTRAVENOUS; SUBCUTANEOUS at 06:47

## 2018-05-11 RX ADMIN — Medication 50 GRAM(S): at 14:12

## 2018-05-11 RX ADMIN — HYDROMORPHONE HYDROCHLORIDE 1 MILLIGRAM(S): 2 INJECTION INTRAMUSCULAR; INTRAVENOUS; SUBCUTANEOUS at 01:54

## 2018-05-11 RX ADMIN — Medication 2000 MILLIGRAM(S): at 02:00

## 2018-05-11 RX ADMIN — Medication 1000 MILLIGRAM(S): at 14:06

## 2018-05-11 RX ADMIN — Medication 1 TABLET(S): at 14:12

## 2018-05-11 NOTE — PROGRESS NOTE ADULT - ASSESSMENT
73 y/o female history of both breast and lung cancers s/p craniotomy and resection of brain tumor.   q 4h neuro checks  pain control  xanax prn  tylenol prn moderate  dilaudid prn break through  enc IS use, prn asthma home meds  SCDs  remove A line, and zepeda  MRI brain  home today

## 2018-05-11 NOTE — DISCHARGE NOTE ADULT - NS AS ACTIVITY OBS
Walking-Outdoors allowed/Do not drive or operate machinery/Stairs allowed/Showering allowed/Walking-Indoors allowed/No Heavy lifting/straining/Do not make important decisions

## 2018-05-11 NOTE — OCCUPATIONAL THERAPY INITIAL EVALUATION ADULT - MD ORDER
Per chart, 72-year old woman with a history of both breast and lung cancers. Last year she had a craniotomy for metastatic tumor w/ pathology c/w lung primary. She underwent postoperative radiation. She presented with tenderness over her incision and pain from her cranial hardware after hitting her head near incision after falling in [place] in December 2017. She presents today for revision of her incision/cranioplasty. She is intact neurologically.

## 2018-05-11 NOTE — DISCHARGE NOTE ADULT - CARE PROVIDER_API CALL
Nishant Muniz), Neurological Surgery  [address], [place] [address]  Phone: [phone]  Fax: [phone]

## 2018-05-11 NOTE — PHYSICAL THERAPY INITIAL EVALUATION ADULT - CRITERIA FOR SKILLED THERAPEUTIC INTERVENTIONS
anticipated discharge recommendation/functional limitations in following categories/impairments found/rehab potential/therapy frequency

## 2018-05-11 NOTE — OCCUPATIONAL THERAPY INITIAL EVALUATION ADULT - STANDING BALANCE: DYNAMIC, REHAB EVAL
Patient ambulating with supervision, +1 loss of balance (LLE buckling), states this is baseline following meniscus repair/fair balance

## 2018-05-11 NOTE — PHYSICAL THERAPY INITIAL EVALUATION ADULT - ADDITIONAL COMMENTS
Patient lives in elevator apartment building with a couple steps to enter, is left handed and wears reading glasses. Patient  has shower chair that she does not use.    Tracks with smooth pursuit and vision intact with slight double vision that patient states is her baseline with superior fields.

## 2018-05-11 NOTE — DISCHARGE NOTE ADULT - ADDITIONAL INSTRUCTIONS
B. ACTIVITY LEVEL  1. Fatigue is common following brain surgery, rest if you are tired.  2. You should get up and walk around every hour during the daytime.  3. No bending, lifting or twisting for the first 3 weeks, but walking is  recommended.  4. Drink plenty of water, stay out of the sun.  You may be given a narcotic pain reliever such as Percocet  (oxycodone-acetaminophen). This is for short term use. Avoid use of alcohol when taking these meds.

## 2018-05-11 NOTE — OCCUPATIONAL THERAPY INITIAL EVALUATION ADULT - PERTINENT HX OF CURRENT PROBLEM, REHAB EVAL
Patient underwent exploration of right cranioplasty with excision of right scalp mass on 05/10/2018 .

## 2018-05-11 NOTE — PROGRESS NOTE ADULT - SUBJECTIVE AND OBJECTIVE BOX
S:  72y    Female   s/p  Exploration of right cranioplasty with excision of right scalp mass Frozen consistent w/ adenocarcinoma today. Patient extubated and taken to PACU post op pending ICU bed availability Patient reports moderate headache currently. Denies any nausea vomiting, dizziness, or difficulty breathing. Denies any new weakness numbness or tingling.      Hospital Course:   POD#0: s/p exploration of right cranioplasty with excision of right scalp mass, frozen consistent with adenocarcinoma  POD#1: remains boarded in PACU overnight. Bolused with fluids overnight due to low SBP and UOP with good response. Up for stepdown today, pending MRI      Vital Signs Last 24 Hrs  T(C): 36 (11 May 2018 05:04), Max: 36.6 (10 May 2018 13:00)  T(F): 96.8 (11 May 2018 05:04), Max: 97.9 (10 May 2018 13:00)  HR: 84 (11 May 2018 08:00) (70 - 94)  BP: 80/40 (11 May 2018 08:00) (80/40 - 111/54)  BP(mean): 50 (11 May 2018 08:00) (48 - 77)  RR: 12 (11 May 2018 08:00) (8 - 34)  SpO2: 96% (11 May 2018 08:00) (91% - 100%)    I&O's Detail    10 May 2018 07:01  -  11 May 2018 07:00  --------------------------------------------------------  IN:    Oral Fluid: 340 mL    sodium chloride 0.9%: 725 mL    sodium chloride 0.9% with potassium chloride 20 mEq/L: 1200 mL  Total IN: 2265 mL    OUT:    Indwelling Catheter - Urethral: 2070 mL  Total OUT: 2070 mL    Total NET: 195 mL      11 May 2018 07:01  -  11 May 2018 09:01  --------------------------------------------------------  IN:    sodium chloride 0.9%: 75 mL  Total IN: 75 mL    OUT:    Indwelling Catheter - Urethral: 60 mL  Total OUT: 60 mL    Total NET: 15 mL        I&O's Summary    10 May 2018 07:01  -  11 May 2018 07:00  --------------------------------------------------------  IN: 2265 mL / OUT: 2070 mL / NET: 195 mL    11 May 2018 07:01  -  11 May 2018 09:01  --------------------------------------------------------  IN: 75 mL / OUT: 60 mL / NET: 15 mL        PHYSICAL EXAM:  Neurological:  AAOx3, NAD, coherent speech, FC     DEVICE/DRAIN DRESSING:    TUBES/LINES:  [] CVC  [] A-line  [] Lumbar Drain  [] Ventriculostomy  [] Other    DIET:  [] NPO  [] Mechanical  [] Tube feeds    LABS:                        10.7   12.6  )-----------( 206      ( 11 May 2018 05:06 )             32.7     05-11    139  |  103  |  14  ----------------------------<  116<H>  4.1   |  25  |  0.63    Ca    8.5      11 May 2018 05:05  Phos  3.1     05-11  Mg     1.8     05-11              CAPILLARY BLOOD GLUCOSE      POCT Blood Glucose.: 125 mg/dL (11 May 2018 06:52)  POCT Blood Glucose.: 188 mg/dL (10 May 2018 22:19)  POCT Blood Glucose.: 137 mg/dL (10 May 2018 16:36)      Drug Levels: [] N/A    CSF Analysis: [] N/A      Allergies    adhesives (Rash)  No Known Drug Allergies  Originally Entered as [Unknown] reaction to [Other][Tape] (Unknown)    Intolerances      MEDICATIONS:  Antibiotics:    Neuro:  ALPRAZolam 0.5 milliGRAM(s) Oral at bedtime PRN  escitalopram 20 milliGRAM(s) Oral daily  HYDROmorphone  Injectable 1 milliGRAM(s) IV Push every 4 hours PRN  levETIRAcetam 500 milliGRAM(s) Oral every 12 hours  ondansetron Injectable 4 milliGRAM(s) IV Push every 6 hours PRN  oxyCODONE    IR 5 milliGRAM(s) Oral every 4 hours PRN    Anticoagulation:    OTHER:  ALBUTerol    90 MICROgram(s) HFA Inhaler 2 Puff(s) Inhalation every 6 hours PRN  dextrose 40% Gel 15 Gram(s) Oral once PRN  dextrose 50% Injectable 12.5 Gram(s) IV Push once  dextrose 50% Injectable 25 Gram(s) IV Push once  dextrose 50% Injectable 25 Gram(s) IV Push once  docusate sodium 100 milliGRAM(s) Oral three times a day  glucagon  Injectable 1 milliGRAM(s) IntraMuscular once PRN  insulin lispro (HumaLOG) corrective regimen sliding scale   SubCutaneous Before meals and at bedtime  loratadine 10 milliGRAM(s) Oral daily  montelukast 10 milliGRAM(s) Oral daily  pantoprazole    Tablet 40 milliGRAM(s) Oral before breakfast  senna 2 Tablet(s) Oral at bedtime    IVF:  dextrose 5%. 1000 milliLiter(s) IV Continuous <Continuous>  magnesium sulfate  IVPB 2 Gram(s) IV Intermittent once  multivitamin 1 Tablet(s) Oral daily  vitamin A 95888 Unit(s) Oral daily    CULTURES:    RADIOLOGY & ADDITIONAL TESTS:      ASSESSMENT:  72y Female s/p    BRAIN VPRBBP85.9  BRAIN TUMOR  No h/o HF  No pertinent family history in first degree relatives  Handoff  Asthma  GERD (gastroesophageal reflux disease)  Lung collapse  COPD (chronic obstructive pulmonary disease)  Brain tumor  Liver cancer  Carcinoid tumor  Malignant neoplasm of female breast  Malignant neoplasm of bronchus and lung, unspecified site  Scalp mass  Scalp mass  Excision of mass of soft tissue of scalp  Exploration and closure of wound  H/O right hemicolectomy  History of craniotomy  History of colon resection  Surgery, elective  Surgery, elective  Surgery, elective  Cancer of liver  Breast cancer      PLAN:  NEURO:    CARDIOVASCULAR:    PULMONARY:    RENAL:    GI:    HEME:    ID:    ENDO:    DVT PROPHYLAXIS:  [] Venodynes                                [] Heparin/Lovenox    FALL RISK:  [] Low Risk                                    [] Impulsive    DISPOSITION: S:  72y    Female   s/p  Exploration of right cranioplasty with excision of right scalp mass Frozen consistent w/ adenocarcinoma today. Patient extubated and taken to PACU post op pending ICU bed availability Patient reports moderate headache currently. Denies any nausea vomiting, dizziness, or difficulty breathing. Denies any new weakness numbness or tingling.      Hospital Course:   POD#0: s/p exploration of right cranioplasty with excision of right scalp mass, frozen consistent with adenocarcinoma  POD#1: remains boarded in PACU overnight. Bolused with fluids overnight due to low SBP and UOP with good response. Up for stepdown today, pending MRI      Vital Signs Last 24 Hrs  T(C): 36 (11 May 2018 05:04), Max: 36.6 (10 May 2018 13:00)  T(F): 96.8 (11 May 2018 05:04), Max: 97.9 (10 May 2018 13:00)  HR: 84 (11 May 2018 08:00) (70 - 94)  BP: 80/40 (11 May 2018 08:00) (80/40 - 111/54)  BP(mean): 50 (11 May 2018 08:00) (48 - 77)  RR: 12 (11 May 2018 08:00) (8 - 34)  SpO2: 96% (11 May 2018 08:00) (91% - 100%)    I&O's Detail    10 May 2018 07:01  -  11 May 2018 07:00  --------------------------------------------------------  IN:    Oral Fluid: 340 mL    sodium chloride 0.9%: 725 mL    sodium chloride 0.9% with potassium chloride 20 mEq/L: 1200 mL  Total IN: 2265 mL    OUT:    Indwelling Catheter - Urethral: 2070 mL  Total OUT: 2070 mL    Total NET: 195 mL      11 May 2018 07:01  -  11 May 2018 09:01  --------------------------------------------------------  IN:    sodium chloride 0.9%: 75 mL  Total IN: 75 mL    OUT:    Indwelling Catheter - Urethral: 60 mL  Total OUT: 60 mL    Total NET: 15 mL        I&O's Summary    10 May 2018 07:01  -  11 May 2018 07:00  --------------------------------------------------------  IN: 2265 mL / OUT: 2070 mL / NET: 195 mL    11 May 2018 07:01  -  11 May 2018 09:01  --------------------------------------------------------  IN: 75 mL / OUT: 60 mL / NET: 15 mL        PHYSICAL EXAM:  Neurological:  AAOx3, NAD, coherent speech, FC   CNII-XII grossly intact, PERRL, EOMI, face symmetric  MAEx4, strength 5/5 UE and LE b/l, no drift  SILT throughout b/l   Incision/wound: scalp incision clean, dry and intact; staples in place    DEVICE/DRAIN DRESSING:    TUBES/LINES:  [] CVC  [] A-line  [] Lumbar Drain  [] Ventriculostomy  [] Other    DIET:  [] NPO  [x] Mechanical  [] Tube feeds    LABS:                        10.7   12.6  )-----------( 206      ( 11 May 2018 05:06 )             32.7     05-11    139  |  103  |  14  ----------------------------<  116<H>  4.1   |  25  |  0.63    Ca    8.5      11 May 2018 05:05  Phos  3.1     05-11  Mg     1.8     05-11              CAPILLARY BLOOD GLUCOSE      POCT Blood Glucose.: 125 mg/dL (11 May 2018 06:52)  POCT Blood Glucose.: 188 mg/dL (10 May 2018 22:19)  POCT Blood Glucose.: 137 mg/dL (10 May 2018 16:36)      Drug Levels: [] N/A    CSF Analysis: [] N/A      Allergies    adhesives (Rash)  No Known Drug Allergies  Originally Entered as [Unknown] reaction to [Other][Tape] (Unknown)    Intolerances      MEDICATIONS:  Antibiotics:    Neuro:  ALPRAZolam 0.5 milliGRAM(s) Oral at bedtime PRN  escitalopram 20 milliGRAM(s) Oral daily  HYDROmorphone  Injectable 1 milliGRAM(s) IV Push every 4 hours PRN  levETIRAcetam 500 milliGRAM(s) Oral every 12 hours  ondansetron Injectable 4 milliGRAM(s) IV Push every 6 hours PRN  oxyCODONE    IR 5 milliGRAM(s) Oral every 4 hours PRN    Anticoagulation:    OTHER:  ALBUTerol    90 MICROgram(s) HFA Inhaler 2 Puff(s) Inhalation every 6 hours PRN  dextrose 40% Gel 15 Gram(s) Oral once PRN  dextrose 50% Injectable 12.5 Gram(s) IV Push once  dextrose 50% Injectable 25 Gram(s) IV Push once  dextrose 50% Injectable 25 Gram(s) IV Push once  docusate sodium 100 milliGRAM(s) Oral three times a day  glucagon  Injectable 1 milliGRAM(s) IntraMuscular once PRN  insulin lispro (HumaLOG) corrective regimen sliding scale   SubCutaneous Before meals and at bedtime  loratadine 10 milliGRAM(s) Oral daily  montelukast 10 milliGRAM(s) Oral daily  pantoprazole    Tablet 40 milliGRAM(s) Oral before breakfast  senna 2 Tablet(s) Oral at bedtime    IVF:  dextrose 5%. 1000 milliLiter(s) IV Continuous <Continuous>  magnesium sulfate  IVPB 2 Gram(s) IV Intermittent once  multivitamin 1 Tablet(s) Oral daily  vitamin A 86346 Unit(s) Oral daily    CULTURES:    RADIOLOGY & ADDITIONAL TESTS:      ASSESSMENT:  72y Female s/p exploration of right cranioplasty with excision of right scalp mass, frozen consistent with adenocarcinoma, POD#1    BRAIN GAINYS00.9  BRAIN TUMOR  No h/o HF  No pertinent family history in first degree relatives  Handoff  Asthma  GERD (gastroesophageal reflux disease)  Lung collapse  COPD (chronic obstructive pulmonary disease)  Brain tumor  Liver cancer  Carcinoid tumor  Malignant neoplasm of female breast  Malignant neoplasm of bronchus and lung, unspecified site  Scalp mass  Scalp mass  Excision of mass of soft tissue of scalp  Exploration and closure of wound  H/O right hemicolectomy  History of craniotomy  History of colon resection  Surgery, elective  Surgery, elective  Surgery, elective  Cancer of liver  Breast cancer      PLAN:  NEURO:  -neuro checks  -pain control  -continue keppra 500mg bid  -pending MRI brain  -stepdown status    CARDIOVASCULAR:  --150    PULMONARY:  -room air  -IS    RENAL:  -IVL  -replete electrolytes prn  -remove zepeda, f/u TOV    GI:  -regular diet  -bowel regimen    HEME:  -h/h stable     ID:  -afebrile    ENDO:  -ISS    DVT PROPHYLAXIS:  [x] Venodynes                                [] Heparin/Lovenox    -d/w Dr. Muniz and Dr. Marie

## 2018-05-11 NOTE — PHYSICAL THERAPY INITIAL EVALUATION ADULT - PERTINENT HX OF CURRENT PROBLEM, REHAB EVAL
72-year old woman with a history of both breast and lung cancers. Last year she had a craniotomy for metastatic tumor w/ pathology c/w lung primary. She underwent postoperative radiation w/ Dr. Owens, which was finished in late May 2017. She presented with tenderness over her incision and pain from her cranial hardware. She presents today for revision of her incision/cranioplasty. She is intact neurologically.

## 2018-05-11 NOTE — OCCUPATIONAL THERAPY INITIAL EVALUATION ADULT - ADDITIONAL COMMENTS
Per patient she was independent with ADL and ambulation PTA, states she owns shower chair but does not need it.

## 2018-05-11 NOTE — DISCHARGE NOTE ADULT - MEDICATION SUMMARY - MEDICATIONS TO TAKE
I will START or STAY ON the medications listed below when I get home from the hospital:    Tylenol 325 mg oral tablet  -- 2 tab(s) by mouth every 4 hours, As Needed  -- Indication: For mild pain    Dilaudid 4 mg oral tablet  -- 1 tab(s) by mouth every 4 hours, As Needed for severe pain MDD:6 doses  -- Caution federal law prohibits the transfer of this drug to any person other  than the person for whom it was prescribed.  May cause drowsiness.  Alcohol may intensify this effect.  Use care when operating dangerous machinery.  This prescription cannot be refilled.  Using more of this medication than prescribed may cause serious breathing problems.    -- Indication: For Severe pain     levETIRAcetam 500 mg oral tablet  -- 1 tab(s) by mouth every 12 hours  -- Indication: For Seizures     Lexapro  -- 20 milligram(s) by mouth once a day  -- Indication: For Antidepressant    ZyrTEC 10 mg oral tablet  -- 1 tab(s) by mouth once a day (at bedtime)  -- Indication: For Antihistamine    Femara 2.5 mg oral tablet  -- 1 tab(s) by mouth once a day  -- Indication: For Antineoplastic    ALPRAZolam 0.5 mg oral tablet  -- 0.75 milligram(s) by mouth once a day (at bedtime), As Needed  -- Indication: For Anxiety     Stiolto Respimat 2.5 mcg-2.5 mcg/inh inhalation aerosol  -- 2 puff(s) inhaled every 24 hours  -- Indication: For BRonchodilator     Ventolin HFA 90 mcg/inh inhalation aerosol  -- 2 puff(s) inhaled 4 times a day, As Needed  -- Indication: For BRonchodilator     Zantac 150 oral tablet  -- 1 tab(s) by mouth 2 times a day, As Needed  -- Indication: For H2 blocker    senna oral tablet  -- 2 tab(s) by mouth once a day (at bedtime)  -- Indication: For laxative     Singulair 10 mg oral tablet  -- 1 tab(s) by mouth once a day  -- Indication: For respiratory agent     Melatonin 1 mg oral tablet  -- 0.5 tab(s) by mouth once a day (at bedtime), As Needed  -- Indication: For Sleep aid    vitamin A 10,000 intl units oral capsule  -- 2 tab(s) by mouth once a day  -- Indication: For vitamin

## 2018-05-11 NOTE — PROGRESS NOTE ADULT - SUBJECTIVE AND OBJECTIVE BOX
NEUROCRITICAL CARE PROGRESS NOTE    RAFY LO   [id number]  Summary:  /  HPI:  This patient is a 72-year old woman with a history of both breast and lung cancers. Last year she had a craniotomy for metastatic tumor w/ pathology c/w lung primary. She underwent postoperative radiation w/ Dr. Owens, which was finished in late May 2017. She presented with tenderness over her incision and pain from her cranial hardware. She presents today for revision of her incision/cranioplasty. She is intact neurologically.    The last note from Dr. Muniz's office is pasted below.     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 Owens, completed 5/22/17.      She was seen on 4/30/18 and reported an area of tenderness to palpation near her incision. She was started on keflex. After starting keflex she developed sore throat and headache. No fever or chills. No shortness of breath or rash.     She reported today that she forgot to mention hitting her head near incision after falling in [place] in December 2017. (10 May 2018 07:53)      S/Overnight events:    POD#     EVD:    CSF :    ICPs:      Vital Signs Last 24 Hrs  T(C): 37.3 (11 May 2018 13:59), Max: 37.3 (11 May 2018 11:34)  T(F): 99.2 (11 May 2018 13:59), Max: 99.2 (11 May 2018 13:59)  HR: 86 (11 May 2018 11:25) (84 - 96)  BP: 108/57 (11 May 2018 11:25) (80/40 - 110/53)  BP(mean): 78 (11 May 2018 11:25) (50 - 78)  RR: 16 (11 May 2018 10:15) (10 - 34)  SpO2: 98% (11 May 2018 11:25) (91% - 100%)        I&O's Detail    10 May 2018 07:01  -  11 May 2018 07:00  --------------------------------------------------------  IN:    Oral Fluid: 340 mL    sodium chloride 0.9%: 725 mL    sodium chloride 0.9% with potassium chloride 20 mEq/L: 1200 mL  Total IN: 2265 mL    OUT:    Indwelling Catheter - Urethral: 2070 mL  Total OUT: 2070 mL    Total NET: 195 mL      11 May 2018 07:01  -  11 May 2018 19:35  --------------------------------------------------------  IN:    sodium chloride 0.9%: 150 mL  Total IN: 150 mL    OUT:    Indwelling Catheter - Urethral: 95 mL    Voided: 135 mL  Total OUT: 230 mL    Total NET: -80 mL          LABS:                        10.7   12.6  )-----------( 206      ( 11 May 2018 05:06 )             32.7     05-11    139  |  103  |  14  ----------------------------<  116<H>  4.1   |  25  |  0.63    Ca    8.5      11 May 2018 05:05  Phos  3.1     05-11  Mg     1.8     05-11              CAPILLARY BLOOD GLUCOSE      POCT Blood Glucose.: 105 mg/dL (11 May 2018 11:30)  POCT Blood Glucose.: 125 mg/dL (11 May 2018 06:52)  POCT Blood Glucose.: 188 mg/dL (10 May 2018 22:19)      Drug Levels: [] N/A    CSF Analysis: [] N/A      Allergies    adhesives (Rash)  No Known Drug Allergies  Originally Entered as [Unknown] reaction to [Other][Tape] (Unknown)    Intolerances      MEDICATIONS:  Antibiotics:    Neuro:  ALPRAZolam 0.5 milliGRAM(s) Oral at bedtime PRN  escitalopram 20 milliGRAM(s) Oral daily  HYDROmorphone  Injectable 1 milliGRAM(s) IV Push every 4 hours PRN  HYDROmorphone  Injectable 0.25 milliGRAM(s) IV Push once  levETIRAcetam 500 milliGRAM(s) Oral every 12 hours  ondansetron Injectable 4 milliGRAM(s) IV Push every 6 hours PRN  oxyCODONE    IR 5 milliGRAM(s) Oral every 4 hours PRN    Anticoagulation:    OTHER:  ALBUTerol    90 MICROgram(s) HFA Inhaler 2 Puff(s) Inhalation every 6 hours PRN  dextrose 40% Gel 15 Gram(s) Oral once PRN  dextrose 50% Injectable 12.5 Gram(s) IV Push once  dextrose 50% Injectable 25 Gram(s) IV Push once  dextrose 50% Injectable 25 Gram(s) IV Push once  docusate sodium 100 milliGRAM(s) Oral three times a day  glucagon  Injectable 1 milliGRAM(s) IntraMuscular once PRN  insulin lispro (HumaLOG) corrective regimen sliding scale   SubCutaneous Before meals and at bedtime  loratadine 10 milliGRAM(s) Oral daily  montelukast 10 milliGRAM(s) Oral daily  pantoprazole    Tablet 40 milliGRAM(s) Oral before breakfast  senna 2 Tablet(s) Oral at bedtime    IVF:  dextrose 5%. 1000 milliLiter(s) IV Continuous <Continuous>  multivitamin 1 Tablet(s) Oral daily  vitamin A 78178 Unit(s) Oral daily    CULTURES:    RADIOLOGY & ADDITIONAL TESTS:            Physical exam  Awake, alert, oriented x 3, PERRL, EOMI  Follows commands, speech clear  RIOS X4 with good strength   Incision: C/D/I  neg drift  motor 5/5 throughout  sensation itnact b/l  radial a line  zepeda

## 2018-05-11 NOTE — OCCUPATIONAL THERAPY INITIAL EVALUATION ADULT - GENERAL OBSERVATIONS, REHAB EVAL
Left hand dominant. Chart reviewed, patient cleared for OT eval by AMBER Palomino. Received semi-supine, NAD, +tele, +heplock, reports headache, AMBER Palomino notified.

## 2018-05-11 NOTE — DISCHARGE NOTE ADULT - PATIENT PORTAL LINK FT
You can access the [facility] [facility] Patient Portal, offered by [facility], by registering with the following website: [email or web address]

## 2018-05-11 NOTE — DISCHARGE NOTE ADULT - PLAN OF CARE
maintain activities of daily living 1. You must wash your hair starting 24 hours after being home. Use your normal  shampoo. During the shampoo, massage the staples to remove any dried blood  or crusting. This keeps your wound clean and helps healing. You should shampoo everyday.  2. Mild swelling around the incision is common. Keep incision open to air and dry.  3. Call our office at [phone] to set up the appointment with an NP for wound check  once you get home.  4. Inform the doctor immediately if you have a fever (above 101), chills, night  sweats, wound drainage, increasing wound redness or pain, nausea, vomiting, or  worsening headache.

## 2018-05-11 NOTE — PHYSICAL THERAPY INITIAL EVALUATION ADULT - GENERAL OBSERVATIONS, REHAB EVAL
Patient received semi-supine in bed in no acute distress, +ekg, scd, heplock, complaints of headache.

## 2018-05-11 NOTE — DISCHARGE NOTE ADULT - HOSPITAL COURSE
This patient is a 72-year old woman with a history of both breast and lung cancers. Last year she had a craniotomy for metastatic tumor w/ pathology c/w lung primary. She underwent postoperative radiation w/ Dr. Owens, which was finished in late May 2017. She presented with tenderness over her incision and pain from her cranial hardware. She presents today for revision of her incision/cranioplasty. She is intact neurologically.    On 5/10, patient went to OR for right scalp mass excision and exploration of cranioplasty. Patient remained boarded in PACU overnight, and was then transferred to hospital floor. Hospital course uneventful. MRI performed on 5/11. Patient worked with PT/OT and is cleared for discharge home. Patient medically cleared for discharge.

## 2018-05-11 NOTE — DISCHARGE NOTE ADULT - CARE PLAN
Principal Discharge DX:	Scalp mass  Goal:	maintain activities of daily living  Assessment and plan of treatment:	1. You must wash your hair starting 24 hours after being home. Use your normal  shampoo. During the shampoo, massage the staples to remove any dried blood  or crusting. This keeps your wound clean and helps healing. You should shampoo everyday.  2. Mild swelling around the incision is common. Keep incision open to air and dry.  3. Call our office at [phone] to set up the appointment with an NP for wound check  once you get home.  4. Inform the doctor immediately if you have a fever (above 101), chills, night  sweats, wound drainage, increasing wound redness or pain, nausea, vomiting, or  worsening headache.

## 2018-05-14 ENCOUNTER — EMERGENCY (EMERGENCY)
Facility: HOSPITAL | Age: 73
LOS: 1 days | Discharge: HOME | End: 2018-05-14
Attending: EMERGENCY MEDICINE | Admitting: EMERGENCY MEDICINE
Payer: MEDICARE

## 2018-05-14 VITALS
OXYGEN SATURATION: 98 % | TEMPERATURE: 98 F | SYSTOLIC BLOOD PRESSURE: 128 MMHG | HEART RATE: 83 BPM | DIASTOLIC BLOOD PRESSURE: 68 MMHG | RESPIRATION RATE: 18 BRPM

## 2018-05-14 VITALS
TEMPERATURE: 99 F | HEART RATE: 99 BPM | OXYGEN SATURATION: 96 % | RESPIRATION RATE: 16 BRPM | HEIGHT: 61 IN | DIASTOLIC BLOOD PRESSURE: 68 MMHG | SYSTOLIC BLOOD PRESSURE: 117 MMHG | WEIGHT: 147.93 LBS

## 2018-05-14 DIAGNOSIS — Z98.890 OTHER SPECIFIED POSTPROCEDURAL STATES: Chronic | ICD-10-CM

## 2018-05-14 DIAGNOSIS — Z41.9 ENCOUNTER FOR PROCEDURE FOR PURPOSES OTHER THAN REMEDYING HEALTH STATE, UNSPECIFIED: Chronic | ICD-10-CM

## 2018-05-14 DIAGNOSIS — C50.919 MALIGNANT NEOPLASM OF UNSPECIFIED SITE OF UNSPECIFIED FEMALE BREAST: Chronic | ICD-10-CM

## 2018-05-14 DIAGNOSIS — Z90.49 ACQUIRED ABSENCE OF OTHER SPECIFIED PARTS OF DIGESTIVE TRACT: Chronic | ICD-10-CM

## 2018-05-14 PROBLEM — K21.9 GASTRO-ESOPHAGEAL REFLUX DISEASE WITHOUT ESOPHAGITIS: Chronic | Status: ACTIVE | Noted: 2018-05-09

## 2018-05-14 PROBLEM — J45.909 UNSPECIFIED ASTHMA, UNCOMPLICATED: Chronic | Status: ACTIVE | Noted: 2018-05-09

## 2018-05-14 PROBLEM — C22.9 MALIGNANT NEOPLASM OF LIVER, NOT SPECIFIED AS PRIMARY OR SECONDARY: Chronic | Status: ACTIVE | Noted: 2017-04-05

## 2018-05-14 LAB
ALBUMIN SERPL ELPH-MCNC: 3.9 G/DL — SIGNIFICANT CHANGE UP (ref 3.3–5)
ALP SERPL-CCNC: 71 U/L — SIGNIFICANT CHANGE UP (ref 40–120)
ALT FLD-CCNC: 18 U/L — SIGNIFICANT CHANGE UP (ref 10–45)
ANION GAP SERPL CALC-SCNC: 10 MMOL/L — SIGNIFICANT CHANGE UP (ref 5–17)
APTT BLD: 29.9 SEC — SIGNIFICANT CHANGE UP (ref 27.5–37.4)
AST SERPL-CCNC: 20 U/L — SIGNIFICANT CHANGE UP (ref 10–40)
BASOPHILS NFR BLD AUTO: 0.3 % — SIGNIFICANT CHANGE UP (ref 0–2)
BILIRUB SERPL-MCNC: 0.5 MG/DL — SIGNIFICANT CHANGE UP (ref 0.2–1.2)
BUN SERPL-MCNC: 12 MG/DL — SIGNIFICANT CHANGE UP (ref 7–23)
CALCIUM SERPL-MCNC: 10.4 MG/DL — SIGNIFICANT CHANGE UP (ref 8.4–10.5)
CHLORIDE SERPL-SCNC: 90 MMOL/L — LOW (ref 96–108)
CO2 SERPL-SCNC: 31 MMOL/L — SIGNIFICANT CHANGE UP (ref 22–31)
CREAT SERPL-MCNC: 0.75 MG/DL — SIGNIFICANT CHANGE UP (ref 0.5–1.3)
EOSINOPHIL NFR BLD AUTO: 2.4 % — SIGNIFICANT CHANGE UP (ref 0–6)
GLUCOSE SERPL-MCNC: 116 MG/DL — HIGH (ref 70–99)
HCT VFR BLD CALC: 36.8 % — SIGNIFICANT CHANGE UP (ref 34.5–45)
HGB BLD-MCNC: 12.1 G/DL — SIGNIFICANT CHANGE UP (ref 11.5–15.5)
INR BLD: 0.93 — SIGNIFICANT CHANGE UP (ref 0.88–1.16)
LACTATE SERPL-SCNC: 1.2 MMOL/L — SIGNIFICANT CHANGE UP (ref 0.5–2)
LIDOCAIN IGE QN: 29 U/L — SIGNIFICANT CHANGE UP (ref 7–60)
LYMPHOCYTES # BLD AUTO: 22.1 % — SIGNIFICANT CHANGE UP (ref 13–44)
MAGNESIUM SERPL-MCNC: 1.9 MG/DL — SIGNIFICANT CHANGE UP (ref 1.6–2.6)
MCHC RBC-ENTMCNC: 28.9 PG — SIGNIFICANT CHANGE UP (ref 27–34)
MCHC RBC-ENTMCNC: 32.9 G/DL — SIGNIFICANT CHANGE UP (ref 32–36)
MCV RBC AUTO: 88 FL — SIGNIFICANT CHANGE UP (ref 80–100)
MONOCYTES NFR BLD AUTO: 8.4 % — SIGNIFICANT CHANGE UP (ref 2–14)
NEUTROPHILS NFR BLD AUTO: 66.8 % — SIGNIFICANT CHANGE UP (ref 43–77)
PLATELET # BLD AUTO: 259 K/UL — SIGNIFICANT CHANGE UP (ref 150–400)
POTASSIUM SERPL-MCNC: 4.8 MMOL/L — SIGNIFICANT CHANGE UP (ref 3.5–5.3)
POTASSIUM SERPL-SCNC: 4.8 MMOL/L — SIGNIFICANT CHANGE UP (ref 3.5–5.3)
PROT SERPL-MCNC: 8 G/DL — SIGNIFICANT CHANGE UP (ref 6–8.3)
PROTHROM AB SERPL-ACNC: 10.3 SEC — SIGNIFICANT CHANGE UP (ref 9.8–12.7)
RBC # BLD: 4.18 M/UL — SIGNIFICANT CHANGE UP (ref 3.8–5.2)
RBC # FLD: 14.6 % — SIGNIFICANT CHANGE UP (ref 10.3–16.9)
SODIUM SERPL-SCNC: 131 MMOL/L — LOW (ref 135–145)
TROPONIN T SERPL-MCNC: <0.01 NG/ML — SIGNIFICANT CHANGE UP (ref 0–0.01)
WBC # BLD: 9.8 K/UL — SIGNIFICANT CHANGE UP (ref 3.8–10.5)
WBC # FLD AUTO: 9.8 K/UL — SIGNIFICANT CHANGE UP (ref 3.8–10.5)

## 2018-05-14 PROCEDURE — 71275 CT ANGIOGRAPHY CHEST: CPT

## 2018-05-14 PROCEDURE — 99284 EMERGENCY DEPT VISIT MOD MDM: CPT | Mod: 25

## 2018-05-14 PROCEDURE — 88341 IMHCHEM/IMCYTCHM EA ADD ANTB: CPT

## 2018-05-14 PROCEDURE — 86901 BLOOD TYPING SEROLOGIC RH(D): CPT

## 2018-05-14 PROCEDURE — 83036 HEMOGLOBIN GLYCOSYLATED A1C: CPT

## 2018-05-14 PROCEDURE — 85730 THROMBOPLASTIN TIME PARTIAL: CPT

## 2018-05-14 PROCEDURE — 70553 MRI BRAIN STEM W/O & W/DYE: CPT

## 2018-05-14 PROCEDURE — 85610 PROTHROMBIN TIME: CPT

## 2018-05-14 PROCEDURE — A9585: CPT

## 2018-05-14 PROCEDURE — 85025 COMPLETE CBC W/AUTO DIFF WBC: CPT

## 2018-05-14 PROCEDURE — 99285 EMERGENCY DEPT VISIT HI MDM: CPT | Mod: 25

## 2018-05-14 PROCEDURE — 88300 SURGICAL PATH GROSS: CPT

## 2018-05-14 PROCEDURE — 80048 BASIC METABOLIC PNL TOTAL CA: CPT

## 2018-05-14 PROCEDURE — 71046 X-RAY EXAM CHEST 2 VIEWS: CPT

## 2018-05-14 PROCEDURE — 85027 COMPLETE CBC AUTOMATED: CPT

## 2018-05-14 PROCEDURE — 88307 TISSUE EXAM BY PATHOLOGIST: CPT

## 2018-05-14 PROCEDURE — 96374 THER/PROPH/DIAG INJ IV PUSH: CPT | Mod: XU

## 2018-05-14 PROCEDURE — 71046 X-RAY EXAM CHEST 2 VIEWS: CPT | Mod: 26

## 2018-05-14 PROCEDURE — 82962 GLUCOSE BLOOD TEST: CPT

## 2018-05-14 PROCEDURE — 84100 ASSAY OF PHOSPHORUS: CPT

## 2018-05-14 PROCEDURE — 93970 EXTREMITY STUDY: CPT

## 2018-05-14 PROCEDURE — 83735 ASSAY OF MAGNESIUM: CPT

## 2018-05-14 PROCEDURE — C1889: CPT

## 2018-05-14 PROCEDURE — 88305 TISSUE EXAM BY PATHOLOGIST: CPT

## 2018-05-14 PROCEDURE — 88331 PATH CONSLTJ SURG 1 BLK 1SPC: CPT

## 2018-05-14 PROCEDURE — 97161 PT EVAL LOW COMPLEX 20 MIN: CPT

## 2018-05-14 PROCEDURE — 93010 ELECTROCARDIOGRAM REPORT: CPT

## 2018-05-14 PROCEDURE — C1713: CPT

## 2018-05-14 PROCEDURE — 83605 ASSAY OF LACTIC ACID: CPT

## 2018-05-14 PROCEDURE — 86850 RBC ANTIBODY SCREEN: CPT

## 2018-05-14 PROCEDURE — 93005 ELECTROCARDIOGRAM TRACING: CPT

## 2018-05-14 PROCEDURE — 71275 CT ANGIOGRAPHY CHEST: CPT | Mod: 26

## 2018-05-14 PROCEDURE — 36415 COLL VENOUS BLD VENIPUNCTURE: CPT

## 2018-05-14 PROCEDURE — 83690 ASSAY OF LIPASE: CPT

## 2018-05-14 PROCEDURE — 80053 COMPREHEN METABOLIC PANEL: CPT

## 2018-05-14 PROCEDURE — 86900 BLOOD TYPING SEROLOGIC ABO: CPT

## 2018-05-14 PROCEDURE — 88360 TUMOR IMMUNOHISTOCHEM/MANUAL: CPT

## 2018-05-14 PROCEDURE — 84484 ASSAY OF TROPONIN QUANT: CPT

## 2018-05-14 RX ORDER — FAMOTIDINE 10 MG/ML
20 INJECTION INTRAVENOUS ONCE
Qty: 0 | Refills: 0 | Status: COMPLETED | OUTPATIENT
Start: 2018-05-14 | End: 2018-05-14

## 2018-05-14 RX ADMIN — Medication 30 MILLILITER(S): at 13:07

## 2018-05-14 RX ADMIN — FAMOTIDINE 20 MILLIGRAM(S): 10 INJECTION INTRAVENOUS at 13:09

## 2018-05-14 NOTE — ED PROVIDER NOTE - MEDICAL DECISION MAKING DETAILS
here w/ mild epigastric/chest pain in setting of being recently post op. EKG non ischemic and troponin neg. CT angio done to r/o PE and neg. DC home in NAD with strict return precautions given, to f/u her PCP and surgeons.

## 2018-05-14 NOTE — ED PROVIDER NOTE - PHYSICAL EXAMINATION
CONSTITUTIONAL: Well-appearing; well-nourished; in no apparent distress.   HEAD: Normocephalic; atraumatic.   EYES:  conjunctiva and sclera clear  ENT: normal nose; no rhinorrhea; normal pharynx with no erythema or lesions.   NECK: Supple; non-tender;   CARDIOVASCULAR: Normal S1, S2; no murmurs, rubs, or gallops. Regular rate and rhythm.   RESPIRATORY: Breathing easily; breath sounds clear and equal bilaterally; no wheezes, rhonchi, or rales.  GI: Soft; non-distended; non-tender; no palpable organomegaly.   EXT: No cyanosis or edema; N/V intact  SKIN: Normal for age and race; warm; dry; good turgor; no apparent lesions or rash.   NEURO: A & O x 3; face symmetric; grossly unremarkable.   PSYCHOLOGICAL: The patient’s mood and manner are appropriate. CONSTITUTIONAL: Well-appearing; well-nourished; in no apparent distress.   HEAD: large incision over scalp, staples in place, c/d/o.   EYES:  conjunctiva and sclera clear. +periorbital edema to R w/ ecchymosis  ENT: normal nose; no rhinorrhea; normal pharynx with no erythema or lesions.   NECK: Supple; non-tender;   CARDIOVASCULAR: Normal S1, S2; no murmurs, rubs, or gallops. Regular rate and rhythm.   RESPIRATORY: Breathing easily; breath sounds clear and equal bilaterally; no wheezes, rhonchi, or rales.  GI: Soft; non-distended; non-tender; no palpable organomegaly.   EXT: No cyanosis or edema; N/V intact  SKIN: Normal for age and race; warm; dry; good turgor; no apparent lesions or rash.   NEURO: A & O x 3; face symmetric; grossly unremarkable.   PSYCHOLOGICAL: The patient’s mood and manner are appropriate.

## 2018-05-14 NOTE — ED PROVIDER NOTE - OBJECTIVE STATEMENT
72-year old woman with a history of both breast and lung cancers. S/p craniotomy for metastatic tumor w/ pathology c/w lung primary in 2017. She underwent postoperative radiation w/ Dr. Owens, which was finished in late May 2017. She presented with tenderness over her incision and pain from her cranial hardware. She presented last admission for revision of her incision/cranioplasty and was discharged 5/11. 72-year old woman with a history of both breast and lung cancer S/p craniotomy for metastatic tumor w/ pathology c/w lung primary in 2017. She underwent postoperative radiation w/ Dr. Owens, which was finished in late May 2017, then readmitted for revision of her incision/cranioplasty and was discharged 5/11. Pt states she had general anesthesia. Comes to ED today because in the middle of the night pt had epigastric pain that has since continued. No radiation to shoulder, neck or jaw, no associated new SOB (pt states at baseline has mild SOB), no wheezing, no diaphoresis. Pt also has a hx of copd but states symptoms are not consistent with such. Hx of aspiration PNA after colonoscopy a few years back, but states post OR had no such symptoms. Denies fevers/chills, N/V/D. Good relief of her incision pain w/ pain medications.

## 2018-05-14 NOTE — ED ADULT NURSE NOTE - OBJECTIVE STATEMENT
73 y/o female w/ hx of brain ca s/p brain surgery on 5/10/18 for loose screw and plate c/o chest tightness that began around 4 am that is worse w/ deep breathing. Pt states, "I was having headaches for 3 weeks and we found out the plate and screw were loose in my head. Last Thursday the doctor took out the plate and found another mass on my brain that was removed. PMHx asthma, breast ca, lung ca. Denies fever, chills, cough, headache, dizziness, lightheadedness, SOB, chest pain, abdominal pain, nausea, vomiting, and any other associated symptoms.

## 2018-05-14 NOTE — ED ADULT TRIAGE NOTE - OTHER COMPLAINTS
pt admits to having brain surgery on Thursday, presents to ed with redness/swelling and bruising to R side of face/eye. pt c.o midsternal chest tightness worsening with inhalation since last night. denies recent cough/fever/chills. ekg in progress.

## 2018-05-14 NOTE — CONSULT NOTE ADULT - SUBJECTIVE AND OBJECTIVE BOX
HISTORY OF PRESENT ILLNESS:   Pt is well known to the neurosurgical service,  72-year old woman with a history of both breast and lung cancers. S/p craniotomy for metastatic tumor w/ pathology c/w lung primary in 2017. She underwent postoperative radiation w/ Dr. Owens, which was finished in late May 2017. She presented with tenderness over her incision and pain from her cranial hardware. She presented last admission for revision of her incision/cranioplasty and was discharged 5/11. Pt is intact neurologically, no new deficits, improved L eye periorbital swelling. Denies seizure-like activity, changes in vision, orientation, motor and sensory function, incisional discomfort. Reports mild headache alleviated with Dilaudid. Reporting non-radiating epigastric pain. R/o PE and pneumonia per ER.    PAST MEDICAL & SURGICAL HISTORY:  Asthma  GERD (gastroesophageal reflux disease)  Lung collapse: 2x  COPD (chronic obstructive pulmonary disease)  Brain tumor  Liver cancer: left breast cancer spread to liver and chest wall  Carcinoid tumor: abdomen  Malignant neoplasm of female breast: Left breast CA with liver mets  Malignant neoplasm of bronchus and lung, unspecified site: RUL Lobectomy, RLL wedge resection  H/O right hemicolectomy  History of craniotomy  History of colon resection  Surgery, elective: Thoractomy with lung resection-  Right upper lobe removed, left  lower lobe wedge, right lower wedge  Surgery, elective: Ovaries removed 1980&quot;s  Surgery, elective: Left wrist  Breast cancer: left mastectomy    FAMILY HISTORY:  No pertinent family history in first degree relatives    Allergies  adhesives (Rash)  Keflex (Short breath)  Originally Entered as [Unknown] reaction to [Other][Tape] (Unknown)    Intolerances        REVIEW OF SYSTEMS  Negative unless otherwise stated in the HPI.    MEDICATIONS:  Antibiotics:    Neuro:    Anticoagulation:    OTHER:    IVF:      Vital Signs Last 24 Hrs  T(C): 37 (14 May 2018 09:32), Max: 37 (14 May 2018 09:32)  T(F): 98.6 (14 May 2018 09:32), Max: 98.6 (14 May 2018 09:32)  HR: 80 (14 May 2018 12:02) (80 - 99)  BP: 125/68 (14 May 2018 12:02) (117/68 - 132/58)  BP(mean): --  RR: 18 (14 May 2018 12:02) (16 - 18)  SpO2: 97% (14 May 2018 12:02) (96% - 97%)      PHYSICAL EXAM  GENERAL: NAD, thin female  HEAD:  Atraumatic, Normocephalic  EYES: EOMI, PERRLA, 2mm conjunctiva and sclera clear, mild L sided periorbital edema   NERVOUS SYSTEM:    Cranial nerves II-XII intact, face symmetric  Alert & Oriented X3, Good concentration;   Motor Strength 5/5 B/L upper and lower extremities;   Sensation intact to LT in UE/LE in 3 dermatomes  CHEST/LUNG: Clear to auscultation bilaterally; No rales, rhonchi, wheezing, or rubs  HEART: Regular rate and rhythm; No murmurs, rubs, or gallops  ABDOMEN: Soft, Nontender, Nondistended; Bowel sounds present  EXTREMITIES:  2+ Peripheral Pulses, No clubbing, cyanosis, or edema  SKIN: Incision C/D/I, staples BRYANT      LABS:                        12.1   9.8   )-----------( 259      ( 14 May 2018 11:03 )             36.8     05-14    131<L>  |  90<L>  |  12  ----------------------------<  116<H>  4.8   |  31  |  0.75    Ca    10.4      14 May 2018 11:03  Mg     1.9     05-14    TPro  8.0  /  Alb  3.9  /  TBili  0.5  /  DBili  x   /  AST  20  /  ALT  18  /  AlkPhos  71  05-14    PT/INR - ( 14 May 2018 11:03 )   PT: 10.3 sec;   INR: 0.93          PTT - ( 14 May 2018 11:03 )  PTT:29.9 sec    CULTURES:      RADIOLOGY & ADDITIONAL STUDIES:      < from: MR Head w/wo IV Cont (05.11.18 @ 17:06) >  IMPRESSION:    1. Interval developmentof a holohemispheric right subdural fluid   collection, greater in the right frontal lobe subjacent to the craniotomy   and interval development of a greater amount of fluid extrinsic to the   craniotomy in the subcutaneous adipose tissues in the right   frontoparietal convexity extending caudally into the right temporal and   right occipital regions.    2. Interval change in appearance of resection cavity with different fluid   and signal characteristics including thicker, abnormal enhancement along   the periphery with a more solid enhancement medially including a separate   focus along the ependymal lining of the body of the right lateral   ventricle anteriorly with associated greater amounts of abnormal T2   prolongation within the subcortical white matter of the right frontal   lobe surrounding the resection cavity extending to the ependymal lining   of the body of the right lateral ventricle anteriorly. Superior to the   resection cavity, interval development of cortical swelling, abnormal T2   prolongation, and subtle leptomeningeal enhancement with a few foci of   reduced diffusivity superior and posterior to the resection cavity.   Findings are concerning for infection and/or neoplasm.     < end of copied text >    < from: US Duplex Venous Lower Ext Complete, Bilateral (05.11.18 @ 18:15) >  IMPRESSION:  No deep vein thrombosis seen.    < end of copied text >      ASSESSMENT:   73yo F w/ breast ca, lung ca, s/p cranioplasty 5/11 p/w Epigastric pain    RECOMMENDATIONS:   - Agree with Medical workup for PNA/PE  - No requirement for additional workup  - D/w Dr. Muniz

## 2018-05-15 ENCOUNTER — EMERGENCY (EMERGENCY)
Facility: HOSPITAL | Age: 73
LOS: 1 days | Discharge: HOME | End: 2018-05-15
Attending: EMERGENCY MEDICINE | Admitting: EMERGENCY MEDICINE
Payer: MEDICARE

## 2018-05-15 VITALS
HEIGHT: 62 IN | WEIGHT: 139.99 LBS | DIASTOLIC BLOOD PRESSURE: 50 MMHG | RESPIRATION RATE: 18 BRPM | SYSTOLIC BLOOD PRESSURE: 98 MMHG | TEMPERATURE: 98 F | HEART RATE: 97 BPM | OXYGEN SATURATION: 98 %

## 2018-05-15 VITALS
HEART RATE: 96 BPM | DIASTOLIC BLOOD PRESSURE: 71 MMHG | RESPIRATION RATE: 18 BRPM | OXYGEN SATURATION: 99 % | TEMPERATURE: 98 F | SYSTOLIC BLOOD PRESSURE: 106 MMHG

## 2018-05-15 DIAGNOSIS — F41.9 ANXIETY DISORDER, UNSPECIFIED: ICD-10-CM

## 2018-05-15 DIAGNOSIS — Z91.048 OTHER NONMEDICINAL SUBSTANCE ALLERGY STATUS: ICD-10-CM

## 2018-05-15 DIAGNOSIS — Z41.9 ENCOUNTER FOR PROCEDURE FOR PURPOSES OTHER THAN REMEDYING HEALTH STATE, UNSPECIFIED: Chronic | ICD-10-CM

## 2018-05-15 DIAGNOSIS — J44.9 CHRONIC OBSTRUCTIVE PULMONARY DISEASE, UNSPECIFIED: ICD-10-CM

## 2018-05-15 DIAGNOSIS — Z98.890 OTHER SPECIFIED POSTPROCEDURAL STATES: Chronic | ICD-10-CM

## 2018-05-15 DIAGNOSIS — Z90.49 ACQUIRED ABSENCE OF OTHER SPECIFIED PARTS OF DIGESTIVE TRACT: Chronic | ICD-10-CM

## 2018-05-15 DIAGNOSIS — C50.919 MALIGNANT NEOPLASM OF UNSPECIFIED SITE OF UNSPECIFIED FEMALE BREAST: Chronic | ICD-10-CM

## 2018-05-15 DIAGNOSIS — Z79.899 OTHER LONG TERM (CURRENT) DRUG THERAPY: ICD-10-CM

## 2018-05-15 DIAGNOSIS — Z88.1 ALLERGY STATUS TO OTHER ANTIBIOTIC AGENTS STATUS: ICD-10-CM

## 2018-05-15 LAB
ALBUMIN SERPL ELPH-MCNC: 3.7 G/DL — SIGNIFICANT CHANGE UP (ref 3.3–5)
ALP SERPL-CCNC: 75 U/L — SIGNIFICANT CHANGE UP (ref 40–120)
ALT FLD-CCNC: 20 U/L — SIGNIFICANT CHANGE UP (ref 10–45)
AMPHET UR-MCNC: NEGATIVE — SIGNIFICANT CHANGE UP
ANION GAP SERPL CALC-SCNC: 13 MMOL/L — SIGNIFICANT CHANGE UP (ref 5–17)
APAP SERPL-MCNC: <15 UG/ML — SIGNIFICANT CHANGE UP (ref 10–30)
APPEARANCE UR: CLEAR — SIGNIFICANT CHANGE UP
AST SERPL-CCNC: 32 U/L — SIGNIFICANT CHANGE UP (ref 10–40)
BARBITURATES UR SCN-MCNC: NEGATIVE — SIGNIFICANT CHANGE UP
BASOPHILS NFR BLD AUTO: 0.6 % — SIGNIFICANT CHANGE UP (ref 0–2)
BENZODIAZ UR-MCNC: POSITIVE
BILIRUB SERPL-MCNC: 0.2 MG/DL — SIGNIFICANT CHANGE UP (ref 0.2–1.2)
BILIRUB UR-MCNC: NEGATIVE — SIGNIFICANT CHANGE UP
BUN SERPL-MCNC: 24 MG/DL — HIGH (ref 7–23)
CALCIUM SERPL-MCNC: 9.8 MG/DL — SIGNIFICANT CHANGE UP (ref 8.4–10.5)
CHLORIDE SERPL-SCNC: 99 MMOL/L — SIGNIFICANT CHANGE UP (ref 96–108)
CO2 SERPL-SCNC: 26 MMOL/L — SIGNIFICANT CHANGE UP (ref 22–31)
COCAINE METAB.OTHER UR-MCNC: NEGATIVE — SIGNIFICANT CHANGE UP
COLOR SPEC: YELLOW — SIGNIFICANT CHANGE UP
CREAT SERPL-MCNC: 0.77 MG/DL — SIGNIFICANT CHANGE UP (ref 0.5–1.3)
DIFF PNL FLD: NEGATIVE — SIGNIFICANT CHANGE UP
EOSINOPHIL NFR BLD AUTO: 3.4 % — SIGNIFICANT CHANGE UP (ref 0–6)
ETHANOL SERPL-MCNC: <10 MG/DL — SIGNIFICANT CHANGE UP (ref 0–10)
GLUCOSE SERPL-MCNC: 102 MG/DL — HIGH (ref 70–99)
GLUCOSE UR QL: NEGATIVE — SIGNIFICANT CHANGE UP
HCT VFR BLD CALC: 35.9 % — SIGNIFICANT CHANGE UP (ref 34.5–45)
HGB BLD-MCNC: 11.7 G/DL — SIGNIFICANT CHANGE UP (ref 11.5–15.5)
KETONES UR-MCNC: NEGATIVE — SIGNIFICANT CHANGE UP
LEUKOCYTE ESTERASE UR-ACNC: NEGATIVE — SIGNIFICANT CHANGE UP
LYMPHOCYTES # BLD AUTO: 27.4 % — SIGNIFICANT CHANGE UP (ref 13–44)
MCHC RBC-ENTMCNC: 29.3 PG — SIGNIFICANT CHANGE UP (ref 27–34)
MCHC RBC-ENTMCNC: 32.6 G/DL — SIGNIFICANT CHANGE UP (ref 32–36)
MCV RBC AUTO: 90 FL — SIGNIFICANT CHANGE UP (ref 80–100)
METHADONE UR-MCNC: NEGATIVE — SIGNIFICANT CHANGE UP
MONOCYTES NFR BLD AUTO: 9.7 % — SIGNIFICANT CHANGE UP (ref 2–14)
NEUTROPHILS NFR BLD AUTO: 58.9 % — SIGNIFICANT CHANGE UP (ref 43–77)
NITRITE UR-MCNC: NEGATIVE — SIGNIFICANT CHANGE UP
OPIATES UR-MCNC: NEGATIVE — SIGNIFICANT CHANGE UP
PCP SPEC-MCNC: SIGNIFICANT CHANGE UP
PCP UR-MCNC: NEGATIVE — SIGNIFICANT CHANGE UP
PH UR: 7.5 — SIGNIFICANT CHANGE UP (ref 5–8)
PLATELET # BLD AUTO: 271 K/UL — SIGNIFICANT CHANGE UP (ref 150–400)
POTASSIUM SERPL-MCNC: 5.2 MMOL/L — SIGNIFICANT CHANGE UP (ref 3.5–5.3)
POTASSIUM SERPL-SCNC: 5.2 MMOL/L — SIGNIFICANT CHANGE UP (ref 3.5–5.3)
PROT SERPL-MCNC: 7.4 G/DL — SIGNIFICANT CHANGE UP (ref 6–8.3)
PROT UR-MCNC: NEGATIVE MG/DL — SIGNIFICANT CHANGE UP
RBC # BLD: 3.99 M/UL — SIGNIFICANT CHANGE UP (ref 3.8–5.2)
RBC # FLD: 14.7 % — SIGNIFICANT CHANGE UP (ref 10.3–16.9)
SALICYLATES SERPL-MCNC: <0.3 MG/DL — LOW (ref 2.8–20)
SODIUM SERPL-SCNC: 138 MMOL/L — SIGNIFICANT CHANGE UP (ref 135–145)
SP GR SPEC: 1.01 — SIGNIFICANT CHANGE UP (ref 1–1.03)
THC UR QL: NEGATIVE — SIGNIFICANT CHANGE UP
TSH SERPL-MCNC: 0.29 UIU/ML — LOW (ref 0.35–4.94)
UROBILINOGEN FLD QL: 0.2 E.U./DL — SIGNIFICANT CHANGE UP
WBC # BLD: 8.5 K/UL — SIGNIFICANT CHANGE UP (ref 3.8–10.5)
WBC # FLD AUTO: 8.5 K/UL — SIGNIFICANT CHANGE UP (ref 3.8–10.5)

## 2018-05-15 PROCEDURE — 70450 CT HEAD/BRAIN W/O DYE: CPT | Mod: 26

## 2018-05-15 PROCEDURE — 93010 ELECTROCARDIOGRAM REPORT: CPT

## 2018-05-15 PROCEDURE — 99284 EMERGENCY DEPT VISIT MOD MDM: CPT | Mod: 25

## 2018-05-15 PROCEDURE — 90792 PSYCH DIAG EVAL W/MED SRVCS: CPT

## 2018-05-15 RX ORDER — ARIPIPRAZOLE 15 MG/1
1 TABLET ORAL
Qty: 14 | Refills: 0 | OUTPATIENT
Start: 2018-05-15

## 2018-05-15 RX ADMIN — Medication 30 MILLILITER(S): at 17:55

## 2018-05-15 NOTE — ED BEHAVIORAL HEALTH ASSESSMENT NOTE - REFERRAL / APPOINTMENT DETAILS
Pt and cousin should call Dr. Padgett for info regarding next appt; she may be seen tomorrow by a raza LLAMAS in Dr. Padgett's office.

## 2018-05-15 NOTE — ED BEHAVIORAL HEALTH ASSESSMENT NOTE - RISK ASSESSMENT
Pt with no hx of suicidal/homicidal ideation/intent/plan/attempts. No current thoughts of self-harm. Expressed anger towards  ("I want to shoot him!"), but has no plan or intent and, in fact, does not own a gun. Pt would benefit from time apart from her , to minimize discord. Despite recent upset, pt is at low risk for harm to self or others.

## 2018-05-15 NOTE — ED BEHAVIORAL HEALTH ASSESSMENT NOTE - DESCRIPTION
Calm, cooperative. As above As above. Born and raised in [place]. Graduate of [employer]. Works as  and .  x 2. Marriage #1 ([id number]) ended in divorce.  to #2 x 12 years. No kids. As above. As above. Born and raised in [place]. Graduate of [employer]. Works as  and .  x 2. Marriage #1 ([id number]) ended in divorce.  to #2 x 12 years. No kids. Of note: Pt's work partner recently moved, so pt is no longer working with her, which may be another stressor.

## 2018-05-15 NOTE — ED ADULT TRIAGE NOTE - CHIEF COMPLAINT QUOTE
" I woke up hating my  , I want to kill him, been feeling hopeless and anxious ," Denies any hearing voices nor hallucinations . Was sent to see psychiatrist .

## 2018-05-15 NOTE — ED BEHAVIORAL HEALTH ASSESSMENT NOTE - HPI (INCLUDE ILLNESS QUALITY, SEVERITY, DURATION, TIMING, CONTEXT, MODIFYING FACTORS, ASSOCIATED SIGNS AND SYMPTOMS)
Pt seen; chart reviewed and discussed with ED and neurosurgery teams, pt's outpt psychiatrist and pt's .    Briefly, pt is 72-year-old female, , domiciled with  of 12 years, who denies significant past hx until recently seeing an outpt psychiatrist for depression and anxiety, in context of multiple medical problems (most significantly metastatic lung CA with recent neurosurgical intervention for brain metastases), now BIB cousin/best friend at recommendation of psychiatrist, due to behavioral outburst including expressing threats to kill her .     Per pt and her cousin/best friend, who appears very reliable, pt has had increasing arguments and frustration with , due to pt's impression that  is neither helpful nor caring regarding her serious medical issues. Pt acknowledges, "Yesterday, I snapped after lunch, because my  decided to go swimming rather than take me home and care for me." Pt adds, "When I get sick, he gets sicker. Ever since I got cancer, he's been the patient." Marital discord continued through to today, when pt acknowledges, "I woke up at 1 AM with utter and sheer hatred of my . I got horrifically angry, with thoughts of shooting him." Pt acknowledges that she does not have a gun, has never tried to hurt herself or anyone else, yet is so frustrated regarding the situation that she does not want to be around her . .   Pt called her outpt psychiatrist, Dr. Nash Padgett, who advised pt to go to ED, as he was not able to see the pt today.   Pt expressed intention to call the police/tell the landlord she wants her  off the lease/call the  and have the locks changed. She was advised by her cousin/friend that this would not be advisable. Pt seen; chart reviewed and discussed with ED and neurosurgery teams, pt's outpt psychiatrist and pt's .    Briefly, pt is 72-year-old female, , domiciled with  of 12 years, who denies significant past hx until recently seeing an outpt psychiatrist for depression and anxiety, in context of multiple medical problems (most significantly metastatic lung CA with recent neurosurgical intervention for brain metastases), now BIB cousin/best friend at recommendation of psychiatrist, due to behavioral outburst including expressing threats to kill her .     Per pt and her cousin/best friend, who appears very reliable, pt has had increasing arguments and frustration with , due to pt's impression that  is neither helpful nor caring regarding her serious medical issues. Pt acknowledges, "Yesterday, I snapped after lunch, because my  decided to go swimming rather than take me home and care for me." Pt adds, "When I get sick, he gets sicker. Ever since I got cancer, he's been the patient." Marital discord continued through to today, when pt acknowledges, "I woke up at 1 AM with utter and sheer hatred of my . I got horrifically angry, with thoughts of shooting him." Pt acknowledges that she does not have a gun, has never tried to hurt herself or anyone else, yet is so frustrated regarding the situation that she does not want to be around her .     Pt called her outpt psychiatrist, Dr. Nash Padgett, who advised pt to go to ED, as he was not able to see the pt today.     Per cousin/friend, Ms. Rebecca Brady, pt and her  have had an emotionally labile relationship. Ms. Brady reports that pt is angry all the time, but her  is "self-involved and selfish, while Paulina is just the opposite."    Pt expressed intention to call the police/tell the landlord she wants her  off the lease/call the  and have the locks changed. She was advised by her Ms. Brady and this M.GABE that this would not be advisable.    Plan for pt's  to leave their apt for now discussed with pt as well as with her , over the phone. Both agree that this period of separation will likely be beneficial in decreasing tensions between them. Pt seen; chart reviewed and discussed with ED and neurosurgery teams, pt's outpt psychiatrist and pt's .    Briefly, pt is 72-year-old female, , domiciled with  of 12 years, who denies significant past hx until recently seeing an outpt psychiatrist for depression and anxiety, in context of multiple medical problems (most significantly metastatic lung CA with recent neurosurgical intervention for brain metastases), now BIB cousin/best friend at recommendation of psychiatrist, due to behavioral outburst including expressing threats to kill her .     Per pt and her cousin/best friend, Rebecca Brady, who appears very reliable, pt has had increasing arguments and frustration with , due to pt's impression that  is neither helpful nor caring regarding her serious medical issues. Pt acknowledges, "Yesterday, I snapped after lunch, because my  decided to go swimming rather than take me home and care for me." Pt adds, "When I get sick, he gets sicker. Ever since I got cancer, he's been the patient." Marital discord continued through to today, when pt acknowledges, "I woke up at 1 AM with utter and sheer hatred of my . I got horrifically angry, with thoughts of shooting him." Pt acknowledges that she does not have a gun, has never tried to hurt herself or anyone else, yet is so frustrated regarding the situation that she does not want to be around her .     Pt called her outpt psychiatrist, Dr. Nash Padgett, who advised pt to go to ED, as he was not able to see the pt today.     Per Ms. Brady, pt and her  have had an emotionally labile relationship. Ms. Brady reports that pt is angry all the time, but her  is "self-involved and selfish, while Paulina is just the opposite."    Pt expressed intention to call the police/tell the landlord she wants her  off the lease/call the  and have the locks changed. She was advised by Ms. Brady and cassy LLAMAS that this would not be advisable.    Plan for pt's  to leave their apt for now discussed with pt as well as with her , over the phone. Both agree that this period of separation will likely be beneficial in decreasing tensions between them.

## 2018-05-15 NOTE — ED PROVIDER NOTE - MEDICAL DECISION MAKING DETAILS
72-year-old female, with hx of lung cancer and recent neurosurgical intervention for brain mass, brought in by cousin/best friend at recommendation of her outpt psychiatrist, due to behavioral outburst, including expressing threats to kill her . Pt states that her  has been unsupportive of her and she is very angry with him and wants to kill him. Denies SI/ hallucinations. No physical complaints. Seen by neurosurgery in ED- CT head ordered and reviewed with neurosurgery and no concerns per neurosurgery service. Pt seen by psych in ED who spoke not only to pt but her cousin and her  and her psychiatrist and pt is safe for discharge home. Rx given per psych.

## 2018-05-15 NOTE — ED BEHAVIORAL HEALTH ASSESSMENT NOTE - SAFETY PLAN DETAILS
Advised pt, cousin and pt's  via phone that pt would benefit from period of time alone, to minimize conflicts with .  accepted advise; cousin will reiterate recommendations to . Advised pt, cousin and pt's  via phone that pt would benefit from period of time alone, to minimize conflicts with .  accepted advice; cousin will reiterate recommendations to .

## 2018-05-15 NOTE — ED BEHAVIORAL HEALTH ASSESSMENT NOTE - OTHER PAST PSYCHIATRIC HISTORY (INCLUDE DETAILS REGARDING ONSET, COURSE OF ILLNESS, INPATIENT/OUTPATIENT TREATMENT)
As above.  No hx of inpt admits.  No hx of suicidal or homicidal ideation/intent/plan/attempts.    Has been seeing Dr. Nash Adair as outpt x ~1 month. Last appt ~ 2 weeks ago; next appt later this week.    Saw outpt psychologist, Betsy East, decades ago, re relationship issues (with another man, prior to being with ) As above.  No hx of inpt admits.  No hx of suicidal or homicidal ideation/intent/plan/attempts.    Has been seeing Dr. Nash Adair as outpt x ~1 month. Last appt ~ 2 weeks ago.     Saw outpt psychologist, Betsy East, decades ago, re relationship issues (with another man, prior to being with )

## 2018-05-15 NOTE — ED PROVIDER NOTE - PROGRESS NOTE DETAILS
[facility]-  Seen by neurosurgery in ED- CT head ordered and reviewed with neurosurgery and no concerns per neurosurgery service. Pt seen by psych in ED who spoke not only to pt but her cousin and her  and her psychiatrist and pt is safe for discharge home. Rx given per psych.

## 2018-05-15 NOTE — ED BEHAVIORAL HEALTH ASSESSMENT NOTE - DIFFERENTIAL
Major Depression, single episode, without psychosis  Adjustment Disorder with Disturbance of Conduct  Marital Discord  Mood Disorder due to General Medical Condition or medication

## 2018-05-15 NOTE — ED PROVIDER NOTE - OBJECTIVE STATEMENT
72-year-old female, with hx of lung cancer and recent neurosurgical intervention for brain mass, brought in by cousin/best friend at recommendation of her outpt psychiatrist, due to behavioral outburst, including expressing threats to kill her . Pt states that her  has been unsupportive of her and she is very angry with him and wants to kill him. Denies SI/ hallucinations. No physical complaints. 72-year-old female, with hx of lung cancer and recent neurosurgical intervention for brain mass, brought in by cousin/best friend at recommendation of her outpt psychiatrist, due to behavioral outburst, including expressing threats to kill her . Pt states that her  has been unsupportive of her and she is very angry with him and wants to kill him. States "I'm very angry and woke up last night and raged at my ....hes's very selfish." Denies SI/ hallucinations. No physical complaints.

## 2018-05-15 NOTE — ED ADULT NURSE NOTE - OBJECTIVE STATEMENT
71 y/o female w/ history of brain, lung, colon, liver and breast cancer. Pt underwent recent procedure on Friday whereby new dx of brain mass was discovered. Pt. 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 Pt. reports waking up today feeling total "disgust," and had described her feelings against her  w/ sentiments of "hate." Pt is lucid w/ thoughts however has dark humor she shares while providing data during interview. Pt denies pain. Pt ambulates steady, MAEx4, has unlabored breathing. Abd soft nt nd. Skin dry, warm. Pt was undressed, placed in gown, belonging collected, PCT on 1:1. Pt has family support at bedside. Pt awaiting medical evaluation. Will continue to monitor.

## 2018-05-15 NOTE — ED BEHAVIORAL HEALTH ASSESSMENT NOTE - OTHER
cousin/best friend; outpt psychiatrist Acute medical illness; recent neurosurgery Marital issues "Strange." Pt describes episodes of dissociation since recent surgery: "Sometimes I have a strange feeling of disconnect, like I am in a different zone."

## 2018-05-15 NOTE — ED BEHAVIORAL HEALTH ASSESSMENT NOTE - DETAILS
"My sister is a mess." Pt reports sister is passive aggressive, not on any medication. Discussed with Dr. Padgett. Feels periods of dissociation

## 2018-05-15 NOTE — ED BEHAVIORAL HEALTH ASSESSMENT NOTE - SUMMARY
72-year-old  female, who denies significant past psychiatric hx until recently seeing an outpt psychiatrist for depression and anxiety, in context of multiple medical problems (most significantly metastatic lung CA with recent neurosurgical intervention for brain metastases), now BIB cousin/best friend at recommendation of psychiatrist, due to behavioral outburst including expressing threats to kill her .  Pt in good behavioral control through out ED vist and psychiatric evaluation, did not demonstrate any acute dangerousness (does not have a gun, denies intent to harm her  or anyone else, also denies thought of hurting herself), nor did she evidence any acute cognitive changes consistent with organic etiology of her recent upset. 72-year-old  female, who denies significant past psychiatric hx until recently seeing an outpt psychiatrist for depression and anxiety, in context of multiple medical problems (most significantly metastatic lung CA with recent neurosurgical intervention for brain metastases), now BIB cousin/best friend at recommendation of psychiatrist, due to behavioral outburst, including expressing threats to kill her .  Pt in good behavioral control through out ED vist and psychiatric evaluation, did not demonstrate any acute dangerousness (does not have a gun, denies intent to harm her  or anyone else, also denies thought of hurting herself), nor did she evidence any acute cognitive changes or psychosis consistent with organic etiology of her recent upset.  Pt does not require acute inpt psychiatric admit.  Pt would benefit from addition of prn atypical antipsychotic (abilify) for extreme episodes of mood lability, in addition to current lexapro and prn xanax.  Pt would benefit from trial of separation from , as 's presence appears to be exacerbating pt's mood.    accepted this recommendation, and cousin/friend will discuss further with .

## 2018-05-15 NOTE — ED BEHAVIORAL HEALTH ASSESSMENT NOTE - MEDICATIONS (PRESCRIPTIONS, DIRECTIONS)
Continue Lexapro and prn Xanax. Please start abilify 2 mg po q 8 hours prn upset (recommended by Dr. Padgett)..

## 2018-05-16 LAB — SURGICAL PATHOLOGY STUDY: SIGNIFICANT CHANGE UP

## 2018-05-17 ENCOUNTER — APPOINTMENT (OUTPATIENT)
Dept: NEUROSURGERY | Facility: CLINIC | Age: 73
End: 2018-05-17
Payer: MEDICARE

## 2018-05-17 VITALS
RESPIRATION RATE: 14 BRPM | SYSTOLIC BLOOD PRESSURE: 130 MMHG | HEART RATE: 91 BPM | DIASTOLIC BLOOD PRESSURE: 76 MMHG | OXYGEN SATURATION: 97 % | TEMPERATURE: 98 F | HEIGHT: 62 IN

## 2018-05-17 PROCEDURE — 99024 POSTOP FOLLOW-UP VISIT: CPT

## 2018-05-18 DIAGNOSIS — Z79.891 LONG TERM (CURRENT) USE OF OPIATE ANALGESIC: ICD-10-CM

## 2018-05-18 DIAGNOSIS — R07.9 CHEST PAIN, UNSPECIFIED: ICD-10-CM

## 2018-05-18 DIAGNOSIS — Z88.1 ALLERGY STATUS TO OTHER ANTIBIOTIC AGENTS STATUS: ICD-10-CM

## 2018-05-18 DIAGNOSIS — R10.13 EPIGASTRIC PAIN: ICD-10-CM

## 2018-05-18 DIAGNOSIS — Z79.899 OTHER LONG TERM (CURRENT) DRUG THERAPY: ICD-10-CM

## 2018-05-18 DIAGNOSIS — J44.9 CHRONIC OBSTRUCTIVE PULMONARY DISEASE, UNSPECIFIED: ICD-10-CM

## 2018-05-18 DIAGNOSIS — Z88.8 ALLERGY STATUS TO OTHER DRUGS, MEDICAMENTS AND BIOLOGICAL SUBSTANCES STATUS: ICD-10-CM

## 2018-05-18 PROCEDURE — 84443 ASSAY THYROID STIM HORMONE: CPT

## 2018-05-18 PROCEDURE — 70450 CT HEAD/BRAIN W/O DYE: CPT

## 2018-05-18 PROCEDURE — 81003 URINALYSIS AUTO W/O SCOPE: CPT

## 2018-05-18 PROCEDURE — 85025 COMPLETE CBC W/AUTO DIFF WBC: CPT

## 2018-05-18 PROCEDURE — 36415 COLL VENOUS BLD VENIPUNCTURE: CPT

## 2018-05-18 PROCEDURE — 93005 ELECTROCARDIOGRAM TRACING: CPT

## 2018-05-18 PROCEDURE — 80307 DRUG TEST PRSMV CHEM ANLYZR: CPT

## 2018-05-18 PROCEDURE — 99285 EMERGENCY DEPT VISIT HI MDM: CPT | Mod: 25

## 2018-05-18 PROCEDURE — 80053 COMPREHEN METABOLIC PANEL: CPT

## 2018-05-21 ENCOUNTER — APPOINTMENT (OUTPATIENT)
Dept: NEUROSURGERY | Facility: CLINIC | Age: 73
End: 2018-05-21
Payer: MEDICARE

## 2018-05-21 VITALS
HEIGHT: 62 IN | HEART RATE: 74 BPM | OXYGEN SATURATION: 100 % | RESPIRATION RATE: 16 BRPM | DIASTOLIC BLOOD PRESSURE: 61 MMHG | TEMPERATURE: 97.9 F | SYSTOLIC BLOOD PRESSURE: 111 MMHG

## 2018-05-21 PROCEDURE — 99024 POSTOP FOLLOW-UP VISIT: CPT

## 2018-05-22 DIAGNOSIS — G93.9 DISORDER OF BRAIN, UNSPECIFIED: ICD-10-CM

## 2018-05-22 DIAGNOSIS — Z87.2 PERSONAL HISTORY OF DISEASES OF THE SKIN AND SUBCUTANEOUS TISSUE: ICD-10-CM

## 2018-05-22 DIAGNOSIS — R51 HEADACHE: ICD-10-CM

## 2018-05-31 ENCOUNTER — APPOINTMENT (OUTPATIENT)
Dept: PULMONOLOGY | Facility: CLINIC | Age: 73
End: 2018-05-31
Payer: MEDICARE

## 2018-06-01 ENCOUNTER — APPOINTMENT (OUTPATIENT)
Dept: NEUROSURGERY | Facility: CLINIC | Age: 73
End: 2018-06-01
Payer: MEDICARE

## 2018-06-01 PROCEDURE — 99024 POSTOP FOLLOW-UP VISIT: CPT

## 2018-06-01 RX ORDER — OXYCODONE 5 MG/1
5 TABLET ORAL
Qty: 10 | Refills: 0 | Status: COMPLETED | COMMUNITY
Start: 2018-02-10

## 2018-06-01 RX ORDER — OXYCODONE AND ACETAMINOPHEN 5; 325 MG/1; MG/1
5-325 TABLET ORAL
Qty: 8 | Refills: 0 | Status: COMPLETED | COMMUNITY
Start: 2018-01-16

## 2018-06-03 ENCOUNTER — OUTPATIENT (OUTPATIENT)
Dept: OUTPATIENT SERVICES | Facility: HOSPITAL | Age: 73
LOS: 1 days | End: 2018-06-03
Payer: MEDICARE

## 2018-06-03 DIAGNOSIS — Z90.49 ACQUIRED ABSENCE OF OTHER SPECIFIED PARTS OF DIGESTIVE TRACT: Chronic | ICD-10-CM

## 2018-06-03 DIAGNOSIS — Z41.9 ENCOUNTER FOR PROCEDURE FOR PURPOSES OTHER THAN REMEDYING HEALTH STATE, UNSPECIFIED: Chronic | ICD-10-CM

## 2018-06-03 DIAGNOSIS — Z98.890 OTHER SPECIFIED POSTPROCEDURAL STATES: Chronic | ICD-10-CM

## 2018-06-03 DIAGNOSIS — C50.919 MALIGNANT NEOPLASM OF UNSPECIFIED SITE OF UNSPECIFIED FEMALE BREAST: Chronic | ICD-10-CM

## 2018-06-03 PROCEDURE — A9552: CPT

## 2018-06-03 PROCEDURE — 82962 GLUCOSE BLOOD TEST: CPT

## 2018-06-03 PROCEDURE — 78815 PET IMAGE W/CT SKULL-THIGH: CPT | Mod: 26

## 2018-06-03 PROCEDURE — 78815 PET IMAGE W/CT SKULL-THIGH: CPT

## 2018-06-04 ENCOUNTER — APPOINTMENT (OUTPATIENT)
Dept: PULMONOLOGY | Facility: CLINIC | Age: 73
End: 2018-06-04
Payer: MEDICARE

## 2018-06-04 VITALS
SYSTOLIC BLOOD PRESSURE: 110 MMHG | HEIGHT: 62 IN | TEMPERATURE: 98.9 F | HEART RATE: 96 BPM | DIASTOLIC BLOOD PRESSURE: 60 MMHG | OXYGEN SATURATION: 96 % | RESPIRATION RATE: 12 BRPM

## 2018-06-04 DIAGNOSIS — C34.90 MALIGNANT NEOPLASM OF UNSPECIFIED PART OF UNSPECIFIED BRONCHUS OR LUNG: ICD-10-CM

## 2018-06-04 PROCEDURE — 99215 OFFICE O/P EST HI 40 MIN: CPT

## 2018-06-05 ENCOUNTER — FORM ENCOUNTER (OUTPATIENT)
Age: 73
End: 2018-06-05

## 2018-06-05 PROBLEM — C34.90 MALIGNANT TUMOR OF LUNG: Status: ACTIVE | Noted: 2018-06-05

## 2018-06-06 ENCOUNTER — OUTPATIENT (OUTPATIENT)
Dept: OUTPATIENT SERVICES | Facility: HOSPITAL | Age: 73
LOS: 1 days | End: 2018-06-06
Payer: MEDICARE

## 2018-06-06 ENCOUNTER — APPOINTMENT (OUTPATIENT)
Dept: INTERNAL MEDICINE | Facility: CLINIC | Age: 73
End: 2018-06-06
Payer: MEDICARE

## 2018-06-06 VITALS
HEART RATE: 97 BPM | OXYGEN SATURATION: 98 % | TEMPERATURE: 97.9 F | SYSTOLIC BLOOD PRESSURE: 108 MMHG | WEIGHT: 152 LBS | BODY MASS INDEX: 27.97 KG/M2 | DIASTOLIC BLOOD PRESSURE: 59 MMHG | HEIGHT: 62 IN

## 2018-06-06 DIAGNOSIS — Z98.890 OTHER SPECIFIED POSTPROCEDURAL STATES: Chronic | ICD-10-CM

## 2018-06-06 DIAGNOSIS — Z90.49 ACQUIRED ABSENCE OF OTHER SPECIFIED PARTS OF DIGESTIVE TRACT: Chronic | ICD-10-CM

## 2018-06-06 DIAGNOSIS — Z41.9 ENCOUNTER FOR PROCEDURE FOR PURPOSES OTHER THAN REMEDYING HEALTH STATE, UNSPECIFIED: Chronic | ICD-10-CM

## 2018-06-06 DIAGNOSIS — C50.919 MALIGNANT NEOPLASM OF UNSPECIFIED SITE OF UNSPECIFIED FEMALE BREAST: Chronic | ICD-10-CM

## 2018-06-06 DIAGNOSIS — R06.02 SHORTNESS OF BREATH: ICD-10-CM

## 2018-06-06 PROCEDURE — 71046 X-RAY EXAM CHEST 2 VIEWS: CPT

## 2018-06-06 PROCEDURE — 93306 TTE W/DOPPLER COMPLETE: CPT

## 2018-06-06 PROCEDURE — 99213 OFFICE O/P EST LOW 20 MIN: CPT

## 2018-06-06 PROCEDURE — 71046 X-RAY EXAM CHEST 2 VIEWS: CPT | Mod: 26

## 2018-06-06 PROCEDURE — 93306 TTE W/DOPPLER COMPLETE: CPT | Mod: 26

## 2018-06-06 NOTE — HISTORY OF PRESENT ILLNESS
[FreeTextEntry1] : Shortness of breath [de-identified] : Shortness of breath; Saw Dr Mckay and no specific findings. PET noted and looks good; Has recurrent cancer brain and will need treatment; \par Nebulizer with no help

## 2018-06-06 NOTE — ASSESSMENT
[FreeTextEntry1] : Shortness of breath of unclear etiology; Will need a chest xray and also a stat Echocardiogram; This may be cardiac related; Lung are clear and PET looks good; Further plans after review of these studies

## 2018-06-06 NOTE — HISTORY OF PRESENT ILLNESS
[FreeTextEntry1] : Shortness of breath [de-identified] : Shortness of breath; Saw Dr Mckay and no specific findings. PET noted and looks good; Has recurrent cancer brain and will need treatment; \par Nebulizer with no help

## 2018-06-06 NOTE — PHYSICAL EXAM
[Normal Sclera/Conjunctiva] : normal sclera/conjunctiva [PERRL] : pupils equal round and reactive to light [EOMI] : extraocular movements intact [Normal Outer Ear/Nose] : the outer ears and nose were normal in appearance [Normal Oropharynx] : the oropharynx was normal [Normal TMs] : both tympanic membranes were normal [No JVD] : no jugular venous distention [Supple] : supple [No Lymphadenopathy] : no lymphadenopathy [Normal Rate] : normal rate  [Regular Rhythm] : with a regular rhythm [Normal S1, S2] : normal S1 and S2 [Soft] : abdomen soft [Non Tender] : non-tender [Non-distended] : non-distended [No HSM] : no HSM [Normal Bowel Sounds] : normal bowel sounds [No Joint Swelling] : no joint swelling [Grossly Normal Strength/Tone] : grossly normal strength/tone [de-identified] : SOB with taliking [de-identified] : Clear

## 2018-06-06 NOTE — PHYSICAL EXAM
[Normal Sclera/Conjunctiva] : normal sclera/conjunctiva [PERRL] : pupils equal round and reactive to light [EOMI] : extraocular movements intact [Normal Outer Ear/Nose] : the outer ears and nose were normal in appearance [Normal Oropharynx] : the oropharynx was normal [Normal TMs] : both tympanic membranes were normal [No JVD] : no jugular venous distention [Supple] : supple [No Lymphadenopathy] : no lymphadenopathy [Normal Rate] : normal rate  [Regular Rhythm] : with a regular rhythm [Normal S1, S2] : normal S1 and S2 [Soft] : abdomen soft [Non Tender] : non-tender [Non-distended] : non-distended [No HSM] : no HSM [Normal Bowel Sounds] : normal bowel sounds [No Joint Swelling] : no joint swelling [Grossly Normal Strength/Tone] : grossly normal strength/tone [de-identified] : SOB with taliking [de-identified] : Clear

## 2018-06-07 ENCOUNTER — APPOINTMENT (OUTPATIENT)
Dept: RADIATION ONCOLOGY | Facility: CLINIC | Age: 73
End: 2018-06-07

## 2018-06-07 ENCOUNTER — APPOINTMENT (OUTPATIENT)
Dept: THORACIC SURGERY | Facility: CLINIC | Age: 73
End: 2018-06-07
Payer: MEDICARE

## 2018-06-07 VITALS
SYSTOLIC BLOOD PRESSURE: 125 MMHG | OXYGEN SATURATION: 97 % | HEART RATE: 85 BPM | RESPIRATION RATE: 19 BRPM | DIASTOLIC BLOOD PRESSURE: 55 MMHG | TEMPERATURE: 97.5 F

## 2018-06-07 PROCEDURE — 99214 OFFICE O/P EST MOD 30 MIN: CPT

## 2018-06-07 PROCEDURE — 36415 COLL VENOUS BLD VENIPUNCTURE: CPT

## 2018-06-08 ENCOUNTER — APPOINTMENT (OUTPATIENT)
Dept: PULMONOLOGY | Facility: CLINIC | Age: 73
End: 2018-06-08
Payer: MEDICARE

## 2018-06-08 LAB
T4 FREE SERPL-MCNC: 1 NG/DL
TSH SERPL-ACNC: 4.32 UIU/ML

## 2018-06-08 PROCEDURE — 94618 PULMONARY STRESS TESTING: CPT

## 2018-06-11 ENCOUNTER — APPOINTMENT (OUTPATIENT)
Dept: RADIATION ONCOLOGY | Facility: CLINIC | Age: 73
End: 2018-06-11

## 2018-06-11 ENCOUNTER — APPOINTMENT (OUTPATIENT)
Dept: PULMONOLOGY | Facility: CLINIC | Age: 73
End: 2018-06-11
Payer: MEDICARE

## 2018-06-11 VITALS
HEIGHT: 62 IN | TEMPERATURE: 98 F | DIASTOLIC BLOOD PRESSURE: 64 MMHG | SYSTOLIC BLOOD PRESSURE: 100 MMHG | WEIGHT: 152 LBS | BODY MASS INDEX: 27.97 KG/M2 | RESPIRATION RATE: 12 BRPM | HEART RATE: 87 BPM | OXYGEN SATURATION: 98 %

## 2018-06-11 PROCEDURE — 99214 OFFICE O/P EST MOD 30 MIN: CPT

## 2018-06-13 NOTE — ED ADULT NURSE NOTE - GENITOURINARY ASSESSMENT
Glycemic Control Plan of Care    T2DM with A1c of 9.7% (06/10/2018). See separate notes for assessment of home diabetes management and education, 06/13/2018. Patient reported eating multiple amount of [employer] peanut butter cups everyday. Encouraged/emphasized the importance of nutrition compliance and potential complications of uncontrolled diabetes as evidence by elevated A1c level. Patient verbalized understanding and agreed to not eat [employer] peanut butter cups. She will tell her family/friends to help her resist the temptation by not bringing it when they visit her. Also discussed with patient about possible delay wound healing with persistently elevated blood sugar readings. POC BG range on 06/12/2018:  mg/dL. POC BG report on 06/13/2018 at time of review: 187 mg/dL. Recommendation(s):  1.) Changed frequency of basal lantus insulin from BID to every 12 hours. 2.) Modify correctional lispro insulin dose to very resistant with POC BG  Readings > 200 mg/dL within 24 hour period. 3.) Evaluate POC BG trend and add prandial insulin if needed. Assessment:  Patient is 78year old with past medical history including insulin dependent diabetes mellitus, diabetic neuropathy, morbid obesity, CAD, chronic diastolic CHF, asthma, esophageal reflux, hypothyroidism, and CHF - was admitted on 06/10/2018 with report of fall PTA after she slipped on liquid in the kitchen and sustained injury. Noted:  Left distal femur fracture. Status post ORIF of left distal femur fracture on 06/12/2018). Uncontrolled T2DM with current A1c of 9.7% (06/10/2018). Most recent blood glucose values:    Results for Gus Esposito (MRN [id number]) as of 6/13/2018 12:03   Ref.  Range 6/12/2018 06:16 6/12/2018 10:34 6/12/2018 14:36 6/12/2018 15:51 6/12/2018 21:54   GLUCOSE,FAST - POC Latest Ref Range: 70 - 110 mg/dL 108 92 123 (H) 129 (H) 189 (H)     Results for Gus Esposito (MRN [id number]) as of 6/13/2018 12:03   Ref. Range 6/13/2018 06:25   GLUCOSE,FAST - POC Latest Ref Range: 70 - 110 mg/dL 187 (H)     Current A1C: 9.7% (06/10/2018) which is equivalent to estimated average blood glucose of 232 mg/dL during the past 2-3 months. Current hospital diabetes medications:  Basal lantus insulin 30 units every 12 hours. Correctional lispro insulin ACHS. Normal sensitivity dose. Total daily dose insulin requirement previous day: 06/12/2018:  Lantus: 30 units  Correctional lispro insulin ACHS. Normal sensitivity dose. Home diabetes medications: Patient reported on 06/13/2018:  Lantus insulin (pen) twice daily: 30 units daily every morning and 36 units daily at bedtime. Patient also reported taking 20 units of fast acting insulin 3x daily before meals (breakfast, lunch, dinner). She cannot remember the name of insulin and it is not humalog, novolog, or apidra. Patient stated that she does not take the fast acting if she is not eating her meal.    Diet: Diabetic consistent carb regular    Goals:  Blood glucose will be within target range of  mg/dL by 06/16/2018.     Education:  _X__  Refer to Diabetes Education Record: 06/13/2018             ___  Education not indicated at this time    Nae Mackey RN [facility]  Pager: [phone] [facility]

## 2018-06-14 ENCOUNTER — FORM ENCOUNTER (OUTPATIENT)
Age: 73
End: 2018-06-14

## 2018-06-15 ENCOUNTER — APPOINTMENT (OUTPATIENT)
Dept: NEUROSURGERY | Facility: CLINIC | Age: 73
End: 2018-06-15
Payer: MEDICARE

## 2018-06-15 ENCOUNTER — OUTPATIENT (OUTPATIENT)
Dept: OUTPATIENT SERVICES | Facility: HOSPITAL | Age: 73
LOS: 1 days | End: 2018-06-15
Payer: MEDICARE

## 2018-06-15 ENCOUNTER — APPOINTMENT (OUTPATIENT)
Dept: MRI IMAGING | Facility: HOSPITAL | Age: 73
End: 2018-06-15

## 2018-06-15 VITALS
HEIGHT: 62 IN | RESPIRATION RATE: 14 BRPM | TEMPERATURE: 97 F | DIASTOLIC BLOOD PRESSURE: 64 MMHG | BODY MASS INDEX: 28.34 KG/M2 | HEART RATE: 68 BPM | OXYGEN SATURATION: 99 % | SYSTOLIC BLOOD PRESSURE: 103 MMHG | WEIGHT: 154 LBS

## 2018-06-15 DIAGNOSIS — Z98.890 OTHER SPECIFIED POSTPROCEDURAL STATES: Chronic | ICD-10-CM

## 2018-06-15 DIAGNOSIS — C50.919 MALIGNANT NEOPLASM OF UNSPECIFIED SITE OF UNSPECIFIED FEMALE BREAST: Chronic | ICD-10-CM

## 2018-06-15 DIAGNOSIS — Z41.9 ENCOUNTER FOR PROCEDURE FOR PURPOSES OTHER THAN REMEDYING HEALTH STATE, UNSPECIFIED: Chronic | ICD-10-CM

## 2018-06-15 DIAGNOSIS — Z90.49 ACQUIRED ABSENCE OF OTHER SPECIFIED PARTS OF DIGESTIVE TRACT: Chronic | ICD-10-CM

## 2018-06-15 PROCEDURE — 70552 MRI BRAIN STEM W/DYE: CPT

## 2018-06-15 PROCEDURE — A9585: CPT

## 2018-06-15 PROCEDURE — 70552 MRI BRAIN STEM W/DYE: CPT | Mod: 26

## 2018-06-15 PROCEDURE — 99214 OFFICE O/P EST MOD 30 MIN: CPT | Mod: 24

## 2018-06-18 ENCOUNTER — APPOINTMENT (OUTPATIENT)
Dept: RADIATION ONCOLOGY | Facility: CLINIC | Age: 73
End: 2018-06-18
Payer: MEDICARE

## 2018-06-21 ENCOUNTER — APPOINTMENT (OUTPATIENT)
Dept: HEART AND VASCULAR | Facility: CLINIC | Age: 73
End: 2018-06-21
Payer: MEDICARE

## 2018-06-21 VITALS
BODY MASS INDEX: 28.52 KG/M2 | WEIGHT: 155 LBS | HEIGHT: 62 IN | HEART RATE: 87 BPM | SYSTOLIC BLOOD PRESSURE: 108 MMHG | DIASTOLIC BLOOD PRESSURE: 66 MMHG | OXYGEN SATURATION: 98 % | TEMPERATURE: 98.2 F

## 2018-06-21 PROCEDURE — 93000 ELECTROCARDIOGRAM COMPLETE: CPT

## 2018-06-21 PROCEDURE — 99204 OFFICE O/P NEW MOD 45 MIN: CPT | Mod: 25

## 2018-06-21 RX ORDER — ARIPIPRAZOLE 2 MG/1
2 TABLET ORAL
Qty: 14 | Refills: 0 | Status: DISCONTINUED | COMMUNITY
Start: 2018-05-15 | End: 2018-06-21

## 2018-06-21 RX ORDER — AMOXICILLIN AND CLAVULANATE POTASSIUM 875; 125 MG/1; MG/1
875-125 TABLET, COATED ORAL
Qty: 14 | Refills: 0 | Status: DISCONTINUED | COMMUNITY
Start: 2018-02-20 | End: 2018-06-21

## 2018-06-21 RX ORDER — SUCRALFATE 1 G/10ML
1 SUSPENSION ORAL
Qty: 1050 | Refills: 0 | Status: DISCONTINUED | COMMUNITY
Start: 2018-01-19 | End: 2018-06-21

## 2018-06-21 RX ORDER — OSELTAMIVIR PHOSPHATE 75 MG/1
75 CAPSULE ORAL
Qty: 10 | Refills: 0 | Status: DISCONTINUED | COMMUNITY
Start: 2018-01-18 | End: 2018-06-21

## 2018-06-21 RX ORDER — CEPHALEXIN 500 MG/1
500 CAPSULE ORAL
Qty: 14 | Refills: 0 | Status: DISCONTINUED | COMMUNITY
Start: 2018-04-30 | End: 2018-06-21

## 2018-06-21 RX ORDER — LETROZOLE AND RIBOCICLIB 600-2.5 MG
200 & 2.5 KIT ORAL
Qty: 273 | Refills: 0 | Status: DISCONTINUED | COMMUNITY
Start: 2017-12-17 | End: 2018-06-21

## 2018-06-25 ENCOUNTER — APPOINTMENT (OUTPATIENT)
Dept: NEUROSURGERY | Facility: CLINIC | Age: 73
End: 2018-06-25
Payer: MEDICARE

## 2018-06-25 ENCOUNTER — APPOINTMENT (OUTPATIENT)
Dept: NEUROLOGY | Facility: CLINIC | Age: 73
End: 2018-06-25
Payer: MEDICARE

## 2018-06-25 ENCOUNTER — APPOINTMENT (OUTPATIENT)
Dept: INTERNAL MEDICINE | Facility: CLINIC | Age: 73
End: 2018-06-25
Payer: MEDICARE

## 2018-06-25 ENCOUNTER — APPOINTMENT (OUTPATIENT)
Dept: RADIATION ONCOLOGY | Facility: CLINIC | Age: 73
End: 2018-06-25
Payer: MEDICARE

## 2018-06-25 VITALS
DIASTOLIC BLOOD PRESSURE: 64 MMHG | SYSTOLIC BLOOD PRESSURE: 114 MMHG | BODY MASS INDEX: 27.6 KG/M2 | HEIGHT: 62 IN | HEART RATE: 77 BPM | OXYGEN SATURATION: 96 % | WEIGHT: 150 LBS

## 2018-06-25 VITALS
OXYGEN SATURATION: 97 % | HEART RATE: 88 BPM | HEIGHT: 62 IN | DIASTOLIC BLOOD PRESSURE: 74 MMHG | WEIGHT: 150 LBS | BODY MASS INDEX: 27.6 KG/M2 | SYSTOLIC BLOOD PRESSURE: 139 MMHG | TEMPERATURE: 98.1 F

## 2018-06-25 VITALS
HEIGHT: 62 IN | HEART RATE: 79 BPM | RESPIRATION RATE: 14 BRPM | DIASTOLIC BLOOD PRESSURE: 67 MMHG | WEIGHT: 150 LBS | OXYGEN SATURATION: 98 % | SYSTOLIC BLOOD PRESSURE: 103 MMHG | BODY MASS INDEX: 27.6 KG/M2 | TEMPERATURE: 97.9 F

## 2018-06-25 VITALS
WEIGHT: 150 LBS | TEMPERATURE: 97.6 F | HEART RATE: 66 BPM | SYSTOLIC BLOOD PRESSURE: 107 MMHG | RESPIRATION RATE: 16 BRPM | BODY MASS INDEX: 27.6 KG/M2 | OXYGEN SATURATION: 98 % | DIASTOLIC BLOOD PRESSURE: 65 MMHG | HEIGHT: 62 IN

## 2018-06-25 PROCEDURE — 99214 OFFICE O/P EST MOD 30 MIN: CPT | Mod: 24

## 2018-06-25 PROCEDURE — 99215 OFFICE O/P EST HI 40 MIN: CPT | Mod: 25

## 2018-06-25 PROCEDURE — 99212 OFFICE O/P EST SF 10 MIN: CPT

## 2018-06-25 PROCEDURE — 99205 OFFICE O/P NEW HI 60 MIN: CPT

## 2018-06-25 RX ORDER — LEVETIRACETAM 500 MG/1
500 TABLET, FILM COATED ORAL TWICE DAILY
Qty: 60 | Refills: 2 | Status: DISCONTINUED | COMMUNITY
Start: 2018-05-29 | End: 2018-06-25

## 2018-06-25 RX ORDER — TRIAMCINOLONE ACETONIDE 1 MG/G
0.1 CREAM TOPICAL
Qty: 30 | Refills: 0 | Status: DISCONTINUED | COMMUNITY
Start: 2018-05-20 | End: 2018-06-25

## 2018-06-25 RX ORDER — ALBUTEROL SULFATE 90 UG/1
108 (90 BASE) AEROSOL, METERED RESPIRATORY (INHALATION)
Qty: 1 | Refills: 3 | Status: DISCONTINUED | COMMUNITY
Start: 2017-06-22 | End: 2018-06-25

## 2018-06-25 NOTE — HISTORY OF PRESENT ILLNESS
[FreeTextEntry1] : Interim reviewed; All consultants notes read; To see medical oncologist;  Question weather the abdominal tumor is  related to brain lesion;

## 2018-06-25 NOTE — ASSESSMENT
[FreeTextEntry1] : All consultants notes read. Will need RT. Also will try to reach Medical oncologist to discuss and further treatment and whether Chemotherapy will add to the effectiveness of the RT. Medical Oncologist is Dr. Lee;  Have  left message with him today

## 2018-07-09 PROCEDURE — 88304 TISSUE EXAM BY PATHOLOGIST: CPT

## 2018-07-09 PROCEDURE — 77334 RADIATION TREATMENT AID(S): CPT

## 2018-07-09 PROCEDURE — 77290 THER RAD SIMULAJ FIELD CPLX: CPT

## 2018-07-09 PROCEDURE — 84100 ASSAY OF PHOSPHORUS: CPT

## 2018-07-09 PROCEDURE — 83735 ASSAY OF MAGNESIUM: CPT

## 2018-07-09 PROCEDURE — 80053 COMPREHEN METABOLIC PANEL: CPT

## 2018-07-09 PROCEDURE — 86850 RBC ANTIBODY SCREEN: CPT

## 2018-07-09 PROCEDURE — 88309 TISSUE EXAM BY PATHOLOGIST: CPT

## 2018-07-09 PROCEDURE — 36415 COLL VENOUS BLD VENIPUNCTURE: CPT

## 2018-07-09 PROCEDURE — 86900 BLOOD TYPING SEROLOGIC ABO: CPT

## 2018-07-09 PROCEDURE — 80048 BASIC METABOLIC PNL TOTAL CA: CPT

## 2018-07-09 PROCEDURE — 86901 BLOOD TYPING SEROLOGIC RH(D): CPT

## 2018-07-09 PROCEDURE — 88360 TUMOR IMMUNOHISTOCHEM/MANUAL: CPT

## 2018-07-09 PROCEDURE — 85027 COMPLETE CBC AUTOMATED: CPT

## 2018-07-11 ENCOUNTER — APPOINTMENT (OUTPATIENT)
Dept: INTERNAL MEDICINE | Facility: CLINIC | Age: 73
End: 2018-07-11

## 2018-07-24 PROBLEM — Z78.9 ALCOHOL USE: Status: ACTIVE | Noted: 2017-03-31

## 2018-07-25 PROBLEM — J98.19 OTHER PULMONARY COLLAPSE: Chronic | Status: ACTIVE | Noted: 2017-04-06

## 2018-08-14 RX ORDER — ALBUTEROL SULFATE 2.5 MG/3ML
(2.5 MG/3ML) SOLUTION RESPIRATORY (INHALATION)
Qty: 300 | Refills: 3 | Status: DISCONTINUED | COMMUNITY
Start: 2017-11-09 | End: 2018-08-14

## 2018-08-17 ENCOUNTER — APPOINTMENT (OUTPATIENT)
Dept: INTERNAL MEDICINE | Facility: CLINIC | Age: 73
End: 2018-08-17

## 2018-08-20 ENCOUNTER — APPOINTMENT (OUTPATIENT)
Dept: PULMONOLOGY | Facility: CLINIC | Age: 73
End: 2018-08-20
Payer: MEDICARE

## 2018-08-20 ENCOUNTER — APPOINTMENT (OUTPATIENT)
Dept: RADIATION ONCOLOGY | Facility: CLINIC | Age: 73
End: 2018-08-20
Payer: MEDICARE

## 2018-08-20 ENCOUNTER — APPOINTMENT (OUTPATIENT)
Dept: INTERNAL MEDICINE | Facility: CLINIC | Age: 73
End: 2018-08-20
Payer: MEDICARE

## 2018-08-20 VITALS
OXYGEN SATURATION: 97 % | DIASTOLIC BLOOD PRESSURE: 63 MMHG | SYSTOLIC BLOOD PRESSURE: 105 MMHG | WEIGHT: 158 LBS | HEART RATE: 97 BPM | BODY MASS INDEX: 28.9 KG/M2 | RESPIRATION RATE: 18 BRPM

## 2018-08-20 VITALS
HEART RATE: 80 BPM | DIASTOLIC BLOOD PRESSURE: 52 MMHG | TEMPERATURE: 98 F | SYSTOLIC BLOOD PRESSURE: 111 MMHG | OXYGEN SATURATION: 97 % | WEIGHT: 158 LBS | BODY MASS INDEX: 29.08 KG/M2 | HEIGHT: 62 IN

## 2018-08-20 PROCEDURE — 99213 OFFICE O/P EST LOW 20 MIN: CPT

## 2018-08-20 PROCEDURE — 99024 POSTOP FOLLOW-UP VISIT: CPT

## 2018-08-20 PROCEDURE — 99214 OFFICE O/P EST MOD 30 MIN: CPT

## 2018-08-20 NOTE — HISTORY OF PRESENT ILLNESS
[FreeTextEntry1] : Interim reviewed; All  notes read; Feels scattered;  [de-identified] : As above; To small dose of Decadron. ; Energy level poor; Eating well

## 2018-08-20 NOTE — ASSESSMENT
[FreeTextEntry1] : Stable except for some scattered thoughts. Decadron ; Will see next again in 6 weeks;

## 2018-08-20 NOTE — PHYSICAL EXAM
[Normal Sclera/Conjunctiva] : normal sclera/conjunctiva [PERRL] : pupils equal round and reactive to light [EOMI] : extraocular movements intact [Normal Outer Ear/Nose] : the outer ears and nose were normal in appearance [Normal Oropharynx] : the oropharynx was normal [Normal TMs] : both tympanic membranes were normal [Normal Nasal Mucosa] : the nasal mucosa was normal [No JVD] : no jugular venous distention [Supple] : supple [Thyroid Normal, No Nodules] : the thyroid was normal and there were no nodules present [No Respiratory Distress] : no respiratory distress  [Clear to Auscultation] : lungs were clear to auscultation bilaterally [No Accessory Muscle Use] : no accessory muscle use [Normal Percussion] : the chest was normal to percussion [Normal Rate] : normal rate  [Regular Rhythm] : with a regular rhythm [Normal S1, S2] : normal S1 and S2 [No Murmur] : no murmur heard [Soft] : abdomen soft [Non Tender] : non-tender [No HSM] : no HSM [de-identified] : Alopecia from RT

## 2018-08-21 ENCOUNTER — APPOINTMENT (OUTPATIENT)
Dept: GASTROENTEROLOGY | Facility: CLINIC | Age: 73
End: 2018-08-21
Payer: MEDICARE

## 2018-08-21 VITALS
HEART RATE: 86 BPM | WEIGHT: 155 LBS | SYSTOLIC BLOOD PRESSURE: 110 MMHG | OXYGEN SATURATION: 96 % | DIASTOLIC BLOOD PRESSURE: 68 MMHG | BODY MASS INDEX: 28.35 KG/M2 | RESPIRATION RATE: 16 BRPM

## 2018-08-21 DIAGNOSIS — Z85.118 PERSONAL HISTORY OF OTHER MALIGNANT NEOPLASM OF BRONCHUS AND LUNG: ICD-10-CM

## 2018-08-21 DIAGNOSIS — Z85.3 PERSONAL HISTORY OF MALIGNANT NEOPLASM OF BREAST: ICD-10-CM

## 2018-08-21 DIAGNOSIS — D3A.00 BENIGN CARCINOID TUMOR OF UNSPECIFIED SITE: ICD-10-CM

## 2018-08-21 DIAGNOSIS — Z85.05 PERSONAL HISTORY OF MALIGNANT NEOPLASM OF LIVER: ICD-10-CM

## 2018-08-21 DIAGNOSIS — Z87.898 PERSONAL HISTORY OF OTHER SPECIFIED CONDITIONS: ICD-10-CM

## 2018-08-21 DIAGNOSIS — Z80.3 FAMILY HISTORY OF MALIGNANT NEOPLASM OF BREAST: ICD-10-CM

## 2018-08-21 DIAGNOSIS — Z82.49 FAMILY HISTORY OF ISCHEMIC HEART DISEASE AND OTHER DISEASES OF THE CIRCULATORY SYSTEM: ICD-10-CM

## 2018-08-21 PROCEDURE — 99214 OFFICE O/P EST MOD 30 MIN: CPT

## 2018-08-23 RX ORDER — IPRATROPIUM BROMIDE AND ALBUTEROL SULFATE 2.5; .5 MG/3ML; MG/3ML
0.5-2.5 (3) SOLUTION RESPIRATORY (INHALATION)
Qty: 1 | Refills: 0 | Status: DISCONTINUED | COMMUNITY
Start: 2018-08-14 | End: 2018-08-23

## 2018-09-07 ENCOUNTER — APPOINTMENT (OUTPATIENT)
Dept: NEUROSURGERY | Facility: CLINIC | Age: 73
End: 2018-09-07
Payer: MEDICARE

## 2018-09-07 VITALS
OXYGEN SATURATION: 96 % | WEIGHT: 155 LBS | HEART RATE: 81 BPM | DIASTOLIC BLOOD PRESSURE: 73 MMHG | BODY MASS INDEX: 28.52 KG/M2 | HEIGHT: 62 IN | SYSTOLIC BLOOD PRESSURE: 120 MMHG | RESPIRATION RATE: 18 BRPM | TEMPERATURE: 98.9 F

## 2018-09-07 PROCEDURE — 99214 OFFICE O/P EST MOD 30 MIN: CPT

## 2018-09-09 ENCOUNTER — FORM ENCOUNTER (OUTPATIENT)
Age: 73
End: 2018-09-09

## 2018-09-10 ENCOUNTER — APPOINTMENT (OUTPATIENT)
Dept: INTERNAL MEDICINE | Facility: CLINIC | Age: 73
End: 2018-09-10
Payer: MEDICARE

## 2018-09-10 ENCOUNTER — OUTPATIENT (OUTPATIENT)
Dept: OUTPATIENT SERVICES | Facility: HOSPITAL | Age: 73
LOS: 1 days | End: 2018-09-10
Payer: MEDICARE

## 2018-09-10 VITALS
TEMPERATURE: 97.7 F | HEIGHT: 62 IN | OXYGEN SATURATION: 95 % | HEART RATE: 77 BPM | DIASTOLIC BLOOD PRESSURE: 72 MMHG | BODY MASS INDEX: 29.63 KG/M2 | WEIGHT: 161 LBS | SYSTOLIC BLOOD PRESSURE: 149 MMHG

## 2018-09-10 DIAGNOSIS — Z41.9 ENCOUNTER FOR PROCEDURE FOR PURPOSES OTHER THAN REMEDYING HEALTH STATE, UNSPECIFIED: Chronic | ICD-10-CM

## 2018-09-10 DIAGNOSIS — Z98.890 OTHER SPECIFIED POSTPROCEDURAL STATES: Chronic | ICD-10-CM

## 2018-09-10 DIAGNOSIS — Z03.89 ENCOUNTER FOR OBSERVATION FOR OTHER SUSPECTED DISEASES AND CONDITIONS RULED OUT: ICD-10-CM

## 2018-09-10 DIAGNOSIS — C50.919 MALIGNANT NEOPLASM OF UNSPECIFIED SITE OF UNSPECIFIED FEMALE BREAST: Chronic | ICD-10-CM

## 2018-09-10 DIAGNOSIS — Z90.49 ACQUIRED ABSENCE OF OTHER SPECIFIED PARTS OF DIGESTIVE TRACT: Chronic | ICD-10-CM

## 2018-09-10 PROCEDURE — 99213 OFFICE O/P EST LOW 20 MIN: CPT

## 2018-09-10 PROCEDURE — 74019 RADEX ABDOMEN 2 VIEWS: CPT | Mod: 26

## 2018-09-10 PROCEDURE — 74019 RADEX ABDOMEN 2 VIEWS: CPT

## 2018-09-10 NOTE — HISTORY OF PRESENT ILLNESS
[FreeTextEntry1] : Chief complaint Nausea an vomiting. No abdominal pain;  [de-identified] : As above; Finished course of RT.

## 2018-09-14 ENCOUNTER — INPATIENT (INPATIENT)
Facility: HOSPITAL | Age: 73
LOS: 9 days | Discharge: HOME | DRG: 54 | End: 2018-09-24
Attending: INTERNAL MEDICINE | Admitting: INTERNAL MEDICINE
Payer: MEDICARE

## 2018-09-14 VITALS
RESPIRATION RATE: 18 BRPM | DIASTOLIC BLOOD PRESSURE: 73 MMHG | HEART RATE: 72 BPM | SYSTOLIC BLOOD PRESSURE: 138 MMHG | OXYGEN SATURATION: 98 % | TEMPERATURE: 98 F | WEIGHT: 162.26 LBS

## 2018-09-14 DIAGNOSIS — C34.90 MALIGNANT NEOPLASM OF UNSPECIFIED PART OF UNSPECIFIED BRONCHUS OR LUNG: ICD-10-CM

## 2018-09-14 DIAGNOSIS — F41.9 ANXIETY DISORDER, UNSPECIFIED: ICD-10-CM

## 2018-09-14 DIAGNOSIS — Z98.890 OTHER SPECIFIED POSTPROCEDURAL STATES: Chronic | ICD-10-CM

## 2018-09-14 DIAGNOSIS — Z90.49 ACQUIRED ABSENCE OF OTHER SPECIFIED PARTS OF DIGESTIVE TRACT: Chronic | ICD-10-CM

## 2018-09-14 DIAGNOSIS — C50.919 MALIGNANT NEOPLASM OF UNSPECIFIED SITE OF UNSPECIFIED FEMALE BREAST: ICD-10-CM

## 2018-09-14 DIAGNOSIS — R11.0 NAUSEA: ICD-10-CM

## 2018-09-14 DIAGNOSIS — D3A.00 BENIGN CARCINOID TUMOR OF UNSPECIFIED SITE: ICD-10-CM

## 2018-09-14 DIAGNOSIS — Z41.9 ENCOUNTER FOR PROCEDURE FOR PURPOSES OTHER THAN REMEDYING HEALTH STATE, UNSPECIFIED: Chronic | ICD-10-CM

## 2018-09-14 DIAGNOSIS — D49.6 NEOPLASM OF UNSPECIFIED BEHAVIOR OF BRAIN: ICD-10-CM

## 2018-09-14 DIAGNOSIS — J44.9 CHRONIC OBSTRUCTIVE PULMONARY DISEASE, UNSPECIFIED: ICD-10-CM

## 2018-09-14 DIAGNOSIS — G93.6 CEREBRAL EDEMA: ICD-10-CM

## 2018-09-14 DIAGNOSIS — Z91.89 OTHER SPECIFIED PERSONAL RISK FACTORS, NOT ELSEWHERE CLASSIFIED: ICD-10-CM

## 2018-09-14 DIAGNOSIS — C50.919 MALIGNANT NEOPLASM OF UNSPECIFIED SITE OF UNSPECIFIED FEMALE BREAST: Chronic | ICD-10-CM

## 2018-09-14 LAB
ALBUMIN SERPL ELPH-MCNC: 4.2 G/DL — SIGNIFICANT CHANGE UP (ref 3.3–5)
ALP SERPL-CCNC: 58 U/L — SIGNIFICANT CHANGE UP (ref 40–120)
ALT FLD-CCNC: 20 U/L — SIGNIFICANT CHANGE UP (ref 10–45)
ANION GAP SERPL CALC-SCNC: 13 MMOL/L — SIGNIFICANT CHANGE UP (ref 5–17)
AST SERPL-CCNC: 24 U/L — SIGNIFICANT CHANGE UP (ref 10–40)
BASOPHILS NFR BLD AUTO: 0.2 % — SIGNIFICANT CHANGE UP (ref 0–2)
BILIRUB SERPL-MCNC: 0.5 MG/DL — SIGNIFICANT CHANGE UP (ref 0.2–1.2)
BUN SERPL-MCNC: 16 MG/DL — SIGNIFICANT CHANGE UP (ref 7–23)
CALCIUM SERPL-MCNC: 10.3 MG/DL — SIGNIFICANT CHANGE UP (ref 8.4–10.5)
CHLORIDE SERPL-SCNC: 94 MMOL/L — LOW (ref 96–108)
CO2 SERPL-SCNC: 28 MMOL/L — SIGNIFICANT CHANGE UP (ref 22–31)
CREAT SERPL-MCNC: 0.77 MG/DL — SIGNIFICANT CHANGE UP (ref 0.5–1.3)
EOSINOPHIL NFR BLD AUTO: 1.3 % — SIGNIFICANT CHANGE UP (ref 0–6)
EXTRA BLUE TOP TUBE: SIGNIFICANT CHANGE UP
GLUCOSE SERPL-MCNC: 98 MG/DL — SIGNIFICANT CHANGE UP (ref 70–99)
HCT VFR BLD CALC: 36.5 % — SIGNIFICANT CHANGE UP (ref 34.5–45)
HGB BLD-MCNC: 12.1 G/DL — SIGNIFICANT CHANGE UP (ref 11.5–15.5)
LYMPHOCYTES # BLD AUTO: 17.9 % — SIGNIFICANT CHANGE UP (ref 13–44)
MCHC RBC-ENTMCNC: 30.2 PG — SIGNIFICANT CHANGE UP (ref 27–34)
MCHC RBC-ENTMCNC: 33.2 G/DL — SIGNIFICANT CHANGE UP (ref 32–36)
MCV RBC AUTO: 91 FL — SIGNIFICANT CHANGE UP (ref 80–100)
MONOCYTES NFR BLD AUTO: 9.2 % — SIGNIFICANT CHANGE UP (ref 2–14)
NEUTROPHILS NFR BLD AUTO: 71.4 % — SIGNIFICANT CHANGE UP (ref 43–77)
PLATELET # BLD AUTO: 278 K/UL — SIGNIFICANT CHANGE UP (ref 150–400)
POTASSIUM SERPL-MCNC: 3.9 MMOL/L — SIGNIFICANT CHANGE UP (ref 3.5–5.3)
POTASSIUM SERPL-SCNC: 3.9 MMOL/L — SIGNIFICANT CHANGE UP (ref 3.5–5.3)
PROT SERPL-MCNC: 6.9 G/DL — SIGNIFICANT CHANGE UP (ref 6–8.3)
RBC # BLD: 4.01 M/UL — SIGNIFICANT CHANGE UP (ref 3.8–5.2)
RBC # FLD: 13.7 % — SIGNIFICANT CHANGE UP (ref 10.3–16.9)
SODIUM SERPL-SCNC: 135 MMOL/L — SIGNIFICANT CHANGE UP (ref 135–145)
WBC # BLD: 9.2 K/UL — SIGNIFICANT CHANGE UP (ref 3.8–10.5)
WBC # FLD AUTO: 9.2 K/UL — SIGNIFICANT CHANGE UP (ref 3.8–10.5)

## 2018-09-14 PROCEDURE — 99222 1ST HOSP IP/OBS MODERATE 55: CPT | Mod: GC

## 2018-09-14 PROCEDURE — 70450 CT HEAD/BRAIN W/O DYE: CPT | Mod: 26

## 2018-09-14 PROCEDURE — 93010 ELECTROCARDIOGRAM REPORT: CPT

## 2018-09-14 PROCEDURE — 99291 CRITICAL CARE FIRST HOUR: CPT

## 2018-09-14 RX ORDER — FLUTICASONE PROPIONATE 50 MCG
1 SPRAY, SUSPENSION NASAL
Qty: 0 | Refills: 0 | Status: DISCONTINUED | OUTPATIENT
Start: 2018-09-14 | End: 2018-09-24

## 2018-09-14 RX ORDER — LANOLIN ALCOHOL/MO/W.PET/CERES
3 CREAM (GRAM) TOPICAL AT BEDTIME
Qty: 0 | Refills: 0 | Status: DISCONTINUED | OUTPATIENT
Start: 2018-09-14 | End: 2018-09-14

## 2018-09-14 RX ORDER — ASPIRIN/CALCIUM CARB/MAGNESIUM 324 MG
81 TABLET ORAL DAILY
Qty: 0 | Refills: 0 | Status: DISCONTINUED | OUTPATIENT
Start: 2018-09-14 | End: 2018-09-14

## 2018-09-14 RX ORDER — ONDANSETRON 8 MG/1
4 TABLET, FILM COATED ORAL ONCE
Qty: 0 | Refills: 0 | Status: COMPLETED | OUTPATIENT
Start: 2018-09-14 | End: 2018-09-14

## 2018-09-14 RX ORDER — INFLUENZA VIRUS VACCINE 15; 15; 15; 15 UG/.5ML; UG/.5ML; UG/.5ML; UG/.5ML
0.5 SUSPENSION INTRAMUSCULAR ONCE
Qty: 0 | Refills: 0 | Status: COMPLETED | OUTPATIENT
Start: 2018-09-14 | End: 2018-09-24

## 2018-09-14 RX ORDER — ENOXAPARIN SODIUM 100 MG/ML
40 INJECTION SUBCUTANEOUS DAILY
Qty: 0 | Refills: 0 | Status: DISCONTINUED | OUTPATIENT
Start: 2018-09-14 | End: 2018-09-14

## 2018-09-14 RX ORDER — DEXAMETHASONE 0.5 MG/5ML
4 ELIXIR ORAL
Qty: 0 | Refills: 0 | Status: DISCONTINUED | OUTPATIENT
Start: 2018-09-14 | End: 2018-09-17

## 2018-09-14 RX ORDER — ESCITALOPRAM OXALATE 10 MG/1
20 TABLET, FILM COATED ORAL DAILY
Qty: 0 | Refills: 0 | Status: DISCONTINUED | OUTPATIENT
Start: 2018-09-14 | End: 2018-09-14

## 2018-09-14 RX ORDER — RANITIDINE HYDROCHLORIDE 150 MG/1
1 TABLET, FILM COATED ORAL
Qty: 0 | Refills: 0 | COMMUNITY

## 2018-09-14 RX ORDER — ALPRAZOLAM 0.25 MG
1 TABLET ORAL AT BEDTIME
Qty: 0 | Refills: 0 | Status: DISCONTINUED | OUTPATIENT
Start: 2018-09-14 | End: 2018-09-21

## 2018-09-14 RX ORDER — SODIUM CHLORIDE 9 MG/ML
500 INJECTION INTRAMUSCULAR; INTRAVENOUS; SUBCUTANEOUS ONCE
Qty: 0 | Refills: 0 | Status: COMPLETED | OUTPATIENT
Start: 2018-09-14 | End: 2018-09-14

## 2018-09-14 RX ORDER — FOLIC ACID/VIT B COMPLEX AND C 400 MCG
2 TABLET ORAL
Qty: 0 | Refills: 0 | COMMUNITY

## 2018-09-14 RX ORDER — ACETAMINOPHEN 500 MG
2 TABLET ORAL
Qty: 0 | Refills: 0 | COMMUNITY

## 2018-09-14 RX ORDER — MONTELUKAST 4 MG/1
10 TABLET, CHEWABLE ORAL DAILY
Qty: 0 | Refills: 0 | Status: DISCONTINUED | OUTPATIENT
Start: 2018-09-14 | End: 2018-09-24

## 2018-09-14 RX ORDER — ASPIRIN/CALCIUM CARB/MAGNESIUM 324 MG
81 TABLET ORAL DAILY
Qty: 0 | Refills: 0 | Status: DISCONTINUED | OUTPATIENT
Start: 2018-09-15 | End: 2018-09-24

## 2018-09-14 RX ORDER — ESCITALOPRAM OXALATE 10 MG/1
20 TABLET, FILM COATED ORAL DAILY
Qty: 0 | Refills: 0 | Status: DISCONTINUED | OUTPATIENT
Start: 2018-09-15 | End: 2018-09-24

## 2018-09-14 RX ORDER — LETROZOLE 2.5 MG/1
2.5 TABLET, FILM COATED ORAL DAILY
Qty: 0 | Refills: 0 | Status: DISCONTINUED | OUTPATIENT
Start: 2018-09-14 | End: 2018-09-24

## 2018-09-14 RX ORDER — LETROZOLE 2.5 MG/1
1 TABLET, FILM COATED ORAL
Qty: 0 | Refills: 0 | COMMUNITY

## 2018-09-14 RX ORDER — ONDANSETRON 8 MG/1
4 TABLET, FILM COATED ORAL EVERY 6 HOURS
Qty: 0 | Refills: 0 | Status: DISCONTINUED | OUTPATIENT
Start: 2018-09-14 | End: 2018-09-20

## 2018-09-14 RX ORDER — ONDANSETRON 8 MG/1
8 TABLET, FILM COATED ORAL ONCE
Qty: 0 | Refills: 0 | Status: COMPLETED | OUTPATIENT
Start: 2018-09-14 | End: 2018-09-14

## 2018-09-14 RX ORDER — ALBUTEROL 90 UG/1
2 AEROSOL, METERED ORAL EVERY 6 HOURS
Qty: 0 | Refills: 0 | Status: DISCONTINUED | OUTPATIENT
Start: 2018-09-14 | End: 2018-09-24

## 2018-09-14 RX ADMIN — MONTELUKAST 10 MILLIGRAM(S): 4 TABLET, CHEWABLE ORAL at 19:27

## 2018-09-14 RX ADMIN — ONDANSETRON 4 MILLIGRAM(S): 8 TABLET, FILM COATED ORAL at 18:17

## 2018-09-14 RX ADMIN — SODIUM CHLORIDE 1000 MILLILITER(S): 9 INJECTION INTRAMUSCULAR; INTRAVENOUS; SUBCUTANEOUS at 11:11

## 2018-09-14 RX ADMIN — Medication 1 MILLIGRAM(S): at 21:49

## 2018-09-14 RX ADMIN — Medication 1 SPRAY(S): at 19:29

## 2018-09-14 RX ADMIN — ONDANSETRON 8 MILLIGRAM(S): 8 TABLET, FILM COATED ORAL at 22:22

## 2018-09-14 RX ADMIN — Medication 4 MILLIGRAM(S): at 14:22

## 2018-09-14 RX ADMIN — ONDANSETRON 4 MILLIGRAM(S): 8 TABLET, FILM COATED ORAL at 10:27

## 2018-09-14 NOTE — H&P ADULT - NSHPLABSRESULTS_GEN_ALL_CORE
.  LABS:                         12.1   9.2   )-----------( 278      ( 14 Sep 2018 10:26 )             36.5     09-14    135  |  94<L>  |  16  ----------------------------<  98  3.9   |  28  |  0.77    Ca    10.3      14 Sep 2018 10:26    TPro  6.9  /  Alb  4.2  /  TBili  0.5  /  DBili  x   /  AST  24  /  ALT  20  /  AlkPhos  58  09-14    CT head w/o contr 9-14-18  IMPRESSION:     In this patient with previously treated right frontal brain tumor, there   is development of mass effect and extensive right frontal, basal ganglia   and anterior callosal lucency. Mild leftward midline shift. Possible   focus of recent intratumoral hemorrhage.    Findings are worrisome for tumoral progression and contrast-enhanced MRI   is advised.

## 2018-09-14 NOTE — H&P ADULT - PROBLEM SELECTOR PLAN 2
-Prior radiation in July 2018 due to recurrence.   -CT head w/o contrast shows mass effect and extensive right frontal, basal ganglia   and anterior callosal lucency. Mild leftward midline shift.   -currently no signs or symptoms of brain hernia, no signs of Cushings triad  -continue monitoring neurologic exams, neurosurgery consult if worrisome SS  -MRI head w/ w/o contrast  -decadron 4mg Q6

## 2018-09-14 NOTE — H&P ADULT - PMH
Asthma    Brain tumor    Carcinoid tumor  abdomen  Cerebral edema    COPD (chronic obstructive pulmonary disease)    GERD (gastroesophageal reflux disease)    Liver cancer  left breast cancer spread to liver and chest wall  Lung collapse  2x  Malignant neoplasm of bronchus and lung, unspecified site  RUL Lobectomy, RLL wedge resection  Malignant neoplasm of female breast  Left breast CA with liver mets

## 2018-09-14 NOTE — H&P ADULT - PROBLEM SELECTOR PLAN 4
-history of neuroendocrine tumor of lung, post upper and mid b/l lung lobe resection  -continue management as above.

## 2018-09-14 NOTE — ED ADULT TRIAGE NOTE - CHIEF COMPLAINT QUOTE
radiation last Friday and has been having nausea since even with medciation - sent ini by Geoff ZUNIGA

## 2018-09-14 NOTE — ED PROVIDER NOTE - PROGRESS NOTE DETAILS
pt clinically stable. no acute changes CT head read shows mass effect and mild shift. Dr. Tellez states he will call Dr. Muniz now and get back to me w/ next plan fo care. Pt clinically stable. Dr Tellez accepted to his service and is coordinating w/ NSG, recommends IV steroids 4 q6hr and MRI

## 2018-09-14 NOTE — ED PROVIDER NOTE - OBJECTIVE STATEMENT
pmh brain tumor, lung tumor, and colon polyps, s/p radiation last in July p/w nausea since friday, no vomiting, despite taking decadron and reglan since friday, sent by her PMD Dr. Chantel Tellez for eval. Pt is tolerating PO but feeling nausea constantly without relief w/ reglan. No black or bloody stool, no chest or abdominal pain, no fever, no nausea. Had a few loose stools but no diarrhea.

## 2018-09-14 NOTE — H&P ADULT - NSHPOUTPATIENTPROVIDERS_GEN_ALL_CORE
Patient follows Dr. Delfin Davis oncologist, Dr. Chantel Tellez pulmonary, and neurosurgery as well.

## 2018-09-14 NOTE — H&P ADULT - NSHPPHYSICALEXAM_GEN_ALL_CORE
PHYSICAL EXAM:  Constitutional: alert and oriented times 3, well nourished  Eyes: EOMI, no scleral icterus, no erythema  ENMT: Hearing grossly intact b/l  no pharyngeal erythema  uvula midline  Neck: supple, no lymphadenopathy, no jvd  Back: no signs of trauma or ecchymosis  Respiratory: CTAB slightly decreased breath sounds b/l upper lobes  no wheezes, no crackles  Cardiovascular: RRR normal S1 S2  Gastrointestinal: soft, NDNT, bowel sounds present all four quadrants  Extremities:  Vascular:  Neurological:  Skin:  Lymph Nodes:  Musculoskeletal:  Psychiatric: PHYSICAL EXAM:  Constitutional: alert and oriented times 3, well nourished  Eyes: EOMI, no scleral icterus, no erythema  ENMT: Hearing grossly intact b/l  no pharyngeal erythema  uvula midline  Neck: supple, no lymphadenopathy, no jvd  Back: no signs of trauma or ecchymosis  Respiratory: CTAB slightly decreased breath sounds b/l upper lobes  no wheezes, no crackles  Cardiovascular: RRR normal S1 S2  Gastrointestinal: soft, NDNT, bowel sounds present all four quadrants  Extremities: no edema noted  Vascular: 2+ radial pulses b/l and 2+ dorsalis pedis pulses b/l  Neurological:   Alert and oriented times 3  EOMI, PERRLA present  left sided facial droop otherwise CN 2-12 intact  5/5 upper and lower extremity strength  sensation grossly intact upper and lower extremities  decreased vibratory sensation on upper and lower extremities  2+ patellar, dorsalis pedis, and triceps reflex  able to walk in a straight line, slight deviation toward left side.  Psychiatric: appropriate mood and affect

## 2018-09-14 NOTE — H&P ADULT - PROBLEM SELECTOR PLAN 6
-lexapro 20 mg daily     #insomnia  -patient takes melatonin sublingual dissolving tablets at home  -unable to use her home dose due to not being in appropriate label for pharmacy  -patient does not want to take oral melatonin   -xanax 0.5 mg two pills prn insomnia at [facility].

## 2018-09-14 NOTE — H&P ADULT - PROBLEM SELECTOR PLAN 3
-pt is on home femara 2.5 mg daily  -will need special script from heme/onc or patient needs to bring own in a bottle cap with clear labels in order to continue  -ok to hold inpatient for now.

## 2018-09-14 NOTE — ED ADULT NURSE NOTE - NSIMPLEMENTINTERV_GEN_ALL_ED
Implemented All Universal Safety Interventions:  [place] to call system. Call bell, personal items and telephone within reach. Instruct patient to call for assistance. Room bathroom lighting operational. Non-slip footwear when patient is off stretcher. Physically safe environment: no spills, clutter or unnecessary equipment. Stretcher in lowest position, wheels locked, appropriate side rails in place.

## 2018-09-14 NOTE — ED PROVIDER NOTE - MEDICAL DECISION MAKING DETAILS
nausea, no vomiting, no pain since friday, depsite reglan /w/ a headache this weekend. Also taking decadron. Sent in by PMD for eval. Exam benign. nausea, no vomiting, no pain since friday, depsite reglan /w/ a headache this weekend. Also taking decadron. Sent in by PMD for eval. Exam benign. CT head shows some mass effect and midline shift. Discussed w/ Dr. Tellez who is contacting Dr. Muniz. nausea, no vomiting, no pain since friday, depsite reglan /w/ a headache this weekend. Also taking decadron. Sent in by PMD for eval. Exam benign. CT head shows some mass effect and midline shift. Discussed w/ Dr. Tellez who is contacting Dr. Muniz. Dr. Tellez accepted pt to his service. nausea, no vomiting, no pain since friday, depsite reglan /w/ a headache this weekend. Also taking decadron. Sent in by PMD for eval. Exam benign. CT head shows some mass effect and midline shift with possible intratumor blood collectoin. Discussed w/ Dr. Tellez who is contacting Dr. Muniz. Dr. Tellez accepted pt to his service.

## 2018-09-14 NOTE — H&P ADULT - PROBLEM SELECTOR PLAN 1
-likely 2/2 to cerebral edema  -reglan and  -Patient presented due to nausea for the past week  -c/w zofran 4 mg Q6 hrs prn nausea. EKG does not show prolonged QTc.

## 2018-09-14 NOTE — ED ADULT NURSE NOTE - CHPI ED NUR SYMPTOMS NEG
no blood in stool/no burning urination/no chills/no vomiting/no hematuria/no fever/no dysuria/no diarrhea/no abdominal distension

## 2018-09-14 NOTE — ED PROVIDER NOTE - CRITICAL CARE PROVIDED
interpretation of diagnostic studies/documentation/direct patient care (not related to procedure)/additional history taking/consultation with other physicians

## 2018-09-14 NOTE — H&P ADULT - PROBLEM SELECTOR PLAN 7
-c/w home stiolto 2 puffs once a day  -proair 2 puffs Q6 prn as needed  -singulair 10 mg daily,   -flonase 2 sprays each nose daily

## 2018-09-14 NOTE — H&P ADULT - HISTORY OF PRESENT ILLNESS
Patient is a 73 y/o F w/ pmhx of COPD, breast cancer (post resection 1990, recurrence 2008 radiation, then mets to liver post ablation currently on femara 2.5 mg daily), neuroendocrine lung cancer Patient is a 71 y/o f w/ pmhx of COPD, breast cancer post resection 1990, recurrence 2014 to liver post ablation, 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 CT scan of head found development of mass effect and extensive right frontal, basal ganglia and anterior callosal lucency. Mild leftward midline shift. Patient has had nausea present for the past week, worsened on Friday. She feels retching type feeling. Had only one episode of vomiting on Friday of clear material. She received reglan and decadron that did not help the nausea. She also states she has had issues with balance for the past week, without loss of consciousness. Denies fevers, chills, sob, cp, abdominal pain, diarrhea, or changes in urine. Denies changes in weight. Able to tolerate oral intake.    In the ED vitals 98.2, 72, 138/73, 18, 98%   patient received 4 mg zofran which resolved her nausea completely and 500 cc fluid.     CT head w/o contrast showed In this patient with previously treated right frontal brain tumor, there is development of mass effect and extensive right frontal, basal ganglia and anterior callosal lucency. Mild leftward midline shift. Possible   focus of recent intratumoral hemorrhage. 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 CT scan of head found development of mass effect and extensive right frontal, basal ganglia and anterior callosal lucency. Mild leftward midline shift. Patient has had nausea present for the past week, worsened on Friday. She feels retching type feeling. Had only one episode of vomiting on Friday of clear material. She received reglan and decadron that did not help the nausea. 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 She had xray abdomen 4 days ago that was unremarkable for acute changes. Endoscopy one year ago was unremarkable for upper GI pathology at that time.     She also states she has had issues with balance for the past week, without loss of consciousness. Denies fevers, chills, sob, cp, abdominal pain, diarrhea, or changes in urine. Denies changes in weight. Able to tolerate oral intake.    In the ED vitals 98.2, 72, 138/73, 18, 98%   patient received 4 mg zofran which resolved her nausea completely and 500 cc fluid.     CT head w/o contrast showed In this patient with previously treated right frontal brain tumor, there is development of mass effect and extensive right frontal, basal ganglia and anterior callosal lucency. Mild leftward midline shift. Possible   focus of recent intratumoral hemorrhage.

## 2018-09-14 NOTE — ED PROVIDER NOTE - PHYSICAL EXAMINATION
VITAL SIGNS: I have reviewed nursing notes and confirm.  CONSTITUTIONAL: Well-developed; well-nourished; in no acute distress.  SKIN: Skin is warm and dry, no acute rash.  HEAD: Normocephalic; atraumatic.  EYES: PERRL, EOM intact; conjunctiva and sclera clear.  ENT: No nasal discharge; airway clear.  NECK: Supple; non tender. Voluntary FROM  CARD: No murmurs, or rubs. Regular rate and rhythm.  RESP: No wheezes, no rales. No respiratory distress  ABD: Soft; non-distended; non-tender; no rebound or guarding  EXT: Normal ROM. No cyanosis or edema.  NEURO: Alert, oriented. Grossly unremarkable.  PSYCH: Cooperative, appropriate. VITAL SIGNS: I have reviewed nursing notes and confirm.  CONSTITUTIONAL: Well-developed; well-nourished; in no acute distress. Pleasant  SKIN: Skin is warm and dry, no acute rash.  HEAD: Normocephalic; atraumatic.  EYES: PERRL, EOM intact; conjunctiva and sclera clear.  ENT: No nasal discharge; airway clear.  NECK: Supple; non tender. Voluntary FROM  CARD: No murmurs, or rubs. Regular rate and rhythm.  RESP: No wheezes, no rales. No respiratory distress  ABD: Soft; non-distended; non-tender; no rebound or guarding  EXT: Normal ROM. No cyanosis or edema.  NEURO: Alert, oriented. Grossly unremarkable.  PSYCH: Cooperative, appropriate.

## 2018-09-14 NOTE — H&P ADULT - PROBLEM SELECTOR PLAN 5
-history of intestinal carcinoid tumor resection.  -nausea improved with zofran 4 mg Q6  -xray abdomen 4 days ago no signs of acute obstruction  -ct abdomen if signs or symptoms worse, consider GI opinion.

## 2018-09-14 NOTE — H&P ADULT - PROBLEM SELECTOR PLAN 8
1) PCP Contacted on Admission: (Y/N) --> Name & Phone #: Dr. Chantel Tellez is working with the patient to help take care of them.  2) Date of Contact with PCP:  3) PCP Contacted at Discharge: (Y/N, N/A)  4) Summary of Handoff Given to PCP:   5) Post-Discharge Appointment Date and Location:    #[facility]  Diet: full diet  Electrolytes: K>4 Mag >2  prophylaxis SCDs, patient does not want injections (ie lovenox, heparin subQ)  code status: patient is DNR DNI.

## 2018-09-14 NOTE — H&P ADULT - ASSESSMENT
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

## 2018-09-14 NOTE — ED ADULT NURSE NOTE - OBJECTIVE STATEMENT
73 y/o female c/o nausea for one week, no vomiting/diarrhea/abdominal pain. pt last received radiation for brain tumor 7/30/18. NAD, VSS, ekg completed. denies chest pain, sob, falls but states "I feel nauseous and wobbly." pt eating apple in stretcher.

## 2018-09-15 LAB
ANION GAP SERPL CALC-SCNC: 11 MMOL/L — SIGNIFICANT CHANGE UP (ref 5–17)
BUN SERPL-MCNC: 13 MG/DL — SIGNIFICANT CHANGE UP (ref 7–23)
CALCIUM SERPL-MCNC: 9.8 MG/DL — SIGNIFICANT CHANGE UP (ref 8.4–10.5)
CHLORIDE SERPL-SCNC: 95 MMOL/L — LOW (ref 96–108)
CO2 SERPL-SCNC: 28 MMOL/L — SIGNIFICANT CHANGE UP (ref 22–31)
CREAT SERPL-MCNC: 0.64 MG/DL — SIGNIFICANT CHANGE UP (ref 0.5–1.3)
GLUCOSE SERPL-MCNC: 129 MG/DL — HIGH (ref 70–99)
HCT VFR BLD CALC: 37.1 % — SIGNIFICANT CHANGE UP (ref 34.5–45)
HGB BLD-MCNC: 12.4 G/DL — SIGNIFICANT CHANGE UP (ref 11.5–15.5)
MAGNESIUM SERPL-MCNC: 1.9 MG/DL — SIGNIFICANT CHANGE UP (ref 1.6–2.6)
MCHC RBC-ENTMCNC: 30.3 PG — SIGNIFICANT CHANGE UP (ref 27–34)
MCHC RBC-ENTMCNC: 33.4 G/DL — SIGNIFICANT CHANGE UP (ref 32–36)
MCV RBC AUTO: 90.7 FL — SIGNIFICANT CHANGE UP (ref 80–100)
PLATELET # BLD AUTO: 263 K/UL — SIGNIFICANT CHANGE UP (ref 150–400)
POTASSIUM SERPL-MCNC: 3.7 MMOL/L — SIGNIFICANT CHANGE UP (ref 3.5–5.3)
POTASSIUM SERPL-SCNC: 3.7 MMOL/L — SIGNIFICANT CHANGE UP (ref 3.5–5.3)
RBC # BLD: 4.09 M/UL — SIGNIFICANT CHANGE UP (ref 3.8–5.2)
RBC # FLD: 13.5 % — SIGNIFICANT CHANGE UP (ref 10.3–16.9)
SODIUM SERPL-SCNC: 134 MMOL/L — LOW (ref 135–145)
WBC # BLD: 10.7 K/UL — HIGH (ref 3.8–10.5)
WBC # FLD AUTO: 10.7 K/UL — HIGH (ref 3.8–10.5)

## 2018-09-15 PROCEDURE — 99232 SBSQ HOSP IP/OBS MODERATE 35: CPT

## 2018-09-15 PROCEDURE — 70553 MRI BRAIN STEM W/O & W/DYE: CPT | Mod: 26

## 2018-09-15 RX ORDER — ALPRAZOLAM 0.25 MG
0.5 TABLET ORAL ONCE
Qty: 0 | Refills: 0 | Status: DISCONTINUED | OUTPATIENT
Start: 2018-09-15 | End: 2018-09-15

## 2018-09-15 RX ORDER — FAMOTIDINE 10 MG/ML
20 INJECTION INTRAVENOUS ONCE
Qty: 0 | Refills: 0 | Status: DISCONTINUED | OUTPATIENT
Start: 2018-09-15 | End: 2018-09-15

## 2018-09-15 RX ORDER — TIOTROPIUM BROMIDE AND OLODATEROL 3.124; 2.736 UG/1; UG/1
2 SPRAY, METERED RESPIRATORY (INHALATION) DAILY
Qty: 0 | Refills: 0 | Status: DISCONTINUED | OUTPATIENT
Start: 2018-09-15 | End: 2018-09-24

## 2018-09-15 RX ORDER — PANTOPRAZOLE SODIUM 20 MG/1
40 TABLET, DELAYED RELEASE ORAL
Qty: 0 | Refills: 0 | Status: DISCONTINUED | OUTPATIENT
Start: 2018-09-15 | End: 2018-09-24

## 2018-09-15 RX ADMIN — Medication 4 MILLIGRAM(S): at 05:49

## 2018-09-15 RX ADMIN — Medication 4 MILLIGRAM(S): at 17:05

## 2018-09-15 RX ADMIN — Medication 1 MILLIGRAM(S): at 23:21

## 2018-09-15 RX ADMIN — Medication 30 MILLILITER(S): at 23:20

## 2018-09-15 RX ADMIN — Medication 4 MILLIGRAM(S): at 12:00

## 2018-09-15 RX ADMIN — Medication 30 MILLILITER(S): at 02:50

## 2018-09-15 RX ADMIN — ESCITALOPRAM OXALATE 20 MILLIGRAM(S): 10 TABLET, FILM COATED ORAL at 12:00

## 2018-09-15 RX ADMIN — Medication 4 MILLIGRAM(S): at 00:39

## 2018-09-15 RX ADMIN — Medication 1 SPRAY(S): at 05:50

## 2018-09-15 RX ADMIN — Medication 0.5 MILLIGRAM(S): at 05:49

## 2018-09-15 RX ADMIN — Medication 4 MILLIGRAM(S): at 23:22

## 2018-09-15 RX ADMIN — TIOTROPIUM BROMIDE AND OLODATEROL 2 PUFF(S): 3.124; 2.736 SPRAY, METERED RESPIRATORY (INHALATION) at 15:00

## 2018-09-15 RX ADMIN — MONTELUKAST 10 MILLIGRAM(S): 4 TABLET, CHEWABLE ORAL at 21:38

## 2018-09-15 RX ADMIN — Medication 81 MILLIGRAM(S): at 12:00

## 2018-09-15 RX ADMIN — ONDANSETRON 4 MILLIGRAM(S): 8 TABLET, FILM COATED ORAL at 12:08

## 2018-09-15 RX ADMIN — Medication 1 SPRAY(S): at 17:05

## 2018-09-15 NOTE — PHYSICAL THERAPY INITIAL EVALUATION ADULT - PLANNED THERAPY INTERVENTIONS, PT EVAL
motor coordination training/postural re-education/bed mobility training/balance training/gait training/strengthening/transfer training

## 2018-09-15 NOTE — PHYSICAL THERAPY INITIAL EVALUATION ADULT - ADDITIONAL COMMENTS
Patient lives with her  in an elevator apartment with no steps to enter. Prior to admission, patient was independent for all functional mobility without assistive device.  is able to assist with functional mobility upon discharge

## 2018-09-15 NOTE — PHYSICAL THERAPY INITIAL EVALUATION ADULT - PERTINENT HX OF CURRENT PROBLEM, REHAB EVAL
Patient is a 72 year old F who presents due to nausea. CT scan of head found development of mass effect and extensive right frontal, basal ganglia and anterior callosal lucency. Mild leftward midline shift. Patient has had nausea present for the past week, worsened on Friday. Relevant PMH includes COPD, breast cancer post resection 1990, recurrence 2014 to liver post ablation, neuroendocrine lung cancer post b/l upper lobe resection and mets to brain post craniotomy and radiation treatment.

## 2018-09-15 NOTE — PROGRESS NOTE ADULT - SUBJECTIVE AND OBJECTIVE BOX
INTERVAL HPI/OVERNIGHT EVENTS:  Some confusion this am; Likely from Decadron; Otherwise slight nasal droop noted; Awaiting MRI with and without contrast      MEDICATIONS  (STANDING):  aspirin enteric coated 81 milliGRAM(s) Oral daily  dexamethasone  Injectable 4 milliGRAM(s) IV Push four times a day  escitalopram 20 milliGRAM(s) Oral daily  fluticasone propionate 50 MICROgram(s)/spray Nasal Spray 1 Spray(s) Both Nostrils two times a day  influenza   Vaccine 0.5 milliLiter(s) IntraMuscular once  letrozole 2.5 milliGRAM(s) Oral daily  montelukast 10 milliGRAM(s) Oral daily  Naltrexone HCL compound capsulse 1.5 milliGRAM(s) 1.5 milliGRAM(s) Oral at bedtime    MEDICATIONS  (PRN):  ALBUTerol    90 MICROgram(s) HFA Inhaler 2 Puff(s) Inhalation every 6 hours PRN Shortness of Breath and/or Wheezing  ALPRAZolam 1 milliGRAM(s) Oral at bedtime PRN insomnia  aluminum hydroxide/magnesium hydroxide/simethicone Suspension 30 milliLiter(s) Oral every 6 hours PRN Dyspepsia  ondansetron Injectable 4 milliGRAM(s) IV Push every 6 hours PRN Nausea      Allergies    adhesives (Rash)  Keflex (Short breath)  Originally Entered as [Unknown] reaction to [Other][Tape] (Unknown)    Intolerances        Vital Signs Last 24 Hrs  T(C): 36.6 (15 Sep 2018 05:48), Max: 36.9 (14 Sep 2018 22:11)  T(F): 97.9 (15 Sep 2018 05:48), Max: 98.5 (14 Sep 2018 22:11)  HR: 65 (15 Sep 2018 05:48) (65 - 93)  BP: 120/73 (15 Sep 2018 10:39) (120/73 - 131/76)  BP(mean): 85 (14 Sep 2018 15:27) (85 - 85)  RR: 16 (15 Sep 2018 10:39) (16 - 18)  SpO2: 96% (15 Sep 2018 10:39) (92% - 99%)          Constitutional:  Awake    Eyes: DANAE    ENMT: Negative    Neck: Supple    Back:  no tenderness     Respiratory:   clear    Cardiovascular: S1 S2    Gastrointestinal:  soft    Genitourinary:    Extremities: no edema    Vascular:    Neurological:  left facial    Skin:    Lymph Nodes:            LABS:                        12.4   10.7  )-----------( 263      ( 15 Sep 2018 07:59 )             37.1     09-15    134<L>  |  95<L>  |  13  ----------------------------<  129<H>  3.7   |  28  |  0.64    Ca    9.8      15 Sep 2018 07:59  Mg     1.9     09-15    TPro  6.9  /  Alb  4.2  /  TBili  0.5  /  DBili  x   /  AST  24  /  ALT  20  /  AlkPhos  58  09-14          RADIOLOGY & ADDITIONAL TESTS:

## 2018-09-15 NOTE — PHYSICAL THERAPY INITIAL EVALUATION ADULT - SENSORY TESTS
+dysdiadokinesia on Left with impaired ability to perform rapid alternating movements; +Romberg with eyes open and closed

## 2018-09-15 NOTE — PHYSICAL THERAPY INITIAL EVALUATION ADULT - MANUAL MUSCLE TESTING RESULTS, REHAB EVAL
R hip flexion 5/5; L hip flexion 4+/5; R knee extension 5/5; L knee extension 4+/5; R hip abduction 5/5; L hip abduction 4+/5; R ankle dorsiflexion 5/5; L ankle dorsiflexion 4+/5; Bilateral  strength 5/5; R elbow flexion: 5/5; L elbow flexion 4+/5; R elbow extension 5/5; L elbow extension 4+/5; R shoulder flexion 5/5; L shoulder flexion 4+/5; R shoulder abduction 5/5; L shoulder abduction 4+/5

## 2018-09-15 NOTE — PHYSICAL THERAPY INITIAL EVALUATION ADULT - COORDINATION ASSESSED, REHAB EVAL
heel to shin/mild dysmetria on LUE for finger to nose with increased time on task as well; Heel to shin intact bilaterally WNL; RUE finger to nose WNL/finger to nose mild dysmetria on LUE for finger to nose with increased time on task as well; Heel to shin intact bilaterally WNL; RUE finger to nose WNL/finger to nose/heel to shin

## 2018-09-15 NOTE — PHYSICAL THERAPY INITIAL EVALUATION ADULT - CRITERIA FOR SKILLED THERAPEUTIC INTERVENTIONS
risk reduction/prevention/therapy frequency/anticipated discharge recommendation/impairments found/functional limitations in following categories/rehab potential

## 2018-09-15 NOTE — PHYSICAL THERAPY INITIAL EVALUATION ADULT - GENERAL OBSERVATIONS, REHAB EVAL
Received semi-supine in bed with +bed alarm, +family present, on room air, +hep lock, in no apparent distress. Patient has L sided mouth droop

## 2018-09-15 NOTE — CHART NOTE - NSCHARTNOTEFT_GEN_A_CORE
Around 6:30am pt had an unwitnessed fall. Pt claimed she fell on her hands, denied head trauma. On physical exam, pt had not pain or tenderness in either hand or anywhere else in the body. Pt was A&Ox3, neuro exam was unremarkable. Spoke with senior resident and it was decided not to do a CT scan of the head. Continue to monitor patient closely for any neurological changes.

## 2018-09-15 NOTE — PHYSICAL THERAPY INITIAL EVALUATION ADULT - DIAGNOSIS, PT EVAL
Practice Pattern 5D: Impaired motor function and sensory integrity associated with nonprogressive disorders of the central nervous system - acquired in adolescence or adulthood

## 2018-09-16 DIAGNOSIS — R63.8 OTHER SYMPTOMS AND SIGNS CONCERNING FOOD AND FLUID INTAKE: ICD-10-CM

## 2018-09-16 DIAGNOSIS — E83.52 HYPERCALCEMIA: ICD-10-CM

## 2018-09-16 LAB
ANION GAP SERPL CALC-SCNC: 9 MMOL/L — SIGNIFICANT CHANGE UP (ref 5–17)
BUN SERPL-MCNC: 16 MG/DL — SIGNIFICANT CHANGE UP (ref 7–23)
CALCIUM SERPL-MCNC: 12 MG/DL — HIGH (ref 8.4–10.5)
CHLORIDE SERPL-SCNC: 94 MMOL/L — LOW (ref 96–108)
CO2 SERPL-SCNC: 34 MMOL/L — HIGH (ref 22–31)
CREAT SERPL-MCNC: 0.68 MG/DL — SIGNIFICANT CHANGE UP (ref 0.5–1.3)
GLUCOSE SERPL-MCNC: 110 MG/DL — HIGH (ref 70–99)
POTASSIUM SERPL-MCNC: 4.4 MMOL/L — SIGNIFICANT CHANGE UP (ref 3.5–5.3)
POTASSIUM SERPL-SCNC: 4.4 MMOL/L — SIGNIFICANT CHANGE UP (ref 3.5–5.3)
SODIUM SERPL-SCNC: 137 MMOL/L — SIGNIFICANT CHANGE UP (ref 135–145)

## 2018-09-16 PROCEDURE — 99232 SBSQ HOSP IP/OBS MODERATE 35: CPT | Mod: GC

## 2018-09-16 RX ORDER — ACETAMINOPHEN 500 MG
650 TABLET ORAL ONCE
Qty: 0 | Refills: 0 | Status: COMPLETED | OUTPATIENT
Start: 2018-09-16 | End: 2018-09-16

## 2018-09-16 RX ORDER — SODIUM CHLORIDE 9 MG/ML
1000 INJECTION INTRAMUSCULAR; INTRAVENOUS; SUBCUTANEOUS
Qty: 0 | Refills: 0 | Status: DISCONTINUED | OUTPATIENT
Start: 2018-09-16 | End: 2018-09-19

## 2018-09-16 RX ORDER — ALPRAZOLAM 0.25 MG
0.5 TABLET ORAL ONCE
Qty: 0 | Refills: 0 | Status: DISCONTINUED | OUTPATIENT
Start: 2018-09-16 | End: 2018-09-16

## 2018-09-16 RX ADMIN — Medication 4 MILLIGRAM(S): at 23:48

## 2018-09-16 RX ADMIN — ONDANSETRON 4 MILLIGRAM(S): 8 TABLET, FILM COATED ORAL at 12:08

## 2018-09-16 RX ADMIN — Medication 30 MILLILITER(S): at 20:40

## 2018-09-16 RX ADMIN — MONTELUKAST 10 MILLIGRAM(S): 4 TABLET, CHEWABLE ORAL at 11:57

## 2018-09-16 RX ADMIN — ONDANSETRON 4 MILLIGRAM(S): 8 TABLET, FILM COATED ORAL at 03:51

## 2018-09-16 RX ADMIN — ESCITALOPRAM OXALATE 20 MILLIGRAM(S): 10 TABLET, FILM COATED ORAL at 11:57

## 2018-09-16 RX ADMIN — Medication 1 SPRAY(S): at 17:10

## 2018-09-16 RX ADMIN — SODIUM CHLORIDE 100 MILLILITER(S): 9 INJECTION INTRAMUSCULAR; INTRAVENOUS; SUBCUTANEOUS at 11:56

## 2018-09-16 RX ADMIN — Medication 4 MILLIGRAM(S): at 17:10

## 2018-09-16 RX ADMIN — Medication 650 MILLIGRAM(S): at 23:45

## 2018-09-16 RX ADMIN — Medication 0.5 MILLIGRAM(S): at 12:54

## 2018-09-16 RX ADMIN — Medication 1 SPRAY(S): at 06:03

## 2018-09-16 RX ADMIN — Medication 4 MILLIGRAM(S): at 06:01

## 2018-09-16 RX ADMIN — Medication 1 MILLIGRAM(S): at 04:24

## 2018-09-16 RX ADMIN — Medication 0.5 MILLIGRAM(S): at 04:24

## 2018-09-16 RX ADMIN — Medication 4 MILLIGRAM(S): at 11:57

## 2018-09-16 RX ADMIN — PANTOPRAZOLE SODIUM 40 MILLIGRAM(S): 20 TABLET, DELAYED RELEASE ORAL at 06:01

## 2018-09-16 RX ADMIN — Medication 81 MILLIGRAM(S): at 11:57

## 2018-09-16 RX ADMIN — Medication 1 MILLIGRAM(S): at 23:48

## 2018-09-16 RX ADMIN — TIOTROPIUM BROMIDE AND OLODATEROL 2 PUFF(S): 3.124; 2.736 SPRAY, METERED RESPIRATORY (INHALATION) at 11:58

## 2018-09-16 NOTE — PROGRESS NOTE ADULT - SUBJECTIVE AND OBJECTIVE BOX
INTERVAL HPI/OVERNIGHT EVENTS:  Multiple questions answered; MRI noted; Difficult ro interpret; Will discuss with Neurosurgery. Calcium also noted; Unclear reason; Will hydrate today and repeat Calcium in morning;  RT needs to see patient as well    MEDICATIONS  (STANDING):  aspirin enteric coated 81 milliGRAM(s) Oral daily  dexamethasone  Injectable 4 milliGRAM(s) IV Push four times a day  escitalopram 20 milliGRAM(s) Oral daily  fluticasone propionate 50 MICROgram(s)/spray Nasal Spray 1 Spray(s) Both Nostrils two times a day  influenza   Vaccine 0.5 milliLiter(s) IntraMuscular once  letrozole 2.5 milliGRAM(s) Oral daily  montelukast 10 milliGRAM(s) Oral daily  Naltrexone HCL compound capsulse 1.5 milliGRAM(s) 1.5 milliGRAM(s) Oral at bedtime  pantoprazole    Tablet 40 milliGRAM(s) Oral before breakfast  tiotropium 2.5 MICROgram(s)/olodaterol 2.5 MICROgram(s) (STIOLTO) Inhaler 2 Puff(s) Inhalation daily    MEDICATIONS  (PRN):  ALBUTerol    90 MICROgram(s) HFA Inhaler 2 Puff(s) Inhalation every 6 hours PRN Shortness of Breath and/or Wheezing  ALPRAZolam 1 milliGRAM(s) Oral at bedtime PRN insomnia  aluminum hydroxide/magnesium hydroxide/simethicone Suspension 30 milliLiter(s) Oral every 6 hours PRN Dyspepsia  ondansetron Injectable 4 milliGRAM(s) IV Push every 6 hours PRN Nausea      Allergies    adhesives (Rash)  Keflex (Short breath)  Originally Entered as [Unknown] reaction to [Other][Tape] (Unknown)    Intolerances        Vital Signs Last 24 Hrs  T(C): 36.7 (16 Sep 2018 08:49), Max: 37 (15 Sep 2018 16:00)  T(F): 98.1 (16 Sep 2018 08:49), Max: 98.6 (15 Sep 2018 16:00)  HR: 59 (16 Sep 2018 08:49) (59 - 80)  BP: 135/68 (16 Sep 2018 08:49) (109/68 - 138/62)  BP(mean): --  RR: 17 (16 Sep 2018 08:49) (17 - 19)  SpO2: 95% (16 Sep 2018 08:49) (95% - 97%)          Constitutional: Awake    Eyes: DANAE    ENMT: Negative    Neck: Supple    Back:  no tenderness     Respiratory:  clear    Cardiovascular: S1 S2    Gastrointestinal: soft    Genitourinary:    Extremities:  no edema      Vascular:    Neurological:    Skin:    Lymph Nodes:            LABS:                        12.4   10.7  )-----------( 263      ( 15 Sep 2018 07:59 )             37.1     09-16    137  |  94<L>  |  16  ----------------------------<  110<H>  4.4   |  34<H>  |  0.68    Ca    12.0<H>      16 Sep 2018 07:23  Mg     1.9     09-15            RADIOLOGY & ADDITIONAL TESTS:

## 2018-09-16 NOTE — PROGRESS NOTE ADULT - PROBLEM SELECTOR PLAN 1
-likely 2/2 to cerebral edema  -Patient presented due to nausea for the past week  -c/w zofran 4 mg Q6 hrs prn nausea. EKG does not show prolonged QTc.    #hypercalcemia  -possible in the setting of malignancy?  -12 today, corrected Ca: 11.8  -c/w NS fluids   -f/u calcium

## 2018-09-16 NOTE — PROGRESS NOTE ADULT - PROBLEM SELECTOR PLAN 2
-Prior radiation in July 2018 due to recurrence.   - MRI head w/ w/o contrast: heterogenous, peripherally enhancing right frontal mass, interval hemorrhage, edema, possible tumor progression vs radiation tx findings  -CT head w/o contrast shows mass effect and extensive right frontal, basal ganglia   and anterior callosal lucency. Mild leftward midline shift.   -currently no signs or symptoms of brain hernia, no signs of [place] triad  -continue monitoring neurologic exams, neurosurgery consult if worrisome SS  -c/w decadron 4mg Q6  -f/u neurosx recs

## 2018-09-16 NOTE — PROGRESS NOTE ADULT - PROBLEM SELECTOR PLAN 6
-lexapro 20 mg daily     #insomnia  -patient takes melatonin sublingual dissolving tablets at home  -unable to use her home dose due to not being in appropriate label for pharmacy  -patient does not want to take oral melatonin   -xanax 0.5 mg two pills prn insomnia at [facility].

## 2018-09-16 NOTE — PROGRESS NOTE ADULT - SUBJECTIVE AND OBJECTIVE BOX
INTERVAL HPI/OVERNIGHT EVENTS:  Patient was seen and examined at bedside. No acute overnight events. Pt denies feeling nauseous currently and is controlled by her zofran. As per staff, pt had unstrady gait and fell yesterday. Given enhanced supervision to prevent future falls.         VITAL SIGNS:  T(F): 98.1 (09-16-18 @ 08:49)  HR: 59 (09-16-18 @ 08:49)  BP: 135/68 (09-16-18 @ 08:49)  RR: 17 (09-16-18 @ 08:49)  SpO2: 95% (09-16-18 @ 08:49)  Wt(kg): --    PHYSICAL EXAM:    General: alert and oriented times 3, well nourished  HEENT:  NCAT, anicertic, MMM, no scleral icterus,  Respiratory: CTAB no wheezes, rhonchi, rales  Cardiovascular: RRR normal S1 S2  Gastrointestinal: soft, NTND normoactive bowel sounds  Extremities: no edema noted,  2+ radial pulses b/l and 2+ dorsalis pedis pulses b/l,   	Neurological:     AAO x 3  	EOMI, PERRLA present  	+ left sided facial droop    5/5 shoulder shrug strength, 5/5 upper and lower extremity strength    Gait deferred   Psychiatric: appropriate mood and affect  MEDICATIONS  (STANDING):  aspirin enteric coated 81 milliGRAM(s) Oral daily  dexamethasone  Injectable 4 milliGRAM(s) IV Push four times a day  escitalopram 20 milliGRAM(s) Oral daily  fluticasone propionate 50 MICROgram(s)/spray Nasal Spray 1 Spray(s) Both Nostrils two times a day  influenza   Vaccine 0.5 milliLiter(s) IntraMuscular once  letrozole 2.5 milliGRAM(s) Oral daily  montelukast 10 milliGRAM(s) Oral daily  Naltrexone HCL compound capsulse 1.5 milliGRAM(s) 1.5 milliGRAM(s) Oral at bedtime  pantoprazole    Tablet 40 milliGRAM(s) Oral before breakfast  sodium chloride 0.9%. 1000 milliLiter(s) (100 mL/Hr) IV Continuous <Continuous>  tiotropium 2.5 MICROgram(s)/olodaterol 2.5 MICROgram(s) (STIOLTO) Inhaler 2 Puff(s) Inhalation daily    MEDICATIONS  (PRN):  ALBUTerol    90 MICROgram(s) HFA Inhaler 2 Puff(s) Inhalation every 6 hours PRN Shortness of Breath and/or Wheezing  ALPRAZolam 1 milliGRAM(s) Oral at bedtime PRN insomnia  aluminum hydroxide/magnesium hydroxide/simethicone Suspension 30 milliLiter(s) Oral every 6 hours PRN Dyspepsia  ondansetron Injectable 4 milliGRAM(s) IV Push every 6 hours PRN Nausea      Allergies    adhesives (Rash)  Keflex (Short breath)  Originally Entered as [Unknown] reaction to [Other][Tape] (Unknown)    Intolerances        LABS:                        12.4   10.7  )-----------( 263      ( 15 Sep 2018 07:59 )             37.1     09-16    137  |  94<L>  |  16  ----------------------------<  110<H>  4.4   |  34<H>  |  0.68    Ca    12.0<H>      16 Sep 2018 07:23  Mg     1.9     09-15            RADIOLOGY & ADDITIONAL TESTS:  Reviewed INTERVAL HPI/OVERNIGHT EVENTS:  Patient was seen and examined at bedside. No acute overnight events. Pt denies feeling nauseous currently and is controlled by her zofran. As per staff, pt had unsteady gait and fell yesterday. Given enhanced supervision to prevent future falls.         VITAL SIGNS:  T(F): 98.1 (09-16-18 @ 08:49)  HR: 59 (09-16-18 @ 08:49)  BP: 135/68 (09-16-18 @ 08:49)  RR: 17 (09-16-18 @ 08:49)  SpO2: 95% (09-16-18 @ 08:49)  Wt(kg): --    PHYSICAL EXAM:    General: alert and oriented times 3, well nourished  HEENT:  NCAT, anicertic, MMM, no scleral icterus,  Respiratory: CTAB no wheezes, rhonchi, rales  Cardiovascular: RRR normal S1 S2  Gastrointestinal: soft, NTND normoactive bowel sounds  Extremities: no edema noted,  2+ radial pulses b/l and 2+ dorsalis pedis pulses b/l,   	Neurological:     AAO x 3  	EOMI, PERRLA present  	+ left sided facial droop    5/5 shoulder shrug strength, 5/5 upper and lower extremity strength    Gait deferred   Psychiatric: appropriate mood and affect  MEDICATIONS  (STANDING):  aspirin enteric coated 81 milliGRAM(s) Oral daily  dexamethasone  Injectable 4 milliGRAM(s) IV Push four times a day  escitalopram 20 milliGRAM(s) Oral daily  fluticasone propionate 50 MICROgram(s)/spray Nasal Spray 1 Spray(s) Both Nostrils two times a day  influenza   Vaccine 0.5 milliLiter(s) IntraMuscular once  letrozole 2.5 milliGRAM(s) Oral daily  montelukast 10 milliGRAM(s) Oral daily  Naltrexone HCL compound capsulse 1.5 milliGRAM(s) 1.5 milliGRAM(s) Oral at bedtime  pantoprazole    Tablet 40 milliGRAM(s) Oral before breakfast  sodium chloride 0.9%. 1000 milliLiter(s) (100 mL/Hr) IV Continuous <Continuous>  tiotropium 2.5 MICROgram(s)/olodaterol 2.5 MICROgram(s) (STIOLTO) Inhaler 2 Puff(s) Inhalation daily    MEDICATIONS  (PRN):  ALBUTerol    90 MICROgram(s) HFA Inhaler 2 Puff(s) Inhalation every 6 hours PRN Shortness of Breath and/or Wheezing  ALPRAZolam 1 milliGRAM(s) Oral at bedtime PRN insomnia  aluminum hydroxide/magnesium hydroxide/simethicone Suspension 30 milliLiter(s) Oral every 6 hours PRN Dyspepsia  ondansetron Injectable 4 milliGRAM(s) IV Push every 6 hours PRN Nausea      Allergies    adhesives (Rash)  Keflex (Short breath)  Originally Entered as [Unknown] reaction to [Other][Tape] (Unknown)    Intolerances        LABS:                        12.4   10.7  )-----------( 263      ( 15 Sep 2018 07:59 )             37.1     09-16    137  |  94<L>  |  16  ----------------------------<  110<H>  4.4   |  34<H>  |  0.68    Ca    12.0<H>      16 Sep 2018 07:23  Mg     1.9     09-15            RADIOLOGY & ADDITIONAL TESTS:  Reviewed

## 2018-09-17 LAB
ANION GAP SERPL CALC-SCNC: 10 MMOL/L — SIGNIFICANT CHANGE UP (ref 5–17)
BUN SERPL-MCNC: 23 MG/DL — SIGNIFICANT CHANGE UP (ref 7–23)
CALCIUM SERPL-MCNC: 9.8 MG/DL — SIGNIFICANT CHANGE UP (ref 8.4–10.5)
CHLORIDE SERPL-SCNC: 99 MMOL/L — SIGNIFICANT CHANGE UP (ref 96–108)
CO2 SERPL-SCNC: 28 MMOL/L — SIGNIFICANT CHANGE UP (ref 22–31)
CREAT SERPL-MCNC: 0.65 MG/DL — SIGNIFICANT CHANGE UP (ref 0.5–1.3)
GLUCOSE SERPL-MCNC: 109 MG/DL — HIGH (ref 70–99)
HCT VFR BLD CALC: 36.8 % — SIGNIFICANT CHANGE UP (ref 34.5–45)
HGB BLD-MCNC: 12.2 G/DL — SIGNIFICANT CHANGE UP (ref 11.5–15.5)
LYMPHOCYTES # BLD AUTO: 8.4 % — LOW (ref 13–44)
MAGNESIUM SERPL-MCNC: 2.1 MG/DL — SIGNIFICANT CHANGE UP (ref 1.6–2.6)
MCHC RBC-ENTMCNC: 30.4 PG — SIGNIFICANT CHANGE UP (ref 27–34)
MCHC RBC-ENTMCNC: 33.2 G/DL — SIGNIFICANT CHANGE UP (ref 32–36)
MCV RBC AUTO: 91.8 FL — SIGNIFICANT CHANGE UP (ref 80–100)
MONOCYTES NFR BLD AUTO: 4.5 % — SIGNIFICANT CHANGE UP (ref 2–14)
NEUTROPHILS NFR BLD AUTO: 87.1 % — HIGH (ref 43–77)
PLATELET # BLD AUTO: 270 K/UL — SIGNIFICANT CHANGE UP (ref 150–400)
POTASSIUM SERPL-MCNC: 4.5 MMOL/L — SIGNIFICANT CHANGE UP (ref 3.5–5.3)
POTASSIUM SERPL-SCNC: 4.5 MMOL/L — SIGNIFICANT CHANGE UP (ref 3.5–5.3)
RBC # BLD: 4.01 M/UL — SIGNIFICANT CHANGE UP (ref 3.8–5.2)
RBC # FLD: 13.8 % — SIGNIFICANT CHANGE UP (ref 10.3–16.9)
SODIUM SERPL-SCNC: 137 MMOL/L — SIGNIFICANT CHANGE UP (ref 135–145)
WBC # BLD: 12.8 K/UL — HIGH (ref 3.8–10.5)
WBC # FLD AUTO: 12.8 K/UL — HIGH (ref 3.8–10.5)

## 2018-09-17 PROCEDURE — 99232 SBSQ HOSP IP/OBS MODERATE 35: CPT | Mod: GC

## 2018-09-17 RX ORDER — DEXAMETHASONE 0.5 MG/5ML
2 ELIXIR ORAL
Qty: 0 | Refills: 0 | Status: DISCONTINUED | OUTPATIENT
Start: 2018-09-17 | End: 2018-09-17

## 2018-09-17 RX ORDER — DEXAMETHASONE 0.5 MG/5ML
2 ELIXIR ORAL EVERY 6 HOURS
Qty: 0 | Refills: 0 | Status: DISCONTINUED | OUTPATIENT
Start: 2018-09-17 | End: 2018-09-19

## 2018-09-17 RX ADMIN — Medication 30 MILLILITER(S): at 22:01

## 2018-09-17 RX ADMIN — ONDANSETRON 4 MILLIGRAM(S): 8 TABLET, FILM COATED ORAL at 21:51

## 2018-09-17 RX ADMIN — SODIUM CHLORIDE 100 MILLILITER(S): 9 INJECTION INTRAMUSCULAR; INTRAVENOUS; SUBCUTANEOUS at 17:17

## 2018-09-17 RX ADMIN — Medication 650 MILLIGRAM(S): at 00:15

## 2018-09-17 RX ADMIN — Medication 2 MILLIGRAM(S): at 17:07

## 2018-09-17 RX ADMIN — Medication 1 MILLIGRAM(S): at 22:01

## 2018-09-17 RX ADMIN — Medication 30 MILLILITER(S): at 04:02

## 2018-09-17 RX ADMIN — Medication 81 MILLIGRAM(S): at 11:20

## 2018-09-17 RX ADMIN — Medication 4 MILLIGRAM(S): at 05:46

## 2018-09-17 RX ADMIN — MONTELUKAST 10 MILLIGRAM(S): 4 TABLET, CHEWABLE ORAL at 11:20

## 2018-09-17 RX ADMIN — Medication 2 MILLIGRAM(S): at 11:20

## 2018-09-17 RX ADMIN — ONDANSETRON 4 MILLIGRAM(S): 8 TABLET, FILM COATED ORAL at 15:45

## 2018-09-17 RX ADMIN — TIOTROPIUM BROMIDE AND OLODATEROL 2 PUFF(S): 3.124; 2.736 SPRAY, METERED RESPIRATORY (INHALATION) at 11:21

## 2018-09-17 RX ADMIN — Medication 1 SPRAY(S): at 17:04

## 2018-09-17 RX ADMIN — Medication 30 MILLILITER(S): at 11:49

## 2018-09-17 RX ADMIN — ONDANSETRON 4 MILLIGRAM(S): 8 TABLET, FILM COATED ORAL at 05:50

## 2018-09-17 RX ADMIN — PANTOPRAZOLE SODIUM 40 MILLIGRAM(S): 20 TABLET, DELAYED RELEASE ORAL at 06:18

## 2018-09-17 RX ADMIN — Medication 1 SPRAY(S): at 05:50

## 2018-09-17 RX ADMIN — ESCITALOPRAM OXALATE 20 MILLIGRAM(S): 10 TABLET, FILM COATED ORAL at 11:20

## 2018-09-17 NOTE — PROGRESS NOTE ADULT - PROBLEM SELECTOR PLAN 1
-likely 2/2 to cerebral edema/ brain mass  -c/w zofran 4 mg Q6 hrs prn nausea. EKG does not show prolonged QTc.

## 2018-09-17 NOTE — CONSULT NOTE ADULT - ASSESSMENT
Probable progression of the CNS disease and I will attend the Neurosurgical Conference this AM with Dr Muniz and staff.

## 2018-09-17 NOTE — PROGRESS NOTE ADULT - SUBJECTIVE AND OBJECTIVE BOX
INTERVAL HPI/OVERNIGHT EVENTS:  Interim reviewed; Patient presented at tumor board; Taper steroids and the outpatient Avastin      MEDICATIONS  (STANDING):  aspirin enteric coated 81 milliGRAM(s) Oral daily  dexamethasone  Injectable 2 milliGRAM(s) IV Push every 6 hours  escitalopram 20 milliGRAM(s) Oral daily  fluticasone propionate 50 MICROgram(s)/spray Nasal Spray 1 Spray(s) Both Nostrils two times a day  influenza   Vaccine 0.5 milliLiter(s) IntraMuscular once  letrozole 2.5 milliGRAM(s) Oral daily  montelukast 10 milliGRAM(s) Oral daily  Naltrexone HCL compound capsulse 1.5 milliGRAM(s) 1.5 milliGRAM(s) Oral at bedtime  pantoprazole    Tablet 40 milliGRAM(s) Oral before breakfast  sodium chloride 0.9%. 1000 milliLiter(s) (100 mL/Hr) IV Continuous <Continuous>  tiotropium 2.5 MICROgram(s)/olodaterol 2.5 MICROgram(s) (STIOLTO) Inhaler 2 Puff(s) Inhalation daily    MEDICATIONS  (PRN):  ALBUTerol    90 MICROgram(s) HFA Inhaler 2 Puff(s) Inhalation every 6 hours PRN Shortness of Breath and/or Wheezing  ALPRAZolam 1 milliGRAM(s) Oral at bedtime PRN insomnia  aluminum hydroxide/magnesium hydroxide/simethicone Suspension 30 milliLiter(s) Oral every 6 hours PRN Dyspepsia  ondansetron Injectable 4 milliGRAM(s) IV Push every 6 hours PRN Nausea      Allergies    adhesives (Rash)  Keflex (Short breath)  Originally Entered as [Unknown] reaction to [Other][Tape] (Unknown)    Intolerances        Vital Signs Last 24 Hrs  T(C): 36.6 (17 Sep 2018 08:20), Max: 36.9 (17 Sep 2018 05:44)  T(F): 97.8 (17 Sep 2018 08:20), Max: 98.4 (17 Sep 2018 05:44)  HR: 70 (17 Sep 2018 08:20) (62 - 70)  BP: 141/71 (17 Sep 2018 08:20) (118/66 - 146/64)  BP(mean): --  RR: 18 (17 Sep 2018 08:20) (17 - 18)  SpO2: 98% (17 Sep 2018 08:20) (95% - 98%)          Constitutional: Awake    Eyes: DANAE    ENMT: Negative    Neck: Supple    Back:  no tenderness     Respiratory:  clear    Cardiovascular: S1 S2    Gastrointestinal:  soft    Genitourinary:    Extremities:  no edema    Vascular:    Neurological:    Skin:    Lymph Nodes:            LABS:                        12.2   12.8  )-----------( 270      ( 17 Sep 2018 06:40 )             36.8     09-17    137  |  99  |  23  ----------------------------<  109<H>  4.5   |  28  |  0.65    Ca    9.8      17 Sep 2018 06:43  Mg     2.1     09-17            RADIOLOGY & ADDITIONAL TESTS:

## 2018-09-17 NOTE — PROGRESS NOTE ADULT - SUBJECTIVE AND OBJECTIVE BOX
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 CT scan of head found development of mass effect and extensive right frontal, basal ganglia and anterior callosal lucency. Mild leftward midline shift. Patient has had nausea present for the past week, worsened on Friday. She feels retching type feeling. Had only one episode of vomiting on Friday of clear material. She received reglan and decadron that did not help the nausea. 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 She had xray abdomen 4 days ago that was unremarkable for acute changes. Endoscopy one year ago was unremarkable for upper GI pathology at that time.     She also states she has had issues with balance for the past week, without loss of consciousness. Denies fevers, chills, sob, cp, abdominal pain, diarrhea, or changes in urine. Denies changes in weight. Able to tolerate oral intake.    In the ED vitals 98.2, 72, 138/73, 18, 98%   patient received 4 mg zofran which resolved her nausea completely and 500 cc fluid.     CT head w/o contrast showed In this patient with previously treated right frontal brain tumor, there is development of mass effect and extensive right frontal, basal ganglia and anterior callosal lucency. Mild leftward midline shift. Possible   focus of recent intratumoral hemorrhage. (14 Sep 2018 17:36)    OVERNIGHT EVENTS:  Vital Signs Last 24 Hrs  T(C): 36.6 (17 Sep 2018 08:20), Max: 36.9 (17 Sep 2018 05:44)  T(F): 97.8 (17 Sep 2018 08:20), Max: 98.4 (17 Sep 2018 05:44)  HR: 70 (17 Sep 2018 08:20) (62 - 70)  BP: 141/71 (17 Sep 2018 08:20) (118/66 - 146/64)  BP(mean): --  RR: 18 (17 Sep 2018 08:20) (17 - 18)  SpO2: 98% (17 Sep 2018 08:20) (95% - 98%)    I&O's Summary      Neurological:  Physical exam  Awake, alert, oriented x 3, PERRL, EOMI  Follows commands, speech clear  RIOS X4 with good strength   Incision: C/D/I  neg drift  motor 5/5 throughout  sensation itnact b/l                                                          Cardiovascular: RRR  Respiratory: Lungs CTAB  Gastrointestinal: +BS  Genitourinary: voiding without difficulty  Extremities: warm and dry      DIET: Regular    LABS:                        12.2   12.8  )-----------( 270      ( 17 Sep 2018 06:40 )             36.8     09-17    137  |  99  |  23  ----------------------------<  109<H>  4.5   |  28  |  0.65    Ca    9.8      17 Sep 2018 06:43  Mg     2.1     09-17              CAPILLARY BLOOD GLUCOSE          Drug Levels: [] N/A    CSF Analysis: [] N/A      Allergies    adhesives (Rash)  Keflex (Short breath)  Originally Entered as [Unknown] reaction to [Other][Tape] (Unknown)    Intolerances      MEDICATIONS:  Antibiotics:    Neuro:  ALPRAZolam 1 milliGRAM(s) Oral at bedtime PRN  escitalopram 20 milliGRAM(s) Oral daily  ondansetron Injectable 4 milliGRAM(s) IV Push every 6 hours PRN    Anticoagulation:  aspirin enteric coated 81 milliGRAM(s) Oral daily    OTHER:  ALBUTerol    90 MICROgram(s) HFA Inhaler 2 Puff(s) Inhalation every 6 hours PRN  aluminum hydroxide/magnesium hydroxide/simethicone Suspension 30 milliLiter(s) Oral every 6 hours PRN  dexamethasone  Injectable 2 milliGRAM(s) IV Push every 6 hours  fluticasone propionate 50 MICROgram(s)/spray Nasal Spray 1 Spray(s) Both Nostrils two times a day  influenza   Vaccine 0.5 milliLiter(s) IntraMuscular once  letrozole 2.5 milliGRAM(s) Oral daily  montelukast 10 milliGRAM(s) Oral daily  Naltrexone HCL compound capsulse 1.5 milliGRAM(s) 1.5 milliGRAM(s) Oral at bedtime  pantoprazole    Tablet 40 milliGRAM(s) Oral before breakfast  tiotropium 2.5 MICROgram(s)/olodaterol 2.5 MICROgram(s) (STIOLTO) Inhaler 2 Puff(s) Inhalation daily    IVF:  sodium chloride 0.9%. 1000 milliLiter(s) IV Continuous <Continuous>    CULTURES:    RADIOLOGY & ADDITIONAL TESTS:      ASSESSMENT:  72y Female s/p  female history of both breast and lung cancers s/p craniotomy and resection of brain tumor.     PLAN:    NEURO:    Monitor neuro status  Continue current medical regime as per Primary team    Dispo: Discussed with attending

## 2018-09-17 NOTE — CONSULT NOTE ADULT - SUBJECTIVE AND OBJECTIVE BOX
The patient is known to me and Dr Delfin Davis, my associate.    Complicated case  involving both breast cancer and neuroendocrine lung tumor. RT was concluded on 7/30/18 to the brain. Now with facial droop, nausea and enlarging mass or RT flare of the brain . The patient is currently on the letrozole and this AM she is fully alert, coherent but with mild facial droop. She states she has left side weakness of gait.
HISTORY OF PRESENT ILLNESS: 72 year-old female with history of metastatic breast and lung ca s/p crani x 2 (2017 and May 2018), receiving radiation treatments (last on July 30th) presenting with one week history of nausea without vomiting.  Also associated gait abnormalities.  Denies headache, fever, vomiting, weakness    PAST MEDICAL & SURGICAL HISTORY:  Asthma  GERD (gastroesophageal reflux disease)  Lung collapse: 2x  COPD (chronic obstructive pulmonary disease)  Brain tumor  Liver cancer: left breast cancer spread to liver and chest wall  Carcinoid tumor: abdomen  Malignant neoplasm of female breast: Left breast CA with liver mets  Malignant neoplasm of bronchus and lung, unspecified site: RUL Lobectomy, RLL wedge resection  H/O right hemicolectomy  History of craniotomy  History of colon resection  Surgery, elective: Thoractomy with lung resection-  Right upper lobe removed, left  lower lobe wedge, right lower wedge  Surgery, elective: Ovaries removed 1980&quot;s  Surgery, elective: Left wrist  Breast cancer: left mastectomy    FAMILY HISTORY:  No pertinent family history in first degree relatives      SOCIAL HISTORY:  Tobacco Use: denies  EtOH use: denies  Substance: denies    Allergies    adhesives (Rash)  Keflex (Short breath)  Originally Entered as [Unknown] reaction to [Other][Tape] (Unknown)    Intolerances        REVIEW OF SYSTEMS      MEDICATIONS:  Antibiotics:    Neuro:  escitalopram 20 milliGRAM(s) Oral daily  melatonin 3 milliGRAM(s) Oral at bedtime  ondansetron Injectable 4 milliGRAM(s) IV Push every 6 hours PRN    Anticoagulation:  aspirin enteric coated 81 milliGRAM(s) Oral daily  enoxaparin Injectable 40 milliGRAM(s) SubCutaneous daily    OTHER:  ALBUTerol    90 MICROgram(s) HFA Inhaler 2 Puff(s) Inhalation every 6 hours PRN  dexamethasone  Injectable 4 milliGRAM(s) IV Push four times a day  influenza   Vaccine 0.5 milliLiter(s) IntraMuscular once  letrozole 2.5 milliGRAM(s) Oral daily  montelukast 10 milliGRAM(s) Oral once    IVF:      Vital Signs Last 24 Hrs  T(C): 36.8 (14 Sep 2018 15:27), Max: 36.8 (14 Sep 2018 09:49)  T(F): 98.3 (14 Sep 2018 15:27), Max: 98.3 (14 Sep 2018 15:27)  HR: 87 (14 Sep 2018 15:27) (68 - 87)  BP: 125/65 (14 Sep 2018 15:27) (125/65 - 138/73)  BP(mean): 85 (14 Sep 2018 15:27) (85 - 85)  RR: 18 (14 Sep 2018 15:27) (18 - 18)  SpO2: 99% (14 Sep 2018 15:27) (98% - 99%)    PHYSICAL EXAM:  A/Ox3, follows commands, speech clear  RIOS 5/5 strength  Left facial droop, cranial nerves otherwise intact  Sensation intact  LABS:                        12.1   9.2   )-----------( 278      ( 14 Sep 2018 10:26 )             36.5     09-14    135  |  94<L>  |  16  ----------------------------<  98  3.9   |  28  |  0.77    Ca    10.3      14 Sep 2018 10:26    TPro  6.9  /  Alb  4.2  /  TBili  0.5  /  DBili  x   /  AST  24  /  ALT  20  /  AlkPhos  58  09-14        CULTURES:      RADIOLOGY & ADDITIONAL STUDIES: CT increased right frontal vasogenic edema with midline shift and mass effect on right ventricle    A/P 72 year-old female history of metastatic lung ca now with increased edema on CT, symptomatic nausea  -No acute neurosurgical intervention at this time  -Continue decadron 4q6h  -Recommend MRI brain with and without nayan (will order)  -Rad/Onc follow up  -Patient will be discussed at neurosurgical tumor board on Monday  -Care per primary team  -Case, imaging, and plan discussed with Dr. Muniz

## 2018-09-17 NOTE — PROGRESS NOTE ADULT - SUBJECTIVE AND OBJECTIVE BOX
INTERVAL HPI/OVERNIGHT EVENTS:  Patient was seen and examined at bedside. No acute overnight events. Patient report some dyspepsia     VITAL SIGNS:  Vital Signs Last 24 Hrs  T(C): 36.6 (17 Sep 2018 08:20), Max: 36.9 (17 Sep 2018 05:44)  T(F): 97.8 (17 Sep 2018 08:20), Max: 98.4 (17 Sep 2018 05:44)  HR: 70 (17 Sep 2018 08:20) (62 - 70)  BP: 141/71 (17 Sep 2018 08:20) (118/66 - 146/64)  BP(mean): --  RR: 18 (17 Sep 2018 08:20) (17 - 18)  SpO2: 98% (17 Sep 2018 08:20) (95% - 98%)    PHYSICAL EXAM:    General: alert and oriented times 3, well nourished  HEENT:  NCAT, anicertic, MMM, no scleral icterus, cranial deformities due to prior brain surgery   Respiratory: CTAB no wheezes, rhonchi, rales  Cardiovascular: RRR normal S1 S2  Gastrointestinal: soft, NTND normoactive bowel sounds  Extremities: no edema noted,  2+ radial pulses b/l and 2+ dorsalis pedis pulses b/l,   	Neurological:     AAO x 3  	EOMI, PERRLA present  	+ left sided facial droop    5/5 shoulder shrug strength, 5/5 upper and lower extremity strength    Gait deferred   Psychiatric: appropriate mood and affect    MEDICATIONS  (STANDING):  aspirin enteric coated 81 milliGRAM(s) Oral daily  dexamethasone  Injectable 4 milliGRAM(s) IV Push four times a day  escitalopram 20 milliGRAM(s) Oral daily  fluticasone propionate 50 MICROgram(s)/spray Nasal Spray 1 Spray(s) Both Nostrils two times a day  influenza   Vaccine 0.5 milliLiter(s) IntraMuscular once  letrozole 2.5 milliGRAM(s) Oral daily  montelukast 10 milliGRAM(s) Oral daily  Naltrexone HCL compound capsulse 1.5 milliGRAM(s) 1.5 milliGRAM(s) Oral at bedtime  pantoprazole    Tablet 40 milliGRAM(s) Oral before breakfast  sodium chloride 0.9%. 1000 milliLiter(s) (100 mL/Hr) IV Continuous <Continuous>  tiotropium 2.5 MICROgram(s)/olodaterol 2.5 MICROgram(s) (STIOLTO) Inhaler 2 Puff(s) Inhalation daily    MEDICATIONS  (PRN):  ALBUTerol    90 MICROgram(s) HFA Inhaler 2 Puff(s) Inhalation every 6 hours PRN Shortness of Breath and/or Wheezing  ALPRAZolam 1 milliGRAM(s) Oral at bedtime PRN insomnia  aluminum hydroxide/magnesium hydroxide/simethicone Suspension 30 milliLiter(s) Oral every 6 hours PRN Dyspepsia  ondansetron Injectable 4 milliGRAM(s) IV Push every 6 hours PRN Nausea      Allergies    adhesives (Rash)  Keflex (Short breath)  Originally Entered as [Unknown] reaction to [Other][Tape] (Unknown)    Intolerances        LABS:                                   12.2   12.8  )-----------( 270      ( 17 Sep 2018 06:40 )             36.8         CBC Full  -  ( 17 Sep 2018 06:40 )  WBC Count : 12.8 K/uL  Hemoglobin : 12.2 g/dL  Hematocrit : 36.8 %  Platelet Count - Automated : 270 K/uL  Mean Cell Volume : 91.8 fL  Mean Cell Hemoglobin : 30.4 pg  Mean Cell Hemoglobin Concentration : 33.2 g/dL  Auto Neutrophil # : x  Auto Lymphocyte # : x  Auto Monocyte # : x  Auto Eosinophil # : x  Auto Basophil # : x  Auto Neutrophil % : 87.1 %  Auto Lymphocyte % : 8.4 %  Auto Monocyte % : 4.5 %  Auto Eosinophil % : x  Auto Basophil % : x        09-17    137  |  99  |  23  ----------------------------<  109<H>  4.5   |  28  |  0.65    Ca    9.8      17 Sep 2018 06:43  Mg     2.1     09-17 INTERVAL HPI/OVERNIGHT EVENTS:  Patient was seen and examined at bedside. No acute overnight events. Patient report some dyspepsia     VITAL SIGNS:  Vital Signs Last 24 Hrs  T(C): 36.6 (17 Sep 2018 08:20), Max: 36.9 (17 Sep 2018 05:44)  T(F): 97.8 (17 Sep 2018 08:20), Max: 98.4 (17 Sep 2018 05:44)  HR: 70 (17 Sep 2018 08:20) (62 - 70)  BP: 141/71 (17 Sep 2018 08:20) (118/66 - 146/64)  BP(mean): --  RR: 18 (17 Sep 2018 08:20) (17 - 18)  SpO2: 98% (17 Sep 2018 08:20) (95% - 98%)    PHYSICAL EXAM:    General: alert and oriented times 3, well nourished  HEENT:  NCAT, anicertic, MMM, no scleral icterus, cranial deformities due to prior brain surgery   Respiratory: CTAB no wheezes, rhonchi, rales, absent breathsounds in RLL  Cardiovascular: RRR normal S1 S2  Gastrointestinal: soft, NTND normoactive bowel sounds  Extremities: no edema noted,  2+ radial pulses b/l and 2+ dorsalis pedis pulses b/l,   	Neurological:     AAO x 3  	EOMI, PERRLA present  	+ left sided facial droop    5/5 shoulder shrug strength, 5/5 upper and lower extremity strength    Gait deferred   Psychiatric: appropriate mood and affect    MEDICATIONS  (STANDING):  aspirin enteric coated 81 milliGRAM(s) Oral daily  dexamethasone  Injectable 4 milliGRAM(s) IV Push four times a day  escitalopram 20 milliGRAM(s) Oral daily  fluticasone propionate 50 MICROgram(s)/spray Nasal Spray 1 Spray(s) Both Nostrils two times a day  influenza   Vaccine 0.5 milliLiter(s) IntraMuscular once  letrozole 2.5 milliGRAM(s) Oral daily  montelukast 10 milliGRAM(s) Oral daily  Naltrexone HCL compound capsulse 1.5 milliGRAM(s) 1.5 milliGRAM(s) Oral at bedtime  pantoprazole    Tablet 40 milliGRAM(s) Oral before breakfast  sodium chloride 0.9%. 1000 milliLiter(s) (100 mL/Hr) IV Continuous <Continuous>  tiotropium 2.5 MICROgram(s)/olodaterol 2.5 MICROgram(s) (STIOLTO) Inhaler 2 Puff(s) Inhalation daily    MEDICATIONS  (PRN):  ALBUTerol    90 MICROgram(s) HFA Inhaler 2 Puff(s) Inhalation every 6 hours PRN Shortness of Breath and/or Wheezing  ALPRAZolam 1 milliGRAM(s) Oral at bedtime PRN insomnia  aluminum hydroxide/magnesium hydroxide/simethicone Suspension 30 milliLiter(s) Oral every 6 hours PRN Dyspepsia  ondansetron Injectable 4 milliGRAM(s) IV Push every 6 hours PRN Nausea      Allergies    adhesives (Rash)  Keflex (Short breath)  Originally Entered as [Unknown] reaction to [Other][Tape] (Unknown)    Intolerances        LABS:                                   12.2   12.8  )-----------( 270      ( 17 Sep 2018 06:40 )             36.8         CBC Full  -  ( 17 Sep 2018 06:40 )  WBC Count : 12.8 K/uL  Hemoglobin : 12.2 g/dL  Hematocrit : 36.8 %  Platelet Count - Automated : 270 K/uL  Mean Cell Volume : 91.8 fL  Mean Cell Hemoglobin : 30.4 pg  Mean Cell Hemoglobin Concentration : 33.2 g/dL  Auto Neutrophil # : x  Auto Lymphocyte # : x  Auto Monocyte # : x  Auto Eosinophil # : x  Auto Basophil # : x  Auto Neutrophil % : 87.1 %  Auto Lymphocyte % : 8.4 %  Auto Monocyte % : 4.5 %  Auto Eosinophil % : x  Auto Basophil % : x        09-17    137  |  99  |  23  ----------------------------<  109<H>  4.5   |  28  |  0.65    Ca    9.8      17 Sep 2018 06:43  Mg     2.1     09-17

## 2018-09-17 NOTE — PROGRESS NOTE ADULT - PROBLEM SELECTOR PLAN 6
-lexapro 20 mg daily     #insomnia  -patient takes melatonin sublingual dissolving tablets at home  -unable to use her home dose due to not being in appropriate label for pharmacy  -patient does not want to take oral melatonin   -xanax 0.5 mg two pills prn insomnia at [facility].

## 2018-09-18 LAB
HCT VFR BLD CALC: 34.3 % — LOW (ref 34.5–45)
HGB BLD-MCNC: 11.3 G/DL — LOW (ref 11.5–15.5)
MCHC RBC-ENTMCNC: 31 PG — SIGNIFICANT CHANGE UP (ref 27–34)
MCHC RBC-ENTMCNC: 32.9 G/DL — SIGNIFICANT CHANGE UP (ref 32–36)
MCV RBC AUTO: 94.2 FL — SIGNIFICANT CHANGE UP (ref 80–100)
PLATELET # BLD AUTO: 224 K/UL — SIGNIFICANT CHANGE UP (ref 150–400)
RBC # BLD: 3.64 M/UL — LOW (ref 3.8–5.2)
RBC # FLD: 13.9 % — SIGNIFICANT CHANGE UP (ref 10.3–16.9)
WBC # BLD: 11 K/UL — HIGH (ref 3.8–10.5)
WBC # FLD AUTO: 11 K/UL — HIGH (ref 3.8–10.5)

## 2018-09-18 PROCEDURE — 99232 SBSQ HOSP IP/OBS MODERATE 35: CPT | Mod: GC

## 2018-09-18 RX ORDER — POLYETHYLENE GLYCOL 3350 17 G/17G
17 POWDER, FOR SOLUTION ORAL DAILY
Qty: 0 | Refills: 0 | Status: DISCONTINUED | OUTPATIENT
Start: 2018-09-18 | End: 2018-09-24

## 2018-09-18 RX ADMIN — MONTELUKAST 10 MILLIGRAM(S): 4 TABLET, CHEWABLE ORAL at 11:59

## 2018-09-18 RX ADMIN — SODIUM CHLORIDE 100 MILLILITER(S): 9 INJECTION INTRAMUSCULAR; INTRAVENOUS; SUBCUTANEOUS at 06:30

## 2018-09-18 RX ADMIN — PANTOPRAZOLE SODIUM 40 MILLIGRAM(S): 20 TABLET, DELAYED RELEASE ORAL at 06:26

## 2018-09-18 RX ADMIN — ONDANSETRON 4 MILLIGRAM(S): 8 TABLET, FILM COATED ORAL at 06:25

## 2018-09-18 RX ADMIN — Medication 2 MILLIGRAM(S): at 00:42

## 2018-09-18 RX ADMIN — Medication 2 MILLIGRAM(S): at 06:25

## 2018-09-18 RX ADMIN — ESCITALOPRAM OXALATE 20 MILLIGRAM(S): 10 TABLET, FILM COATED ORAL at 11:59

## 2018-09-18 RX ADMIN — POLYETHYLENE GLYCOL 3350 17 GRAM(S): 17 POWDER, FOR SOLUTION ORAL at 12:00

## 2018-09-18 RX ADMIN — Medication 1 MILLIGRAM(S): at 03:25

## 2018-09-18 RX ADMIN — Medication 2 MILLIGRAM(S): at 11:59

## 2018-09-18 RX ADMIN — Medication 1 MILLIGRAM(S): at 22:16

## 2018-09-18 RX ADMIN — Medication 2 MILLIGRAM(S): at 17:51

## 2018-09-18 RX ADMIN — Medication 2 MILLIGRAM(S): at 23:37

## 2018-09-18 RX ADMIN — TIOTROPIUM BROMIDE AND OLODATEROL 2 PUFF(S): 3.124; 2.736 SPRAY, METERED RESPIRATORY (INHALATION) at 12:00

## 2018-09-18 RX ADMIN — ALBUTEROL 2 PUFF(S): 90 AEROSOL, METERED ORAL at 22:14

## 2018-09-18 RX ADMIN — Medication 1 SPRAY(S): at 17:49

## 2018-09-18 RX ADMIN — Medication 1 SPRAY(S): at 06:29

## 2018-09-18 RX ADMIN — ONDANSETRON 4 MILLIGRAM(S): 8 TABLET, FILM COATED ORAL at 23:37

## 2018-09-18 RX ADMIN — SODIUM CHLORIDE 100 MILLILITER(S): 9 INJECTION INTRAMUSCULAR; INTRAVENOUS; SUBCUTANEOUS at 11:59

## 2018-09-18 RX ADMIN — Medication 81 MILLIGRAM(S): at 11:59

## 2018-09-18 NOTE — PROGRESS NOTE ADULT - SUBJECTIVE AND OBJECTIVE BOX
INTERVAL HPI/OVERNIGHT EVENTS:  Walked with physical therapy today; Will discuss plans with RT; Otherwise neuro exam without change      MEDICATIONS  (STANDING):  aspirin enteric coated 81 milliGRAM(s) Oral daily  dexamethasone  Injectable 2 milliGRAM(s) IV Push every 6 hours  escitalopram 20 milliGRAM(s) Oral daily  fluticasone propionate 50 MICROgram(s)/spray Nasal Spray 1 Spray(s) Both Nostrils two times a day  influenza   Vaccine 0.5 milliLiter(s) IntraMuscular once  letrozole 2.5 milliGRAM(s) Oral daily  montelukast 10 milliGRAM(s) Oral daily  Naltrexone HCL compound capsulse 1.5 milliGRAM(s) 1.5 milliGRAM(s) Oral at bedtime  pantoprazole    Tablet 40 milliGRAM(s) Oral before breakfast  sodium chloride 0.9%. 1000 milliLiter(s) (100 mL/Hr) IV Continuous <Continuous>  tiotropium 2.5 MICROgram(s)/olodaterol 2.5 MICROgram(s) (STIOLTO) Inhaler 2 Puff(s) Inhalation daily    MEDICATIONS  (PRN):  ALBUTerol    90 MICROgram(s) HFA Inhaler 2 Puff(s) Inhalation every 6 hours PRN Shortness of Breath and/or Wheezing  ALPRAZolam 1 milliGRAM(s) Oral at bedtime PRN insomnia  aluminum hydroxide/magnesium hydroxide/simethicone Suspension 30 milliLiter(s) Oral every 6 hours PRN Dyspepsia  ondansetron Injectable 4 milliGRAM(s) IV Push every 6 hours PRN Nausea      Allergies    adhesives (Rash)  Keflex (Short breath)  Originally Entered as [Unknown] reaction to [Other][Tape] (Unknown)    Intolerances        Vital Signs Last 24 Hrs  T(C): 36.8 (18 Sep 2018 09:02), Max: 36.9 (17 Sep 2018 21:00)  T(F): 98.2 (18 Sep 2018 09:02), Max: 98.4 (17 Sep 2018 21:00)  HR: 80 (18 Sep 2018 09:02) (67 - 84)  BP: 120/75 (18 Sep 2018 09:02) (118/54 - 133/73)  BP(mean): --  RR: 17 (18 Sep 2018 09:02) (17 - 18)  SpO2: 97% (18 Sep 2018 05:44) (94% - 97%)          Constitutional:  Awake    Eyes: DANAE    ENMT: Negative    Neck: Supple    Back:  no tenderness     Respiratory:   clear    Cardiovascular: S1 S2    Gastrointestinal: soft    Genitourinary:    Extremities:    Vascular:    Neurological:  left facial mild    Skin:    Lymph Nodes:            LABS:                        11.3   11.0  )-----------( 224      ( 18 Sep 2018 06:26 )             34.3     09-17    137  |  99  |  23  ----------------------------<  109<H>  4.5   |  28  |  0.65    Ca    9.8      17 Sep 2018 06:43  Mg     2.1     09-17            RADIOLOGY & ADDITIONAL TESTS:

## 2018-09-18 NOTE — PROGRESS NOTE ADULT - SUBJECTIVE AND OBJECTIVE BOX
INTERVAL HPI/OVERNIGHT EVENTS:  Patient was seen and examined at bedside. No acute overnight events.     Vital Signs Last 24 Hrs  T(C): 36.5 (18 Sep 2018 05:44), Max: 36.9 (17 Sep 2018 21:00)  T(F): 97.7 (18 Sep 2018 05:44), Max: 98.4 (17 Sep 2018 21:00)  HR: 67 (18 Sep 2018 05:44) (67 - 84)  BP: 133/73 (18 Sep 2018 05:44) (118/54 - 141/71)  BP(mean): --  RR: 17 (18 Sep 2018 05:44) (17 - 18)  SpO2: 97% (18 Sep 2018 05:44) (94% - 98%)    PHYSICAL EXAM:    General: alert and oriented times 3, well nourished  HEENT:  NCAT, anicertic, MMM, no scleral icterus, cranial deformities due to prior brain surgery   Respiratory: CTA no wheezes, rhonchi, rales, On L absent breathsounds in R  Cardiovascular: RRR normal S1 S2  Gastrointestinal: soft, NTND normoactive bowel sounds  Extremities: no edema noted,  2+ radial pulses b/l and 2+ dorsalis pedis pulses b/l,   	Neurological:     AAO x 3  	EOMI, PERRLA present  	+ left sided facial droop    5/5 shoulder shrug strength, 5/5 upper and lower extremity strength    Gait deferred   Psychiatric: appropriate mood and affect    MEDICATIONS  (STANDING):  aspirin enteric coated 81 milliGRAM(s) Oral daily  dexamethasone  Injectable 2 milliGRAM(s) IV Push every 6 hours  escitalopram 20 milliGRAM(s) Oral daily  fluticasone propionate 50 MICROgram(s)/spray Nasal Spray 1 Spray(s) Both Nostrils two times a day  influenza   Vaccine 0.5 milliLiter(s) IntraMuscular once  letrozole 2.5 milliGRAM(s) Oral daily  montelukast 10 milliGRAM(s) Oral daily  Naltrexone HCL compound capsulse 1.5 milliGRAM(s) 1.5 milliGRAM(s) Oral at bedtime  pantoprazole    Tablet 40 milliGRAM(s) Oral before breakfast  sodium chloride 0.9%. 1000 milliLiter(s) (100 mL/Hr) IV Continuous <Continuous>  tiotropium 2.5 MICROgram(s)/olodaterol 2.5 MICROgram(s) (STIOLTO) Inhaler 2 Puff(s) Inhalation daily    MEDICATIONS  (PRN):  ALBUTerol    90 MICROgram(s) HFA Inhaler 2 Puff(s) Inhalation every 6 hours PRN Shortness of Breath and/or Wheezing  ALPRAZolam 1 milliGRAM(s) Oral at bedtime PRN insomnia  aluminum hydroxide/magnesium hydroxide/simethicone Suspension 30 milliLiter(s) Oral every 6 hours PRN Dyspepsia  ondansetron Injectable 4 milliGRAM(s) IV Push every 6 hours PRN Nausea      Allergies    adhesives (Rash)  Keflex (Short breath)  Originally Entered as [Unknown] reaction to [Other][Tape] (Unknown)    Intolerances        LABS:                                     11.3   11.0  )-----------( 224      ( 18 Sep 2018 06:26 )             34.3         CBC Full  -  ( 18 Sep 2018 06:26 )  WBC Count : 11.0 K/uL  Hemoglobin : 11.3 g/dL  Hematocrit : 34.3 %  Platelet Count - Automated : 224 K/uL  Mean Cell Volume : 94.2 fL  Mean Cell Hemoglobin : 31.0 pg  Mean Cell Hemoglobin Concentration : 32.9 g/dL  Auto Neutrophil # : x  Auto Lymphocyte # : x  Auto Monocyte # : x  Auto Eosinophil # : x  Auto Basophil # : x  Auto Neutrophil % : x  Auto Lymphocyte % : x  Auto Monocyte % : x  Auto Eosinophil % : x  Auto Basophil % : x        09-17    137  |  99  |  23  ----------------------------<  109<H>  4.5   |  28  |  0.65    Ca    9.8      17 Sep 2018 06:43  Mg     2.1     09-17

## 2018-09-18 NOTE — PROGRESS NOTE ADULT - PROBLEM SELECTOR PLAN 6
-lexapro 20 mg daily     #insomnia  -patient takes melatonin sublingual dissolving tablets at home  -unable to use her home dose due to not being in appropriate label for pharmacy  -patient does not want to take oral melatonin   -xanax 0.5 mg two pills prn insomnia at [facility].

## 2018-09-18 NOTE — PROGRESS NOTE ADULT - SUBJECTIVE AND OBJECTIVE BOX
The patient is comfortable at bed rest and is aware that the consensus of the [facility] Tumor Board was to offer her Avastin in the near future.    I spoke with Dr Delfin Davis who is agreeable for this approach.      Will discuss with the staff.

## 2018-09-18 NOTE — PROGRESS NOTE ADULT - PROBLEM SELECTOR PLAN 2
-Prior radiation in July 2018 due to recurrence.   - MRI head w/ w/o contrast: heterogenous, peripherally enhancing right frontal mass, interval hemorrhage, edema, possible tumor progression vs radiation tx findings  -CT head w/o contrast shows mass effect and extensive right frontal, basal ganglia   and anterior callosal lucency. Mild leftward midline shift.   -currently no signs or symptoms of brain hernia, no signs of Cushings triad  -continue monitoring neurologic exams, neurosurgery consult if worrisome SS  -c/w decadron 2mg Q6 to taper to PO  -f/u with heme/onc and starting avastin  -f/u neurosx recs

## 2018-09-19 PROCEDURE — 99152 MOD SED SAME PHYS/QHP 5/>YRS: CPT

## 2018-09-19 PROCEDURE — 36561 INSERT TUNNELED CV CATH: CPT

## 2018-09-19 PROCEDURE — 77001 FLUOROGUIDE FOR VEIN DEVICE: CPT | Mod: 26

## 2018-09-19 PROCEDURE — 76937 US GUIDE VASCULAR ACCESS: CPT | Mod: 26

## 2018-09-19 PROCEDURE — 99232 SBSQ HOSP IP/OBS MODERATE 35: CPT | Mod: GC

## 2018-09-19 RX ORDER — DEXAMETHASONE 0.5 MG/5ML
2 ELIXIR ORAL EVERY 8 HOURS
Qty: 0 | Refills: 0 | Status: DISCONTINUED | OUTPATIENT
Start: 2018-09-19 | End: 2018-09-24

## 2018-09-19 RX ORDER — ALPRAZOLAM 0.25 MG
0.25 TABLET ORAL ONCE
Qty: 0 | Refills: 0 | Status: DISCONTINUED | OUTPATIENT
Start: 2018-09-19 | End: 2018-09-19

## 2018-09-19 RX ORDER — LANOLIN ALCOHOL/MO/W.PET/CERES
5 CREAM (GRAM) TOPICAL AT BEDTIME
Qty: 0 | Refills: 0 | Status: DISCONTINUED | OUTPATIENT
Start: 2018-09-19 | End: 2018-09-24

## 2018-09-19 RX ADMIN — POLYETHYLENE GLYCOL 3350 17 GRAM(S): 17 POWDER, FOR SOLUTION ORAL at 18:09

## 2018-09-19 RX ADMIN — ONDANSETRON 4 MILLIGRAM(S): 8 TABLET, FILM COATED ORAL at 22:08

## 2018-09-19 RX ADMIN — Medication 0.25 MILLIGRAM(S): at 03:09

## 2018-09-19 RX ADMIN — MONTELUKAST 10 MILLIGRAM(S): 4 TABLET, CHEWABLE ORAL at 12:24

## 2018-09-19 RX ADMIN — TIOTROPIUM BROMIDE AND OLODATEROL 2 PUFF(S): 3.124; 2.736 SPRAY, METERED RESPIRATORY (INHALATION) at 12:24

## 2018-09-19 RX ADMIN — Medication 2 MILLIGRAM(S): at 13:50

## 2018-09-19 RX ADMIN — Medication 2 MILLIGRAM(S): at 07:03

## 2018-09-19 RX ADMIN — Medication 1 SPRAY(S): at 07:05

## 2018-09-19 RX ADMIN — ESCITALOPRAM OXALATE 20 MILLIGRAM(S): 10 TABLET, FILM COATED ORAL at 12:24

## 2018-09-19 RX ADMIN — ONDANSETRON 4 MILLIGRAM(S): 8 TABLET, FILM COATED ORAL at 07:03

## 2018-09-19 RX ADMIN — ONDANSETRON 4 MILLIGRAM(S): 8 TABLET, FILM COATED ORAL at 13:36

## 2018-09-19 RX ADMIN — Medication 1 SPRAY(S): at 18:10

## 2018-09-19 RX ADMIN — LETROZOLE 2.5 MILLIGRAM(S): 2.5 TABLET, FILM COATED ORAL at 12:24

## 2018-09-19 RX ADMIN — Medication 5 MILLIGRAM(S): at 22:06

## 2018-09-19 RX ADMIN — Medication 81 MILLIGRAM(S): at 12:24

## 2018-09-19 RX ADMIN — Medication 2 MILLIGRAM(S): at 22:05

## 2018-09-19 RX ADMIN — Medication 30 MILLILITER(S): at 02:24

## 2018-09-19 RX ADMIN — Medication 1 MILLIGRAM(S): at 22:07

## 2018-09-19 NOTE — PROGRESS NOTE ADULT - PROBLEM SELECTOR PLAN 6
-lexapro 20 mg daily     #insomnia  -patient takes melatonin sublingual dissolving tablets at home  -unable to use her home dose due to not being in appropriate label for pharmacy  -patient does not want to take oral melatonin although she accepted last night  -xanax 0.5 mg two pills prn insomnia at night.

## 2018-09-19 NOTE — DIETITIAN INITIAL EVALUATION ADULT. - ENERGY NEEDS
Ideal body weight used for calculations as pt >120% of IBW.   ABW 73.6kg, IBW 52kg, 140% IBW, ht 63", BMI 28.7   Oncology needs used for stressed pts undergoing tx

## 2018-09-19 NOTE — PROGRESS NOTE ADULT - SUBJECTIVE AND OBJECTIVE BOX
Patient increasingly ambulatory.      Infusaport  placement is suggested. Please advise Interventional radiology.    She continues on the letrozole.

## 2018-09-19 NOTE — DIETITIAN INITIAL EVALUATION ADULT. - OTHER INFO
72F admitted with nausea, presenting with cerebral edema + CNS radiation damage. Hx of COPD, breast cancer w mets to brain s/p crainiotomy. No noted n/v/d/c, chewing/ swallowing/ pain impacting intake at this time, skin is WNL, pt is NPO. No noted changes in UBW PTA, NKFA. Receiving Zofran for nausea, seems to help. Will follow for ed with diet advancement. 72F admitted with nausea, presenting with cerebral edema + CNS radiation damage. Hx of COPD, breast cancer w mets to brain s/p crainiotomy. No noted v/d/c, chewing/ swallowing/ pain impacting intake at this time, skin is WNL, pt is NPO. No noted changes in UBW PTA, NKFA. Receiving Zofran for nausea, seems to help. Will follow for ed with diet advancement.

## 2018-09-19 NOTE — PROGRESS NOTE ADULT - SUBJECTIVE AND OBJECTIVE BOX
INTERVAL HPI/OVERNIGHT EVENTS:  No complaints; Walking with a walker;; To get a port today for IV Avastin. Decadron to be changed to 2 mg PO q 8h. otherwise withoutc change in current regime;       MEDICATIONS  (STANDING):  aspirin enteric coated 81 milliGRAM(s) Oral daily  dexamethasone  Injectable 2 milliGRAM(s) IV Push every 6 hours  escitalopram 20 milliGRAM(s) Oral daily  fluticasone propionate 50 MICROgram(s)/spray Nasal Spray 1 Spray(s) Both Nostrils two times a day  influenza   Vaccine 0.5 milliLiter(s) IntraMuscular once  letrozole 2.5 milliGRAM(s) Oral daily  melatonin 5 milliGRAM(s) Oral at bedtime  montelukast 10 milliGRAM(s) Oral daily  Naltrexone HCL compound capsulse 1.5 milliGRAM(s) 1.5 milliGRAM(s) Oral at bedtime  pantoprazole    Tablet 40 milliGRAM(s) Oral before breakfast  polyethylene glycol 3350 17 Gram(s) Oral daily  sodium chloride 0.9%. 1000 milliLiter(s) (100 mL/Hr) IV Continuous <Continuous>  tiotropium 2.5 MICROgram(s)/olodaterol 2.5 MICROgram(s) (STIOLTO) Inhaler 2 Puff(s) Inhalation daily    MEDICATIONS  (PRN):  ALBUTerol    90 MICROgram(s) HFA Inhaler 2 Puff(s) Inhalation every 6 hours PRN Shortness of Breath and/or Wheezing  ALPRAZolam 1 milliGRAM(s) Oral at bedtime PRN insomnia  aluminum hydroxide/magnesium hydroxide/simethicone Suspension 30 milliLiter(s) Oral every 6 hours PRN Dyspepsia  ondansetron Injectable 4 milliGRAM(s) IV Push every 6 hours PRN Nausea      Allergies    adhesives (Rash)  Keflex (Short breath)  Originally Entered as [Unknown] reaction to [Other][Tape] (Unknown)    Intolerances        Vital Signs Last 24 Hrs  T(C): 36.6 (19 Sep 2018 08:56), Max: 37.1 (18 Sep 2018 15:33)  T(F): 97.9 (19 Sep 2018 08:56), Max: 98.7 (18 Sep 2018 15:33)  HR: 68 (19 Sep 2018 08:56) (60 - 84)  BP: 135/78 (19 Sep 2018 08:56) (103/53 - 139/66)  BP(mean): --  RR: 17 (19 Sep 2018 08:56) (16 - 18)  SpO2: 97% (19 Sep 2018 08:56) (96% - 97%)          Constitutional:  Awake    Eyes: DANAE    ENMT: Negative    Neck: Supple    Back:  no tenderness     Respiratory:  clear    Cardiovascular: S1 S2    Gastrointestinal:  soft    Genitourinary:    Extremities:  no edema    Vascular:    Neurological:    Skin:    Lymph Nodes:            09-19 @ 07:01  -  09-19 @ 10:30  --------------------------------------------------------  IN: 200 mL / OUT: 0 mL / NET: 200 mL      LABS:                        11.3   11.0  )-----------( 224      ( 18 Sep 2018 06:26 )             34.3                 RADIOLOGY & ADDITIONAL TESTS:

## 2018-09-19 NOTE — PROGRESS NOTE ADULT - PROBLEM SELECTOR PLAN 2
-Prior radiation in July 2018 due to recurrence.   - MRI head w/ w/o contrast: heterogenous, peripherally enhancing right frontal mass, interval hemorrhage, edema, possible tumor progression vs radiation tx findings. Consensus is that the edema is from radiation damage  -CT head w/o contrast shows mass effect and extensive right frontal, basal ganglia   and anterior callosal lucency. Mild leftward midline shift.   -currently no signs or symptoms of brain hernia, no signs of Cushings triad  -continue monitoring neurologic exams, neurosurgery consult if worrisome SS  -c/w decadron 2mg po q8  -f/u with heme/onc and starting avastin as an outpatient

## 2018-09-19 NOTE — PROGRESS NOTE ADULT - SUBJECTIVE AND OBJECTIVE BOX
INTERVAL HPI/OVERNIGHT EVENTS:  Patient was seen and examined at bedside. Overnight given melatonin to help sleep    Vital Signs Last 24 Hrs  T(C): 36.6 (19 Sep 2018 08:56), Max: 37.1 (18 Sep 2018 15:33)  T(F): 97.9 (19 Sep 2018 08:56), Max: 98.7 (18 Sep 2018 15:33)  HR: 68 (19 Sep 2018 08:56) (60 - 84)  BP: 135/78 (19 Sep 2018 08:56) (103/53 - 139/66)  BP(mean): --  RR: 17 (19 Sep 2018 08:56) (16 - 18)  SpO2: 97% (19 Sep 2018 08:56) (96% - 97%)    PHYSICAL EXAM:    General: alert and oriented times 3, well nourished  HEENT:  NCAT, anicertic, MMM, no scleral icterus, cranial deformities due to prior brain surgery   Respiratory: CTA no wheezes, rhonchi, rales, On L absent breathsounds in R  Cardiovascular: RRR normal S1 S2  Gastrointestinal: soft, NTND normoactive bowel sounds  Extremities: no edema noted,  2+ radial pulses b/l and 2+ dorsalis pedis pulses b/l,   	Neurological:     AAO x 3  	EOMI, PERRLA present  	+ left sided facial droop    5/5 shoulder shrug strength, 5/5 upper and lower extremity strength    Gait deferred   Psychiatric: appropriate mood and affect        MEDICATIONS  (STANDING):  aspirin enteric coated 81 milliGRAM(s) Oral daily  dexamethasone     Tablet 2 milliGRAM(s) Oral every 8 hours  escitalopram 20 milliGRAM(s) Oral daily  fluticasone propionate 50 MICROgram(s)/spray Nasal Spray 1 Spray(s) Both Nostrils two times a day  influenza   Vaccine 0.5 milliLiter(s) IntraMuscular once  letrozole 2.5 milliGRAM(s) Oral daily  melatonin 5 milliGRAM(s) Oral at bedtime  montelukast 10 milliGRAM(s) Oral daily  Naltrexone HCL compound capsulse 1.5 milliGRAM(s) 1.5 milliGRAM(s) Oral at bedtime  pantoprazole    Tablet 40 milliGRAM(s) Oral before breakfast  polyethylene glycol 3350 17 Gram(s) Oral daily  sodium chloride 0.9%. 1000 milliLiter(s) (100 mL/Hr) IV Continuous <Continuous>  tiotropium 2.5 MICROgram(s)/olodaterol 2.5 MICROgram(s) (STIOLTO) Inhaler 2 Puff(s) Inhalation daily    MEDICATIONS  (PRN):  ALBUTerol    90 MICROgram(s) HFA Inhaler 2 Puff(s) Inhalation every 6 hours PRN Shortness of Breath and/or Wheezing  ALPRAZolam 1 milliGRAM(s) Oral at bedtime PRN insomnia  aluminum hydroxide/magnesium hydroxide/simethicone Suspension 30 milliLiter(s) Oral every 6 hours PRN Dyspepsia  ondansetron Injectable 4 milliGRAM(s) IV Push every 6 hours PRN Nausea                          11.3   11.0  )-----------( 224      ( 18 Sep 2018 06:26 )             34.3         CBC Full  -  ( 18 Sep 2018 06:26 )  WBC Count : 11.0 K/uL  Hemoglobin : 11.3 g/dL  Hematocrit : 34.3 %  Platelet Count - Automated : 224 K/uL  Mean Cell Volume : 94.2 fL  Mean Cell Hemoglobin : 31.0 pg  Mean Cell Hemoglobin Concentration : 32.9 g/dL  Auto Neutrophil # : x  Auto Lymphocyte # : x  Auto Monocyte # : x  Auto Eosinophil # : x  Auto Basophil # : x  Auto Neutrophil % : x  Auto Lymphocyte % : x  Auto Monocyte % : x  Auto Eosinophil % : x  Auto Basophil % : x INTERVAL HPI/OVERNIGHT EVENTS:  Patient was seen and examined at bedside. Overnight given melatonin to help sleep    Vital Signs Last 24 Hrs  T(C): 36.6 (19 Sep 2018 08:56), Max: 37.1 (18 Sep 2018 15:33)  T(F): 97.9 (19 Sep 2018 08:56), Max: 98.7 (18 Sep 2018 15:33)  HR: 68 (19 Sep 2018 08:56) (60 - 84)  BP: 135/78 (19 Sep 2018 08:56) (103/53 - 139/66)  BP(mean): --  RR: 17 (19 Sep 2018 08:56) (16 - 18)  SpO2: 97% (19 Sep 2018 08:56) (96% - 97%)    PHYSICAL EXAM:    General: alert and oriented times 3, well nourished  HEENT:  NCAT, anicertic, MMM, no scleral icterus, cranial deformities due to prior brain surgery   Respiratory: CTA no wheezes, rhonchi, rales, On L decreased breath sounds in R  Cardiovascular: RRR normal S1 S2  Gastrointestinal: soft, NTND normoactive bowel sounds  Extremities: no edema noted,  2+ radial pulses b/l and 2+ dorsalis pedis pulses b/l,   	Neurological:     AAO x 3  	EOMI, PERRLA present  	+ left sided facial droop    5/5 shoulder shrug strength, 5/5 upper and lower extremity strength    Gait deferred   Psychiatric: appropriate mood and affect        MEDICATIONS  (STANDING):  aspirin enteric coated 81 milliGRAM(s) Oral daily  dexamethasone     Tablet 2 milliGRAM(s) Oral every 8 hours  escitalopram 20 milliGRAM(s) Oral daily  fluticasone propionate 50 MICROgram(s)/spray Nasal Spray 1 Spray(s) Both Nostrils two times a day  influenza   Vaccine 0.5 milliLiter(s) IntraMuscular once  letrozole 2.5 milliGRAM(s) Oral daily  melatonin 5 milliGRAM(s) Oral at bedtime  montelukast 10 milliGRAM(s) Oral daily  Naltrexone HCL compound capsulse 1.5 milliGRAM(s) 1.5 milliGRAM(s) Oral at bedtime  pantoprazole    Tablet 40 milliGRAM(s) Oral before breakfast  polyethylene glycol 3350 17 Gram(s) Oral daily  sodium chloride 0.9%. 1000 milliLiter(s) (100 mL/Hr) IV Continuous <Continuous>  tiotropium 2.5 MICROgram(s)/olodaterol 2.5 MICROgram(s) (STIOLTO) Inhaler 2 Puff(s) Inhalation daily    MEDICATIONS  (PRN):  ALBUTerol    90 MICROgram(s) HFA Inhaler 2 Puff(s) Inhalation every 6 hours PRN Shortness of Breath and/or Wheezing  ALPRAZolam 1 milliGRAM(s) Oral at bedtime PRN insomnia  aluminum hydroxide/magnesium hydroxide/simethicone Suspension 30 milliLiter(s) Oral every 6 hours PRN Dyspepsia  ondansetron Injectable 4 milliGRAM(s) IV Push every 6 hours PRN Nausea                          11.3   11.0  )-----------( 224      ( 18 Sep 2018 06:26 )             34.3         CBC Full  -  ( 18 Sep 2018 06:26 )  WBC Count : 11.0 K/uL  Hemoglobin : 11.3 g/dL  Hematocrit : 34.3 %  Platelet Count - Automated : 224 K/uL  Mean Cell Volume : 94.2 fL  Mean Cell Hemoglobin : 31.0 pg  Mean Cell Hemoglobin Concentration : 32.9 g/dL  Auto Neutrophil # : x  Auto Lymphocyte # : x  Auto Monocyte # : x  Auto Eosinophil # : x  Auto Basophil # : x  Auto Neutrophil % : x  Auto Lymphocyte % : x  Auto Monocyte % : x  Auto Eosinophil % : x  Auto Basophil % : x

## 2018-09-19 NOTE — DIETITIAN INITIAL EVALUATION ADULT. - PROBLEM SELECTOR PLAN 6
-lexapro 20 mg daily     #insomnia  -patient takes melatonin sublingual dissolving tablets at home  -unable to use her home dose due to not being in appropriate label for pharmacy  -patient does not want to take oral melatonin   -xanax 0.5 mg two pills prn insomnia at [facility].

## 2018-09-19 NOTE — PROGRESS NOTE ADULT - PROBLEM SELECTOR PLAN 1
-likely 2/2 to cerebral edema from radiation damage  -c/w zofran 4 mg Q6 hrs prn nausea. EKG does not show prolonged QTc.

## 2018-09-19 NOTE — DIETITIAN INITIAL EVALUATION ADULT. - PROBLEM SELECTOR PLAN 8
1) PCP Contacted on Admission: (Y/N) --> Name & Phone #: Dr. Chantel Tellez is working with the patient to help take care of them.  2) Date of Contact with PCP:  3) PCP Contacted at Discharge: (Y/N, N/A)  4) Summary of Handoff Given to PCP:   5) Post-Discharge Appointment Date and Location:    #[facility]  Diet: full diet  Electrolytes: K>4 Mag >2  prophylaxis SCDs, patient does not want injections (ie lovenox, heparin subQ)  code status: patient is DNR DNI.

## 2018-09-20 LAB
ANION GAP SERPL CALC-SCNC: 12 MMOL/L — SIGNIFICANT CHANGE UP (ref 5–17)
BUN SERPL-MCNC: 33 MG/DL — HIGH (ref 7–23)
CALCIUM SERPL-MCNC: 9.4 MG/DL — SIGNIFICANT CHANGE UP (ref 8.4–10.5)
CHLORIDE SERPL-SCNC: 94 MMOL/L — LOW (ref 96–108)
CO2 SERPL-SCNC: 29 MMOL/L — SIGNIFICANT CHANGE UP (ref 22–31)
CREAT SERPL-MCNC: 0.78 MG/DL — SIGNIFICANT CHANGE UP (ref 0.5–1.3)
GLUCOSE SERPL-MCNC: 103 MG/DL — HIGH (ref 70–99)
HCT VFR BLD CALC: 35 % — SIGNIFICANT CHANGE UP (ref 34.5–45)
HGB BLD-MCNC: 11.5 G/DL — SIGNIFICANT CHANGE UP (ref 11.5–15.5)
MCHC RBC-ENTMCNC: 30.3 PG — SIGNIFICANT CHANGE UP (ref 27–34)
MCHC RBC-ENTMCNC: 32.9 G/DL — SIGNIFICANT CHANGE UP (ref 32–36)
MCV RBC AUTO: 92.3 FL — SIGNIFICANT CHANGE UP (ref 80–100)
PLATELET # BLD AUTO: 267 K/UL — SIGNIFICANT CHANGE UP (ref 150–400)
POTASSIUM SERPL-MCNC: 4.5 MMOL/L — SIGNIFICANT CHANGE UP (ref 3.5–5.3)
POTASSIUM SERPL-SCNC: 4.5 MMOL/L — SIGNIFICANT CHANGE UP (ref 3.5–5.3)
RBC # BLD: 3.79 M/UL — LOW (ref 3.8–5.2)
RBC # FLD: 14.1 % — SIGNIFICANT CHANGE UP (ref 10.3–16.9)
SODIUM SERPL-SCNC: 135 MMOL/L — SIGNIFICANT CHANGE UP (ref 135–145)
WBC # BLD: 10.8 K/UL — HIGH (ref 3.8–10.5)
WBC # FLD AUTO: 10.8 K/UL — HIGH (ref 3.8–10.5)

## 2018-09-20 PROCEDURE — 99232 SBSQ HOSP IP/OBS MODERATE 35: CPT | Mod: GC

## 2018-09-20 PROCEDURE — 93010 ELECTROCARDIOGRAM REPORT: CPT

## 2018-09-20 RX ORDER — MORPHINE SULFATE 50 MG/1
1 CAPSULE, EXTENDED RELEASE ORAL ONCE
Qty: 0 | Refills: 0 | Status: DISCONTINUED | OUTPATIENT
Start: 2018-09-20 | End: 2018-09-20

## 2018-09-20 RX ORDER — ONDANSETRON 8 MG/1
2 TABLET, FILM COATED ORAL EVERY 8 HOURS
Qty: 0 | Refills: 0 | Status: DISCONTINUED | OUTPATIENT
Start: 2018-09-20 | End: 2018-09-22

## 2018-09-20 RX ADMIN — Medication 30 MILLILITER(S): at 06:17

## 2018-09-20 RX ADMIN — TIOTROPIUM BROMIDE AND OLODATEROL 2 PUFF(S): 3.124; 2.736 SPRAY, METERED RESPIRATORY (INHALATION) at 12:08

## 2018-09-20 RX ADMIN — Medication 1 SPRAY(S): at 17:15

## 2018-09-20 RX ADMIN — MORPHINE SULFATE 1 MILLIGRAM(S): 50 CAPSULE, EXTENDED RELEASE ORAL at 01:41

## 2018-09-20 RX ADMIN — Medication 1 SPRAY(S): at 06:22

## 2018-09-20 RX ADMIN — Medication 2 MILLIGRAM(S): at 21:34

## 2018-09-20 RX ADMIN — MORPHINE SULFATE 1 MILLIGRAM(S): 50 CAPSULE, EXTENDED RELEASE ORAL at 16:16

## 2018-09-20 RX ADMIN — Medication 1 MILLIGRAM(S): at 22:52

## 2018-09-20 RX ADMIN — ESCITALOPRAM OXALATE 20 MILLIGRAM(S): 10 TABLET, FILM COATED ORAL at 12:08

## 2018-09-20 RX ADMIN — MONTELUKAST 10 MILLIGRAM(S): 4 TABLET, CHEWABLE ORAL at 12:08

## 2018-09-20 RX ADMIN — POLYETHYLENE GLYCOL 3350 17 GRAM(S): 17 POWDER, FOR SOLUTION ORAL at 12:07

## 2018-09-20 RX ADMIN — Medication 2 MILLIGRAM(S): at 13:25

## 2018-09-20 RX ADMIN — Medication 30 MILLILITER(S): at 01:00

## 2018-09-20 RX ADMIN — MORPHINE SULFATE 1 MILLIGRAM(S): 50 CAPSULE, EXTENDED RELEASE ORAL at 02:10

## 2018-09-20 RX ADMIN — Medication 30 MILLILITER(S): at 18:02

## 2018-09-20 RX ADMIN — LETROZOLE 2.5 MILLIGRAM(S): 2.5 TABLET, FILM COATED ORAL at 12:08

## 2018-09-20 RX ADMIN — Medication 2 MILLIGRAM(S): at 06:20

## 2018-09-20 RX ADMIN — MORPHINE SULFATE 1 MILLIGRAM(S): 50 CAPSULE, EXTENDED RELEASE ORAL at 06:08

## 2018-09-20 RX ADMIN — ONDANSETRON 4 MILLIGRAM(S): 8 TABLET, FILM COATED ORAL at 06:08

## 2018-09-20 RX ADMIN — MORPHINE SULFATE 1 MILLIGRAM(S): 50 CAPSULE, EXTENDED RELEASE ORAL at 18:00

## 2018-09-20 RX ADMIN — Medication 81 MILLIGRAM(S): at 12:07

## 2018-09-20 RX ADMIN — MORPHINE SULFATE 1 MILLIGRAM(S): 50 CAPSULE, EXTENDED RELEASE ORAL at 06:38

## 2018-09-20 RX ADMIN — ONDANSETRON 2 MILLIGRAM(S): 8 TABLET, FILM COATED ORAL at 16:00

## 2018-09-20 RX ADMIN — ALBUTEROL 2 PUFF(S): 90 AEROSOL, METERED ORAL at 23:52

## 2018-09-20 RX ADMIN — PANTOPRAZOLE SODIUM 40 MILLIGRAM(S): 20 TABLET, DELAYED RELEASE ORAL at 06:21

## 2018-09-20 NOTE — PROGRESS NOTE ADULT - PROBLEM SELECTOR PLAN 6
-lexapro 20 mg daily     #insomnia  -patient takes melatonin sublingual dissolving tablets at home  -unable to use her home dose due to not being in appropriate label for pharmacy  -patient does not want to take oral melatonin although she accepted last night  -xanax 0.5 mg two pills prn insomnia at night. -lexapro 20 mg daily     #insomnia  -patient takes melatonin sublingual dissolving tablets at home  -unable to use her home dose due to not being in appropriate label for pharmacy  -patient does not want to take oral melatonin although she accepted last night  -xanax 0.5 mg two pills prn insomnia at night.  -[place] qHs PRN

## 2018-09-20 NOTE — PROGRESS NOTE ADULT - SUBJECTIVE AND OBJECTIVE BOX
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 CT scan of head found development of mass effect and extensive right frontal, basal ganglia and anterior callosal lucency. Mild leftward midline shift. Mass was determined to be due to neovascularization from radation changes. Patient was started on   . She received reglan and decadron that did not help the nausea. 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 She had xray abdomen 4 days ago that was unremarkable for acute changes. Endoscopy one year ago was unremarkable for upper GI pathology at that time.     She also states she has had issues with balance for the past week, without loss of consciousness. Denies fevers, chills, sob, cp, abdominal pain, diarrhea, or changes in urine. Denies changes in weight. Able to tolerate oral intake.    In the ED vitals 98.2, 72, 138/73, 18, 98%   patient received 4 mg zofran which resolved her nausea completely and 500 cc fluid.     CT head w/o contrast showed In this patient with previously treated right frontal brain tumor, there is development of mass effect and extensive right frontal, basal ganglia and anterior callosal lucency. Mild leftward midline shift. Possible   focus of recent intratumoral hemorrhage.            INTERVAL HPI/OVERNIGHT EVENTS:  FRANK    Vital Signs Last 24 Hrs  T(C): 36.6 (19 Sep 2018 08:56), Max: 37.1 (18 Sep 2018 15:33)  T(F): 97.9 (19 Sep 2018 08:56), Max: 98.7 (18 Sep 2018 15:33)  HR: 68 (19 Sep 2018 08:56) (60 - 84)  BP: 135/78 (19 Sep 2018 08:56) (103/53 - 139/66)  BP(mean): --  RR: 17 (19 Sep 2018 08:56) (16 - 18)  SpO2: 97% (19 Sep 2018 08:56) (96% - 97%)    PHYSICAL EXAM:    General: alert and oriented times 3, well nourished  HEENT:  NCAT, anicertic, MMM, no scleral icterus, cranial deformities due to prior brain surgery   Respiratory: CTA no wheezes, rhonchi, rales, On L decreased breath sounds in R  Cardiovascular: RRR normal S1 S2  Gastrointestinal: soft, NTND normoactive bowel sounds  Extremities: no edema noted,  2+ radial pulses b/l and 2+ dorsalis pedis pulses b/l,   	Neurological:     AAO x 3  	EOMI, PERRLA present  	+ left sided facial droop    5/5 shoulder shrug strength, 5/5 upper and lower extremity strength    Gait deferred   Psychiatric: appropriate mood and affect        MEDICATIONS  (STANDING):  aspirin enteric coated 81 milliGRAM(s) Oral daily  dexamethasone     Tablet 2 milliGRAM(s) Oral every 8 hours  escitalopram 20 milliGRAM(s) Oral daily  fluticasone propionate 50 MICROgram(s)/spray Nasal Spray 1 Spray(s) Both Nostrils two times a day  influenza   Vaccine 0.5 milliLiter(s) IntraMuscular once  letrozole 2.5 milliGRAM(s) Oral daily  melatonin 5 milliGRAM(s) Oral at bedtime  montelukast 10 milliGRAM(s) Oral daily  Naltrexone HCL compound capsulse 1.5 milliGRAM(s) 1.5 milliGRAM(s) Oral at bedtime  pantoprazole    Tablet 40 milliGRAM(s) Oral before breakfast  polyethylene glycol 3350 17 Gram(s) Oral daily  sodium chloride 0.9%. 1000 milliLiter(s) (100 mL/Hr) IV Continuous <Continuous>  tiotropium 2.5 MICROgram(s)/olodaterol 2.5 MICROgram(s) (STIOLTO) Inhaler 2 Puff(s) Inhalation daily    MEDICATIONS  (PRN):  ALBUTerol    90 MICROgram(s) HFA Inhaler 2 Puff(s) Inhalation every 6 hours PRN Shortness of Breath and/or Wheezing  ALPRAZolam 1 milliGRAM(s) Oral at bedtime PRN insomnia  aluminum hydroxide/magnesium hydroxide/simethicone Suspension 30 milliLiter(s) Oral every 6 hours PRN Dyspepsia  ondansetron Injectable 4 milliGRAM(s) IV Push every 6 hours PRN Nausea                          11.3   11.0  )-----------( 224      ( 18 Sep 2018 06:26 )             34.3         CBC Full  -  ( 18 Sep 2018 06:26 )  WBC Count : 11.0 K/uL  Hemoglobin : 11.3 g/dL  Hematocrit : 34.3 %  Platelet Count - Automated : 224 K/uL  Mean Cell Volume : 94.2 fL  Mean Cell Hemoglobin : 31.0 pg  Mean Cell Hemoglobin Concentration : 32.9 g/dL  Auto Neutrophil # : x  Auto Lymphocyte # : x  Auto Monocyte # : x  Auto Eosinophil # : x  Auto Basophil # : x  Auto Neutrophil % : x  Auto Lymphocyte % : x  Auto Monocyte % : x  Auto Eosinophil % : x  Auto Basophil % : x Hospital course    Patient is a 71 y/o f w/ pmhx of COPD, breast cancer post resection 1990, recurrence 2014 to liver post ablation, 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 CT scan of head found development of mass effect and extensive right frontal, basal ganglia and anterior callosal lucency. Mild leftward midline shift.. QTC wnl Patient was started on zofran. Patient was started on decadron 4mg q6 IV.  MRI was obtained of the mass for further characterization.  Mass was determined to be due to neovascularization from radiation changes. Heme onc was consulted and patient is to be started on avastin Decadron tapered down to 2mg q8 PO.  Patient was restarted for home Port was placed on 9/19. Patient to be transferred to [address] [place] [address] for intiation of Avastin       INTERVAL HPI/OVERNIGHT EVENTS:  FRANK    Subjective Patient reports that she feels okay, worried about starting avastin.     Vital Signs Last 24 Hrs  T(C): 36.8 (20 Sep 2018 08:30), Max: 36.8 (20 Sep 2018 08:30)  T(F): 98.2 (20 Sep 2018 08:30), Max: 98.2 (20 Sep 2018 08:30)  HR: 67 (20 Sep 2018 08:30) (67 - 90)  BP: 118/59 (20 Sep 2018 08:30) (107/58 - 118/59)  BP(mean): --  RR: 18 (20 Sep 2018 08:30) (18 - 20)  SpO2: 96% (20 Sep 2018 08:30) (96% - 97%)  PHYSICAL EXAM:    General: alert and oriented times 3, well nourished  HEENT:  NCAT, anicertic, MMM, no scleral icterus, cranial deformities due to prior brain surgery   Respiratory: CTA no wheezes, rhonchi, rales, On L decreased breath sounds in R  Cardiovascular: RRR normal S1 S2  Gastrointestinal: soft, NTND normoactive bowel sounds  Extremities: no edema noted,  2+ radial pulses b/l and 2+ dorsalis pedis pulses b/l,   	Neurological:     AAO x 3  	EOMI, PERRLA present  	+ left sided facial droop    5/5 shoulder shrug strength, 5/5 upper and lower extremity strength    Gait deferred   Psychiatric: appropriate mood and affect        MEDICATIONS  (STANDING):  aspirin enteric coated 81 milliGRAM(s) Oral daily  dexamethasone     Tablet 2 milliGRAM(s) Oral every 8 hours  escitalopram 20 milliGRAM(s) Oral daily  fluticasone propionate 50 MICROgram(s)/spray Nasal Spray 1 Spray(s) Both Nostrils two times a day  influenza   Vaccine 0.5 milliLiter(s) IntraMuscular once  letrozole 2.5 milliGRAM(s) Oral daily  melatonin 5 milliGRAM(s) Oral at bedtime  montelukast 10 milliGRAM(s) Oral daily  Naltrexone HCL compound capsulse 1.5 milliGRAM(s) 1.5 milliGRAM(s) Oral at bedtime  pantoprazole    Tablet 40 milliGRAM(s) Oral before breakfast  polyethylene glycol 3350 17 Gram(s) Oral daily  tiotropium 2.5 MICROgram(s)/olodaterol 2.5 MICROgram(s) (STIOLTO) Inhaler 2 Puff(s) Inhalation daily    MEDICATIONS  (PRN):  ALBUTerol    90 MICROgram(s) HFA Inhaler 2 Puff(s) Inhalation every 6 hours PRN Shortness of Breath and/or Wheezing  ALPRAZolam 1 milliGRAM(s) Oral at bedtime PRN insomnia  aluminum hydroxide/magnesium hydroxide/simethicone Suspension 30 milliLiter(s) Oral every 6 hours PRN Dyspepsia  ondansetron Injectable 4 milliGRAM(s) IV Push every 6 hours PRN Nausea                            11.5   10.8  )-----------( 267      ( 20 Sep 2018 06:38 )             35.0       09-20    135  |  94<L>  |  33<H>  ----------------------------<  103<H>  4.5   |  29  |  0.78    Ca    9.4      20 Sep 2018 06:38                        Lactate Trend            CAPILLARY BLOOD GLUCOSE            \ Hospital course    Patient is a 73 y/o f w/ pmhx of COPD, breast cancer post resection 1990, recurrence 2014 to liver post ablation, 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 CT scan of head found development of mass effect and extensive right frontal, basal ganglia and anterior callosal lucency. Mild leftward midline shift.. QTC wnl Patient was started on zofran. Patient was started on decadron 4mg q6 IV.  MRI was obtained of the mass for further characterization.  Mass was determined to be due to neovascularization from radiation changes. Heme onc was consulted and patient is to be started on avastin Decadron tapered down to 2mg q8 PO.  Patient was restarted for home Port was placed on 9/19.  qtc on 9/20 is 464. Decreased Zofran 4IV q6 IV to 2mg q8 Iv.  from Patient to be transferred to [address] [place] [address] for initiation of Avastin       INTERVAL HPI/OVERNIGHT EVENTS:  FRANK    Subjective Patient reports that she feels okay, worried about starting avastin.     Vital Signs Last 24 Hrs  T(C): 36.8 (20 Sep 2018 08:30), Max: 36.8 (20 Sep 2018 08:30)  T(F): 98.2 (20 Sep 2018 08:30), Max: 98.2 (20 Sep 2018 08:30)  HR: 67 (20 Sep 2018 08:30) (67 - 90)  BP: 118/59 (20 Sep 2018 08:30) (107/58 - 118/59)  BP(mean): --  RR: 18 (20 Sep 2018 08:30) (18 - 20)  SpO2: 96% (20 Sep 2018 08:30) (96% - 97%)  PHYSICAL EXAM:    General: alert and oriented times 3, well nourished  HEENT:  NCAT, anicertic, MMM, no scleral icterus, cranial deformities due to prior brain surgery   Respiratory: CTA no wheezes, rhonchi, rales, On L decreased breath sounds in R  Cardiovascular: RRR normal S1 S2  Gastrointestinal: soft, NTND normoactive bowel sounds  Extremities: no edema noted,  2+ radial pulses b/l and 2+ dorsalis pedis pulses b/l,   	Neurological:     AAO x 3  	EOMI, PERRLA present  	+ left sided facial droop    5/5 shoulder shrug strength, 5/5 upper and lower extremity strength    Gait deferred   Psychiatric: appropriate mood and affect        MEDICATIONS  (STANDING):  aspirin enteric coated 81 milliGRAM(s) Oral daily  dexamethasone     Tablet 2 milliGRAM(s) Oral every 8 hours  escitalopram 20 milliGRAM(s) Oral daily  fluticasone propionate 50 MICROgram(s)/spray Nasal Spray 1 Spray(s) Both Nostrils two times a day  influenza   Vaccine 0.5 milliLiter(s) IntraMuscular once  letrozole 2.5 milliGRAM(s) Oral daily  melatonin 5 milliGRAM(s) Oral at bedtime  montelukast 10 milliGRAM(s) Oral daily  Naltrexone HCL compound capsulse 1.5 milliGRAM(s) 1.5 milliGRAM(s) Oral at bedtime  pantoprazole    Tablet 40 milliGRAM(s) Oral before breakfast  polyethylene glycol 3350 17 Gram(s) Oral daily  tiotropium 2.5 MICROgram(s)/olodaterol 2.5 MICROgram(s) (STIOLTO) Inhaler 2 Puff(s) Inhalation daily    MEDICATIONS  (PRN):  ALBUTerol    90 MICROgram(s) HFA Inhaler 2 Puff(s) Inhalation every 6 hours PRN Shortness of Breath and/or Wheezing  ALPRAZolam 1 milliGRAM(s) Oral at bedtime PRN insomnia  aluminum hydroxide/magnesium hydroxide/simethicone Suspension 30 milliLiter(s) Oral every 6 hours PRN Dyspepsia  ondansetron Injectable 4 milliGRAM(s) IV Push every 6 hours PRN Nausea                            11.5   10.8  )-----------( 267      ( 20 Sep 2018 06:38 )             35.0       09-20    135  |  94<L>  |  33<H>  ----------------------------<  103<H>  4.5   |  29  |  0.78    Ca    9.4      20 Sep 2018 06:38                        Lactate Trend            CAPILLARY BLOOD GLUCOSE            \

## 2018-09-20 NOTE — PROGRESS NOTE ADULT - SUBJECTIVE AND OBJECTIVE BOX
NP Note Neurosurgery Sign off Note  Pt neuroslogically sable No surgical intervention required at this time  Please consult if any neuro changes  Continue Medical care as per Primary team    Dispo: Discussed with attending

## 2018-09-20 NOTE — PROGRESS NOTE ADULT - PROBLEM SELECTOR PLAN 1
-likely 2/2 to cerebral edema from radiation damage  -c/w zofran 4 mg Q6 hrs prn nausea. EKG does not show prolonged QTc. -likely 2/2 to cerebral edema from radiation damage  -c/w zofran 2 mg PO Q8 hrs prn nausea. EKG does not show prolonged QTc.  -repeat EKG

## 2018-09-20 NOTE — PROGRESS NOTE ADULT - SUBJECTIVE AND OBJECTIVE BOX
Infusaport in place.    Patient is alert with no new complaints and is eager to have the bevacizumab (Avastin) administered.      I will discuss the  issue with Radiation therapy today re: dosing and if appropriate arrange for transfer to [facility] to implement the therapy.

## 2018-09-20 NOTE — PROGRESS NOTE ADULT - PROBLEM SELECTOR PLAN 2
-Prior radiation in July 2018 due to recurrence.   - MRI head w/ w/o contrast: heterogenous, peripherally enhancing right frontal mass, interval hemorrhage, edema, possible tumor progression vs radiation tx findings. Consensus is that the edema is from radiation damage  -CT head w/o contrast shows mass effect and extensive right frontal, basal ganglia   and anterior callosal lucency. Mild leftward midline shift.   -currently no signs or symptoms of brain hernia, no signs of Cushings triad  -continue monitoring neurologic exams, neurosurgery consult if worrisome SS  -c/w decadron 2mg po q8  -f/u with heme/onc and starting avastin as an outpatient -Prior radiation in July 2018 due to recurrence.   - MRI head w/ w/o contrast: heterogenous, peripherally enhancing right frontal mass, interval hemorrhage, edema, possible tumor progression vs radiation tx findings. Consensus is that the edema is from radiation damage  -currently no signs or symptoms of brain hernia, no signs of Cushings triad  -continue monitoring neurologic exams, neurosurgery consult if worrisome SS  -c/w decadron 2mg po q8  -f/u with heme/onc and starting avastin in [facility]

## 2018-09-21 PROBLEM — G93.6 CEREBRAL EDEMA: Chronic | Status: ACTIVE | Noted: 2018-09-14

## 2018-09-21 LAB
ANION GAP SERPL CALC-SCNC: 9 MMOL/L — SIGNIFICANT CHANGE UP (ref 5–17)
BUN SERPL-MCNC: 29 MG/DL — HIGH (ref 7–23)
CALCIUM SERPL-MCNC: 9.8 MG/DL — SIGNIFICANT CHANGE UP (ref 8.4–10.5)
CHLORIDE SERPL-SCNC: 94 MMOL/L — LOW (ref 96–108)
CO2 SERPL-SCNC: 32 MMOL/L — HIGH (ref 22–31)
CREAT SERPL-MCNC: 0.79 MG/DL — SIGNIFICANT CHANGE UP (ref 0.5–1.3)
GLUCOSE SERPL-MCNC: 101 MG/DL — HIGH (ref 70–99)
HCT VFR BLD CALC: 40 % — SIGNIFICANT CHANGE UP (ref 34.5–45)
HGB BLD-MCNC: 13.1 G/DL — SIGNIFICANT CHANGE UP (ref 11.5–15.5)
MCHC RBC-ENTMCNC: 30.3 PG — SIGNIFICANT CHANGE UP (ref 27–34)
MCHC RBC-ENTMCNC: 32.8 G/DL — SIGNIFICANT CHANGE UP (ref 32–36)
MCV RBC AUTO: 92.4 FL — SIGNIFICANT CHANGE UP (ref 80–100)
PLATELET # BLD AUTO: 279 K/UL — SIGNIFICANT CHANGE UP (ref 150–400)
POTASSIUM SERPL-MCNC: 4.7 MMOL/L — SIGNIFICANT CHANGE UP (ref 3.5–5.3)
POTASSIUM SERPL-SCNC: 4.7 MMOL/L — SIGNIFICANT CHANGE UP (ref 3.5–5.3)
RBC # BLD: 4.33 M/UL — SIGNIFICANT CHANGE UP (ref 3.8–5.2)
RBC # FLD: 14.1 % — SIGNIFICANT CHANGE UP (ref 10.3–16.9)
SODIUM SERPL-SCNC: 135 MMOL/L — SIGNIFICANT CHANGE UP (ref 135–145)
WBC # BLD: 11.6 K/UL — HIGH (ref 3.8–10.5)
WBC # FLD AUTO: 11.6 K/UL — HIGH (ref 3.8–10.5)

## 2018-09-21 PROCEDURE — 99232 SBSQ HOSP IP/OBS MODERATE 35: CPT | Mod: GC

## 2018-09-21 RX ORDER — ACETAMINOPHEN 500 MG
1000 TABLET ORAL ONCE
Qty: 0 | Refills: 0 | Status: COMPLETED | OUTPATIENT
Start: 2018-09-21 | End: 2018-09-21

## 2018-09-21 RX ORDER — BEVACIZUMAB 400 MG/16ML
360 INJECTION, SOLUTION INTRAVENOUS ONCE
Qty: 0 | Refills: 0 | Status: COMPLETED | OUTPATIENT
Start: 2018-09-21 | End: 2018-09-21

## 2018-09-21 RX ORDER — DIPHENHYDRAMINE HCL 50 MG
50 CAPSULE ORAL ONCE
Qty: 0 | Refills: 0 | Status: COMPLETED | OUTPATIENT
Start: 2018-09-21 | End: 2018-09-21

## 2018-09-21 RX ORDER — ALPRAZOLAM 0.25 MG
1 TABLET ORAL AT BEDTIME
Qty: 0 | Refills: 0 | Status: DISCONTINUED | OUTPATIENT
Start: 2018-09-21 | End: 2018-09-24

## 2018-09-21 RX ADMIN — BEVACIZUMAB 76.27 MILLIGRAM(S): 400 INJECTION, SOLUTION INTRAVENOUS at 18:36

## 2018-09-21 RX ADMIN — Medication 1000 MILLIGRAM(S): at 18:19

## 2018-09-21 RX ADMIN — ONDANSETRON 2 MILLIGRAM(S): 8 TABLET, FILM COATED ORAL at 14:31

## 2018-09-21 RX ADMIN — MONTELUKAST 10 MILLIGRAM(S): 4 TABLET, CHEWABLE ORAL at 11:16

## 2018-09-21 RX ADMIN — ONDANSETRON 2 MILLIGRAM(S): 8 TABLET, FILM COATED ORAL at 22:51

## 2018-09-21 RX ADMIN — POLYETHYLENE GLYCOL 3350 17 GRAM(S): 17 POWDER, FOR SOLUTION ORAL at 11:16

## 2018-09-21 RX ADMIN — Medication 1 SPRAY(S): at 05:50

## 2018-09-21 RX ADMIN — LETROZOLE 2.5 MILLIGRAM(S): 2.5 TABLET, FILM COATED ORAL at 11:18

## 2018-09-21 RX ADMIN — Medication 2 MILLIGRAM(S): at 05:49

## 2018-09-21 RX ADMIN — Medication 1000 MILLIGRAM(S): at 19:39

## 2018-09-21 RX ADMIN — Medication 2 MILLIGRAM(S): at 14:27

## 2018-09-21 RX ADMIN — Medication 50 MILLIGRAM(S): at 18:20

## 2018-09-21 RX ADMIN — TIOTROPIUM BROMIDE AND OLODATEROL 2 PUFF(S): 3.124; 2.736 SPRAY, METERED RESPIRATORY (INHALATION) at 11:15

## 2018-09-21 RX ADMIN — Medication 81 MILLIGRAM(S): at 11:16

## 2018-09-21 RX ADMIN — ESCITALOPRAM OXALATE 20 MILLIGRAM(S): 10 TABLET, FILM COATED ORAL at 11:16

## 2018-09-21 RX ADMIN — ONDANSETRON 2 MILLIGRAM(S): 8 TABLET, FILM COATED ORAL at 00:02

## 2018-09-21 RX ADMIN — Medication 30 MILLILITER(S): at 00:03

## 2018-09-21 RX ADMIN — Medication 1 MILLIGRAM(S): at 21:55

## 2018-09-21 RX ADMIN — PANTOPRAZOLE SODIUM 40 MILLIGRAM(S): 20 TABLET, DELAYED RELEASE ORAL at 05:49

## 2018-09-21 RX ADMIN — Medication 2 MILLIGRAM(S): at 21:50

## 2018-09-21 RX ADMIN — Medication 1 SPRAY(S): at 19:29

## 2018-09-21 NOTE — PROGRESS NOTE ADULT - SUBJECTIVE AND OBJECTIVE BOX
The patient is alert and awaiting transfer to [facility] and then the Avastin therapy.      Port in place.    No new issues noted.

## 2018-09-21 NOTE — PROGRESS NOTE ADULT - SUBJECTIVE AND OBJECTIVE BOX
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 CT scan of head found development of mass effect and extensive right frontal, basal ganglia and anterior callosal lucency. Mild leftward midline shift.. QTC wnl Patient was started on zofran. Patient was started on decadron 4mg q6 IV.  MRI was obtained of the mass for further characterization.  Mass was determined to be due to neovascularization from radiation changes. Heme onc was consulted and patient is to be started on avastin Decadron tapered down to 2mg q8 PO.  Patient was restarted for home Port was placed on 9/19.  qtc on 9/20 is 464. Decreased Zofran 4IV q6 IV to 2mg q8 Iv.  from Patient to be transferred to [address] [place] [address] for initiation of Avastin       INTERVAL HPI/OVERNIGHT EVENTS:  resuested a pro air treatment, was not short of breath but watned ti    Subjective Patient reports that she feels good, anxious about her infusion    ICU Vital Signs Last 24 Hrs  T(C): 36.6 (21 Sep 2018 09:39), Max: 37.1 (20 Sep 2018 20:39)  T(F): 97.8 (21 Sep 2018 09:39), Max: 98.7 (20 Sep 2018 20:39)  HR: 97 (21 Sep 2018 09:39) (80 - 97)  BP: 115/67 (21 Sep 2018 09:39) (105/69 - 115/67)  BP(mean): --  ABP: --  ABP(mean): --  RR: 18 (21 Sep 2018 09:39) (18 - 18)  SpO2: 97% (21 Sep 2018 09:39) (96% - 97%)      General: alert and oriented times 3, well nourished  HEENT:  NCAT, anicertic, MMM, no scleral icterus, cranial deformities due to prior brain surgery   Respiratory: CTA no wheezes, rhonchi, rales, On L decreased breath sounds in R  Cardiovascular: RRR normal S1 S2  Gastrointestinal: soft, NTND normoactive bowel sounds  Extremities: no edema noted,  2+ radial pulses b/l and 2+ dorsalis pedis pulses b/l,   	Neurological:     AAO x 3  	EOMI, PERRLA present  	+ left sided facial droop    5/5 shoulder shrug strength, 5/5 upper and lower extremity strength    Gait deferred   Psychiatric: appropriate mood and affect        MEDICATIONS  (STANDING):  aspirin enteric coated 81 milliGRAM(s) Oral daily  dexamethasone     Tablet 2 milliGRAM(s) Oral every 8 hours  escitalopram 20 milliGRAM(s) Oral daily  fluticasone propionate 50 MICROgram(s)/spray Nasal Spray 1 Spray(s) Both Nostrils two times a day  influenza   Vaccine 0.5 milliLiter(s) IntraMuscular once  letrozole 2.5 milliGRAM(s) Oral daily  melatonin 5 milliGRAM(s) Oral at bedtime  montelukast 10 milliGRAM(s) Oral daily  Naltrexone HCL compound capsulse 1.5 milliGRAM(s) 1.5 milliGRAM(s) Oral at bedtime  pantoprazole    Tablet 40 milliGRAM(s) Oral before breakfast  polyethylene glycol 3350 17 Gram(s) Oral daily  tiotropium 2.5 MICROgram(s)/olodaterol 2.5 MICROgram(s) (STIOLTO) Inhaler 2 Puff(s) Inhalation daily    MEDICATIONS  (PRN):  ALBUTerol    90 MICROgram(s) HFA Inhaler 2 Puff(s) Inhalation every 6 hours PRN Shortness of Breath and/or Wheezing  ALPRAZolam 1 milliGRAM(s) Oral at bedtime PRN insomnia  aluminum hydroxide/magnesium hydroxide/simethicone Suspension 30 milliLiter(s) Oral every 6 hours PRN Dyspepsia  ondansetron Injectable 2 milliGRAM(s) IV Push every 8 hours PRN Nausea and/or Vomiting                            13.1   11.6  )-----------( 279      ( 21 Sep 2018 06:42 )             40.0       09-21    135  |  94<L>  |  29<H>  ----------------------------<  101<H>  4.7   |  32<H>  |  0.79    Ca    9.8      21 Sep 2018 06:42                        Lactate Trend            CAPILLARY BLOOD GLUCOSE

## 2018-09-21 NOTE — PROGRESS NOTE ADULT - SUBJECTIVE AND OBJECTIVE BOX
INTERVAL HPI/OVERNIGHT EVENTS:  No chief complaint; Awake and alert; Will get Avastin today; Ambulating with assistance;       MEDICATIONS  (STANDING):  aspirin enteric coated 81 milliGRAM(s) Oral daily  dexamethasone     Tablet 2 milliGRAM(s) Oral every 8 hours  escitalopram 20 milliGRAM(s) Oral daily  fluticasone propionate 50 MICROgram(s)/spray Nasal Spray 1 Spray(s) Both Nostrils two times a day  influenza   Vaccine 0.5 milliLiter(s) IntraMuscular once  letrozole 2.5 milliGRAM(s) Oral daily  melatonin 5 milliGRAM(s) Oral at bedtime  montelukast 10 milliGRAM(s) Oral daily  Naltrexone HCL compound capsulse 1.5 milliGRAM(s) 1.5 milliGRAM(s) Oral at bedtime  pantoprazole    Tablet 40 milliGRAM(s) Oral before breakfast  polyethylene glycol 3350 17 Gram(s) Oral daily  tiotropium 2.5 MICROgram(s)/olodaterol 2.5 MICROgram(s) (STIOLTO) Inhaler 2 Puff(s) Inhalation daily    MEDICATIONS  (PRN):  ALBUTerol    90 MICROgram(s) HFA Inhaler 2 Puff(s) Inhalation every 6 hours PRN Shortness of Breath and/or Wheezing  ALPRAZolam 1 milliGRAM(s) Oral at bedtime PRN insomnia  aluminum hydroxide/magnesium hydroxide/simethicone Suspension 30 milliLiter(s) Oral every 6 hours PRN Dyspepsia  ondansetron Injectable 2 milliGRAM(s) IV Push every 8 hours PRN Nausea and/or Vomiting      Allergies    adhesives (Rash)  Keflex (Short breath)  Originally Entered as [Unknown] reaction to [Other][Tape] (Unknown)    Intolerances        Vital Signs Last 24 Hrs  T(C): 36.6 (21 Sep 2018 09:39), Max: 37.1 (20 Sep 2018 20:39)  T(F): 97.8 (21 Sep 2018 09:39), Max: 98.7 (20 Sep 2018 20:39)  HR: 97 (21 Sep 2018 09:39) (80 - 97)  BP: 115/67 (21 Sep 2018 09:39) (105/69 - 115/67)  BP(mean): --  RR: 18 (21 Sep 2018 09:39) (18 - 18)  SpO2: 97% (21 Sep 2018 09:39) (96% - 97%)          Constitutional:  Awake    Eyes: DANAE    ENMT: Negative    Neck: Supple    Back:  no tenderness     Respiratory:  clear    Cardiovascular: S1 S2    Gastrointestinal:  soft    Genitourinary:    Extremities:  no edema    Vascular:    Neurological:    Skin:    Lymph Nodes:            LABS:                        13.1   11.6  )-----------( 279      ( 21 Sep 2018 06:42 )             40.0     09-21    135  |  94<L>  |  29<H>  ----------------------------<  101<H>  4.7   |  32<H>  |  0.79    Ca    9.8      21 Sep 2018 06:42            RADIOLOGY & ADDITIONAL TESTS:

## 2018-09-21 NOTE — PROGRESS NOTE ADULT - PROBLEM SELECTOR PLAN 2
-Prior radiation in July 2018 due to recurrence.   - MRI head w/ w/o contrast: heterogenous, peripherally enhancing right frontal mass, interval hemorrhage, edema, possible tumor progression vs radiation tx findings. Consensus is that the edema is from radiation damage  -currently no signs or symptoms of brain hernia, no signs of Cushings triad  -continue monitoring neurologic exams, neurosurgery consult if worrisome SS  -c/w decadron 2mg po q8  -f/u with heme/onc and starting Avastin in 7 [facility]

## 2018-09-21 NOTE — PROGRESS NOTE ADULT - PROBLEM SELECTOR PLAN 1
-likely 2/2 to cerebral edema from radiation damage  -c/w zofran 2 mg PO Q8 hrs prn nausea. Ekg shows qtc of 464

## 2018-09-21 NOTE — PROGRESS NOTE ADULT - PROBLEM SELECTOR PLAN 6
-lexapro 20 mg daily     #insomnia  -patient takes melatonin sublingual dissolving tablets at home  -unable to use her home dose due to not being in appropriate label for pharmacy  -patient does not want to take oral melatonin although she accepted last night  -xanax 0.5 mg two pills prn insomnia at night.  -[place] qHs PRN

## 2018-09-22 PROCEDURE — 93010 ELECTROCARDIOGRAM REPORT: CPT | Mod: 76

## 2018-09-22 PROCEDURE — 99233 SBSQ HOSP IP/OBS HIGH 50: CPT | Mod: GC

## 2018-09-22 RX ORDER — ONDANSETRON 8 MG/1
4 TABLET, FILM COATED ORAL EVERY 4 HOURS
Qty: 0 | Refills: 0 | Status: DISCONTINUED | OUTPATIENT
Start: 2018-09-22 | End: 2018-09-24

## 2018-09-22 RX ORDER — ONDANSETRON 8 MG/1
4 TABLET, FILM COATED ORAL ONCE
Qty: 0 | Refills: 0 | Status: COMPLETED | OUTPATIENT
Start: 2018-09-22 | End: 2018-09-22

## 2018-09-22 RX ORDER — ONDANSETRON 8 MG/1
2 TABLET, FILM COATED ORAL ONCE
Qty: 0 | Refills: 0 | Status: DISCONTINUED | OUTPATIENT
Start: 2018-09-22 | End: 2018-09-22

## 2018-09-22 RX ORDER — ACETAMINOPHEN 500 MG
650 TABLET ORAL EVERY 4 HOURS
Qty: 0 | Refills: 0 | Status: DISCONTINUED | OUTPATIENT
Start: 2018-09-22 | End: 2018-09-24

## 2018-09-22 RX ADMIN — ONDANSETRON 4 MILLIGRAM(S): 8 TABLET, FILM COATED ORAL at 16:44

## 2018-09-22 RX ADMIN — Medication 30 MILLILITER(S): at 16:43

## 2018-09-22 RX ADMIN — ONDANSETRON 4 MILLIGRAM(S): 8 TABLET, FILM COATED ORAL at 10:38

## 2018-09-22 RX ADMIN — Medication 1 SPRAY(S): at 06:16

## 2018-09-22 RX ADMIN — ONDANSETRON 2 MILLIGRAM(S): 8 TABLET, FILM COATED ORAL at 06:16

## 2018-09-22 RX ADMIN — Medication 1 SPRAY(S): at 17:23

## 2018-09-22 RX ADMIN — Medication 2 MILLIGRAM(S): at 14:19

## 2018-09-22 RX ADMIN — Medication 81 MILLIGRAM(S): at 12:46

## 2018-09-22 RX ADMIN — MONTELUKAST 10 MILLIGRAM(S): 4 TABLET, CHEWABLE ORAL at 12:47

## 2018-09-22 RX ADMIN — Medication 1 MILLIGRAM(S): at 21:45

## 2018-09-22 RX ADMIN — ONDANSETRON 4 MILLIGRAM(S): 8 TABLET, FILM COATED ORAL at 21:46

## 2018-09-22 RX ADMIN — Medication 650 MILLIGRAM(S): at 16:41

## 2018-09-22 RX ADMIN — LETROZOLE 2.5 MILLIGRAM(S): 2.5 TABLET, FILM COATED ORAL at 12:46

## 2018-09-22 RX ADMIN — Medication 650 MILLIGRAM(S): at 17:15

## 2018-09-22 RX ADMIN — Medication 2 MILLIGRAM(S): at 21:43

## 2018-09-22 RX ADMIN — TIOTROPIUM BROMIDE AND OLODATEROL 2 PUFF(S): 3.124; 2.736 SPRAY, METERED RESPIRATORY (INHALATION) at 12:48

## 2018-09-22 RX ADMIN — Medication 2 MILLIGRAM(S): at 06:17

## 2018-09-22 RX ADMIN — ESCITALOPRAM OXALATE 20 MILLIGRAM(S): 10 TABLET, FILM COATED ORAL at 12:47

## 2018-09-22 RX ADMIN — PANTOPRAZOLE SODIUM 40 MILLIGRAM(S): 20 TABLET, DELAYED RELEASE ORAL at 06:54

## 2018-09-22 RX ADMIN — Medication 30 MILLILITER(S): at 04:55

## 2018-09-23 PROCEDURE — 99232 SBSQ HOSP IP/OBS MODERATE 35: CPT | Mod: GC

## 2018-09-23 RX ORDER — CHLORHEXIDINE GLUCONATE 213 G/1000ML
1 SOLUTION TOPICAL DAILY
Qty: 0 | Refills: 0 | Status: DISCONTINUED | OUTPATIENT
Start: 2018-09-23 | End: 2018-09-24

## 2018-09-23 RX ORDER — SODIUM CHLORIDE 0.65 %
1 AEROSOL, SPRAY (ML) NASAL ONCE
Qty: 0 | Refills: 0 | Status: COMPLETED | OUTPATIENT
Start: 2018-09-23 | End: 2018-09-23

## 2018-09-23 RX ADMIN — Medication 1 SPRAY(S): at 11:52

## 2018-09-23 RX ADMIN — ESCITALOPRAM OXALATE 20 MILLIGRAM(S): 10 TABLET, FILM COATED ORAL at 11:05

## 2018-09-23 RX ADMIN — Medication 1 SPRAY(S): at 18:00

## 2018-09-23 RX ADMIN — Medication 1 MILLIGRAM(S): at 03:15

## 2018-09-23 RX ADMIN — TIOTROPIUM BROMIDE AND OLODATEROL 2 PUFF(S): 3.124; 2.736 SPRAY, METERED RESPIRATORY (INHALATION) at 11:05

## 2018-09-23 RX ADMIN — Medication 1 SPRAY(S): at 07:06

## 2018-09-23 RX ADMIN — MONTELUKAST 10 MILLIGRAM(S): 4 TABLET, CHEWABLE ORAL at 11:05

## 2018-09-23 RX ADMIN — PANTOPRAZOLE SODIUM 40 MILLIGRAM(S): 20 TABLET, DELAYED RELEASE ORAL at 07:05

## 2018-09-23 RX ADMIN — Medication 5 MILLIGRAM(S): at 22:16

## 2018-09-23 RX ADMIN — Medication 2 MILLIGRAM(S): at 13:27

## 2018-09-23 RX ADMIN — ONDANSETRON 4 MILLIGRAM(S): 8 TABLET, FILM COATED ORAL at 03:05

## 2018-09-23 RX ADMIN — ONDANSETRON 4 MILLIGRAM(S): 8 TABLET, FILM COATED ORAL at 18:31

## 2018-09-23 RX ADMIN — Medication 650 MILLIGRAM(S): at 15:25

## 2018-09-23 RX ADMIN — Medication 81 MILLIGRAM(S): at 11:05

## 2018-09-23 RX ADMIN — Medication 30 MILLILITER(S): at 15:26

## 2018-09-23 RX ADMIN — Medication 650 MILLIGRAM(S): at 16:30

## 2018-09-23 RX ADMIN — Medication 2 MILLIGRAM(S): at 22:18

## 2018-09-23 RX ADMIN — CHLORHEXIDINE GLUCONATE 1 APPLICATION(S): 213 SOLUTION TOPICAL at 11:07

## 2018-09-23 RX ADMIN — Medication 30 MILLILITER(S): at 22:25

## 2018-09-23 RX ADMIN — LETROZOLE 2.5 MILLIGRAM(S): 2.5 TABLET, FILM COATED ORAL at 11:52

## 2018-09-23 RX ADMIN — Medication 2 MILLIGRAM(S): at 07:05

## 2018-09-23 NOTE — PROGRESS NOTE ADULT - SUBJECTIVE AND OBJECTIVE BOX
Tolerated the  Avastin and discharge planned for tomorrow.    Arrangements discussed with the staff.    Patient alert and walking.

## 2018-09-23 NOTE — PROGRESS NOTE ADULT - SUBJECTIVE AND OBJECTIVE BOX
OVERNIGHT EVENTS:  FRANK    SUBJECTIVE / INTERVAL HPI: Patient seen and examined at bedside.   Patient sitting in bed, comfortable and alert. Tolerated avastin well and was up organizing her room and belongings. Is aware of planned discharge tomorrow AM. Notes improvement in nausea. Denies any complaints including h/v/d fevers chills, sob, cp, abd pain, changes in BM/urination, and leg swelling.     VITAL SIGNS:  Vital Signs Last 24 Hrs  T(C): 36.8 (23 Sep 2018 05:48), Max: 36.8 (23 Sep 2018 05:48)  T(F): 98.2 (23 Sep 2018 05:48), Max: 98.2 (23 Sep 2018 05:48)  HR: 76 (23 Sep 2018 05:48) (76 - 92)  BP: 117/71 (23 Sep 2018 05:48) (117/65 - 129/67)  BP(mean): --  RR: 14 (23 Sep 2018 05:48) (14 - 17)  SpO2: 99% (23 Sep 2018 05:48) (94% - 99%)    PHYSICAL EXAM:    General: WDWN  HEENT:  cranial deformities due to prior brain surgery  PERRL, anicteric sclera; MMM,   Neck: supple, no jvd  Cardiovascular: +S1/S2; RRR  Respiratory: CTA B/L; no w/r/r  Gastrointestinal: soft, NT/ND; +BSx4  Extremities: WWP; no edema, clubbing or cyanosis  Vascular: 2+ radial, DP/PT pulses B/L  Neurological: AAOx3; no focal deficits    MEDICATIONS:  MEDICATIONS  (STANDING):  aspirin enteric coated 81 milliGRAM(s) Oral daily  chlorhexidine 2% Cloths 1 Application(s) Topical daily  dexamethasone     Tablet 2 milliGRAM(s) Oral every 8 hours  escitalopram 20 milliGRAM(s) Oral daily  fluticasone propionate 50 MICROgram(s)/spray Nasal Spray 1 Spray(s) Both Nostrils two times a day  influenza   Vaccine 0.5 milliLiter(s) IntraMuscular once  letrozole 2.5 milliGRAM(s) Oral daily  melatonin 5 milliGRAM(s) Oral at bedtime  montelukast 10 milliGRAM(s) Oral daily  Naltrexone compound med 1.5mg/capsule 2 Capsule(s) 2 Capsule(s) Oral at bedtime  pantoprazole    Tablet 40 milliGRAM(s) Oral before breakfast  polyethylene glycol 3350 17 Gram(s) Oral daily  sodium chloride 0.65% Nasal 1 Spray(s) Both Nostrils once  tiotropium 2.5 MICROgram(s)/olodaterol 2.5 MICROgram(s) (STIOLTO) Inhaler 2 Puff(s) Inhalation daily    MEDICATIONS  (PRN):  acetaminophen   Tablet .. 650 milliGRAM(s) Oral every 4 hours PRN Temp greater or equal to 38C (100.4F), Mild Pain (1 - 3)  ALBUTerol    90 MICROgram(s) HFA Inhaler 2 Puff(s) Inhalation every 6 hours PRN Shortness of Breath and/or Wheezing  ALPRAZolam 1 milliGRAM(s) Oral at bedtime PRN insomnia  aluminum hydroxide/magnesium hydroxide/simethicone Suspension 30 milliLiter(s) Oral every 6 hours PRN Dyspepsia  ondansetron Injectable 4 milliGRAM(s) IV Push every 4 hours PRN Nausea and/or Vomiting      ALLERGIES:  Allergies    adhesives (Rash)  Keflex (Short breath)  Originally Entered as [Unknown] reaction to [Other][Tape] (Unknown)    Intolerances        LABS:              CAPILLARY BLOOD GLUCOSE          RADIOLOGY & ADDITIONAL TESTS: Reviewed.

## 2018-09-23 NOTE — PROGRESS NOTE ADULT - PROBLEM SELECTOR PLAN 2
-Prior radiation in July 2018 due to recurrence.   - MRI head w/ w/o contrast: heterogenous, peripherally enhancing right frontal mass, interval hemorrhage, edema, possible tumor progression vs radiation tx findings. Consensus is that the edema is from radiation damage  -currently no signs or symptoms of brain hernia, no signs of [place] triad  -continue monitoring neurologic exams, neurosurgery consult if worrisome SS  -c/w decadron 2mg po q8  -f/u with heme/onc and s/p Avastin   -Discharge planned for tomorrow 9/24

## 2018-09-24 ENCOUNTER — TRANSCRIPTION ENCOUNTER (OUTPATIENT)
Age: 73
End: 2018-09-24

## 2018-09-24 VITALS
SYSTOLIC BLOOD PRESSURE: 135 MMHG | HEART RATE: 74 BPM | OXYGEN SATURATION: 97 % | RESPIRATION RATE: 15 BRPM | TEMPERATURE: 98 F | DIASTOLIC BLOOD PRESSURE: 76 MMHG

## 2018-09-24 PROCEDURE — 99152 MOD SED SAME PHYS/QHP 5/>YRS: CPT

## 2018-09-24 PROCEDURE — 94640 AIRWAY INHALATION TREATMENT: CPT

## 2018-09-24 PROCEDURE — 83690 ASSAY OF LIPASE: CPT

## 2018-09-24 PROCEDURE — 80053 COMPREHEN METABOLIC PANEL: CPT

## 2018-09-24 PROCEDURE — 80048 BASIC METABOLIC PNL TOTAL CA: CPT

## 2018-09-24 PROCEDURE — 76937 US GUIDE VASCULAR ACCESS: CPT

## 2018-09-24 PROCEDURE — C1788: CPT

## 2018-09-24 PROCEDURE — 90686 IIV4 VACC NO PRSV 0.5 ML IM: CPT

## 2018-09-24 PROCEDURE — 99285 EMERGENCY DEPT VISIT HI MDM: CPT | Mod: 25

## 2018-09-24 PROCEDURE — 85025 COMPLETE CBC W/AUTO DIFF WBC: CPT

## 2018-09-24 PROCEDURE — C1769: CPT

## 2018-09-24 PROCEDURE — 97116 GAIT TRAINING THERAPY: CPT

## 2018-09-24 PROCEDURE — 36561 INSERT TUNNELED CV CATH: CPT

## 2018-09-24 PROCEDURE — A9585: CPT

## 2018-09-24 PROCEDURE — 97530 THERAPEUTIC ACTIVITIES: CPT

## 2018-09-24 PROCEDURE — 97162 PT EVAL MOD COMPLEX 30 MIN: CPT

## 2018-09-24 PROCEDURE — 70553 MRI BRAIN STEM W/O & W/DYE: CPT

## 2018-09-24 PROCEDURE — 99153 MOD SED SAME PHYS/QHP EA: CPT

## 2018-09-24 PROCEDURE — 77001 FLUOROGUIDE FOR VEIN DEVICE: CPT

## 2018-09-24 PROCEDURE — 96374 THER/PROPH/DIAG INJ IV PUSH: CPT

## 2018-09-24 PROCEDURE — 83735 ASSAY OF MAGNESIUM: CPT

## 2018-09-24 PROCEDURE — 85027 COMPLETE CBC AUTOMATED: CPT

## 2018-09-24 PROCEDURE — 36415 COLL VENOUS BLD VENIPUNCTURE: CPT

## 2018-09-24 PROCEDURE — 99232 SBSQ HOSP IP/OBS MODERATE 35: CPT | Mod: GC

## 2018-09-24 PROCEDURE — 70450 CT HEAD/BRAIN W/O DYE: CPT

## 2018-09-24 PROCEDURE — 93005 ELECTROCARDIOGRAM TRACING: CPT

## 2018-09-24 RX ORDER — FLUTICASONE PROPIONATE 50 MCG
1 SPRAY, SUSPENSION NASAL
Qty: 1 | Refills: 0 | OUTPATIENT
Start: 2018-09-24

## 2018-09-24 RX ORDER — DEXAMETHASONE 0.5 MG/5ML
1 ELIXIR ORAL
Qty: 0 | Refills: 0 | Status: DISCONTINUED | OUTPATIENT
Start: 2018-09-24 | End: 2018-09-24

## 2018-09-24 RX ORDER — ONDANSETRON 8 MG/1
1 TABLET, FILM COATED ORAL
Qty: 90 | Refills: 0
Start: 2018-09-24

## 2018-09-24 RX ORDER — DEXAMETHASONE 0.5 MG/5ML
1 ELIXIR ORAL
Qty: 60 | Refills: 0 | OUTPATIENT
Start: 2018-09-24

## 2018-09-24 RX ORDER — FAMOTIDINE 10 MG/ML
1 INJECTION INTRAVENOUS
Qty: 30 | Refills: 0 | OUTPATIENT
Start: 2018-09-24

## 2018-09-24 RX ORDER — ASPIRIN/CALCIUM CARB/MAGNESIUM 324 MG
1 TABLET ORAL
Qty: 30 | Refills: 0
Start: 2018-09-24

## 2018-09-24 RX ORDER — FLUTICASONE PROPIONATE 50 MCG
1 SPRAY, SUSPENSION NASAL
Qty: 0 | Refills: 0 | COMMUNITY
Start: 2018-09-24

## 2018-09-24 RX ADMIN — Medication 2 MILLIGRAM(S): at 07:05

## 2018-09-24 RX ADMIN — MONTELUKAST 10 MILLIGRAM(S): 4 TABLET, CHEWABLE ORAL at 11:42

## 2018-09-24 RX ADMIN — Medication 1 SPRAY(S): at 07:04

## 2018-09-24 RX ADMIN — Medication 81 MILLIGRAM(S): at 11:42

## 2018-09-24 RX ADMIN — PANTOPRAZOLE SODIUM 40 MILLIGRAM(S): 20 TABLET, DELAYED RELEASE ORAL at 07:05

## 2018-09-24 RX ADMIN — INFLUENZA VIRUS VACCINE 0.5 MILLILITER(S): 15; 15; 15; 15 SUSPENSION INTRAMUSCULAR at 11:42

## 2018-09-24 RX ADMIN — ESCITALOPRAM OXALATE 20 MILLIGRAM(S): 10 TABLET, FILM COATED ORAL at 11:42

## 2018-09-24 RX ADMIN — LETROZOLE 2.5 MILLIGRAM(S): 2.5 TABLET, FILM COATED ORAL at 11:42

## 2018-09-24 RX ADMIN — ONDANSETRON 4 MILLIGRAM(S): 8 TABLET, FILM COATED ORAL at 01:25

## 2018-09-24 RX ADMIN — CHLORHEXIDINE GLUCONATE 1 APPLICATION(S): 213 SOLUTION TOPICAL at 11:43

## 2018-09-24 RX ADMIN — ONDANSETRON 4 MILLIGRAM(S): 8 TABLET, FILM COATED ORAL at 09:33

## 2018-09-24 RX ADMIN — TIOTROPIUM BROMIDE AND OLODATEROL 2 PUFF(S): 3.124; 2.736 SPRAY, METERED RESPIRATORY (INHALATION) at 11:43

## 2018-09-24 RX ADMIN — Medication 1 MILLIGRAM(S): at 01:25

## 2018-09-24 NOTE — PROGRESS NOTE ADULT - SUBJECTIVE AND OBJECTIVE BOX
The patient is  to go home today with the next Avastin to be administered in our office in 2 1/2 weeks.    Please prescribe on the ondansetron for nausea and the steroids as per Neurology.    No issues overnight, pt alert without change.

## 2018-09-24 NOTE — PROGRESS NOTE ADULT - PROBLEM SELECTOR PROBLEM 7
Lung cancer
Lung cancer
Chronic obstructive pulmonary disease, unspecified COPD type
Lung cancer
Chronic obstructive pulmonary disease, unspecified COPD type

## 2018-09-24 NOTE — DISCHARGE NOTE ADULT - PLAN OF CARE
Improvement in your nausea and vomiting symptoms You were admitted to the hospital with nausea and vomiting. We worked you up for causes of this by checking an abdominal x ray which was negative and a head CT and MRI which showed swelling in the brain. We believe this swelling to be a result of your radiation therapy. You we also started on avastin for a process called "neovascularization" in the brain which we also believe to be a result of your radiation therapy. You are currently deemed stable for discharge. Please follow up with Dr. Tellez and your hematologist/oncologist outpatient to continue avastin therapy. Please also take your medications as prescribed. You were admitted to the hospital with a history of COPD. Please continue to take your home medications as prescribed and follow up with Dr. Tellez outpatient. You came to the hospital with a history of breast cancer, please continue to take your home medications as prescribed and follow up with your hematology/oncology doctor.

## 2018-09-24 NOTE — PROGRESS NOTE ADULT - REASON FOR ADMISSION
nausea
nausea, CNS abnormality
nausea. CNS radiation damage.
nausea. Radiation induced flare of CNS
nausea, breast cancer, radiation induced CNS necrosis
nausea, RT necrosis of brain,breast cancer
nausea, Radiation necrosis of CNs
nausea, radiation induced CNS damage, breast cancer
Nausea; Cerebral edema
nausea

## 2018-09-24 NOTE — PROGRESS NOTE ADULT - PROBLEM SELECTOR PLAN 8
After hydration calcium know improved; 9.8
Hydrate today
Hydrate today
Fluids: NS 1000 cc @ 100cc/hr   Electrolytes: replete as necessary, K>4, Mg>2  Nutrition: Regular  Bowel Regimen: none  DVT ppx: lovenox  Code: DNR DNI  Disposition: 7 uris
Fluids: None  Electrolytes: replete as necessary, K>4, Mg>2  Nutrition: Regular  Bowel Regimen: none  DVT ppx: lovenox  Code: DNR DNI  Disposition: 7 uris
Fluids: None  Electrolytes: replete as necessary, K>4, Mg>2  Nutrition: Regular  Bowel Regimen: none  DVT ppx: lovenox  Code: DNR DNI  Disposition: 7 uris
Fluids: None  Electrolytes: replete as necessary, K>4, Mg>2  Nutrition: Regular  Bowel Regimen: none  DVT ppx: lovenox  Code: DNR DNI  Disposition: 7W Will work with PT and be discharged in AM
Fluids: None  Electrolytes: replete as necessary, K>4, Mg>2  Nutrition: Regular  Bowel Regimen: none  DVT ppx: lovenox  Code: DNR DNI  Disposition: 7W Will work with PT and be discharged in AM
Fluids: None  Electrolytes: replete as necessary, K>4, Mg>2  Nutrition: Regular  Bowel Regimen: none  DVT ppx: lovenox  Code: DNR DNI  Disposition: 7 uris

## 2018-09-24 NOTE — DISCHARGE NOTE ADULT - SECONDARY DIAGNOSIS.
Cerebral edema Chronic obstructive pulmonary disease, unspecified COPD type Malignant neoplasm of female breast, unspecified estrogen receptor status, unspecified laterality, unspecified site of breast

## 2018-09-24 NOTE — PROGRESS NOTE ADULT - PROBLEM SELECTOR PROBLEM 2
Brain tumor
Cerebral edema
Brain tumor
Cerebral edema

## 2018-09-24 NOTE — PROGRESS NOTE ADULT - PROVIDER SPECIALTY LIST ADULT
Heme/Onc
Internal Medicine
Neurosurgery
Neurosurgery
Internal Medicine

## 2018-09-24 NOTE — DISCHARGE NOTE ADULT - CARE PLAN
Principal Discharge DX:	Non-intractable vomiting with nausea, unspecified vomiting type  Goal:	Improvement in your nausea and vomiting symptoms  Secondary Diagnosis:	Cerebral edema  Secondary Diagnosis:	Chronic obstructive pulmonary disease, unspecified COPD type  Secondary Diagnosis:	Malignant neoplasm of female breast, unspecified estrogen receptor status, unspecified laterality, unspecified site of breast Principal Discharge DX:	Non-intractable vomiting with nausea, unspecified vomiting type  Goal:	Improvement in your nausea and vomiting symptoms  Assessment and plan of treatment:	You were admitted to the hospital with nausea and vomiting. We worked you up for causes of this by checking an abdominal x ray which was negative and a head CT and MRI which showed swelling in the brain. We believe this swelling to be a result of your radiation therapy. You we also started on avastin for a process called "neovascularization" in the brain which we also believe to be a result of your radiation therapy. You are currently deemed stable for discharge. Please follow up with Dr. Tellez and your hematologist/oncologist outpatient to continue avastin therapy. Please also take your medications as prescribed.  Secondary Diagnosis:	Cerebral edema  Assessment and plan of treatment:	You were admitted to the hospital with nausea and vomiting. We worked you up for causes of this by checking an abdominal x ray which was negative and a head CT and MRI which showed swelling in the brain. We believe this swelling to be a result of your radiation therapy. You we also started on avastin for a process called "neovascularization" in the brain which we also believe to be a result of your radiation therapy. You are currently deemed stable for discharge. Please follow up with Dr. Tellez and your hematologist/oncologist outpatient to continue avastin therapy. Please also take your medications as prescribed.  Secondary Diagnosis:	Chronic obstructive pulmonary disease, unspecified COPD type  Assessment and plan of treatment:	You were admitted to the hospital with a history of COPD. Please continue to take your home medications as prescribed and follow up with Dr. Tellez outpatient.  Secondary Diagnosis:	Malignant neoplasm of female breast, unspecified estrogen receptor status, unspecified laterality, unspecified site of breast  Assessment and plan of treatment:	You came to the hospital with a history of breast cancer, please continue to take your home medications as prescribed and follow up with your hematology/oncology doctor.

## 2018-09-24 NOTE — PROGRESS NOTE ADULT - SUBJECTIVE AND OBJECTIVE BOX
INTERVAL HPI/OVERNIGHT EVENTS:  No complaint; Ambulating; Balance improved      MEDICATIONS  (STANDING):  aspirin enteric coated 81 milliGRAM(s) Oral daily  chlorhexidine 2% Cloths 1 Application(s) Topical daily  dexamethasone     Tablet 1 milliGRAM(s) Oral two times a day  escitalopram 20 milliGRAM(s) Oral daily  fluticasone propionate 50 MICROgram(s)/spray Nasal Spray 1 Spray(s) Both Nostrils two times a day  influenza   Vaccine 0.5 milliLiter(s) IntraMuscular once  letrozole 2.5 milliGRAM(s) Oral daily  melatonin 5 milliGRAM(s) Oral at bedtime  montelukast 10 milliGRAM(s) Oral daily  Naltrexone compound med 1.5mg/capsule 2 Capsule(s) 2 Capsule(s) Oral at bedtime  pantoprazole    Tablet 40 milliGRAM(s) Oral before breakfast  polyethylene glycol 3350 17 Gram(s) Oral daily  tiotropium 2.5 MICROgram(s)/olodaterol 2.5 MICROgram(s) (STIOLTO) Inhaler 2 Puff(s) Inhalation daily    MEDICATIONS  (PRN):  acetaminophen   Tablet .. 650 milliGRAM(s) Oral every 4 hours PRN Temp greater or equal to 38C (100.4F), Mild Pain (1 - 3)  ALBUTerol    90 MICROgram(s) HFA Inhaler 2 Puff(s) Inhalation every 6 hours PRN Shortness of Breath and/or Wheezing  ALPRAZolam 1 milliGRAM(s) Oral at bedtime PRN insomnia  aluminum hydroxide/magnesium hydroxide/simethicone Suspension 30 milliLiter(s) Oral every 6 hours PRN Dyspepsia  ondansetron Injectable 4 milliGRAM(s) IV Push every 4 hours PRN Nausea and/or Vomiting      Allergies    adhesives (Rash)  Keflex (Short breath)  Originally Entered as [Unknown] reaction to [Other][Tape] (Unknown)    Intolerances        Vital Signs Last 24 Hrs  T(C): 36.3 (24 Sep 2018 05:57), Max: 36.8 (23 Sep 2018 13:32)  T(F): 97.3 (24 Sep 2018 05:57), Max: 98.3 (23 Sep 2018 13:32)  HR: 70 (24 Sep 2018 05:57) (70 - 86)  BP: 135/75 (24 Sep 2018 05:57) (102/75 - 135/75)  BP(mean): --  RR: 14 (24 Sep 2018 05:57) (14 - 16)  SpO2: 98% (24 Sep 2018 05:57) (94% - 98%)          Constitutional: Awake    Eyes: DANAE    ENMT: Negative    Neck: Supple    Back:  no tenderness     Respiratory:  clear    Cardiovascular: S1 S2    Gastrointestinal: soft    Genitourinary:    Extremities:  no edema    Vascular:    Neurological:    Skin:    Lymph Nodes:            LABS:                RADIOLOGY & ADDITIONAL TESTS:

## 2018-09-24 NOTE — CHART NOTE - NSCHARTNOTEFT_GEN_A_CORE
Admitting Diagnosis:   Patient is a 72y old  Female who presents with a chief complaint of nausea (24 Sep 2018 11:13)      PAST MEDICAL & SURGICAL HISTORY:  Cerebral edema  Asthma  GERD (gastroesophageal reflux disease)  Lung collapse: 2x  COPD (chronic obstructive pulmonary disease)  Brain tumor  Liver cancer: left breast cancer spread to liver and chest wall  Carcinoid tumor: abdomen  Malignant neoplasm of female breast: Left breast CA with liver mets  Malignant neoplasm of bronchus and lung, unspecified site: RUL Lobectomy, RLL wedge resection  H/O right hemicolectomy  History of craniotomy  History of colon resection  Surgery, elective: Thoractomy with lung resection-  Right upper lobe removed, left  lower lobe wedge, right lower wedge  Surgery, elective: Ovaries removed 1980&quot;s  Surgery, elective: Left wrist  Breast cancer: left mastectomy      Current Nutrition Order: regular     PO Intake: Good (%) [   ]  Fair (50-75%) [ x  ] Poor (<25%) [   ]    GI Issues: no noted v/d/c, nausea noted - intermittent, being managed     Pain: no pain noted     Skin Integrity: WNL     Labs:         CAPILLARY BLOOD GLUCOSE          Medications:  MEDICATIONS  (STANDING):  aspirin enteric coated 81 milliGRAM(s) Oral daily  chlorhexidine 2% Cloths 1 Application(s) Topical daily  dexamethasone     Tablet 1 milliGRAM(s) Oral two times a day  escitalopram 20 milliGRAM(s) Oral daily  fluticasone propionate 50 MICROgram(s)/spray Nasal Spray 1 Spray(s) Both Nostrils two times a day  letrozole 2.5 milliGRAM(s) Oral daily  melatonin 5 milliGRAM(s) Oral at bedtime  montelukast 10 milliGRAM(s) Oral daily  Naltrexone compound med 1.5mg/capsule 2 Capsule(s) 2 Capsule(s) Oral at bedtime  pantoprazole    Tablet 40 milliGRAM(s) Oral before breakfast  polyethylene glycol 3350 17 Gram(s) Oral daily  tiotropium 2.5 MICROgram(s)/olodaterol 2.5 MICROgram(s) (STIOLTO) Inhaler 2 Puff(s) Inhalation daily    MEDICATIONS  (PRN):  acetaminophen   Tablet .. 650 milliGRAM(s) Oral every 4 hours PRN Temp greater or equal to 38C (100.4F), Mild Pain (1 - 3)  ALBUTerol    90 MICROgram(s) HFA Inhaler 2 Puff(s) Inhalation every 6 hours PRN Shortness of Breath and/or Wheezing  ALPRAZolam 1 milliGRAM(s) Oral at bedtime PRN insomnia  aluminum hydroxide/magnesium hydroxide/simethicone Suspension 30 milliLiter(s) Oral every 6 hours PRN Dyspepsia  ondansetron Injectable 4 milliGRAM(s) IV Push every 4 hours PRN Nausea and/or Vomiting      Weight: 73.6kg     Weight Change: no wt change noted per EMR - please re-take     Estimated energy needs:   Ideal body weight used for calculations as pt >120% of IBW.   ABW 73.6kg, IBW 52kg, 140% IBW, ht 63", BMI 28.7   Oncology needs used for stressed pts undergoing tx    1560-1820kcal (30-35kcal/kg)   62-72g pro (1.2-1.4g/kg)   1560-1820ml (30-35ml/kg)     Subjective: 72F admitted with nausea, presenting with cerebral edema + CNS radiation damage. Hx of COPD, breast cancer w mets to brain s/p craniotomy now w necrosis of brain. DC note in for home today as pt is now stable. Will be continued to be monitored outpatient. Intake remains fair at this time.     Previous Nutrition Diagnosis: Inadequate oral intake r/t NPO AEB 0% EER being met     Active [   ]  Resolved [ x  ]    If resolved, new PES: Increased nutrient needs r/t cancer aeb increased needs with catabolic illness     Goal: meet >75% needs via PO diet     Recommendations:  1. encourage intake   2. small frequent meals to meet needs   3. manage nausea as needed to meet needs     Education: encouraged intake to meet needs    Risk Level: High [ x  ] Moderate [   ] Low [   ]

## 2018-09-24 NOTE — PROGRESS NOTE ADULT - PROBLEM SELECTOR PLAN 4
-history of neuroendocrine tumor of lung, post upper and mid b/l lung lobe resection  -continue management as above.
Decadron as ordered
Decadron as ordered
Decadron to be tapered; Avastin to be given
-history of neuroendocrine tumor of lung, post upper and mid b/l lung lobe resection  -continue management as above.

## 2018-09-24 NOTE — PROGRESS NOTE ADULT - PROBLEM SELECTOR PROBLEM 5
Carcinoid tumor

## 2018-09-24 NOTE — DISCHARGE NOTE ADULT - HOSPITAL COURSE
Patient is a 73 y/o f w/ pmhx of COPD, breast cancer post resection 1990, recurrence 2014 to liver post ablation, neuroendocrine lung cancer post b/l upper lobe resection and mets to brain post craniotomy and radiation treatment (2017 and July this year as well) who presented [facility] on 9/23 due to nausea. CT scan of head found development of mass effect and extensive right frontal, basal ganglia and anterior callosal lucency. Mild leftward midline shift. Pt had xray abdomen 4 days ago that was unremarkable for acute changes. Endoscopy one year ago was unremarkable for upper GI pathology at that time. In the ED vitals 98.2, 72, 138/73, 18, 98%   patient received 4 mg zofran which resolved her nausea completely and 500 cc fluid.  CT head w/o contrast showed in this patient with previously treated right frontal brain tumor, development of mass effect and extensive right frontal, basal ganglia and anterior callosal lucency. Mild leftward midline shift. Possible focus of recent intratumoral hemorrhage. MRI head w/ w/o contrast showed heterogenous, peripherally enhancing right frontal mass, interval hemorrhage, edema, possible tumor progression vs radiation tx findings. MR findings thought to be consistent with post radiation neovascularization and that the edema is from radiation damage Pt underwent abdominal XR negative for obstruction. Pt underwent treatment with avastin which she will continue as an outpatient. Pt was started on decadron and zofran for nausea with improvement. Pt currently afebrile, hemodynamically stable and stable for discharge.

## 2018-09-24 NOTE — PROGRESS NOTE ADULT - ATTENDING COMMENTS
Patient alert, port in place.
Case discussed with staff.
Case discussed with staff.
Improving status.
Patient stable today.
Stable status, alert and ambulatory
discharge tomorrow on present meds
Discussed with neurosurgery present problem. MRI to be done with and without contrast. Zofran for nausea
Some confusion this morning; Awaiting MRI; Zofran as needed; Neurosurgery following
A All plans discussed with neurosurgery; Will cut back dose of IV steroids; If stable will start on PO steroids and Avastin as an outpatient;
For transfer to oncology floor today the chemotherapy; Otherwise without change in current regimen
Patient seen and examined;  Improved; Change Decadron to PO, Port  to be inserted today for IV Avastin
Patient seen and examined; Plans discussed; Akshat contact Dr. Muniz about the MRI and further plans.
Patient seen and examined; Taper the steroids to BID tomorrow;
Patient seen and examined; Will decrease Decadron to 1 mg PO BID: Heme plans noted
Patient seen and examined with house-staff during bedside rounds  Resident note read, including vitals, physical findings, laboratory data, and radiological reports.   Revisions included below.  Case discussed with House staff  Direct personal management at bedside  and extensive interpretation of data. Decision making of high complexity.
Patient seen and examined with house-staff during bedside rounds  Resident note read, including vitals, physical findings, laboratory data, and radiological reports.   Revisions included below.  Case discussed with House staff  Direct personal management at bedside  and extensive interpretation of data. Decision making of high complexity.

## 2018-09-24 NOTE — DISCHARGE NOTE ADULT - MEDICATION SUMMARY - MEDICATIONS TO TAKE
I will START or STAY ON the medications listed below when I get home from the hospital:    dexamethasone 1 mg oral tablet  -- 1 tab(s) by mouth 2 times a day  -- Indication: For Cerebral edema    aspirin 81 mg oral delayed release tablet  -- 1 tab(s) by mouth once a day  -- Indication: For Heart protection    aluminum hydroxide-magnesium hydroxide 200 mg-200 mg/5 mL oral suspension  -- 30 milliliter(s) by mouth every 6 hours, As needed, Dyspepsia  -- Indication: For Heartburn    Lexapro  -- 20 milligram(s) by mouth once a day  -- Indication: For Depression    Zofran 4 mg oral tablet  -- 1 tab(s) by mouth 3 times a day, As Needed -for nausea   -- Indication: For NAUSEA    ZyrTEC 10 mg oral tablet  -- 1 tab(s) by mouth once a day (at bedtime)  -- Indication: For COPD (chronic obstructive pulmonary disease)    Femara 2.5 mg oral tablet  -- 1 tab(s) by mouth once a day  -- Indication: For Breast cancer    ALPRAZolam 0.5 mg oral tablet  -- 0.5 milligram(s) by mouth 2 times a day, As Needed  -- Indication: For Anxiety    Stiolto Respimat 2.5 mcg-2.5 mcg/inh inhalation aerosol  -- 2 puff(s) inhaled every 24 hours  -- Indication: For COPD (chronic obstructive pulmonary disease)    Ventolin HFA 90 mcg/inh inhalation aerosol  -- 2 puff(s) inhaled 4 times a day, As Needed  -- Indication: For COPD (chronic obstructive pulmonary disease)    naltrexone  -- 3 milligram(s) by mouth once a day  -- Indication: For Alcohol use    famotidine 20 mg oral tablet  -- 1 tab(s) by mouth once a day   -- It is very important that you take or use this exactly as directed.  Do not skip doses or discontinue unless directed by your doctor.  Obtain medical advice before taking any non-prescription drugs as some may affect the action of this medication.    -- Indication: For Heartburn    senna oral tablet  -- 2 tab(s) by mouth once a day (at bedtime)  -- Indication: For CONSTIPATION    Singulair 10 mg oral tablet  -- 1 tab(s) by mouth once a day  -- Indication: For COPD (chronic obstructive pulmonary disease)    fluticasone 50 mcg/inh nasal spray  -- 1 spray(s) into nose 2 times a day  -- Indication: For COPD (chronic obstructive pulmonary disease)    fluticasone 50 mcg/inh nasal spray  -- 1 spray(s) into nose 2 times a day  -- Indication: For COPD (chronic obstructive pulmonary disease)    Melatonin 5 mg sublingual tablet  -- 1 tab(s) by mouth once a day (at bedtime), As Needed  -- Indication: For insomnia I will START or STAY ON the medications listed below when I get home from the hospital:    dexamethasone 1 mg oral tablet  -- 1 tab(s) by mouth 2 times a day  -- Indication: For Cerebral edema    aspirin 81 mg oral delayed release tablet  -- 1 tab(s) by mouth once a day  -- Indication: For Heart protection    aluminum hydroxide-magnesium hydroxide 200 mg-200 mg/5 mL oral suspension  -- 30 milliliter(s) by mouth every 6 hours, As needed, Dyspepsia  -- Indication: For Heartburn    Lexapro  -- 20 milligram(s) by mouth once a day  -- Indication: For Anxiety    Zofran 4 mg oral tablet  -- 1 tab(s) by mouth 3 times a day, As Needed -for nausea   -- Indication: For NAUSEA    ZyrTEC 10 mg oral tablet  -- 1 tab(s) by mouth once a day (at bedtime)  -- Indication: For COPD (chronic obstructive pulmonary disease)    Femara 2.5 mg oral tablet  -- 1 tab(s) by mouth once a day  -- Indication: For Breast cancer    ALPRAZolam 0.5 mg oral tablet  -- 0.5 milligram(s) by mouth 2 times a day, As Needed  -- Indication: For Anxiety    Stiolto Respimat 2.5 mcg-2.5 mcg/inh inhalation aerosol  -- 2 puff(s) inhaled every 24 hours  -- Indication: For COPD (chronic obstructive pulmonary disease)    Ventolin HFA 90 mcg/inh inhalation aerosol  -- 2 puff(s) inhaled 4 times a day, As Needed  -- Indication: For COPD (chronic obstructive pulmonary disease)    naltrexone  -- 3 milligram(s) by mouth once a day  -- Indication: For Alcohol use    famotidine 20 mg oral tablet  -- 1 tab(s) by mouth once a day   -- It is very important that you take or use this exactly as directed.  Do not skip doses or discontinue unless directed by your doctor.  Obtain medical advice before taking any non-prescription drugs as some may affect the action of this medication.    -- Indication: For Heartburn    senna oral tablet  -- 2 tab(s) by mouth once a day (at bedtime)  -- Indication: For CONSTIPATION    Singulair 10 mg oral tablet  -- 1 tab(s) by mouth once a day  -- Indication: For COPD (chronic obstructive pulmonary disease)    fluticasone 50 mcg/inh nasal spray  -- 1 spray(s) into nose 2 times a day  -- Indication: For COPD (chronic obstructive pulmonary disease)    fluticasone 50 mcg/inh nasal spray  -- 1 spray(s) into nose 2 times a day  -- Indication: For COPD (chronic obstructive pulmonary disease)    Melatonin 5 mg sublingual tablet  -- 1 tab(s) by mouth once a day (at bedtime), As Needed  -- Indication: For insomnia

## 2018-09-24 NOTE — DISCHARGE NOTE ADULT - MEDICATION SUMMARY - MEDICATIONS TO CHANGE
H/O wisdom tooth extraction
I will SWITCH the dose or number of times a day I take the medications listed below when I get home from the hospital:  None

## 2018-09-24 NOTE — DISCHARGE NOTE ADULT - PATIENT PORTAL LINK FT
You can access the [facility] Patient Portal, offered by [facility], by registering with the following website: [email or web address]

## 2018-09-24 NOTE — DISCHARGE NOTE ADULT - CARE PROVIDER_API CALL
Chantel Tellez), Critical Care Medicine; Internal Medicine  [address], [place] [address]  Phone: [phone]  Fax: [phone]

## 2018-09-24 NOTE — PROGRESS NOTE ADULT - PROBLEM SELECTOR PROBLEM 8
Hypercalcemia
Nutrition, metabolism, and development symptoms

## 2018-09-24 NOTE — DISCHARGE NOTE ADULT - ADDITIONAL INSTRUCTIONS
Please follow up with Dr. Tellez (Pulmonology)on Oct 15 @3:20 pm.  Please make an appt to follow up with your hematologist/oncologist.  Please bring your discharge papers and insurance information for these appointments.

## 2018-09-24 NOTE — PROGRESS NOTE ADULT - PROBLEM SELECTOR PROBLEM 6
Malignant neoplasm of female breast
Malignant neoplasm of female breast
Anxiety
Malignant neoplasm of female breast
Anxiety

## 2018-09-24 NOTE — PROGRESS NOTE ADULT - PROBLEM SELECTOR PROBLEM 1
Nausea

## 2018-09-24 NOTE — PROGRESS NOTE ADULT - PROBLEM SELECTOR PLAN 9
1) PCP Contacted on Admission: (Y/N) --> Name & Phone #: Dr. Chantel Tellez is working with the patient to help take care of them.  2) Date of Contact with PCP:  3) PCP Contacted at Discharge: (Y/N, N/A)  4) Summary of Handoff Given to PCP:   5) Post-Discharge Appointment Date and Location:    #[facility]  Diet: full diet  Electrolytes: K>4 Mag >2  prophylaxis SCDs, patient does not want injections (ie lovenox, heparin subQ)  code status: patient is DNR DNI.
1) PCP Contacted on Admission: (Y/N) --> Name & Phone #: Dr. Chantel Tellez is working with the patient to help take care of them.  2) Date of Contact with PCP:  3) PCP Contacted at Discharge: (Y/N, N/A)  4) Summary of Handoff Given to PCP:   5) Post-Discharge Appointment Date and Location:    #[facility]  Diet: full diet  Electrolytes: K>4 Mag >2  prophylaxis SCDs, patient does not want injections (ie lovenox, heparin subQ)  code status: patient is DNR DNI.
1) PCP Contacted on Admission: (Y/N) --> Name & Phone #: Dr. Chantel Tellez is working with the patient to help take care of them.  2) Date of Contact with PCP:  3) PCP Contacted at Discharge: (Y/N, N/A)  4) Summary of Handoff Given to PCP:   5) Post-Discharge Appointment Date and Location:    #[facility]  Diet: full diet  Electrolytes: K>4 Mag >2  prophylaxis SCDs, patient does not want injections (ie lovenox, heparin subQ)  code status: patient is DNR DNI.
1) PCP Contacted on Admission: (Y/N) --> Name & Phone #: Dr. Chantel Tellez is working with the patient to help take care of them.  2) Date of Contact with PCP:  3) PCP Contacted at Discharge: (Y/N, N/A)  4) Summary of Handoff Given to PCP:   5) Post-Discharge Appointment Date and Location:    #[facility]  Diet: full diet  Electrolytes: K>4 Mag >2  prophylaxis SCDs, patient does not want injections (ie lovenox, heparin subQ)  code status: patient is DNR DNI.
1) PCP Contacted on Admission: (Y/N) --> Name & Phone #: Dr. Chantel Tellez is working with the patient to help take care of them.  2) Date of Contact with PCP:  3) PCP Contacted at Discharge: (Y/N, N/A)  4) Summary of Handoff Given to PCP:   5) Post-Discharge Appointment Date and Location:    #[facility]  Diet: full diet  Electrolytes: K>4 Mag >2  prophylaxis SCDs, patient does not want injections (ie lovenox, heparin subQ)  code status: patient is DNR DNI.
1) PCP Contacted on Admission: (Y/N) --> Name & Phone #: Dr. Chantel Tellez is working with the patient to help take care of them.  2) Date of Contact with PCP:  3) PCP Contacted at Discharge: (Y/N, N/A)  4) Summary of Handoff Given to PCP:   5) Post-Discharge Appointment Date and Location:    #[facility]  Diet: full diet  Electrolytes: K>4 Mag >2  prophylaxis SCDs, patient does not want injections (ie lovenox, heparin subQ)  code status: patient is DNR DNI.
1) PCP Contacted on Admission: (Y/N) --> Name & Phone #: Dr. Chantel Tellez is working with the patient to help take care of them.  2) Date of Contact with PCP:  3) PCP Contacted at Discharge: (Y/N, N/A)  4) Summary of Handoff Given to PCP:   5) Post-Discharge Appointment Date and Location:    #[facility]  Diet: full diet  Electrolytes: K>4 Mag >2  prophylaxis SCDs, patient does not want injections (ie lovenox, heparin subQ)  code status: patient is DNR DNI.
1) PCP Contacted on Admission: (Y/N) --> Name & Phone #: Dr. Chantel Tellez is working with the patient to help take care of them.  2) Date of Contact with PCP:  3) PCP Contacted at Discharge: (Y/N, N/A)  4) Summary of Handoff Given to PCP:   5) Post-Discharge Appointment Date and Location:    #[facility]  Diet: full diet  Electrolytes: K>4 Mag >2  prophylaxis SCDs, patient does not want injections (ie lovenox, heparin subQ)  code status: patient is DNR DNI.

## 2018-09-24 NOTE — PROGRESS NOTE ADULT - PROBLEM SELECTOR PLAN 5
-history of intestinal carcinoid tumor resection.  -c/w zofran 4 mg Q6  -xray abdomen 4 days ago no signs of acute obstruction  -ct abdomen if signs or symptoms worse, consider GI opinion.
-history of intestinal carcinoid tumor resection.  -c/w zofran 2 mg Q8  -xray abdomen no signs of acute obstruction  -ct abdomen if signs or symptoms worse, consider GI opinion.
-history of intestinal carcinoid tumor resection.  -c/w zofran 4 mg Q6  -xray abdomen 4 days ago no signs of acute obstruction  -ct abdomen if signs or symptoms worse, consider GI opinion.
Stable
-history of intestinal carcinoid tumor resection.  -c/w zofran 4 mg Q6  -xray abdomen 4 days ago no signs of acute obstruction  -ct abdomen if signs or symptoms worse, consider GI opinion.

## 2018-09-24 NOTE — PROGRESS NOTE ADULT - ASSESSMENT
INTERVAL HPI/OVERNIGHT EVENTS:  Interim reviewed; CT noted; Have spoken with neurosurgery about the present problem. There may be a progression of tumor; No with mild shift as well      MEDICATIONS  (STANDING):  aspirin enteric coated 81 milliGRAM(s) Oral daily  dexamethasone  Injectable 4 milliGRAM(s) IV Push four times a day  enoxaparin Injectable 40 milliGRAM(s) SubCutaneous daily  influenza   Vaccine 0.5 milliLiter(s) IntraMuscular once  letrozole 2.5 milliGRAM(s) Oral daily  montelukast 10 milliGRAM(s) Oral once    MEDICATIONS  (PRN):  ALBUTerol    90 MICROgram(s) HFA Inhaler 2 Puff(s) Inhalation every 6 hours PRN Shortness of Breath and/or Wheezing  ondansetron Injectable 4 milliGRAM(s) IV Push every 6 hours PRN Nausea      Allergies    adhesives (Rash)  Keflex (Short breath)  Originally Entered as [Unknown] reaction to [Other][Tape] (Unknown)    Intolerances        Vital Signs Last 24 Hrs  T(C): 36.8 (14 Sep 2018 15:27), Max: 36.8 (14 Sep 2018 09:49)  T(F): 98.3 (14 Sep 2018 15:27), Max: 98.3 (14 Sep 2018 15:27)  HR: 87 (14 Sep 2018 15:27) (68 - 87)  BP: 125/65 (14 Sep 2018 15:27) (125/65 - 138/73)  BP(mean): 85 (14 Sep 2018 15:27) (85 - 85)  RR: 18 (14 Sep 2018 15:27) (18 - 18)  SpO2: 99% (14 Sep 2018 15:27) (98% - 99%)          Constitutional: Awake    Eyes: DANAE    ENMT: Negative    Neck: Supple    Back:  no tenderness     Respiratory:  clear    Cardiovascular: S1 S2    Gastrointestinal:  soft    Genitourinary:    Extremities:  no edema    Vascular:    Neurological:  no focal signs    Skin:    Lymph Nodes:            LABS:                        12.1   9.2   )-----------( 278      ( 14 Sep 2018 10:26 )             36.5     09-14    135  |  94<L>  |  16  ----------------------------<  98  3.9   |  28  |  0.77    Ca    10.3      14 Sep 2018 10:26    TPro  6.9  /  Alb  4.2  /  TBili  0.5  /  DBili  x   /  AST  24  /  ALT  20  /  AlkPhos  58  09-14          RADIOLOGY & ADDITIONAL TESTS:
INTERVAL HPI/OVERNIGHT EVENTS:  Interim reviewed; CT noted; Have spoken with neurosurgery about the present problem. There may be a progression of tumor; No with mild shift as well      MEDICATIONS  (STANDING):  aspirin enteric coated 81 milliGRAM(s) Oral daily  dexamethasone  Injectable 4 milliGRAM(s) IV Push four times a day  enoxaparin Injectable 40 milliGRAM(s) SubCutaneous daily  influenza   Vaccine 0.5 milliLiter(s) IntraMuscular once  letrozole 2.5 milliGRAM(s) Oral daily  montelukast 10 milliGRAM(s) Oral once    MEDICATIONS  (PRN):  ALBUTerol    90 MICROgram(s) HFA Inhaler 2 Puff(s) Inhalation every 6 hours PRN Shortness of Breath and/or Wheezing  ondansetron Injectable 4 milliGRAM(s) IV Push every 6 hours PRN Nausea      Allergies    adhesives (Rash)  Keflex (Short breath)  Originally Entered as [Unknown] reaction to [Other][Tape] (Unknown)    Intolerances        Vital Signs Last 24 Hrs  T(C): 36.8 (14 Sep 2018 15:27), Max: 36.8 (14 Sep 2018 09:49)  T(F): 98.3 (14 Sep 2018 15:27), Max: 98.3 (14 Sep 2018 15:27)  HR: 87 (14 Sep 2018 15:27) (68 - 87)  BP: 125/65 (14 Sep 2018 15:27) (125/65 - 138/73)  BP(mean): 85 (14 Sep 2018 15:27) (85 - 85)  RR: 18 (14 Sep 2018 15:27) (18 - 18)  SpO2: 99% (14 Sep 2018 15:27) (98% - 99%)          Constitutional: Awake    Eyes: DANAE    ENMT: Negative    Neck: Supple    Back:  no tenderness     Respiratory:  clear    Cardiovascular: S1 S2    Gastrointestinal:  soft    Genitourinary:    Extremities:  no edema    Vascular:    Neurological:  no focal signs    Skin:    Lymph Nodes:            LABS:                        12.1   9.2   )-----------( 278      ( 14 Sep 2018 10:26 )             36.5     09-14    135  |  94<L>  |  16  ----------------------------<  98  3.9   |  28  |  0.77    Ca    10.3      14 Sep 2018 10:26    TPro  6.9  /  Alb  4.2  /  TBili  0.5  /  DBili  x   /  AST  24  /  ALT  20  /  AlkPhos  58  09-14          RADIOLOGY & ADDITIONAL TESTS:
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 MRI showing brain mass, reccurance vs new primary vs Met.
Awaiting transfer to  and Avastin therapy.
Discharge home with VNS, PT and avastin to be administered in my office.
INTERVAL HPI/OVERNIGHT EVENTS:  Interim reviewed; CT noted; Have spoken with neurosurgery about the present problem. There may be a progression of tumor; No with mild shift as well      MEDICATIONS  (STANDING):  aspirin enteric coated 81 milliGRAM(s) Oral daily  dexamethasone  Injectable 4 milliGRAM(s) IV Push four times a day  enoxaparin Injectable 40 milliGRAM(s) SubCutaneous daily  influenza   Vaccine 0.5 milliLiter(s) IntraMuscular once  letrozole 2.5 milliGRAM(s) Oral daily  montelukast 10 milliGRAM(s) Oral once    MEDICATIONS  (PRN):  ALBUTerol    90 MICROgram(s) HFA Inhaler 2 Puff(s) Inhalation every 6 hours PRN Shortness of Breath and/or Wheezing  ondansetron Injectable 4 milliGRAM(s) IV Push every 6 hours PRN Nausea      Allergies    adhesives (Rash)  Keflex (Short breath)  Originally Entered as [Unknown] reaction to [Other][Tape] (Unknown)    Intolerances        Vital Signs Last 24 Hrs  T(C): 36.8 (14 Sep 2018 15:27), Max: 36.8 (14 Sep 2018 09:49)  T(F): 98.3 (14 Sep 2018 15:27), Max: 98.3 (14 Sep 2018 15:27)  HR: 87 (14 Sep 2018 15:27) (68 - 87)  BP: 125/65 (14 Sep 2018 15:27) (125/65 - 138/73)  BP(mean): 85 (14 Sep 2018 15:27) (85 - 85)  RR: 18 (14 Sep 2018 15:27) (18 - 18)  SpO2: 99% (14 Sep 2018 15:27) (98% - 99%)          Constitutional: Awake    Eyes: DANAE    ENMT: Negative    Neck: Supple    Back:  no tenderness     Respiratory:  clear    Cardiovascular: S1 S2    Gastrointestinal:  soft    Genitourinary:    Extremities:  no edema    Vascular:    Neurological:  no focal signs    Skin:    Lymph Nodes:            LABS:                        12.1   9.2   )-----------( 278      ( 14 Sep 2018 10:26 )             36.5     09-14    135  |  94<L>  |  16  ----------------------------<  98  3.9   |  28  |  0.77    Ca    10.3      14 Sep 2018 10:26    TPro  6.9  /  Alb  4.2  /  TBili  0.5  /  DBili  x   /  AST  24  /  ALT  20  /  AlkPhos  58  09-14          RADIOLOGY & ADDITIONAL TESTS:
Infusaport inserted.
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 MRI showing brain mass, post radiation neovascularization
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 MRI showing brain mass, reccurance vs new primary vs Met.
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 MRI showing brain mass, reccurance vs new primary vs Met.
Plan discharge tomorrow 9/23/18.    Please provide walker and arrange VNS Service    Out patient f/u in 2 weeks at my office [address]( ANETTE Hampton MD and Delfin Davis MD).
Stable --requires VNS at home and she will see Dr Davis and me in the office.
Stable overnight, alert, no change in facial droop.
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 MRI showing brain mass, reccurance vs new primary vs Met.

## 2018-09-24 NOTE — PROGRESS NOTE ADULT - PROBLEM SELECTOR PROBLEM 4
Cerebral edema
Malignant neoplasm of lung, unspecified laterality, unspecified part of lung
Cerebral edema
Malignant neoplasm of lung, unspecified laterality, unspecified part of lung

## 2018-09-26 ENCOUNTER — APPOINTMENT (OUTPATIENT)
Dept: MRI IMAGING | Facility: HOSPITAL | Age: 73
End: 2018-09-26

## 2018-09-27 ENCOUNTER — INBOUND DOCUMENT (OUTPATIENT)
Age: 73
End: 2018-09-27

## 2018-09-27 DIAGNOSIS — Z85.118 PERSONAL HISTORY OF OTHER MALIGNANT NEOPLASM OF BRONCHUS AND LUNG: ICD-10-CM

## 2018-09-27 DIAGNOSIS — Z85.05 PERSONAL HISTORY OF MALIGNANT NEOPLASM OF LIVER: ICD-10-CM

## 2018-09-27 DIAGNOSIS — Z66 DO NOT RESUSCITATE: ICD-10-CM

## 2018-09-27 DIAGNOSIS — G93.6 CEREBRAL EDEMA: ICD-10-CM

## 2018-09-27 DIAGNOSIS — C79.31 SECONDARY MALIGNANT NEOPLASM OF BRAIN: ICD-10-CM

## 2018-09-27 DIAGNOSIS — J44.9 CHRONIC OBSTRUCTIVE PULMONARY DISEASE, UNSPECIFIED: ICD-10-CM

## 2018-09-27 DIAGNOSIS — R29.810 FACIAL WEAKNESS: ICD-10-CM

## 2018-09-27 DIAGNOSIS — K21.9 GASTRO-ESOPHAGEAL REFLUX DISEASE WITHOUT ESOPHAGITIS: ICD-10-CM

## 2018-09-27 DIAGNOSIS — E83.52 HYPERCALCEMIA: ICD-10-CM

## 2018-09-27 DIAGNOSIS — G47.00 INSOMNIA, UNSPECIFIED: ICD-10-CM

## 2018-09-27 DIAGNOSIS — J45.909 UNSPECIFIED ASTHMA, UNCOMPLICATED: ICD-10-CM

## 2018-09-27 DIAGNOSIS — C50.919 MALIGNANT NEOPLASM OF UNSPECIFIED SITE OF UNSPECIFIED FEMALE BREAST: ICD-10-CM

## 2018-09-27 DIAGNOSIS — F41.9 ANXIETY DISORDER, UNSPECIFIED: ICD-10-CM

## 2018-09-27 DIAGNOSIS — R11.0 NAUSEA: ICD-10-CM

## 2018-09-27 DIAGNOSIS — Z85.3 PERSONAL HISTORY OF MALIGNANT NEOPLASM OF BREAST: ICD-10-CM

## 2018-09-28 ENCOUNTER — APPOINTMENT (OUTPATIENT)
Dept: NEUROSURGERY | Facility: CLINIC | Age: 73
End: 2018-09-28
Payer: MEDICARE

## 2018-09-28 VITALS
WEIGHT: 161 LBS | RESPIRATION RATE: 18 BRPM | SYSTOLIC BLOOD PRESSURE: 123 MMHG | OXYGEN SATURATION: 98 % | HEART RATE: 86 BPM | TEMPERATURE: 98.7 F | DIASTOLIC BLOOD PRESSURE: 74 MMHG | BODY MASS INDEX: 29.63 KG/M2 | HEIGHT: 62 IN

## 2018-09-28 PROCEDURE — 99214 OFFICE O/P EST MOD 30 MIN: CPT

## 2018-10-02 ENCOUNTER — APPOINTMENT (OUTPATIENT)
Dept: HEMATOLOGY ONCOLOGY | Facility: CLINIC | Age: 73
End: 2018-10-02
Payer: MEDICARE

## 2018-10-02 VITALS
DIASTOLIC BLOOD PRESSURE: 75 MMHG | OXYGEN SATURATION: 98 % | SYSTOLIC BLOOD PRESSURE: 114 MMHG | HEART RATE: 79 BPM | RESPIRATION RATE: 14 BRPM | TEMPERATURE: 98.1 F

## 2018-10-02 VITALS — HEIGHT: 62 IN | WEIGHT: 167 LBS | BODY MASS INDEX: 30.73 KG/M2

## 2018-10-02 PROCEDURE — 99204 OFFICE O/P NEW MOD 45 MIN: CPT | Mod: 25

## 2018-10-09 ENCOUNTER — APPOINTMENT (OUTPATIENT)
Dept: INTERNAL MEDICINE | Facility: CLINIC | Age: 73
End: 2018-10-09
Payer: MEDICARE

## 2018-10-09 VITALS
DIASTOLIC BLOOD PRESSURE: 59 MMHG | OXYGEN SATURATION: 98 % | SYSTOLIC BLOOD PRESSURE: 96 MMHG | WEIGHT: 171 LBS | HEART RATE: 80 BPM | BODY MASS INDEX: 31.28 KG/M2

## 2018-10-09 PROCEDURE — 99213 OFFICE O/P EST LOW 20 MIN: CPT

## 2018-10-09 NOTE — ASSESSMENT
[FreeTextEntry1] : Wants to have therapy in hospital; Will discuss with Dr. Castillo;  Very unsteady on feet; Otherwise without change in current regimen

## 2018-10-09 NOTE — PHYSICAL EXAM

## 2018-10-09 NOTE — HISTORY OF PRESENT ILLNESS
[FreeTextEntry1] : Interim reviewed; RT necrosis with smelling\par  [de-identified] : As above; Appetite better; Off steroids;  Not vomiting anymore;.  Taking H2 blocker;

## 2018-10-12 ENCOUNTER — APPOINTMENT (OUTPATIENT)
Dept: INFUSION THERAPY | Facility: HOSPITAL | Age: 73
End: 2018-10-12

## 2018-10-12 ENCOUNTER — OUTPATIENT (OUTPATIENT)
Dept: OUTPATIENT SERVICES | Facility: HOSPITAL | Age: 73
LOS: 1 days | End: 2018-10-12
Payer: MEDICARE

## 2018-10-12 VITALS
SYSTOLIC BLOOD PRESSURE: 122 MMHG | HEART RATE: 64 BPM | DIASTOLIC BLOOD PRESSURE: 74 MMHG | WEIGHT: 169.09 LBS | RESPIRATION RATE: 17 BRPM | HEIGHT: 62 IN | OXYGEN SATURATION: 98 % | TEMPERATURE: 98 F

## 2018-10-12 DIAGNOSIS — Z41.9 ENCOUNTER FOR PROCEDURE FOR PURPOSES OTHER THAN REMEDYING HEALTH STATE, UNSPECIFIED: Chronic | ICD-10-CM

## 2018-10-12 DIAGNOSIS — C79.31 SECONDARY MALIGNANT NEOPLASM OF BRAIN: ICD-10-CM

## 2018-10-12 DIAGNOSIS — C50.919 MALIGNANT NEOPLASM OF UNSPECIFIED SITE OF UNSPECIFIED FEMALE BREAST: Chronic | ICD-10-CM

## 2018-10-12 DIAGNOSIS — Z98.890 OTHER SPECIFIED POSTPROCEDURAL STATES: Chronic | ICD-10-CM

## 2018-10-12 DIAGNOSIS — Z90.49 ACQUIRED ABSENCE OF OTHER SPECIFIED PARTS OF DIGESTIVE TRACT: Chronic | ICD-10-CM

## 2018-10-12 PROCEDURE — 96413 CHEMO IV INFUSION 1 HR: CPT

## 2018-10-12 RX ORDER — BEVACIZUMAB 400 MG/16ML
400 INJECTION, SOLUTION INTRAVENOUS ONCE
Qty: 0 | Refills: 0 | Status: COMPLETED | OUTPATIENT
Start: 2018-10-12 | End: 2018-10-12

## 2018-10-12 RX ADMIN — BEVACIZUMAB 232 MILLIGRAM(S): 400 INJECTION, SOLUTION INTRAVENOUS at 14:55

## 2018-10-15 ENCOUNTER — APPOINTMENT (OUTPATIENT)
Dept: PULMONOLOGY | Facility: CLINIC | Age: 73
End: 2018-10-15
Payer: MEDICARE

## 2018-10-15 VITALS
SYSTOLIC BLOOD PRESSURE: 120 MMHG | OXYGEN SATURATION: 94 % | DIASTOLIC BLOOD PRESSURE: 70 MMHG | HEART RATE: 83 BPM | HEIGHT: 62 IN | WEIGHT: 168 LBS | BODY MASS INDEX: 30.91 KG/M2

## 2018-10-15 PROCEDURE — 99204 OFFICE O/P NEW MOD 45 MIN: CPT

## 2018-10-21 ENCOUNTER — FORM ENCOUNTER (OUTPATIENT)
Age: 73
End: 2018-10-21

## 2018-10-22 ENCOUNTER — APPOINTMENT (OUTPATIENT)
Dept: MRI IMAGING | Facility: HOSPITAL | Age: 73
End: 2018-10-22

## 2018-10-22 ENCOUNTER — APPOINTMENT (OUTPATIENT)
Dept: NEUROSURGERY | Facility: CLINIC | Age: 73
End: 2018-10-22
Payer: MEDICARE

## 2018-10-22 ENCOUNTER — OUTPATIENT (OUTPATIENT)
Dept: OUTPATIENT SERVICES | Facility: HOSPITAL | Age: 73
LOS: 1 days | End: 2018-10-22
Payer: MEDICARE

## 2018-10-22 ENCOUNTER — APPOINTMENT (OUTPATIENT)
Dept: INTERNAL MEDICINE | Facility: CLINIC | Age: 73
End: 2018-10-22
Payer: MEDICARE

## 2018-10-22 VITALS
DIASTOLIC BLOOD PRESSURE: 73 MMHG | RESPIRATION RATE: 16 BRPM | WEIGHT: 168 LBS | BODY MASS INDEX: 30.91 KG/M2 | SYSTOLIC BLOOD PRESSURE: 117 MMHG | HEART RATE: 88 BPM | OXYGEN SATURATION: 95 % | TEMPERATURE: 97.7 F | HEIGHT: 62 IN

## 2018-10-22 VITALS — DIASTOLIC BLOOD PRESSURE: 80 MMHG | OXYGEN SATURATION: 96 % | SYSTOLIC BLOOD PRESSURE: 147 MMHG | HEART RATE: 84 BPM

## 2018-10-22 DIAGNOSIS — Z98.890 OTHER SPECIFIED POSTPROCEDURAL STATES: Chronic | ICD-10-CM

## 2018-10-22 DIAGNOSIS — Z41.9 ENCOUNTER FOR PROCEDURE FOR PURPOSES OTHER THAN REMEDYING HEALTH STATE, UNSPECIFIED: Chronic | ICD-10-CM

## 2018-10-22 DIAGNOSIS — Z90.49 ACQUIRED ABSENCE OF OTHER SPECIFIED PARTS OF DIGESTIVE TRACT: Chronic | ICD-10-CM

## 2018-10-22 DIAGNOSIS — C50.919 MALIGNANT NEOPLASM OF UNSPECIFIED SITE OF UNSPECIFIED FEMALE BREAST: Chronic | ICD-10-CM

## 2018-10-22 DIAGNOSIS — R42 DIZZINESS AND GIDDINESS: ICD-10-CM

## 2018-10-22 PROCEDURE — 99213 OFFICE O/P EST LOW 20 MIN: CPT

## 2018-10-22 PROCEDURE — 70553 MRI BRAIN STEM W/O & W/DYE: CPT | Mod: 26

## 2018-10-22 PROCEDURE — 99214 OFFICE O/P EST MOD 30 MIN: CPT

## 2018-10-22 NOTE — HISTORY OF PRESENT ILLNESS
[FreeTextEntry1] : Neurosurgery plans noted; MRI to be done [de-identified] : As above; Some falling at home;

## 2018-10-22 NOTE — PHYSICAL EXAM

## 2018-10-22 NOTE — ASSESSMENT
[FreeTextEntry1] : As per neurosurgery; Will likely get more Avastin treatment. Otherwise without change in present regimen. Looks good today

## 2018-10-25 ENCOUNTER — INPATIENT (INPATIENT)
Facility: HOSPITAL | Age: 73
LOS: 2 days | Discharge: HOME HEALTH | DRG: 54 | End: 2018-10-28
Attending: INTERNAL MEDICINE | Admitting: INTERNAL MEDICINE
Payer: MEDICARE

## 2018-10-25 VITALS — DIASTOLIC BLOOD PRESSURE: 71 MMHG | TEMPERATURE: 99 F | HEART RATE: 89 BPM | SYSTOLIC BLOOD PRESSURE: 142 MMHG

## 2018-10-25 DIAGNOSIS — Z98.890 OTHER SPECIFIED POSTPROCEDURAL STATES: Chronic | ICD-10-CM

## 2018-10-25 DIAGNOSIS — C50.919 MALIGNANT NEOPLASM OF UNSPECIFIED SITE OF UNSPECIFIED FEMALE BREAST: Chronic | ICD-10-CM

## 2018-10-25 DIAGNOSIS — Z41.9 ENCOUNTER FOR PROCEDURE FOR PURPOSES OTHER THAN REMEDYING HEALTH STATE, UNSPECIFIED: Chronic | ICD-10-CM

## 2018-10-25 DIAGNOSIS — Z90.49 ACQUIRED ABSENCE OF OTHER SPECIFIED PARTS OF DIGESTIVE TRACT: Chronic | ICD-10-CM

## 2018-10-25 LAB
ALBUMIN SERPL ELPH-MCNC: 4.1 G/DL — SIGNIFICANT CHANGE UP (ref 3.3–5)
ALP SERPL-CCNC: 66 U/L — SIGNIFICANT CHANGE UP (ref 40–120)
ALT FLD-CCNC: 11 U/L — SIGNIFICANT CHANGE UP (ref 10–45)
ANION GAP SERPL CALC-SCNC: 10 MMOL/L — SIGNIFICANT CHANGE UP (ref 5–17)
APTT BLD: 29.7 SEC — SIGNIFICANT CHANGE UP (ref 27.5–37.4)
AST SERPL-CCNC: 16 U/L — SIGNIFICANT CHANGE UP (ref 10–40)
BASOPHILS NFR BLD AUTO: 0.4 % — SIGNIFICANT CHANGE UP (ref 0–2)
BILIRUB SERPL-MCNC: 0.2 MG/DL — SIGNIFICANT CHANGE UP (ref 0.2–1.2)
BUN SERPL-MCNC: 15 MG/DL — SIGNIFICANT CHANGE UP (ref 7–23)
CALCIUM SERPL-MCNC: 9.7 MG/DL — SIGNIFICANT CHANGE UP (ref 8.4–10.5)
CHLORIDE SERPL-SCNC: 102 MMOL/L — SIGNIFICANT CHANGE UP (ref 96–108)
CO2 SERPL-SCNC: 28 MMOL/L — SIGNIFICANT CHANGE UP (ref 22–31)
CREAT SERPL-MCNC: 0.87 MG/DL — SIGNIFICANT CHANGE UP (ref 0.5–1.3)
EOSINOPHIL NFR BLD AUTO: 2.3 % — SIGNIFICANT CHANGE UP (ref 0–6)
GLUCOSE SERPL-MCNC: 103 MG/DL — HIGH (ref 70–99)
HCT VFR BLD CALC: 36.8 % — SIGNIFICANT CHANGE UP (ref 34.5–45)
HGB BLD-MCNC: 12 G/DL — SIGNIFICANT CHANGE UP (ref 11.5–15.5)
INR BLD: 0.99 — SIGNIFICANT CHANGE UP (ref 0.88–1.16)
LYMPHOCYTES # BLD AUTO: 24.7 % — SIGNIFICANT CHANGE UP (ref 13–44)
MCHC RBC-ENTMCNC: 29.3 PG — SIGNIFICANT CHANGE UP (ref 27–34)
MCHC RBC-ENTMCNC: 32.6 G/DL — SIGNIFICANT CHANGE UP (ref 32–36)
MCV RBC AUTO: 89.8 FL — SIGNIFICANT CHANGE UP (ref 80–100)
MONOCYTES NFR BLD AUTO: 9.5 % — SIGNIFICANT CHANGE UP (ref 2–14)
NEUTROPHILS NFR BLD AUTO: 63.1 % — SIGNIFICANT CHANGE UP (ref 43–77)
PLATELET # BLD AUTO: 279 K/UL — SIGNIFICANT CHANGE UP (ref 150–400)
POTASSIUM SERPL-MCNC: 3.6 MMOL/L — SIGNIFICANT CHANGE UP (ref 3.5–5.3)
POTASSIUM SERPL-SCNC: 3.6 MMOL/L — SIGNIFICANT CHANGE UP (ref 3.5–5.3)
PROT SERPL-MCNC: 7.2 G/DL — SIGNIFICANT CHANGE UP (ref 6–8.3)
PROTHROM AB SERPL-ACNC: 11 SEC — SIGNIFICANT CHANGE UP (ref 9.8–12.7)
RBC # BLD: 4.1 M/UL — SIGNIFICANT CHANGE UP (ref 3.8–5.2)
RBC # FLD: 14.1 % — SIGNIFICANT CHANGE UP (ref 10.3–16.9)
SODIUM SERPL-SCNC: 140 MMOL/L — SIGNIFICANT CHANGE UP (ref 135–145)
TROPONIN T SERPL-MCNC: <0.01 NG/ML — SIGNIFICANT CHANGE UP (ref 0–0.01)
WBC # BLD: 10.1 K/UL — SIGNIFICANT CHANGE UP (ref 3.8–10.5)
WBC # FLD AUTO: 10.1 K/UL — SIGNIFICANT CHANGE UP (ref 3.8–10.5)

## 2018-10-25 PROCEDURE — 93010 ELECTROCARDIOGRAM REPORT: CPT

## 2018-10-25 PROCEDURE — 70450 CT HEAD/BRAIN W/O DYE: CPT | Mod: 26

## 2018-10-25 PROCEDURE — 99291 CRITICAL CARE FIRST HOUR: CPT

## 2018-10-25 RX ORDER — DEXTROSE 50 % IN WATER 50 %
15 SYRINGE (ML) INTRAVENOUS ONCE
Qty: 0 | Refills: 0 | Status: DISCONTINUED | OUTPATIENT
Start: 2018-10-25 | End: 2018-10-28

## 2018-10-25 RX ORDER — LACOSAMIDE 50 MG/1
100 TABLET ORAL EVERY 12 HOURS
Qty: 0 | Refills: 0 | Status: DISCONTINUED | OUTPATIENT
Start: 2018-10-26 | End: 2018-10-27

## 2018-10-25 RX ORDER — GLUCAGON INJECTION, SOLUTION 0.5 MG/.1ML
1 INJECTION, SOLUTION SUBCUTANEOUS ONCE
Qty: 0 | Refills: 0 | Status: DISCONTINUED | OUTPATIENT
Start: 2018-10-25 | End: 2018-10-28

## 2018-10-25 RX ORDER — LACOSAMIDE 50 MG/1
100 TABLET ORAL ONCE
Qty: 0 | Refills: 0 | Status: DISCONTINUED | OUTPATIENT
Start: 2018-10-25 | End: 2018-10-25

## 2018-10-25 RX ORDER — DEXTROSE 50 % IN WATER 50 %
25 SYRINGE (ML) INTRAVENOUS ONCE
Qty: 0 | Refills: 0 | Status: DISCONTINUED | OUTPATIENT
Start: 2018-10-25 | End: 2018-10-28

## 2018-10-25 RX ORDER — LEVETIRACETAM 250 MG/1
1000 TABLET, FILM COATED ORAL ONCE
Qty: 0 | Refills: 0 | Status: COMPLETED | OUTPATIENT
Start: 2018-10-25 | End: 2018-10-25

## 2018-10-25 RX ORDER — DEXAMETHASONE 0.5 MG/5ML
1 ELIXIR ORAL
Qty: 0 | Refills: 0 | COMMUNITY

## 2018-10-25 RX ORDER — DEXAMETHASONE 0.5 MG/5ML
4 ELIXIR ORAL ONCE
Qty: 0 | Refills: 0 | Status: COMPLETED | OUTPATIENT
Start: 2018-10-25 | End: 2018-10-25

## 2018-10-25 RX ORDER — SODIUM CHLORIDE 9 MG/ML
1000 INJECTION, SOLUTION INTRAVENOUS
Qty: 0 | Refills: 0 | Status: DISCONTINUED | OUTPATIENT
Start: 2018-10-25 | End: 2018-10-28

## 2018-10-25 RX ORDER — DEXTROSE 50 % IN WATER 50 %
12.5 SYRINGE (ML) INTRAVENOUS ONCE
Qty: 0 | Refills: 0 | Status: DISCONTINUED | OUTPATIENT
Start: 2018-10-25 | End: 2018-10-28

## 2018-10-25 RX ORDER — DEXAMETHASONE 0.5 MG/5ML
4 ELIXIR ORAL EVERY 12 HOURS
Qty: 0 | Refills: 0 | Status: DISCONTINUED | OUTPATIENT
Start: 2018-10-26 | End: 2018-10-26

## 2018-10-25 RX ORDER — INSULIN LISPRO 100/ML
VIAL (ML) SUBCUTANEOUS
Qty: 0 | Refills: 0 | Status: DISCONTINUED | OUTPATIENT
Start: 2018-10-25 | End: 2018-10-28

## 2018-10-25 RX ORDER — DEXAMETHASONE 0.5 MG/5ML
10 ELIXIR ORAL ONCE
Qty: 0 | Refills: 0 | Status: DISCONTINUED | OUTPATIENT
Start: 2018-10-25 | End: 2018-10-25

## 2018-10-25 RX ADMIN — LACOSAMIDE 120 MILLIGRAM(S): 50 TABLET ORAL at 21:13

## 2018-10-25 RX ADMIN — LEVETIRACETAM 1000 MILLIGRAM(S): 250 TABLET, FILM COATED ORAL at 20:09

## 2018-10-25 RX ADMIN — LEVETIRACETAM 400 MILLIGRAM(S): 250 TABLET, FILM COATED ORAL at 19:54

## 2018-10-25 RX ADMIN — Medication 2 MILLIGRAM(S): at 19:36

## 2018-10-25 RX ADMIN — LACOSAMIDE 100 MILLIGRAM(S): 50 TABLET ORAL at 21:45

## 2018-10-25 RX ADMIN — Medication 4 MILLIGRAM(S): at 20:48

## 2018-10-25 NOTE — ED ADULT TRIAGE NOTE - CHIEF COMPLAINT QUOTE
Pt BIBA from home, family member states pt was eating, then started foaming at the mouth, L side of face was drooping. EMS states when they arrived pt was speaking, in ambulance pt began having seizure like activity. FSG with

## 2018-10-25 NOTE — ED PROVIDER NOTE - CRITICAL CARE INDICATION, MLM
patient was critically ill... Patient was critically ill with a high probability of imminent or life threatening deterioration.  ams, new onset seizure, concern for acute cva

## 2018-10-25 NOTE — CONSULT NOTE ADULT - ASSESSMENT
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 Patient evaluated by Critical care for further determination of telemetry and increased neuro checks. Patient to be admitted to [facility] for post-seizure care.       PLAN: 	  - Plan for further HD and telemetry monitoring on [facility] for post seizure monitoring. Patient with improved seizure activity s/p Keppra load, 1x 2mg IVP ativan and vimpat initiation. Seizure activity likely 2/2 to known R brain met with edema (noted to be improving on CT imaging).   - Ordered for VEEG, continue with Vimpat and decadron as per neurosurgery.   - Please review H&P for further detailed plan.

## 2018-10-25 NOTE — ED PROVIDER NOTE - OBJECTIVE STATEMENT
history of lung cancer with mets to brain s/p resection, radiation, here with ams/ twitching for past 30 min.  Per spouse, started foaming at the mouth then face got contorted and started twitching on left side.  Patient unable to provide any history due to clinical condition/ ams.  Per  has never had seizure before.  Concern for stroke by ems and stroke code called prior to arrival

## 2018-10-25 NOTE — CONSULT NOTE ADULT - SUBJECTIVE AND OBJECTIVE BOX
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 Patient was in normal state of health at 19:00 and acute onset of twitching.    Patient last admitted 9/14. No neurosurgical intervention done at the time. Patient admitted to [address] [place] [address] for Avastin therapy, and discharged on 9/24. Patient currently undergoing Avastin therapy every 2 weeks, and is scheduled for treatment tomorrow, 10/26. Last MRI 10/22, indicates no interval change in mass compared to previous MRI 9/15, findings favor radiation injury and/or post surgical changes, rather than disease progression. CTH performed today- no infarct or acute hemorrhage.      PAST MEDICAL & SURGICAL HISTORY:  Cerebral edema  Asthma  GERD (gastroesophageal reflux disease)  Lung collapse: 2x  COPD (chronic obstructive pulmonary disease)  Brain tumor  Liver cancer: left breast cancer spread to liver and chest wall  Carcinoid tumor: abdomen  Malignant neoplasm of female breast: Left breast CA with liver mets  Malignant neoplasm of bronchus and lung, unspecified site: RUL Lobectomy, RLL wedge resection  H/O right hemicolectomy  History of craniotomy  History of colon resection  Surgery, elective: Thoractomy with lung resection-  Right upper lobe removed, left  lower lobe wedge, right lower wedge  Surgery, elective: Ovaries removed 1980&quot;s  Surgery, elective: Left wrist  Breast cancer: left mastectomy    FAMILY HISTORY:  No pertinent family history in first degree relatives      SOCIAL HISTORY:  Tobacco Use:  EtOH use:   Substance:    Allergies    adhesives (Rash)  Keflex (Short breath)  Originally Entered as [Unknown] reaction to [Other][Tape] (Unknown)    Intolerances        MEDICATIONS:  Antibiotics:    Neuro:  lacosamide IVPB 100 milliGRAM(s) IV Intermittent once    Anticoagulation:    OTHER:    IVF:      Vital Signs Last 24 Hrs  T(C): 37.1 (25 Oct 2018 19:30), Max: 37.1 (25 Oct 2018 19:30)  T(F): 98.7 (25 Oct 2018 19:30), Max: 98.7 (25 Oct 2018 19:30)  HR: 82 (25 Oct 2018 20:25) (82 - 89)  BP: 142/69 (25 Oct 2018 20:25) (142/69 - 152/75)  BP(mean): --  RR: --  SpO2: --    PHYSICAL EXAM:  NEURO:  Awake and alert, orientedx3, NAD, coherent speech, following commands  PERRL, EOMI, mild left eye ptosis, left facial asymmetry   MAEx4, strength 5/5 UE and LE b/l, mild LUE drift noted  SILT throughout b/l      LABS:                        12.0   10.1  )-----------( 279      ( 25 Oct 2018 19:40 )             36.8     10-25    140  |  102  |  15  ----------------------------<  103<H>  3.6   |  28  |  0.87    Ca    9.7      25 Oct 2018 19:40    TPro  7.2  /  Alb  4.1  /  TBili  0.2  /  DBili  x   /  AST  16  /  ALT  11  /  AlkPhos  66  10-25    PT/INR - ( 25 Oct 2018 19:40 )   PT: 11.0 sec;   INR: 0.99          PTT - ( 25 Oct 2018 19:40 )  PTT:29.7 sec    CULTURES:      RADIOLOGY & ADDITIONAL STUDIES:    EXAM:  CT BRAIN STROKE PROTOCOL                          PROCEDURE DATE:  10/25/2018        INTERPRETATION:  IRobinson MD, have reviewed the images and   the report and agree with the findings, with the following modification:     Agree: No acute intracranial hemorrhage no CT evidence of recent   transcortical infarction.    Comparison is also made to recent MRI of the brain from 10/22/2018. Right   frontal lobe lucency corresponds with FLAIR-hyperintense findings on MRI.   No new or increasing mass effect in the last 3 days.     Compared with 09/14/2018, the right lateral ventricle has reexpanded as   posttreatment edema has subsided. Nevertheless, the lateral, third and   fourth ventricles are mildly dilated suggesting communicating but   nonacute hydrocephalus.      PROCEDURE: CT head without intravenous contrast    INDICATIONS: Seizure (left face/arm twitching). History of brain tumor.    TECHNIQUE:  Serial axial images were obtained from the skull base to the   vertex without the use of intravenous contrast. Imaging is performed   using helical low-dose technique, and sagittal and coronal reformations   are provided.    COMPARISON EXAMINATION: MRI CT brain 9/14/2018 and MRI brain 9/15/2018.    FINDINGS:    VENTRICLES AND SULCI:  Parenchymal volume and sulci are appropriate for   the patient's age.  Interval prominence of the ventricles, which may   indicate a component of hydrocephalus.  INTRA-AXIAL: Redemonstration of hypodensity involving the right frontal   lobe white matter, extending into the right basal ganglia. The mass was   seen in this region on prior examinations. No acute intracranial   hemorrhage. Interval improvement of the previously seen right to left   midline shift. Chronic lacunar infarct within the left basal ganglia.  EXTRA-AXIAL: No extra-axial fluid collection is present.   VISUALIZED SINUSES: The visualized paranasal sinuses are predominantly   clear.  VISUALIZED MASTOIDS:  Clear.  CALVARIUM:  Patient is again noted to be status post right frontal   craniotomy.    IMPRESSION:    1.  No acute transcortical infarct or acute intracranial hemorrhage.  2.  Interval prominence of the ventricles, which may indicate a component   of hydrocephalus.  3.  Redemonstration of hypodensity involving the right frontal lobe white   matter, extending into the right basal ganglia, in the region of the   previously seen mass. Interval improvement of the previously seen right   to left midline shift.        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 Seizure activity relieved with Ativan and Keppra. MRI 10/22 showing stable size of mass without disease progression. CTH today- stable, no infarct or acute hemorrhage.      Plan:  -no neurosurgical intervention at this time  -admit to medicine service for monitoring  -neuro checks q1hr  -recommend decadron 4mg bid and vimpat 100mg bid   -continue care per primary team  -above reviewed and discussed with Dr. Muniz

## 2018-10-25 NOTE — ED PROVIDER NOTE - NEUROLOGICAL, MLM
focal repetitive twitching of left side of face, left hand, not following commands or verbalizing anything

## 2018-10-25 NOTE — CONSULT NOTE ADULT - ASSESSMENT
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 CTH negative for acute findings and exam noted some facial twitching with L facial droop. Patient determined to warrant further monitoring for seizure work up in setting of brain met (was not on decadron or seizure ppx prior to presentation).     PLAN: 	  -c/w vimpat 100mg BID  -C/w Decadron 4mg BID  -Continue neuro monitoring in setting of seizures. C/w Vimpat and ativan PRN for new symptoms.   -Patient to be admitted under Medicine service with neurosurgery to follow.

## 2018-10-25 NOTE — CONSULT NOTE ADULT - SUBJECTIVE AND OBJECTIVE BOX
CONSULT NOTE PGY-2    HPI:  72F PMH of COPD, breast cancer post resection 1990, recurrence 2014 to liver post ablation, neuroendocrine lung cancer post b/l upper lobe resection and mets to brain post craniotomy and radiation treatment (April 2017 and May 2018) presenting for L side twitching/weakness. Patient was in normal state of health at 19:00 and acute onset of twitching of left side face and upper extremity. Patient's  called EMS to bring patient to [facility]. Upon arrival Stroke code called. Noted Left face and left upper extremity focal twitching. Patient given 1x Ativan 2mg IVP with initial improvement in symptoms. NIHSS score 2 for dysarthria and nasolabial fold flattening (documented past left side facial droop on past notes). CTH notable for hypodensity involving the right frontal lobe white matter, extending into the right basal ganglia, in the region of the previously seen mass. Interval improvement of the previously seen right to left midline shift. Patient loaded with 1g keppra but switched to vimpat 2/2 to previous side effects. Neurosurgery called and evaluated recommending decadron and vimpat. ICU consulted for evaluation of need for telemetry monitoring.   VS notable: 98.7F, HR 87, /73, 95% O2, RR 17. Facial and L side twitching resolved (s/p 1g keppra, vimpat 100mg and decadron initiation). No complaints at this time.     OBJECTIVE  Vitals:  T(C): 37.1 (10-25-18 @ 23:53), Max: 37.1 (10-25-18 @ 19:30)  HR: 80 (10-26-18 @ 01:22) (77 - 89)  BP: 135/60 (10-26-18 @ 01:22) (125/73 - 156/83)  RR: 17 (10-26-18 @ 01:22) (17 - 18)  SpO2: 95% (10-25-18 @ 23:53) (95% - 95%)  Wt(kg): --    I/O:  I&O's Summary      PHYSICAL EXAM:  Appearance: NAD. Speaking in full sentences.   HEENT: Post surgical scars on left side of head. PERRL. No pallor noted.  Conjunctiva clear b/l. Moist oral mucosa.  Cardiovascular: RRR with no murmurs.  Respiratory: Lungs decreased breath sounds at bases.    Gastrointestinal:  Soft, nontender. Non-distended. Non-rigid.	  Extremities: No edema b/l. No erythema b/l. LE WWP b/l.  Vascular: DP present.  Neuro:  Mental status: Awake, alert and oriented x3. Follows commands. Recent and remote memory intact.  Naming, repetition and comprehension intact.  No aphasia, mild dysarthria initially on exam with improvement after facial twitching resolved.   Cranial nerves: Pupils equally round and reactive to light, visual fields full, no nystagmus, extraocular muscles intact, V1 through V3 intact bilaterally and symmetric, Left facial droop, palate elevation symmetric, tongue was midline, sternocleidomastoid/shoulder shrug strength bilaterally 5/5.    Motor:  No pronator drift. No fix. Normal bulk and tone, strength 5/5 in bilateral upper and lower extremities.   strength 5/5.    Sensation: Intact to light touch, proprioception, vibration, temperature, pinprick.  No neglect.   Coordination: No dysmetria on finger-to-nose and heel-to-shin.  No clumsiness.  	  LABS:                        12.0   10.1  )-----------( 279      ( 25 Oct 2018 19:40 )             36.8     10-25    140  |  102  |  15  ----------------------------<  103<H>  3.6   |  28  |  0.87    Ca    9.7      25 Oct 2018 19:40    TPro  7.2  /  Alb  4.1  /  TBili  0.2  /  DBili  x   /  AST  16  /  ALT  11  /  AlkPhos  66  10-25    PT/INR - ( 25 Oct 2018 19:40 )   PT: 11.0 sec;   INR: 0.99          PTT - ( 25 Oct 2018 19:40 )  PTT:29.7 sec      RADIOLOGY & ADDITIONAL TESTS:  Reviewed .    MEDICATIONS  (STANDING):  dexamethasone  Injectable 4 milliGRAM(s) IV Push every 12 hours  dextrose 5%. 1000 milliLiter(s) (50 mL/Hr) IV Continuous <Continuous>  dextrose 50% Injectable 12.5 Gram(s) IV Push once  dextrose 50% Injectable 25 Gram(s) IV Push once  dextrose 50% Injectable 25 Gram(s) IV Push once  insulin lispro (HumaLOG) corrective regimen sliding scale   SubCutaneous Before meals and at bedtime  lacosamide IVPB 100 milliGRAM(s) IV Intermittent every 12 hours    MEDICATIONS  (PRN):  ALPRAZolam 1 milliGRAM(s) Oral at bedtime PRN insomnia or anxiety  dextrose 40% Gel 15 Gram(s) Oral once PRN Blood Glucose LESS THAN 70 milliGRAM(s)/deciliter  glucagon  Injectable 1 milliGRAM(s) IntraMuscular once PRN Glucose LESS THAN 70 milligrams/deciliter

## 2018-10-25 NOTE — ED PROVIDER NOTE - MEDICAL DECISION MAKING DETAILS
new onset seizure.  stroke code called pta based on report of left sided weakness by ems.  on arrival, noted to have focal motor seizure.  history obtained of prior brain tumor/ resection.  given ativan on arrival with termination of seizure.  ct and case reviewed by stroke attending who requested dose of keppra 1g iv.  after eval of paitent, requested change to vimpat and dose of decadron.  neurosurgery consulted and case reviewed.  patient known to dr. bethea who requested admission to his service on medicine tele for further management.  mental status improved after ativan/ termination of seizure.

## 2018-10-25 NOTE — ED PROVIDER NOTE - CONSTITUTIONAL, MLM
normal... ill appearing, focal motor twitching left side of face, left arm, not responsive to questioning

## 2018-10-25 NOTE — CONSULT NOTE ADULT - ATTENDING COMMENTS
CCM ATTENDING    Patient seen and discussed with House Officer/Resident  Chart and history reviewed  Exam, labs, and radiology as noted above   Assessment and Plans as outlined  Currently patient's mentation is appropriate   Protecting airway   Breathing is non-labored without increased work of breathing --- no intercostal accessory muscle use and no paradoxical abdominal motion evident   Ventilating and oxygenating adequately without increased work of breathing  Hemodynamically stable---clinically perfusing adequately  Aim to maintain MAP >= 65 mmHg, U/O >= 0.5 ml/kg/hr, pulse oximetry OxSat >= 92%   Metabolic demands along with any abnormal blood chemistries, and fluid requirements being addressed    Sedation and/or pain meds as needed to reduce metabolic demand and maintain comfort   GI/DVT prophylaxis  protecting airway, post ictal state clearing

## 2018-10-25 NOTE — CONSULT NOTE ADULT - SUBJECTIVE AND OBJECTIVE BOX
CONSULT NOTE    HPI:   72F PMH of COPD, breast cancer post resection 1990, recurrence 2014 to liver post ablation, neuroendocrine lung cancer post b/l upper lobe resection and mets to brain post craniotomy and radiation treatment (April 2017 and May 2018) presenting for L side twitching/weakness. Patient was in normal state of health at 19:00 and acute onset of twitching      OBJECTIVE  Vitals:  T(C): 37.1 (10-25-18 @ 19:30), Max: 37.1 (10-25-18 @ 19:30)  HR: 82 (10-25-18 @ 20:25) (82 - 89)  BP: 142/69 (10-25-18 @ 20:25) (142/69 - 152/75)  RR: --  SpO2: --  Wt(kg): --    I/O:  I&O's Summary      PHYSICAL EXAM:  Appearance: NAD. Speaking in full sentences.   HEENT: PERRL. No pallor noted.  Conjunctiva clear b/l. Moist oral mucosa.  Cardiovascular: RRR with no murmurs.  Respiratory: Lungs CTAB.   Gastrointestinal:  Soft, nontender. Non-distended. Non-rigid.	  Extremities: No edema b/l. No erythema b/l. LE WWP b/l.  Vascular: DP present.  Neurologic:  Alert and awake. Moving all extremities. Following commands. Making eye contact.  	  LABS:                        12.0   10.1  )-----------( 279      ( 25 Oct 2018 19:40 )             36.8           PT/INR - ( 25 Oct 2018 19:40 )   PT: 11.0 sec;   INR: 0.99          PTT - ( 25 Oct 2018 19:40 )  PTT:29.7 sec      RADIOLOGY & ADDITIONAL TESTS:  Reviewed .    MEDICATIONS  (STANDING):  dexamethasone  Injectable 10 milliGRAM(s) IV Push Once  lacosamide IVPB 100 milliGRAM(s) IV Intermittent once    MEDICATIONS  (PRN): CONSULT NOTE    HPI:   72F PMH of COPD, breast cancer post resection 1990, recurrence 2014 to liver post ablation, neuroendocrine lung cancer post b/l upper lobe resection and mets to brain post craniotomy and radiation treatment (April 2017 and May 2018) presenting for L side twitching/weakness. Patient was in normal state of health at 19:00 and acute onset of twitching of left side face and upper extremity. Patient's  called EMS to bring patient to [facility]. Upon arrival Stroke code called. Noted Left face and left upper extremity focal twitching. Patient given 1x Ativan 2mg IVP with initial improvement in symptoms. NIHSS score 2 for dysarthria and nasolabial fold flattening (documented past left side facial droop on past notes). CTH notable for hypodensity involving the right frontal lobe white matter, extending into the right basal ganglia, in the region of the previously seen mass. Interval improvement of the previously seen right to left midline shift. Patient loaded         OBJECTIVE  Vitals:  T(C): 37.1 (10-25-18 @ 19:30), Max: 37.1 (10-25-18 @ 19:30)  HR: 82 (10-25-18 @ 20:25) (82 - 89)  BP: 142/69 (10-25-18 @ 20:25) (142/69 - 152/75)  RR: --  SpO2: --  Wt(kg): --    I/O:  I&O's Summary      PHYSICAL EXAM:  Appearance: NAD. Speaking in full sentences.   HEENT: PERRL. No pallor noted.  Conjunctiva clear b/l. Moist oral mucosa.  Cardiovascular: RRR with no murmurs.  Respiratory: Lungs CTAB.   Gastrointestinal:  Soft, nontender. Non-distended. Non-rigid.	  Extremities: No edema b/l. No erythema b/l. LE WWP b/l.  Vascular: DP present.  Neurologic:  Alert and awake. Moving all extremities. Following commands. Making eye contact.  	  LABS:                        12.0   10.1  )-----------( 279      ( 25 Oct 2018 19:40 )             36.8           PT/INR - ( 25 Oct 2018 19:40 )   PT: 11.0 sec;   INR: 0.99          PTT - ( 25 Oct 2018 19:40 )  PTT:29.7 sec      RADIOLOGY & ADDITIONAL TESTS:  Reviewed .    MEDICATIONS  (STANDING):  dexamethasone  Injectable 10 milliGRAM(s) IV Push Once  lacosamide IVPB 100 milliGRAM(s) IV Intermittent once    MEDICATIONS  (PRN): CONSULT NOTE    HPI:   72F PMH of COPD, breast cancer post resection 1990, recurrence 2014 to liver post ablation, neuroendocrine lung cancer post b/l upper lobe resection and mets to brain post craniotomy and radiation treatment (April 2017 and May 2018) presenting for L side twitching/weakness. Patient was in normal state of health at 19:00 and acute onset of twitching of left side face and upper extremity. Patient's  called EMS to bring patient to [facility]. Upon arrival Stroke code called. Noted Left face and left upper extremity focal twitching. Patient given 1x Ativan 2mg IVP with initial improvement in symptoms. NIHSS score 2 for dysarthria and nasolabial fold flattening (documented past left side facial droop on past notes). CTH notable for hypodensity involving the right frontal lobe white matter, extending into the right basal ganglia, in the region of the previously seen mass. Interval improvement of the previously seen right to left midline shift. Patient loaded with 1g keppra but switched to vimpat 2/2 to previous side effects. Neurosurgery called and evaluated recommending decadron and vimpat.         OBJECTIVE  Vitals:  T(C): 37.1 (10-25-18 @ 19:30), Max: 37.1 (10-25-18 @ 19:30)  HR: 82 (10-25-18 @ 20:25) (82 - 89)  BP: 142/69 (10-25-18 @ 20:25) (142/69 - 152/75)  RR: --  SpO2: --  Wt(kg): --    I/O:  I&O's Summary      PHYSICAL EXAM:  Appearance: NAD. Speaking in full sentences.   HEENT: PERRL. No pallor noted.  Conjunctiva clear b/l. Moist oral mucosa.  Cardiovascular: RRR with no murmurs.  Respiratory: Lungs decreased breath sounds at bases.    Gastrointestinal:  Soft, nontender. Non-distended. Non-rigid.	  Extremities: No edema b/l. No erythema b/l. LE WWP b/l.  Vascular: DP present.  Neuro:  Mental status: Awake, alert and oriented x3. Follows commands. Recent and remote memory intact.  Naming, repetition and comprehension intact.  Attention/concentration intact. No aphasia, mild dysarthria initially on exam with improvement after facial twitching resolved.   Cranial nerves: Pupils equally round and reactive to light, visual fields full, no nystagmus, extraocular muscles intact, V1 through V3 intact bilaterally and symmetric, Left facial droop, palate elevation symmetric, tongue was midline, sternocleidomastoid/shoulder shrug strength bilaterally 5/5.    Motor:  No pronator drift. No fix. Normal bulk and tone, strength 5/5 in bilateral upper and lower extremities.   strength 5/5.    Sensation: Intact to light touch, proprioception, vibration, temperature, pinprick.  No neglect.   Coordination: No dysmetria on finger-to-nose and heel-to-shin.  No clumsiness.        	  LABS:                        12.0   10.1  )-----------( 279      ( 25 Oct 2018 19:40 )             36.8           PT/INR - ( 25 Oct 2018 19:40 )   PT: 11.0 sec;   INR: 0.99          PTT - ( 25 Oct 2018 19:40 )  PTT:29.7 sec      RADIOLOGY & ADDITIONAL TESTS:  Reviewed .    MEDICATIONS  (STANDING):  dexamethasone  Injectable 10 milliGRAM(s) IV Push Once  lacosamide IVPB 100 milliGRAM(s) IV Intermittent once    MEDICATIONS  (PRN):

## 2018-10-25 NOTE — ED ADULT NURSE NOTE - OBJECTIVE STATEMENT
Pt BIBA w/ EMS notification stroke code.  Family member at bedside states "We were eating and she started drooling and her face went limp and then she couldn't speak."  Pt arrived w/ O2 via NRB.  Pt upgraded to Brian for stroke code.  Pt visibly trembling and making grunting sounds.  Pt pending CT result.  Will continue to monitor.

## 2018-10-26 ENCOUNTER — APPOINTMENT (OUTPATIENT)
Dept: INFUSION THERAPY | Facility: HOSPITAL | Age: 73
End: 2018-10-26

## 2018-10-26 DIAGNOSIS — J44.9 CHRONIC OBSTRUCTIVE PULMONARY DISEASE, UNSPECIFIED: ICD-10-CM

## 2018-10-26 DIAGNOSIS — D3A.00 BENIGN CARCINOID TUMOR OF UNSPECIFIED SITE: ICD-10-CM

## 2018-10-26 DIAGNOSIS — J45.909 UNSPECIFIED ASTHMA, UNCOMPLICATED: ICD-10-CM

## 2018-10-26 DIAGNOSIS — G93.6 CEREBRAL EDEMA: ICD-10-CM

## 2018-10-26 DIAGNOSIS — R56.9 UNSPECIFIED CONVULSIONS: ICD-10-CM

## 2018-10-26 DIAGNOSIS — C22.9 MALIGNANT NEOPLASM OF LIVER, NOT SPECIFIED AS PRIMARY OR SECONDARY: ICD-10-CM

## 2018-10-26 LAB
ALBUMIN SERPL ELPH-MCNC: 4 G/DL — SIGNIFICANT CHANGE UP (ref 3.3–5)
ALP SERPL-CCNC: 68 U/L — SIGNIFICANT CHANGE UP (ref 40–120)
ALT FLD-CCNC: 11 U/L — SIGNIFICANT CHANGE UP (ref 10–45)
ANION GAP SERPL CALC-SCNC: 15 MMOL/L — SIGNIFICANT CHANGE UP (ref 5–17)
AST SERPL-CCNC: 13 U/L — SIGNIFICANT CHANGE UP (ref 10–40)
BILIRUB SERPL-MCNC: 0.3 MG/DL — SIGNIFICANT CHANGE UP (ref 0.2–1.2)
BUN SERPL-MCNC: 13 MG/DL — SIGNIFICANT CHANGE UP (ref 7–23)
CALCIUM SERPL-MCNC: 10.2 MG/DL — SIGNIFICANT CHANGE UP (ref 8.4–10.5)
CHLORIDE SERPL-SCNC: 99 MMOL/L — SIGNIFICANT CHANGE UP (ref 96–108)
CO2 SERPL-SCNC: 24 MMOL/L — SIGNIFICANT CHANGE UP (ref 22–31)
CREAT SERPL-MCNC: 0.6 MG/DL — SIGNIFICANT CHANGE UP (ref 0.5–1.3)
GLUCOSE BLDC GLUCOMTR-MCNC: 121 MG/DL — HIGH (ref 70–99)
GLUCOSE BLDC GLUCOMTR-MCNC: 132 MG/DL — HIGH (ref 70–99)
GLUCOSE BLDC GLUCOMTR-MCNC: 148 MG/DL — HIGH (ref 70–99)
GLUCOSE BLDC GLUCOMTR-MCNC: 155 MG/DL — HIGH (ref 70–99)
GLUCOSE SERPL-MCNC: 136 MG/DL — HIGH (ref 70–99)
HBA1C BLD-MCNC: 5.6 % — SIGNIFICANT CHANGE UP (ref 4–5.6)
HCT VFR BLD CALC: 39.4 % — SIGNIFICANT CHANGE UP (ref 34.5–45)
HGB BLD-MCNC: 13.1 G/DL — SIGNIFICANT CHANGE UP (ref 11.5–15.5)
MAGNESIUM SERPL-MCNC: 1.8 MG/DL — SIGNIFICANT CHANGE UP (ref 1.6–2.6)
MCHC RBC-ENTMCNC: 29.6 PG — SIGNIFICANT CHANGE UP (ref 27–34)
MCHC RBC-ENTMCNC: 33.2 G/DL — SIGNIFICANT CHANGE UP (ref 32–36)
MCV RBC AUTO: 88.9 FL — SIGNIFICANT CHANGE UP (ref 80–100)
PHOSPHATE SERPL-MCNC: 3.3 MG/DL — SIGNIFICANT CHANGE UP (ref 2.5–4.5)
PLATELET # BLD AUTO: 303 K/UL — SIGNIFICANT CHANGE UP (ref 150–400)
POTASSIUM SERPL-MCNC: 3.9 MMOL/L — SIGNIFICANT CHANGE UP (ref 3.5–5.3)
POTASSIUM SERPL-SCNC: 3.9 MMOL/L — SIGNIFICANT CHANGE UP (ref 3.5–5.3)
PROT SERPL-MCNC: 7.1 G/DL — SIGNIFICANT CHANGE UP (ref 6–8.3)
RBC # BLD: 4.43 M/UL — SIGNIFICANT CHANGE UP (ref 3.8–5.2)
RBC # FLD: 14.2 % — SIGNIFICANT CHANGE UP (ref 10.3–16.9)
SODIUM SERPL-SCNC: 138 MMOL/L — SIGNIFICANT CHANGE UP (ref 135–145)
TSH SERPL-MCNC: 0.56 UIU/ML — SIGNIFICANT CHANGE UP (ref 0.35–4.94)
WBC # BLD: 8 K/UL — SIGNIFICANT CHANGE UP (ref 3.8–10.5)
WBC # FLD AUTO: 8 K/UL — SIGNIFICANT CHANGE UP (ref 3.8–10.5)

## 2018-10-26 RX ORDER — NALTREXONE HYDROCHLORIDE 50 MG/1
3 TABLET, FILM COATED ORAL
Qty: 0 | Refills: 0 | COMMUNITY

## 2018-10-26 RX ORDER — LANOLIN ALCOHOL/MO/W.PET/CERES
5 CREAM (GRAM) TOPICAL AT BEDTIME
Qty: 0 | Refills: 0 | Status: DISCONTINUED | OUTPATIENT
Start: 2018-10-26 | End: 2018-10-28

## 2018-10-26 RX ORDER — BUDESONIDE AND FORMOTEROL FUMARATE DIHYDRATE 160; 4.5 UG/1; UG/1
2 AEROSOL RESPIRATORY (INHALATION)
Qty: 0 | Refills: 0 | Status: DISCONTINUED | OUTPATIENT
Start: 2018-10-26 | End: 2018-10-26

## 2018-10-26 RX ORDER — ALPRAZOLAM 0.25 MG
1 TABLET ORAL AT BEDTIME
Qty: 0 | Refills: 0 | Status: DISCONTINUED | OUTPATIENT
Start: 2018-10-26 | End: 2018-10-28

## 2018-10-26 RX ORDER — FLUTICASONE PROPIONATE 50 MCG
1 SPRAY, SUSPENSION NASAL
Qty: 0 | Refills: 0 | Status: DISCONTINUED | OUTPATIENT
Start: 2018-10-26 | End: 2018-10-28

## 2018-10-26 RX ORDER — PANTOPRAZOLE SODIUM 20 MG/1
40 TABLET, DELAYED RELEASE ORAL
Qty: 0 | Refills: 0 | Status: DISCONTINUED | OUTPATIENT
Start: 2018-10-26 | End: 2018-10-28

## 2018-10-26 RX ORDER — ENOXAPARIN SODIUM 100 MG/ML
40 INJECTION SUBCUTANEOUS EVERY 24 HOURS
Qty: 0 | Refills: 0 | Status: DISCONTINUED | OUTPATIENT
Start: 2018-10-26 | End: 2018-10-28

## 2018-10-26 RX ORDER — ENOXAPARIN SODIUM 100 MG/ML
30 INJECTION SUBCUTANEOUS EVERY 24 HOURS
Qty: 0 | Refills: 0 | Status: DISCONTINUED | OUTPATIENT
Start: 2018-10-26 | End: 2018-10-26

## 2018-10-26 RX ORDER — ESCITALOPRAM OXALATE 10 MG/1
10 TABLET, FILM COATED ORAL DAILY
Qty: 0 | Refills: 0 | Status: DISCONTINUED | OUTPATIENT
Start: 2018-10-26 | End: 2018-10-26

## 2018-10-26 RX ORDER — ASPIRIN/CALCIUM CARB/MAGNESIUM 324 MG
81 TABLET ORAL DAILY
Qty: 0 | Refills: 0 | Status: DISCONTINUED | OUTPATIENT
Start: 2018-10-26 | End: 2018-10-28

## 2018-10-26 RX ORDER — ONDANSETRON 8 MG/1
4 TABLET, FILM COATED ORAL ONCE
Qty: 0 | Refills: 0 | Status: COMPLETED | OUTPATIENT
Start: 2018-10-26 | End: 2018-10-26

## 2018-10-26 RX ORDER — MONTELUKAST 4 MG/1
10 TABLET, CHEWABLE ORAL DAILY
Qty: 0 | Refills: 0 | Status: DISCONTINUED | OUTPATIENT
Start: 2018-10-26 | End: 2018-10-28

## 2018-10-26 RX ORDER — DEXAMETHASONE 0.5 MG/5ML
2 ELIXIR ORAL EVERY 12 HOURS
Qty: 0 | Refills: 0 | Status: DISCONTINUED | OUTPATIENT
Start: 2018-10-26 | End: 2018-10-28

## 2018-10-26 RX ORDER — ALBUTEROL 90 UG/1
2 AEROSOL, METERED ORAL EVERY 6 HOURS
Qty: 0 | Refills: 0 | Status: DISCONTINUED | OUTPATIENT
Start: 2018-10-26 | End: 2018-10-28

## 2018-10-26 RX ORDER — DEXAMETHASONE 0.5 MG/5ML
1 ELIXIR ORAL
Qty: 0 | Refills: 0 | COMMUNITY

## 2018-10-26 RX ORDER — CETIRIZINE HYDROCHLORIDE 10 MG/1
1 TABLET ORAL
Qty: 0 | Refills: 0 | COMMUNITY

## 2018-10-26 RX ORDER — ESCITALOPRAM OXALATE 10 MG/1
20 TABLET, FILM COATED ORAL DAILY
Qty: 0 | Refills: 0 | Status: DISCONTINUED | OUTPATIENT
Start: 2018-10-26 | End: 2018-10-28

## 2018-10-26 RX ADMIN — Medication 2: at 17:34

## 2018-10-26 RX ADMIN — Medication 1 SPRAY(S): at 06:45

## 2018-10-26 RX ADMIN — Medication 1 SPRAY(S): at 17:35

## 2018-10-26 RX ADMIN — MONTELUKAST 10 MILLIGRAM(S): 4 TABLET, CHEWABLE ORAL at 11:37

## 2018-10-26 RX ADMIN — ONDANSETRON 4 MILLIGRAM(S): 8 TABLET, FILM COATED ORAL at 23:20

## 2018-10-26 RX ADMIN — Medication 81 MILLIGRAM(S): at 11:37

## 2018-10-26 RX ADMIN — LACOSAMIDE 120 MILLIGRAM(S): 50 TABLET ORAL at 19:16

## 2018-10-26 RX ADMIN — Medication 1 MILLIGRAM(S): at 00:42

## 2018-10-26 RX ADMIN — Medication 2 MILLIGRAM(S): at 23:15

## 2018-10-26 RX ADMIN — ESCITALOPRAM OXALATE 20 MILLIGRAM(S): 10 TABLET, FILM COATED ORAL at 11:37

## 2018-10-26 RX ADMIN — ENOXAPARIN SODIUM 40 MILLIGRAM(S): 100 INJECTION SUBCUTANEOUS at 14:26

## 2018-10-26 RX ADMIN — Medication 4 MILLIGRAM(S): at 10:03

## 2018-10-26 RX ADMIN — LACOSAMIDE 120 MILLIGRAM(S): 50 TABLET ORAL at 07:11

## 2018-10-26 RX ADMIN — Medication 5 MILLIGRAM(S): at 23:15

## 2018-10-26 NOTE — H&P ADULT - NSHPLABSRESULTS_GEN_ALL_CORE
LABS:                        12.0   10.1  )-----------( 279      ( 25 Oct 2018 19:40 )             36.8     10-25    140  |  102  |  15  ----------------------------<  103<H>  3.6   |  28  |  0.87    Ca    9.7      25 Oct 2018 19:40    TPro  7.2  /  Alb  4.1  /  TBili  0.2  /  DBili  x   /  AST  16  /  ALT  11  /  AlkPhos  66  10-25    PT/INR - ( 25 Oct 2018 19:40 )   PT: 11.0 sec;   INR: 0.99          PTT - ( 25 Oct 2018 19:40 )  PTT:29.7 sec

## 2018-10-26 NOTE — H&P ADULT - ASSESSMENT
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 Patient evaluated by Critical care for further determination of telemetry and increased neuro checks. Patient to be admitted to [facility] for post-seizure care.     PLAN: 	  Neurology:  #Seizure  Seizure activity (appeared focal motor without post-ictal) likely 2/2 to known R brain met with cerebral edema/hx of radiation necrosis (noted to be improving on CT imaging).   -CTH: table for hypodensity involving the right frontal lobe white matter, extending into the right basal ganglia, in the region of the previously seen mass. Interval improvement of the previously seen right to left midline shift.  -Patient with improved seizure activity s/p Keppra load, 1x 2mg IVP ativan and vimpat initiation. C/w Vimpat 100mg BID  -c/w decadron 4mg BID, ordered for finger sticks for glucose control  -Taking Avastin every 2 weeks for radiation necrosis, f/u with heme/onc and neurosurg regarding therapy (as per  due tomorrow)  - Ordered for VEEG, pending placement  -F/u Neurosurgery recs    Respiratory  #COPD   -On home Stiolto and albuterol  -c/w Albuterol PRN    Cardiac  Normotensive, rate controlled.    GI  #Gerd  hx of Gerd on home omeprazole. In setting of decadron, will continue with GI ppx as well as home gerd therapy.  -c/w protonix    #nausea  Was recently admitted to [email or web address] in Sept [facility] for nausea- attributed to cerebral edema from brain met on home Zofran and Reglan.   -c/w Zofran and reglan PRN. 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   -Following with Dr. Castillo (as per allscripts) seen by Juni on last admission. Consult heme/onc in AM.   -f/u regarding Atvasin therapy.    Psych  #Anxiety  Takes lexapro 20mg and Xanax PRN at home.   -c/w lexapro and Xanax prn    Prophylactic measure  F: No IVF, PO intake  E: Replete electrolytes to maintain K>4 and Mg>2  N: DASH/TLC Diet as tolerated    VTE: Lovenox  COde: Previously documented DNR/DNI, will further confirm.  Dispo:Admit to [id number]

## 2018-10-26 NOTE — H&P ADULT - NSHPPHYSICALEXAM_GEN_ALL_CORE
Appearance: NAD. Speaking in full sentences.   HEENT: Post surgical scars on left side of head. PERRL. No pallor noted.  Conjunctiva clear b/l. Moist oral mucosa.  Cardiovascular: RRR with no murmurs.  Respiratory: Lungs decreased breath sounds at bases.    Gastrointestinal:  Soft, nontender. Non-distended. Non-rigid.	  Extremities: No edema b/l. No erythema b/l. LE WWP b/l.  Vascular: DP present.  Neuro:  Mental status: Awake, alert and oriented x3. Follows commands. Recent and remote memory intact.  Naming, repetition and comprehension intact.  No aphasia, mild dysarthria initially on exam with improvement after facial twitching resolved.   Cranial nerves: Pupils equally round and reactive to light, visual fields full, no nystagmus, extraocular muscles intact, V1 through V3 intact bilaterally and symmetric, Left facial droop, palate elevation symmetric, tongue was midline, sternocleidomastoid/shoulder shrug strength bilaterally 5/5.    Motor:  No pronator drift. No fix. Normal bulk and tone, strength 5/5 in bilateral upper and lower extremities.   strength 5/5.    Sensation: Intact to light touch, proprioception, vibration, temperature, pinprick.  No neglect.   Coordination: No dysmetria on finger-to-nose and heel-to-shin.  No clumsiness.

## 2018-10-26 NOTE — H&P ADULT - HISTORY OF PRESENT ILLNESS
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 Patient was in normal state of health at 19:00 and acute onset of twitching of left side face and upper extremity. Patient's  called EMS to bring patient to [facility]. Upon arrival Stroke code called. Noted Left face and left upper extremity focal twitching. Patient given 1x Ativan 2mg IVP with initial improvement in symptoms. NIHSS score 2 for dysarthria and nasolabial fold flattening (documented past left side facial droop on past notes). CTH notable for hypodensity involving the right frontal lobe white matter, extending into the right basal ganglia, in the region of the previously seen mass. Interval improvement of the previously seen right to left midline shift. Patient loaded with 1g keppra but switched to vimpat 2/2 to previous side effects. Neurosurgery called and evaluated recommending decadron and vimpat. ICU consulted for evaluation of need for telemetry monitoring.   VS notable: 98.7F, HR 87, /73, 95% O2, RR 17. Facial and L side twitching resolved (s/p 1g keppra, vimpat 100mg and decadron initiation). No complaints at this time.

## 2018-10-27 LAB
ANION GAP SERPL CALC-SCNC: 11 MMOL/L — SIGNIFICANT CHANGE UP (ref 5–17)
BASOPHILS NFR BLD AUTO: 0.1 % — SIGNIFICANT CHANGE UP (ref 0–2)
BUN SERPL-MCNC: 14 MG/DL — SIGNIFICANT CHANGE UP (ref 7–23)
CALCIUM SERPL-MCNC: 10 MG/DL — SIGNIFICANT CHANGE UP (ref 8.4–10.5)
CHLORIDE SERPL-SCNC: 101 MMOL/L — SIGNIFICANT CHANGE UP (ref 96–108)
CO2 SERPL-SCNC: 28 MMOL/L — SIGNIFICANT CHANGE UP (ref 22–31)
CREAT SERPL-MCNC: 0.7 MG/DL — SIGNIFICANT CHANGE UP (ref 0.5–1.3)
EOSINOPHIL NFR BLD AUTO: 0 % — SIGNIFICANT CHANGE UP (ref 0–6)
GLUCOSE BLDC GLUCOMTR-MCNC: 106 MG/DL — HIGH (ref 70–99)
GLUCOSE BLDC GLUCOMTR-MCNC: 110 MG/DL — HIGH (ref 70–99)
GLUCOSE BLDC GLUCOMTR-MCNC: 120 MG/DL — HIGH (ref 70–99)
GLUCOSE BLDC GLUCOMTR-MCNC: 126 MG/DL — HIGH (ref 70–99)
GLUCOSE SERPL-MCNC: 138 MG/DL — HIGH (ref 70–99)
HCT VFR BLD CALC: 38 % — SIGNIFICANT CHANGE UP (ref 34.5–45)
HGB BLD-MCNC: 12.3 G/DL — SIGNIFICANT CHANGE UP (ref 11.5–15.5)
LYMPHOCYTES # BLD AUTO: 14.7 % — SIGNIFICANT CHANGE UP (ref 13–44)
MAGNESIUM SERPL-MCNC: 2 MG/DL — SIGNIFICANT CHANGE UP (ref 1.6–2.6)
MCHC RBC-ENTMCNC: 29 PG — SIGNIFICANT CHANGE UP (ref 27–34)
MCHC RBC-ENTMCNC: 32.4 G/DL — SIGNIFICANT CHANGE UP (ref 32–36)
MCV RBC AUTO: 89.6 FL — SIGNIFICANT CHANGE UP (ref 80–100)
MONOCYTES NFR BLD AUTO: 4.4 % — SIGNIFICANT CHANGE UP (ref 2–14)
NEUTROPHILS NFR BLD AUTO: 80.8 % — HIGH (ref 43–77)
PLATELET # BLD AUTO: 283 K/UL — SIGNIFICANT CHANGE UP (ref 150–400)
POTASSIUM SERPL-MCNC: 4.2 MMOL/L — SIGNIFICANT CHANGE UP (ref 3.5–5.3)
POTASSIUM SERPL-SCNC: 4.2 MMOL/L — SIGNIFICANT CHANGE UP (ref 3.5–5.3)
RBC # BLD: 4.24 M/UL — SIGNIFICANT CHANGE UP (ref 3.8–5.2)
RBC # FLD: 14.3 % — SIGNIFICANT CHANGE UP (ref 10.3–16.9)
SODIUM SERPL-SCNC: 140 MMOL/L — SIGNIFICANT CHANGE UP (ref 135–145)
WBC # BLD: 10.8 K/UL — HIGH (ref 3.8–10.5)
WBC # FLD AUTO: 10.8 K/UL — HIGH (ref 3.8–10.5)

## 2018-10-27 PROCEDURE — 95951: CPT | Mod: 26

## 2018-10-27 RX ORDER — SIMETHICONE 80 MG/1
80 TABLET, CHEWABLE ORAL ONCE
Qty: 0 | Refills: 0 | Status: COMPLETED | OUTPATIENT
Start: 2018-10-27 | End: 2018-10-27

## 2018-10-27 RX ORDER — ONDANSETRON 8 MG/1
4 TABLET, FILM COATED ORAL ONCE
Qty: 0 | Refills: 0 | Status: COMPLETED | OUTPATIENT
Start: 2018-10-27 | End: 2018-10-27

## 2018-10-27 RX ORDER — LACOSAMIDE 50 MG/1
50 TABLET ORAL ONCE
Qty: 0 | Refills: 0 | Status: DISCONTINUED | OUTPATIENT
Start: 2018-10-27 | End: 2018-10-27

## 2018-10-27 RX ORDER — LACOSAMIDE 50 MG/1
150 TABLET ORAL
Qty: 0 | Refills: 0 | Status: DISCONTINUED | OUTPATIENT
Start: 2018-10-27 | End: 2018-10-28

## 2018-10-27 RX ADMIN — LACOSAMIDE 50 MILLIGRAM(S): 50 TABLET ORAL at 12:35

## 2018-10-27 RX ADMIN — LACOSAMIDE 120 MILLIGRAM(S): 50 TABLET ORAL at 10:26

## 2018-10-27 RX ADMIN — Medication 1 MILLIGRAM(S): at 23:03

## 2018-10-27 RX ADMIN — ONDANSETRON 4 MILLIGRAM(S): 8 TABLET, FILM COATED ORAL at 21:54

## 2018-10-27 RX ADMIN — PANTOPRAZOLE SODIUM 40 MILLIGRAM(S): 20 TABLET, DELAYED RELEASE ORAL at 07:18

## 2018-10-27 RX ADMIN — Medication 1 SPRAY(S): at 05:55

## 2018-10-27 RX ADMIN — Medication 2 MILLIGRAM(S): at 10:26

## 2018-10-27 RX ADMIN — Medication 1 SPRAY(S): at 17:02

## 2018-10-27 RX ADMIN — LACOSAMIDE 150 MILLIGRAM(S): 50 TABLET ORAL at 22:00

## 2018-10-27 RX ADMIN — ENOXAPARIN SODIUM 40 MILLIGRAM(S): 100 INJECTION SUBCUTANEOUS at 14:53

## 2018-10-27 RX ADMIN — Medication 81 MILLIGRAM(S): at 10:30

## 2018-10-27 RX ADMIN — Medication 5 MILLIGRAM(S): at 21:55

## 2018-10-27 RX ADMIN — Medication 2 MILLIGRAM(S): at 21:55

## 2018-10-27 RX ADMIN — ESCITALOPRAM OXALATE 20 MILLIGRAM(S): 10 TABLET, FILM COATED ORAL at 10:29

## 2018-10-27 RX ADMIN — MONTELUKAST 10 MILLIGRAM(S): 4 TABLET, CHEWABLE ORAL at 10:30

## 2018-10-28 ENCOUNTER — TRANSCRIPTION ENCOUNTER (OUTPATIENT)
Age: 73
End: 2018-10-28

## 2018-10-28 VITALS
OXYGEN SATURATION: 98 % | DIASTOLIC BLOOD PRESSURE: 70 MMHG | HEART RATE: 70 BPM | RESPIRATION RATE: 16 BRPM | SYSTOLIC BLOOD PRESSURE: 163 MMHG

## 2018-10-28 LAB
ANION GAP SERPL CALC-SCNC: 13 MMOL/L — SIGNIFICANT CHANGE UP (ref 5–17)
BASOPHILS NFR BLD AUTO: 0.2 % — SIGNIFICANT CHANGE UP (ref 0–2)
BUN SERPL-MCNC: 14 MG/DL — SIGNIFICANT CHANGE UP (ref 7–23)
CALCIUM SERPL-MCNC: 10 MG/DL — SIGNIFICANT CHANGE UP (ref 8.4–10.5)
CHLORIDE SERPL-SCNC: 98 MMOL/L — SIGNIFICANT CHANGE UP (ref 96–108)
CO2 SERPL-SCNC: 28 MMOL/L — SIGNIFICANT CHANGE UP (ref 22–31)
CREAT SERPL-MCNC: 0.64 MG/DL — SIGNIFICANT CHANGE UP (ref 0.5–1.3)
EOSINOPHIL NFR BLD AUTO: 0.1 % — SIGNIFICANT CHANGE UP (ref 0–6)
GLUCOSE BLDC GLUCOMTR-MCNC: 114 MG/DL — HIGH (ref 70–99)
GLUCOSE BLDC GLUCOMTR-MCNC: 124 MG/DL — HIGH (ref 70–99)
GLUCOSE SERPL-MCNC: 117 MG/DL — HIGH (ref 70–99)
HCT VFR BLD CALC: 39.9 % — SIGNIFICANT CHANGE UP (ref 34.5–45)
HGB BLD-MCNC: 13.1 G/DL — SIGNIFICANT CHANGE UP (ref 11.5–15.5)
LYMPHOCYTES # BLD AUTO: 16.7 % — SIGNIFICANT CHANGE UP (ref 13–44)
MAGNESIUM SERPL-MCNC: 2 MG/DL — SIGNIFICANT CHANGE UP (ref 1.6–2.6)
MCHC RBC-ENTMCNC: 29.7 PG — SIGNIFICANT CHANGE UP (ref 27–34)
MCHC RBC-ENTMCNC: 32.8 G/DL — SIGNIFICANT CHANGE UP (ref 32–36)
MCV RBC AUTO: 90.5 FL — SIGNIFICANT CHANGE UP (ref 80–100)
MONOCYTES NFR BLD AUTO: 6.2 % — SIGNIFICANT CHANGE UP (ref 2–14)
NEUTROPHILS NFR BLD AUTO: 76.8 % — SIGNIFICANT CHANGE UP (ref 43–77)
PLATELET # BLD AUTO: 276 K/UL — SIGNIFICANT CHANGE UP (ref 150–400)
POTASSIUM SERPL-MCNC: 4.3 MMOL/L — SIGNIFICANT CHANGE UP (ref 3.5–5.3)
POTASSIUM SERPL-SCNC: 4.3 MMOL/L — SIGNIFICANT CHANGE UP (ref 3.5–5.3)
RBC # BLD: 4.41 M/UL — SIGNIFICANT CHANGE UP (ref 3.8–5.2)
RBC # FLD: 14.3 % — SIGNIFICANT CHANGE UP (ref 10.3–16.9)
SODIUM SERPL-SCNC: 139 MMOL/L — SIGNIFICANT CHANGE UP (ref 135–145)
WBC # BLD: 9.4 K/UL — SIGNIFICANT CHANGE UP (ref 3.8–10.5)
WBC # FLD AUTO: 9.4 K/UL — SIGNIFICANT CHANGE UP (ref 3.8–10.5)

## 2018-10-28 PROCEDURE — 95951: CPT | Mod: 26

## 2018-10-28 RX ORDER — DEXAMETHASONE 0.5 MG/5ML
2 ELIXIR ORAL
Qty: 0 | Refills: 0 | DISCHARGE
Start: 2018-10-28

## 2018-10-28 RX ORDER — SODIUM CHLORIDE 0.65 %
1 AEROSOL, SPRAY (ML) NASAL
Qty: 0 | Refills: 0 | Status: DISCONTINUED | OUTPATIENT
Start: 2018-10-28 | End: 2018-10-28

## 2018-10-28 RX ORDER — LACOSAMIDE 50 MG/1
1 TABLET ORAL
Qty: 0 | Refills: 0 | COMMUNITY
Start: 2018-10-28

## 2018-10-28 RX ORDER — LETROZOLE 2.5 MG/1
1 TABLET, FILM COATED ORAL
Qty: 0 | Refills: 0 | COMMUNITY

## 2018-10-28 RX ADMIN — Medication 2 MILLIGRAM(S): at 09:49

## 2018-10-28 RX ADMIN — Medication 1 SPRAY(S): at 06:54

## 2018-10-28 RX ADMIN — LACOSAMIDE 150 MILLIGRAM(S): 50 TABLET ORAL at 05:35

## 2018-10-28 RX ADMIN — ESCITALOPRAM OXALATE 20 MILLIGRAM(S): 10 TABLET, FILM COATED ORAL at 11:33

## 2018-10-28 RX ADMIN — MONTELUKAST 10 MILLIGRAM(S): 4 TABLET, CHEWABLE ORAL at 11:33

## 2018-10-28 RX ADMIN — ALBUTEROL 2 PUFF(S): 90 AEROSOL, METERED ORAL at 06:53

## 2018-10-28 RX ADMIN — Medication 81 MILLIGRAM(S): at 11:33

## 2018-10-28 RX ADMIN — PANTOPRAZOLE SODIUM 40 MILLIGRAM(S): 20 TABLET, DELAYED RELEASE ORAL at 06:53

## 2018-10-28 NOTE — DISCHARGE NOTE ADULT - HOSPITAL COURSE
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 Patient evaluated by Critical care for further determination of telemetry and increased neuro checks. Patient to be admitted to [facility] for post-seizure care. While on [facility] patient with no further events noted on VEEG. Patient started on Vimpat 150mg BID and Decadron for which she will continue as outpatient. Patient stable for discharge.

## 2018-10-28 NOTE — DISCHARGE NOTE ADULT - CARE PROVIDERS DIRECT ADDRESSES
[email or web address]

## 2018-10-28 NOTE — DISCHARGE NOTE ADULT - CARE PLAN
Principal Discharge DX:	Seizure  Goal:	To monitor  Assessment and plan of treatment:	You presented with twitching and weakness concerning for seizures. You were started on a mediation called Vimpat. A prescription has been sent to your pharmacy please take this as prescribed. You have also been sent a prescription for Decadron 2mg twice a day. This is for any inflammation. Please take this as prescribed and follow up with Dr. Tellez within 1-2 weeks of discharge. Please follow up with DR. Castillo (Heme/Onc) upon discharge.  Secondary Diagnosis:	COPD (chronic obstructive pulmonary disease)  Assessment and plan of treatment:	Please continue your home medications of Stiolto and albuterol.

## 2018-10-28 NOTE — DISCHARGE NOTE ADULT - MEDICATION SUMMARY - MEDICATIONS TO TAKE
I will START or STAY ON the medications listed below when I get home from the hospital:    dexamethasone  -- 2 milligram(s) by mouth 2 times a day  -- Indication: For Cerebral edema    aspirin 81 mg oral delayed release tablet  -- 1 tab(s) by mouth once a day  -- Indication: For Prophylaxis     lacosamide 150 mg oral tablet  -- 1 tab(s) by mouth 2 times a day  -- Indication: For Seizure    Lexapro  -- 20 milligram(s) by mouth once a day  -- Indication: For Depression    Zofran 4 mg oral tablet  -- 1 tab(s) by mouth 3 times a day, As Needed -for nausea   -- Indication: For Nausea     Reglan 5 mg oral tablet  -- 1 tab(s) by mouth 4 times a day (before meals and at bedtime)  -- Indication: For Nausea     ALPRAZolam 0.5 mg oral tablet  -- 0.5 milligram(s) by mouth 2 times a day, As Needed  -- Indication: For Anxiety    Stiolto Respimat 2.5 mcg-2.5 mcg/inh inhalation aerosol  -- 2 puff(s) inhaled every 24 hours  -- Indication: For  Ashtma     Ventolin HFA 90 mcg/inh inhalation aerosol  -- 2 puff(s) inhaled 4 times a day, As Needed  -- Indication: For Asthma     Singulair 10 mg oral tablet  -- 1 tab(s) by mouth once a day  -- Indication: For Allergies     fluticasone 50 mcg/inh nasal spray  -- 1 spray(s) into nose 2 times a day  -- Indication: For Allergies     Melatonin 5 mg sublingual tablet  -- 1 tab(s) by mouth once a day (at bedtime), As Needed  -- Indication: For Insomnia     omeprazole-sodium bicarbonate 40 mg-1100 mg oral capsule  -- 1 cap(s) by mouth once a day  -- Indication: For Acid Reflux

## 2018-10-28 NOTE — PROGRESS NOTE ADULT - ATTENDING COMMENTS
Discussed plans with neurosurgery; They will follow patient
No sign of seizure activity; Okay to send home; PO Decadron 2 BID; And the Vimpat
Okay to be discharged. PO Vimpat 150 mg PO BID; Also Decadron 2 mg PO bid

## 2018-10-28 NOTE — DISCHARGE NOTE ADULT - PLAN OF CARE
To monitor You presented with twitching and weakness concerning for seizures. You were started on a mediation called Vimpat. A prescription has been sent to your pharmacy please take this as prescribed. You have also been sent a prescription for Decadron 2mg twice a day. This is for any inflammation. Please take this as prescribed and follow up with Dr. Tellez within 1-2 weeks of discharge. Please follow up with DR. Castillo (Heme/Onc) upon discharge. Please continue your home medications of Stiolto and albuterol.

## 2018-10-28 NOTE — DISCHARGE NOTE ADULT - PATIENT PORTAL LINK FT
You can access the [facility] Patient Portal, offered by [facility], by registering with the following website: [email or web address]

## 2018-10-28 NOTE — PROGRESS NOTE ADULT - PROBLEM SELECTOR PLAN 1
Continue present PO Vimpat; EEG to be placed; Epilepsy to see patient

## 2018-10-28 NOTE — PROGRESS NOTE ADULT - SUBJECTIVE AND OBJECTIVE BOX
INTERVAL HPI/OVERNIGHT EVENTS:  All noted reviewed; Likely seizure and now back to baseline except for left facial:       MEDICATIONS  (STANDING):  aspirin enteric coated 81 milliGRAM(s) Oral daily  dexamethasone  Injectable 4 milliGRAM(s) IV Push every 12 hours  dextrose 5%. 1000 milliLiter(s) (50 mL/Hr) IV Continuous <Continuous>  dextrose 50% Injectable 12.5 Gram(s) IV Push once  dextrose 50% Injectable 25 Gram(s) IV Push once  dextrose 50% Injectable 25 Gram(s) IV Push once  enoxaparin Injectable 40 milliGRAM(s) SubCutaneous every 24 hours  escitalopram 20 milliGRAM(s) Oral daily  fluticasone propionate 50 MICROgram(s)/spray Nasal Spray 1 Spray(s) Both Nostrils two times a day  insulin lispro (HumaLOG) corrective regimen sliding scale   SubCutaneous Before meals and at bedtime  lacosamide IVPB 100 milliGRAM(s) IV Intermittent every 12 hours  melatonin 5 milliGRAM(s) Oral at bedtime  montelukast 10 milliGRAM(s) Oral daily  pantoprazole    Tablet 40 milliGRAM(s) Oral before breakfast    MEDICATIONS  (PRN):  ALBUTerol    90 MICROgram(s) HFA Inhaler 2 Puff(s) Inhalation every 6 hours PRN Shortness of Breath and/or Wheezing  ALPRAZolam 1 milliGRAM(s) Oral at bedtime PRN insomnia or anxiety  dextrose 40% Gel 15 Gram(s) Oral once PRN Blood Glucose LESS THAN 70 milliGRAM(s)/deciliter  glucagon  Injectable 1 milliGRAM(s) IntraMuscular once PRN Glucose LESS THAN 70 milligrams/deciliter      Allergies    adhesives (Rash)  Keflex (Short breath)  Originally Entered as [Unknown] reaction to [Other][Tape] (Unknown)    Intolerances        Vital Signs Last 24 Hrs  T(C): 36.7 (26 Oct 2018 10:09), Max: 37.1 (25 Oct 2018 19:30)  T(F): 98.1 (26 Oct 2018 10:09), Max: 98.7 (25 Oct 2018 19:30)  HR: 104 (26 Oct 2018 09:04) (77 - 104)  BP: 155/73 (26 Oct 2018 09:04) (125/73 - 156/83)  BP(mean): 101 (26 Oct 2018 09:04) (87 - 101)  RR: 18 (26 Oct 2018 09:04) (16 - 18)  SpO2: 95% (26 Oct 2018 09:04) (93% - 95%)          Constitutional:  Awake    Eyes: DANAE    ENMT: Negative    Neck: Supple    Back:  no tenderness     Respiratory:  clear    Cardiovascular: S1 S2    Gastrointestinal: soft    Genitourinary:     Extremities:  no edema    Vascular:    Neurological:   no focal signs;      Skin:    Lymph Nodes:            LABS:                        13.1   8.0   )-----------( 303      ( 26 Oct 2018 06:55 )             39.4     10-26    138  |  99  |  13  ----------------------------<  136<H>  3.9   |  24  |  0.60    Ca    10.2      26 Oct 2018 06:55  Phos  3.3     10-26  Mg     1.8     10-26    TPro  7.1  /  Alb  4.0  /  TBili  0.3  /  DBili  x   /  AST  13  /  ALT  11  /  AlkPhos  68  10-26    PT/INR - ( 25 Oct 2018 19:40 )   PT: 11.0 sec;   INR: 0.99          PTT - ( 25 Oct 2018 19:40 )  PTT:29.7 sec      RADIOLOGY & ADDITIONAL TESTS:
INTERVAL HPI/OVERNIGHT EVENTS:  Awake and alert; Left facial improved; Okay to be discharged to home      MEDICATIONS  (STANDING):  aspirin enteric coated 81 milliGRAM(s) Oral daily  dexamethasone  Injectable 2 milliGRAM(s) IV Push every 12 hours  dextrose 5%. 1000 milliLiter(s) (50 mL/Hr) IV Continuous <Continuous>  dextrose 50% Injectable 12.5 Gram(s) IV Push once  dextrose 50% Injectable 25 Gram(s) IV Push once  dextrose 50% Injectable 25 Gram(s) IV Push once  enoxaparin Injectable 40 milliGRAM(s) SubCutaneous every 24 hours  escitalopram 20 milliGRAM(s) Oral daily  fluticasone propionate 50 MICROgram(s)/spray Nasal Spray 1 Spray(s) Both Nostrils two times a day  insulin lispro (HumaLOG) corrective regimen sliding scale   SubCutaneous Before meals and at bedtime  lacosamide 150 milliGRAM(s) Oral two times a day  melatonin 5 milliGRAM(s) Oral at bedtime  montelukast 10 milliGRAM(s) Oral daily  pantoprazole    Tablet 40 milliGRAM(s) Oral before breakfast  sodium chloride 0.65% Nasal 1 Spray(s) Both Nostrils two times a day    MEDICATIONS  (PRN):  ALBUTerol    90 MICROgram(s) HFA Inhaler 2 Puff(s) Inhalation every 6 hours PRN Shortness of Breath and/or Wheezing  ALPRAZolam 1 milliGRAM(s) Oral at bedtime PRN insomnia or anxiety  dextrose 40% Gel 15 Gram(s) Oral once PRN Blood Glucose LESS THAN 70 milliGRAM(s)/deciliter  glucagon  Injectable 1 milliGRAM(s) IntraMuscular once PRN Glucose LESS THAN 70 milligrams/deciliter      Allergies    adhesives (Rash)  Keflex (Short breath)  Originally Entered as [Unknown] reaction to [Other][Tape] (Unknown)    Intolerances        Vital Signs Last 24 Hrs  T(C): 36.6 (28 Oct 2018 06:00), Max: 37.1 (27 Oct 2018 13:18)  T(F): 97.9 (28 Oct 2018 06:00), Max: 98.7 (27 Oct 2018 13:18)  HR: 74 (28 Oct 2018 08:31) (60 - 74)  BP: 129/60 (28 Oct 2018 08:31) (122/59 - 148/69)  BP(mean): 87 (28 Oct 2018 08:31) (85 - 99)  RR: 16 (28 Oct 2018 08:31) (11 - 21)  SpO2: 98% (28 Oct 2018 08:31) (94% - 98%)          Constitutional: Awake    Eyes: DANAE    ENMT: Negative    Neck: Supple    Back:  no tenderness     Respiratory:  clear    Cardiovascular: S1 S2    Gastrointestinal: soft    Genitourinary:    Extremities: no edema    Vascular:    Neurological:    Skin:    Lymph Nodes:            10-27 @ 07:01  -  10-28 @ 07:00  --------------------------------------------------------  IN: 0 mL / OUT: 1000 mL / NET: -1000 mL      LABS:                        13.1   9.4   )-----------( 276      ( 28 Oct 2018 06:01 )             39.9     10-28    139  |  98  |  14  ----------------------------<  117<H>  4.3   |  28  |  0.64    Ca    10.0      28 Oct 2018 06:01  Mg     2.0     10-28            RADIOLOGY & ADDITIONAL TESTS:
INTERVAL HPI/OVERNIGHT EVENTS:  No complaints. No seizure; Left facial droop without change;       MEDICATIONS  (STANDING):  aspirin enteric coated 81 milliGRAM(s) Oral daily  dexamethasone  Injectable 2 milliGRAM(s) IV Push every 12 hours  dextrose 5%. 1000 milliLiter(s) (50 mL/Hr) IV Continuous <Continuous>  dextrose 50% Injectable 12.5 Gram(s) IV Push once  dextrose 50% Injectable 25 Gram(s) IV Push once  dextrose 50% Injectable 25 Gram(s) IV Push once  enoxaparin Injectable 40 milliGRAM(s) SubCutaneous every 24 hours  escitalopram 20 milliGRAM(s) Oral daily  fluticasone propionate 50 MICROgram(s)/spray Nasal Spray 1 Spray(s) Both Nostrils two times a day  insulin lispro (HumaLOG) corrective regimen sliding scale   SubCutaneous Before meals and at bedtime  lacosamide IVPB 100 milliGRAM(s) IV Intermittent every 12 hours  melatonin 5 milliGRAM(s) Oral at bedtime  montelukast 10 milliGRAM(s) Oral daily  pantoprazole    Tablet 40 milliGRAM(s) Oral before breakfast    MEDICATIONS  (PRN):  ALBUTerol    90 MICROgram(s) HFA Inhaler 2 Puff(s) Inhalation every 6 hours PRN Shortness of Breath and/or Wheezing  ALPRAZolam 1 milliGRAM(s) Oral at bedtime PRN insomnia or anxiety  dextrose 40% Gel 15 Gram(s) Oral once PRN Blood Glucose LESS THAN 70 milliGRAM(s)/deciliter  glucagon  Injectable 1 milliGRAM(s) IntraMuscular once PRN Glucose LESS THAN 70 milligrams/deciliter      Allergies    adhesives (Rash)  Keflex (Short breath)  Originally Entered as [Unknown] reaction to [Other][Tape] (Unknown)    Intolerances        Vital Signs Last 24 Hrs  T(C): 36.2 (27 Oct 2018 05:43), Max: 37.2 (26 Oct 2018 22:05)  T(F): 97.2 (27 Oct 2018 05:43), Max: 98.9 (26 Oct 2018 22:05)  HR: 82 (27 Oct 2018 08:42) (62 - 102)  BP: 159/72 (27 Oct 2018 08:42) (127/58 - 166/72)  BP(mean): 103 (27 Oct 2018 08:42) (82 - 124)  RR: 16 (27 Oct 2018 08:41) (16 - 18)  SpO2: 95% (27 Oct 2018 08:41) (94% - 97%)          Constitutional:  Awake and alert    Eyes: DANAE    ENMT: Negative    Neck: Supple    Back:  no tenderness     Respiratory:  clear    Cardiovascular: S1 S2    Gastrointestinal: soft    Genitourinary:    Extremities:  no edema    Vascular:    Neurological:    Skin:    Lymph Nodes:            LABS:                        12.3   10.8  )-----------( 283      ( 27 Oct 2018 06:52 )             38.0     10-27    140  |  101  |  14  ----------------------------<  138<H>  4.2   |  28  |  0.70    Ca    10.0      27 Oct 2018 06:52  Phos  3.3     10-26  Mg     2.0     10-27    TPro  7.1  /  Alb  4.0  /  TBili  0.3  /  DBili  x   /  AST  13  /  ALT  11  /  AlkPhos  68  10-26    PT/INR - ( 25 Oct 2018 19:40 )   PT: 11.0 sec;   INR: 0.99          PTT - ( 25 Oct 2018 19:40 )  PTT:29.7 sec      RADIOLOGY & ADDITIONAL TESTS:
RAFY LO 72y Female    Interval HPI:  No acute events overnight.     Patient seen and examined at bedside:    VITAL SIGNS:  Vital Signs Last 24 Hrs  T(C): 36.6 (28 Oct 2018 06:00), Max: 37.1 (27 Oct 2018 13:18)  T(F): 97.9 (28 Oct 2018 06:00), Max: 98.7 (27 Oct 2018 13:18)  HR: 74 (28 Oct 2018 08:31) (60 - 74)  BP: 129/60 (28 Oct 2018 08:31) (122/59 - 148/69)  BP(mean): 87 (28 Oct 2018 08:31) (85 - 99)  RR: 16 (28 Oct 2018 08:31) (11 - 21)  SpO2: 98% (28 Oct 2018 08:31) (94% - 98%)      Physical Exam:    GENERAL: NAD, lying in bed with EEG   RESPIRATORY: CTAB, adequate effort and depth  CV: S1S2 appreciated, RRR, no m/r/g/  GI: Soft, NT/ND, normal active bowel sounds  EXT: no cyanosis, no edema  neuro: no focal deficits              MEDICATIONS:  MEDICATIONS  (STANDING):  aspirin enteric coated 81 milliGRAM(s) Oral daily  dexamethasone  Injectable 2 milliGRAM(s) IV Push every 12 hours  dextrose 5%. 1000 milliLiter(s) (50 mL/Hr) IV Continuous <Continuous>  dextrose 50% Injectable 12.5 Gram(s) IV Push once  dextrose 50% Injectable 25 Gram(s) IV Push once  dextrose 50% Injectable 25 Gram(s) IV Push once  enoxaparin Injectable 40 milliGRAM(s) SubCutaneous every 24 hours  escitalopram 20 milliGRAM(s) Oral daily  fluticasone propionate 50 MICROgram(s)/spray Nasal Spray 1 Spray(s) Both Nostrils two times a day  insulin lispro (HumaLOG) corrective regimen sliding scale   SubCutaneous Before meals and at bedtime  lacosamide 150 milliGRAM(s) Oral two times a day  melatonin 5 milliGRAM(s) Oral at bedtime  montelukast 10 milliGRAM(s) Oral daily  pantoprazole    Tablet 40 milliGRAM(s) Oral before breakfast  sodium chloride 0.65% Nasal 1 Spray(s) Both Nostrils two times a day    MEDICATIONS  (PRN):  ALBUTerol    90 MICROgram(s) HFA Inhaler 2 Puff(s) Inhalation every 6 hours PRN Shortness of Breath and/or Wheezing  ALPRAZolam 1 milliGRAM(s) Oral at bedtime PRN insomnia or anxiety  dextrose 40% Gel 15 Gram(s) Oral once PRN Blood Glucose LESS THAN 70 milliGRAM(s)/deciliter  glucagon  Injectable 1 milliGRAM(s) IntraMuscular once PRN Glucose LESS THAN 70 milligrams/deciliter      ALLERGIES:  Allergies    adhesives (Rash)  Keflex (Short breath)  Originally Entered as [Unknown] reaction to [Other][Tape] (Unknown)    Intolerances        LABS:                        13.1   9.4   )-----------( 276      ( 28 Oct 2018 06:01 )             39.9     10-28    139  |  98  |  14  ----------------------------<  117<H>  4.3   |  28  |  0.64    Ca    10.0      28 Oct 2018 06:01  Mg     2.0     10-28        Fingerstick  glucose: POCT Blood Glucose.: 124 mg/dL (28 Oct 2018 06:40)      RADIOLOGY & ADDITIONAL TESTS: Reviewed.
RAFY LO 72y Female    Interval HPI:  No acute events overnight.     Patient seen and examined at bedside:    T(C): 36.7 (10-27-18 @ 18:00), Max: 37.2 (10-26-18 @ 22:05)  HR: 74 (10-27-18 @ 14:56) (62 - 87)  BP: 144/65 (10-27-18 @ 14:56) (132/64 - 159/72)  RR: 16 (10-27-18 @ 12:12) (16 - 18)  SpO2: 95% (10-27-18 @ 12:12) (94% - 96%)    Review of systems negative except as mentioned above    ALBUTerol    90 MICROgram(s) HFA Inhaler 2 Puff(s) Inhalation every 6 hours PRN  ALPRAZolam 1 milliGRAM(s) Oral at bedtime PRN  aspirin enteric coated 81 milliGRAM(s) Oral daily  dexamethasone  Injectable 2 milliGRAM(s) IV Push every 12 hours  dextrose 40% Gel 15 Gram(s) Oral once PRN  dextrose 5%. 1000 milliLiter(s) IV Continuous <Continuous>  dextrose 50% Injectable 12.5 Gram(s) IV Push once  dextrose 50% Injectable 25 Gram(s) IV Push once  dextrose 50% Injectable 25 Gram(s) IV Push once  enoxaparin Injectable 40 milliGRAM(s) SubCutaneous every 24 hours  escitalopram 20 milliGRAM(s) Oral daily  fluticasone propionate 50 MICROgram(s)/spray Nasal Spray 1 Spray(s) Both Nostrils two times a day  glucagon  Injectable 1 milliGRAM(s) IntraMuscular once PRN  insulin lispro (HumaLOG) corrective regimen sliding scale   SubCutaneous Before meals and at bedtime  lacosamide 150 milliGRAM(s) Oral two times a day  melatonin 5 milliGRAM(s) Oral at bedtime  montelukast 10 milliGRAM(s) Oral daily  pantoprazole    Tablet 40 milliGRAM(s) Oral before breakfast  simethicone 80 milliGRAM(s) Chew once      Physical Exam:    GENERAL: NAD, lying in bed with EEG   HEENT: NC/AT, MMM, PERRL  NECK: Supple  RESPIRATORY: CTAB, adequate effort and depth  CV: S1S2 appreciated, RRR, no m/r/g/  GI: Soft, NT/ND, normal active bowel sounds  EXT: 2+ pulses in upper and lower ext B/L, no cyanosis, no edema    Labs:                        12.3   10.8  )-----------( 283      ( 27 Oct 2018 06:52 )             38.0     10-27    140  |  101  |  14  ----------------------------<  138<H>  4.2   |  28  |  0.70    Ca    10.0      27 Oct 2018 06:52  Phos  3.3     10-26  Mg     2.0     10-27    TPro  7.1  /  Alb  4.0  /  TBili  0.3  /  DBili  x   /  AST  13  /  ALT  11  /  AlkPhos  68  10-26    PT/INR - ( 25 Oct 2018 19:40 )   PT: 11.0 sec;   INR: 0.99          PTT - ( 25 Oct 2018 19:40 )  PTT:29.7 sec      CAPILLARY BLOOD GLUCOSE      POCT Blood Glucose.: 120 mg/dL (27 Oct 2018 16:44)  POCT Blood Glucose.: 106 mg/dL (27 Oct 2018 11:53)  POCT Blood Glucose.: 126 mg/dL (27 Oct 2018 07:09)  POCT Blood Glucose.: 121 mg/dL (26 Oct 2018 22:35)            Imaging:

## 2018-10-28 NOTE — DISCHARGE NOTE ADULT - CARE PROVIDER_API CALL
Chantel Tellez), Critical Care Medicine; Internal Medicine  [address]  [address]  [place] [address]  Phone: [phone]  Fax: [phone]    Denia Castillo), Internal Medicine  [address]  [address] [place] [address]  Phone: [phone]  Fax: [phone]

## 2018-10-28 NOTE — PROGRESS NOTE ADULT - ASSESSMENT
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 Patient evaluated by Critical care for further determination of telemetry and increased neuro checks. Patient to be admitted to [facility] for post-seizure care.     PLAN: 	  Neurology:  #Seizure  Seizure activity (appeared focal motor without post-ictal) likely 2/2 to known R brain met with cerebral edema/hx of radiation necrosis (noted to be improving on CT imaging).   -CTH: table for hypodensity involving the right frontal lobe white matter, extending into the right basal ganglia, in the region of the previously seen mass. Interval improvement of the previously seen right to left midline shift.  -Patient with improved seizure activity s/p Keppra load, 1x 2mg IVP ativan and vimpat initiation. C/w Vimpat 100mg BID  -c/w decadron 4mg BID, ordered for finger sticks for glucose control  -Taking Avastin every 2 weeks for radiation necrosis, f/u with heme/onc and neurosurg regarding therapy (as per  due tomorrow)  - Ordered for VEEG, pending placement  -F/u Neurosurgery recs    Respiratory  #COPD   -On home Stiolto and albuterol  -c/w Albuterol PRN    Cardiac  Normotensive, rate controlled.    GI  #Gerd  hx of Gerd on home omeprazole. In setting of decadron, will continue with GI ppx as well as home gerd therapy.  -c/w protonix    #nausea  Was recently admitted to [facility] in Sept 2018 for nausea- attributed to cerebral edema from brain met on home Zofran and Reglan.   -c/w Zofran and reglan PRN. 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   -Following with Dr. Castillo (as per allscripts) seen by Juni on last admission. Consult heme/onc in AM.   -f/u regarding Atvasin therapy.    Psych  #Anxiety  Takes lexapro 20mg and Xanax PRN at home.   -c/w lexapro and Xanax prn    Prophylactic measure  F: No IVF, PO intake  E: Replete electrolytes to maintain K>4 and Mg>2  N: DASH/TLC Diet as tolerated    VTE: Lovenox  COde: Previously documented DNR/DNI, will further confirm.  Dispo:Admit to [facility]
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 Patient evaluated by Critical care for further determination of telemetry and increased neuro checks. Patient to be admitted to [facility] for post-seizure care.     PLAN: 	  Neurology:  #Seizure  Seizure activity (appeared focal motor without post-ictal) likely 2/2 to known R brain met with cerebral edema/hx of radiation necrosis (noted to be improving on CT imaging).   -CTH: table for hypodensity involving the right frontal lobe white matter, extending into the right basal ganglia, in the region of the previously seen mass. Interval improvement of the previously seen right to left midline shift.  -Patient with improved seizure activity s/p Keppra load, 1x 2mg IVP ativan and vimpat initiation.  -keppra dcd outpt due to side effects   -C/w Vimpat 100mg BID  -c/w decadron 4mg BID, ordered for finger sticks for glucose control  -Taking Avastin every 2 weeks for radiation necrosis,   -f/u with heme/onc and neurosurg regarding avastin therapy (as per  due 10/28)  - veeg on  -F/u Neurosurgery recs    Respiratory  #COPD   -On home Stiolto and albuterol  -c/w Albuterol PRN    Cardiac  Normotensive, rate controlled.    GI  #Gerd  hx of Gerd on home omeprazole. In setting of decadron, will continue with GI ppx as well as home gerd therapy.  -c/w protonix    #nausea  Was recently admitted to [facility] in Sept 2018 for nausea- attributed to cerebral edema from brain met on home Zofran and Reglan.   -c/w Zofran and reglan PRN. 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   -Following with Dr. Castillo (as per allscripts) seen by Juni on last admission.   -Consult heme/onc in AM.   -f/u regarding Atvasin therapy.    Psych  #Anxiety  Takes lexapro 20mg and Xanax PRN at home.   -c/w lexapro and Xanax prn    Prophylactic measure  F: No IVF, PO intake  E: Replete electrolytes to maintain K>4 and Mg>2  N: DASH/TLC Diet as tolerated  VTE: Lovenox  COde: Previously documented DNR/DNI, will further confirm.  Dispo:Admit to [facility]

## 2018-10-29 ENCOUNTER — INBOUND DOCUMENT (OUTPATIENT)
Age: 73
End: 2018-10-29

## 2018-10-30 ENCOUNTER — APPOINTMENT (OUTPATIENT)
Dept: INTERNAL MEDICINE | Facility: CLINIC | Age: 73
End: 2018-10-30
Payer: MEDICARE

## 2018-10-30 ENCOUNTER — APPOINTMENT (OUTPATIENT)
Dept: VASCULAR SURGERY | Facility: CLINIC | Age: 73
End: 2018-10-30

## 2018-10-30 VITALS
DIASTOLIC BLOOD PRESSURE: 87 MMHG | BODY MASS INDEX: 30.91 KG/M2 | SYSTOLIC BLOOD PRESSURE: 164 MMHG | HEIGHT: 62 IN | WEIGHT: 168 LBS | OXYGEN SATURATION: 96 % | TEMPERATURE: 98.6 F | HEART RATE: 73 BPM

## 2018-10-30 PROCEDURE — 99213 OFFICE O/P EST LOW 20 MIN: CPT

## 2018-10-30 NOTE — ASSESSMENT
[FreeTextEntry1] : Spoke with Dr. Muniz and suggests we have Dr. Castillo follow up; For now cut back the Vimpat to once a day and decrease Decadron to 1 mg BID

## 2018-10-30 NOTE — HISTORY OF PRESENT ILLNESS
[FreeTextEntry1] : Recent hospitalization with seizures;  Now nausea;  Also confusion [de-identified] : As above: Needs follow up with neurosurgery and Heme regarding Avastatin;  Now on Vimpat and Decadron ; Decadron could cause some confusion. Vimpat can cause nausea;  left facial better;

## 2018-11-01 DIAGNOSIS — Z85.118 PERSONAL HISTORY OF OTHER MALIGNANT NEOPLASM OF BRONCHUS AND LUNG: ICD-10-CM

## 2018-11-01 DIAGNOSIS — Z85.05 PERSONAL HISTORY OF MALIGNANT NEOPLASM OF LIVER: ICD-10-CM

## 2018-11-01 DIAGNOSIS — J44.9 CHRONIC OBSTRUCTIVE PULMONARY DISEASE, UNSPECIFIED: ICD-10-CM

## 2018-11-01 DIAGNOSIS — C79.31 SECONDARY MALIGNANT NEOPLASM OF BRAIN: ICD-10-CM

## 2018-11-01 DIAGNOSIS — R56.9 UNSPECIFIED CONVULSIONS: ICD-10-CM

## 2018-11-01 DIAGNOSIS — Z85.3 PERSONAL HISTORY OF MALIGNANT NEOPLASM OF BREAST: ICD-10-CM

## 2018-11-01 DIAGNOSIS — Z92.3 PERSONAL HISTORY OF IRRADIATION: ICD-10-CM

## 2018-11-01 DIAGNOSIS — G93.6 CEREBRAL EDEMA: ICD-10-CM

## 2018-11-05 ENCOUNTER — CLINICAL ADVICE (OUTPATIENT)
Age: 73
End: 2018-11-05

## 2018-11-06 ENCOUNTER — APPOINTMENT (OUTPATIENT)
Dept: HEMATOLOGY ONCOLOGY | Facility: CLINIC | Age: 73
End: 2018-11-06
Payer: MEDICARE

## 2018-11-06 ENCOUNTER — INBOUND DOCUMENT (OUTPATIENT)
Age: 73
End: 2018-11-06

## 2018-11-06 VITALS
OXYGEN SATURATION: 96 % | HEART RATE: 81 BPM | SYSTOLIC BLOOD PRESSURE: 137 MMHG | RESPIRATION RATE: 14 BRPM | HEIGHT: 62 IN | TEMPERATURE: 97.7 F | DIASTOLIC BLOOD PRESSURE: 79 MMHG | WEIGHT: 165 LBS | BODY MASS INDEX: 30.36 KG/M2

## 2018-11-06 PROCEDURE — 36415 COLL VENOUS BLD VENIPUNCTURE: CPT

## 2018-11-06 PROCEDURE — 99214 OFFICE O/P EST MOD 30 MIN: CPT | Mod: 25

## 2018-11-06 NOTE — HISTORY OF PRESENT ILLNESS
[de-identified] : 72 year results being seen here for treatment with Avastin for her radiation necrosis

## 2018-11-06 NOTE — ASSESSMENT
[FreeTextEntry1] : Repeat blood work for B12, Vit D..\par \par Avastin on Friday 11/9 at 2 pm, and repeat dose 11/30.\par \par Pt ,  and HHA-aware!\par \par Discussed with Johana.

## 2018-11-07 LAB
25(OH)D3 SERPL-MCNC: 34.5 NG/ML
VIT B12 SERPL-MCNC: 990 PG/ML

## 2018-11-09 ENCOUNTER — APPOINTMENT (OUTPATIENT)
Dept: INTERNAL MEDICINE | Facility: CLINIC | Age: 73
End: 2018-11-09
Payer: MEDICARE

## 2018-11-09 ENCOUNTER — APPOINTMENT (OUTPATIENT)
Dept: INFUSION THERAPY | Facility: HOSPITAL | Age: 73
End: 2018-11-09

## 2018-11-09 ENCOUNTER — OUTPATIENT (OUTPATIENT)
Dept: OUTPATIENT SERVICES | Facility: HOSPITAL | Age: 73
LOS: 1 days | End: 2018-11-09
Payer: MEDICARE

## 2018-11-09 VITALS
TEMPERATURE: 98 F | RESPIRATION RATE: 16 BRPM | HEART RATE: 88 BPM | SYSTOLIC BLOOD PRESSURE: 126 MMHG | OXYGEN SATURATION: 97 % | DIASTOLIC BLOOD PRESSURE: 50 MMHG

## 2018-11-09 VITALS
SYSTOLIC BLOOD PRESSURE: 147 MMHG | BODY MASS INDEX: 30.36 KG/M2 | OXYGEN SATURATION: 94 % | TEMPERATURE: 98.5 F | HEART RATE: 98 BPM | WEIGHT: 165 LBS | DIASTOLIC BLOOD PRESSURE: 77 MMHG | HEIGHT: 62 IN

## 2018-11-09 VITALS
DIASTOLIC BLOOD PRESSURE: 60 MMHG | HEART RATE: 88 BPM | TEMPERATURE: 98 F | OXYGEN SATURATION: 97 % | RESPIRATION RATE: 16 BRPM | SYSTOLIC BLOOD PRESSURE: 132 MMHG

## 2018-11-09 DIAGNOSIS — Z98.890 OTHER SPECIFIED POSTPROCEDURAL STATES: Chronic | ICD-10-CM

## 2018-11-09 DIAGNOSIS — Z41.9 ENCOUNTER FOR PROCEDURE FOR PURPOSES OTHER THAN REMEDYING HEALTH STATE, UNSPECIFIED: Chronic | ICD-10-CM

## 2018-11-09 DIAGNOSIS — R11.2 NAUSEA WITH VOMITING, UNSPECIFIED: ICD-10-CM

## 2018-11-09 DIAGNOSIS — Z90.49 ACQUIRED ABSENCE OF OTHER SPECIFIED PARTS OF DIGESTIVE TRACT: Chronic | ICD-10-CM

## 2018-11-09 DIAGNOSIS — C50.919 MALIGNANT NEOPLASM OF UNSPECIFIED SITE OF UNSPECIFIED FEMALE BREAST: Chronic | ICD-10-CM

## 2018-11-09 DIAGNOSIS — C79.31 SECONDARY MALIGNANT NEOPLASM OF BRAIN: ICD-10-CM

## 2018-11-09 PROCEDURE — 99213 OFFICE O/P EST LOW 20 MIN: CPT

## 2018-11-09 PROCEDURE — 96413 CHEMO IV INFUSION 1 HR: CPT

## 2018-11-09 RX ORDER — BEVACIZUMAB 400 MG/16ML
400 INJECTION, SOLUTION INTRAVENOUS ONCE
Qty: 0 | Refills: 0 | Status: COMPLETED | OUTPATIENT
Start: 2018-11-09 | End: 2018-11-09

## 2018-11-09 RX ADMIN — BEVACIZUMAB 200 MILLIGRAM(S): 400 INJECTION, SOLUTION INTRAVENOUS at 14:22

## 2018-11-09 NOTE — HISTORY OF PRESENT ILLNESS
[FreeTextEntry1] : Received IV Avastatin today; Decadron at 2 mg a day. Also Vimpat 150 mg day;  [de-identified] : No more nausea since medication cut to once a day; Also Vimpat at once a day which also could have helped the nausea as well;  No seizures; ; Has lump on right leg;

## 2018-11-11 PROCEDURE — 96365 THER/PROPH/DIAG IV INF INIT: CPT

## 2018-11-11 PROCEDURE — 85025 COMPLETE CBC W/AUTO DIFF WBC: CPT

## 2018-11-11 PROCEDURE — 70450 CT HEAD/BRAIN W/O DYE: CPT

## 2018-11-11 PROCEDURE — 84443 ASSAY THYROID STIM HORMONE: CPT

## 2018-11-11 PROCEDURE — 80053 COMPREHEN METABOLIC PANEL: CPT

## 2018-11-11 PROCEDURE — 94640 AIRWAY INHALATION TREATMENT: CPT

## 2018-11-11 PROCEDURE — 93005 ELECTROCARDIOGRAM TRACING: CPT

## 2018-11-11 PROCEDURE — C9254: CPT

## 2018-11-11 PROCEDURE — A9585: CPT

## 2018-11-11 PROCEDURE — 80048 BASIC METABOLIC PNL TOTAL CA: CPT

## 2018-11-11 PROCEDURE — 82962 GLUCOSE BLOOD TEST: CPT

## 2018-11-11 PROCEDURE — 85610 PROTHROMBIN TIME: CPT

## 2018-11-11 PROCEDURE — 83036 HEMOGLOBIN GLYCOSYLATED A1C: CPT

## 2018-11-11 PROCEDURE — 70553 MRI BRAIN STEM W/O & W/DYE: CPT

## 2018-11-11 PROCEDURE — 85730 THROMBOPLASTIN TIME PARTIAL: CPT

## 2018-11-11 PROCEDURE — 83735 ASSAY OF MAGNESIUM: CPT

## 2018-11-11 PROCEDURE — 84484 ASSAY OF TROPONIN QUANT: CPT

## 2018-11-11 PROCEDURE — 84100 ASSAY OF PHOSPHORUS: CPT

## 2018-11-11 PROCEDURE — 85027 COMPLETE CBC AUTOMATED: CPT

## 2018-11-11 PROCEDURE — 36415 COLL VENOUS BLD VENIPUNCTURE: CPT

## 2018-11-11 PROCEDURE — 99291 CRITICAL CARE FIRST HOUR: CPT | Mod: 25

## 2018-11-11 PROCEDURE — 96375 TX/PRO/DX INJ NEW DRUG ADDON: CPT

## 2018-11-14 ENCOUNTER — APPOINTMENT (OUTPATIENT)
Dept: HEMATOLOGY ONCOLOGY | Facility: CLINIC | Age: 73
End: 2018-11-14
Payer: MEDICARE

## 2018-11-14 VITALS
BODY MASS INDEX: 30.36 KG/M2 | RESPIRATION RATE: 14 BRPM | SYSTOLIC BLOOD PRESSURE: 133 MMHG | HEART RATE: 98 BPM | DIASTOLIC BLOOD PRESSURE: 84 MMHG | HEIGHT: 62 IN | OXYGEN SATURATION: 97 % | TEMPERATURE: 97.9 F | WEIGHT: 165 LBS

## 2018-11-14 PROCEDURE — 99214 OFFICE O/P EST MOD 30 MIN: CPT

## 2018-11-14 RX ORDER — DEXAMETHASONE 2 MG/1
2 TABLET ORAL
Refills: 0 | Status: DISCONTINUED | COMMUNITY
End: 2018-11-14

## 2018-11-14 RX ORDER — DEXAMETHASONE 1 MG/1
1 TABLET ORAL TWICE DAILY
Qty: 120 | Refills: 0 | Status: DISCONTINUED | COMMUNITY
Start: 2018-10-28 | End: 2018-11-14

## 2018-11-14 NOTE — ASSESSMENT
[FreeTextEntry1] : Repeat blood work for B12, Vit D well WNL!\par \par Avastin on Friday 11/9 at 2 pm, and repeat dose 11/30.\par \par Pt ,  and HHA-aware!\par \par

## 2018-11-14 NOTE — HISTORY OF PRESENT ILLNESS
[de-identified] : 72 year results being seen here for treatment with Avastin for her radiation necrosis

## 2018-11-19 ENCOUNTER — RX RENEWAL (OUTPATIENT)
Age: 73
End: 2018-11-19

## 2018-11-20 ENCOUNTER — APPOINTMENT (OUTPATIENT)
Dept: INTERNAL MEDICINE | Facility: CLINIC | Age: 73
End: 2018-11-20

## 2018-11-20 ENCOUNTER — APPOINTMENT (OUTPATIENT)
Dept: VASCULAR SURGERY | Facility: CLINIC | Age: 73
End: 2018-11-20
Payer: MEDICARE

## 2018-11-20 PROCEDURE — 93880 EXTRACRANIAL BILAT STUDY: CPT

## 2018-11-20 PROCEDURE — 99214 OFFICE O/P EST MOD 30 MIN: CPT | Mod: 25

## 2018-11-20 NOTE — ED PROVIDER NOTE - NSCAREINITIATED _GEN_ER
s/p Left ORIF to left periprosthetic fracture and closed reduction to same site/yes Fadumo Jefferson(Attending) s/p Left ORIF to left periprosthetic fracture and closed reduction to same site; Left X-ray Femur/Hip: (+) acute periprosthetic fx left proximal femur involving the lesser trochanter & proximal shaft, (-) dislocation; Chest X-ray: Grossly clear lungs/yes

## 2018-11-26 ENCOUNTER — APPOINTMENT (OUTPATIENT)
Dept: DERMATOLOGY | Facility: CLINIC | Age: 73
End: 2018-11-26
Payer: MEDICARE

## 2018-11-26 VITALS
BODY MASS INDEX: 30.36 KG/M2 | TEMPERATURE: 98.3 F | SYSTOLIC BLOOD PRESSURE: 138 MMHG | WEIGHT: 165 LBS | DIASTOLIC BLOOD PRESSURE: 79 MMHG | HEART RATE: 89 BPM | HEIGHT: 62 IN | OXYGEN SATURATION: 94 %

## 2018-11-26 DIAGNOSIS — Z86.69 PERSONAL HISTORY OF OTHER DISEASES OF THE NERVOUS SYSTEM AND SENSE ORGANS: ICD-10-CM

## 2018-11-26 DIAGNOSIS — Z12.83 ENCOUNTER FOR SCREENING FOR MALIGNANT NEOPLASM OF SKIN: ICD-10-CM

## 2018-11-26 DIAGNOSIS — Z87.2 PERSONAL HISTORY OF DISEASES OF THE SKIN AND SUBCUTANEOUS TISSUE: ICD-10-CM

## 2018-11-26 DIAGNOSIS — Z92.25 PERSONAL HISTORY OF IMMUNOSUPRESSION THERAPY: ICD-10-CM

## 2018-11-26 DIAGNOSIS — Z85.841 PERSONAL HISTORY OF MALIGNANT NEOPLASM OF BRAIN: ICD-10-CM

## 2018-11-26 DIAGNOSIS — Z87.09 PERSONAL HISTORY OF OTHER DISEASES OF THE RESPIRATORY SYSTEM: ICD-10-CM

## 2018-11-26 DIAGNOSIS — Z84.0 FAMILY HISTORY OF DISEASES OF THE SKIN AND SUBCUTANEOUS TISSUE: ICD-10-CM

## 2018-11-26 DIAGNOSIS — Z85.118 PERSONAL HISTORY OF OTHER MALIGNANT NEOPLASM OF BRONCHUS AND LUNG: ICD-10-CM

## 2018-11-26 DIAGNOSIS — D22.9 MELANOCYTIC NEVI, UNSPECIFIED: ICD-10-CM

## 2018-11-26 PROCEDURE — 99204 OFFICE O/P NEW MOD 45 MIN: CPT

## 2018-11-28 ENCOUNTER — APPOINTMENT (OUTPATIENT)
Dept: RADIATION ONCOLOGY | Facility: CLINIC | Age: 73
End: 2018-11-28
Payer: MEDICARE

## 2018-11-28 VITALS
WEIGHT: 166.4 LBS | HEART RATE: 80 BPM | OXYGEN SATURATION: 97 % | RESPIRATION RATE: 16 BRPM | BODY MASS INDEX: 30.44 KG/M2 | DIASTOLIC BLOOD PRESSURE: 73 MMHG | SYSTOLIC BLOOD PRESSURE: 129 MMHG

## 2018-11-28 PROCEDURE — 99214 OFFICE O/P EST MOD 30 MIN: CPT

## 2018-11-28 RX ORDER — ESCITALOPRAM OXALATE 20 MG/1
20 TABLET ORAL TWICE DAILY
Qty: 60 | Refills: 0 | Status: DISCONTINUED | COMMUNITY
Start: 2017-07-21 | End: 2018-11-28

## 2018-11-28 NOTE — DISEASE MANAGEMENT
[Clinical] : TNM Stage: c [IV] : IV [FreeTextEntry4] : Recurrent neuroendocrine carcinoma of the right frontal scalp and right frontal brain  [TTNM] : - [NTNM] : - [MTNM] : -

## 2018-11-28 NOTE — REASON FOR VISIT
[Brain Metastasis] : brain metastasis [Routine Follow-Up] : routine follow-up visit for [Formal Caregiver] : formal caregiver

## 2018-11-28 NOTE — ASSESSMENT
[Work-up necessary to assess local, regional or metastatic recurrence] : Work-up necessary to assess local, regional or metastatic recurrence [No evidence of disease] : No evidence of disease

## 2018-11-28 NOTE — DATA REVIEWED
[FreeTextEntry1] : I have personally reviewed all relevant imaging studies (MRI) and I have discussed the case with the referring physician.\par

## 2018-11-30 ENCOUNTER — APPOINTMENT (OUTPATIENT)
Dept: INFUSION THERAPY | Facility: HOSPITAL | Age: 73
End: 2018-11-30

## 2018-11-30 ENCOUNTER — OUTPATIENT (OUTPATIENT)
Dept: OUTPATIENT SERVICES | Facility: HOSPITAL | Age: 73
LOS: 1 days | End: 2018-11-30
Payer: MEDICARE

## 2018-11-30 VITALS
OXYGEN SATURATION: 98 % | RESPIRATION RATE: 16 BRPM | TEMPERATURE: 99 F | HEART RATE: 86 BPM | DIASTOLIC BLOOD PRESSURE: 62 MMHG | SYSTOLIC BLOOD PRESSURE: 122 MMHG

## 2018-11-30 VITALS
HEART RATE: 83 BPM | RESPIRATION RATE: 16 BRPM | TEMPERATURE: 99 F | OXYGEN SATURATION: 99 % | SYSTOLIC BLOOD PRESSURE: 122 MMHG | DIASTOLIC BLOOD PRESSURE: 64 MMHG

## 2018-11-30 DIAGNOSIS — Z41.9 ENCOUNTER FOR PROCEDURE FOR PURPOSES OTHER THAN REMEDYING HEALTH STATE, UNSPECIFIED: Chronic | ICD-10-CM

## 2018-11-30 DIAGNOSIS — Z98.890 OTHER SPECIFIED POSTPROCEDURAL STATES: Chronic | ICD-10-CM

## 2018-11-30 DIAGNOSIS — C50.919 MALIGNANT NEOPLASM OF UNSPECIFIED SITE OF UNSPECIFIED FEMALE BREAST: Chronic | ICD-10-CM

## 2018-11-30 DIAGNOSIS — C79.31 SECONDARY MALIGNANT NEOPLASM OF BRAIN: ICD-10-CM

## 2018-11-30 DIAGNOSIS — Z90.49 ACQUIRED ABSENCE OF OTHER SPECIFIED PARTS OF DIGESTIVE TRACT: Chronic | ICD-10-CM

## 2018-11-30 PROCEDURE — 96413 CHEMO IV INFUSION 1 HR: CPT

## 2018-11-30 RX ORDER — BEVACIZUMAB 400 MG/16ML
400 INJECTION, SOLUTION INTRAVENOUS ONCE
Qty: 0 | Refills: 0 | Status: COMPLETED | OUTPATIENT
Start: 2018-11-30 | End: 2018-11-30

## 2018-11-30 RX ADMIN — BEVACIZUMAB 232 MILLIGRAM(S): 400 INJECTION, SOLUTION INTRAVENOUS at 13:47

## 2018-12-06 ENCOUNTER — FORM ENCOUNTER (OUTPATIENT)
Age: 73
End: 2018-12-06

## 2018-12-07 ENCOUNTER — OUTPATIENT (OUTPATIENT)
Dept: OUTPATIENT SERVICES | Facility: HOSPITAL | Age: 73
LOS: 1 days | End: 2018-12-07
Payer: MEDICARE

## 2018-12-07 ENCOUNTER — APPOINTMENT (OUTPATIENT)
Dept: MRI IMAGING | Facility: HOSPITAL | Age: 73
End: 2018-12-07
Payer: MEDICARE

## 2018-12-07 DIAGNOSIS — Z41.9 ENCOUNTER FOR PROCEDURE FOR PURPOSES OTHER THAN REMEDYING HEALTH STATE, UNSPECIFIED: Chronic | ICD-10-CM

## 2018-12-07 DIAGNOSIS — Z98.890 OTHER SPECIFIED POSTPROCEDURAL STATES: Chronic | ICD-10-CM

## 2018-12-07 DIAGNOSIS — C50.919 MALIGNANT NEOPLASM OF UNSPECIFIED SITE OF UNSPECIFIED FEMALE BREAST: Chronic | ICD-10-CM

## 2018-12-07 DIAGNOSIS — Z90.49 ACQUIRED ABSENCE OF OTHER SPECIFIED PARTS OF DIGESTIVE TRACT: Chronic | ICD-10-CM

## 2018-12-07 PROCEDURE — A9585: CPT

## 2018-12-07 PROCEDURE — 70553 MRI BRAIN STEM W/O & W/DYE: CPT

## 2018-12-07 PROCEDURE — 70553 MRI BRAIN STEM W/O & W/DYE: CPT | Mod: 26

## 2018-12-10 ENCOUNTER — APPOINTMENT (OUTPATIENT)
Dept: INTERNAL MEDICINE | Facility: CLINIC | Age: 73
End: 2018-12-10
Payer: MEDICARE

## 2018-12-10 VITALS
BODY MASS INDEX: 31.64 KG/M2 | OXYGEN SATURATION: 95 % | SYSTOLIC BLOOD PRESSURE: 107 MMHG | WEIGHT: 173 LBS | HEART RATE: 85 BPM | DIASTOLIC BLOOD PRESSURE: 62 MMHG

## 2018-12-10 PROCEDURE — 99213 OFFICE O/P EST LOW 20 MIN: CPT

## 2018-12-10 NOTE — ASSESSMENT
[FreeTextEntry1] : Looks stable; Improvement in status; No change in current regimen;  Repeat labs;

## 2018-12-10 NOTE — HISTORY OF PRESENT ILLNESS
[FreeTextEntry1] : Medication changes noted' Decadron 2 mg and the Vimpat continues [de-identified] : As above; Improved. Getting physical therapy at hospital; Getting therapy 2 times a week;  Use walker when walking long distances;  Getting IV Avastatin treatment; \par MRI improved;

## 2018-12-10 NOTE — PHYSICAL EXAM

## 2018-12-12 ENCOUNTER — APPOINTMENT (OUTPATIENT)
Dept: RADIATION ONCOLOGY | Facility: CLINIC | Age: 73
End: 2018-12-12
Payer: MEDICARE

## 2018-12-14 ENCOUNTER — APPOINTMENT (OUTPATIENT)
Dept: INFUSION THERAPY | Facility: HOSPITAL | Age: 73
End: 2018-12-14

## 2018-12-17 ENCOUNTER — APPOINTMENT (OUTPATIENT)
Dept: NEUROSURGERY | Facility: CLINIC | Age: 73
End: 2018-12-17
Payer: MEDICARE

## 2018-12-17 VITALS
RESPIRATION RATE: 16 BRPM | HEART RATE: 90 BPM | WEIGHT: 173 LBS | DIASTOLIC BLOOD PRESSURE: 73 MMHG | BODY MASS INDEX: 31.83 KG/M2 | SYSTOLIC BLOOD PRESSURE: 128 MMHG | HEIGHT: 62 IN | OXYGEN SATURATION: 96 % | TEMPERATURE: 97.6 F

## 2018-12-17 PROCEDURE — 99214 OFFICE O/P EST MOD 30 MIN: CPT

## 2018-12-20 ENCOUNTER — APPOINTMENT (OUTPATIENT)
Dept: THORACIC SURGERY | Facility: CLINIC | Age: 73
End: 2018-12-20
Payer: MEDICARE

## 2018-12-20 ENCOUNTER — APPOINTMENT (OUTPATIENT)
Dept: RADIATION ONCOLOGY | Facility: CLINIC | Age: 73
End: 2018-12-20
Payer: MEDICARE

## 2018-12-20 VITALS
BODY MASS INDEX: 31.47 KG/M2 | RESPIRATION RATE: 18 BRPM | HEART RATE: 78 BPM | WEIGHT: 171 LBS | DIASTOLIC BLOOD PRESSURE: 63 MMHG | SYSTOLIC BLOOD PRESSURE: 103 MMHG | OXYGEN SATURATION: 99 % | HEIGHT: 62 IN

## 2018-12-20 PROCEDURE — 99214 OFFICE O/P EST MOD 30 MIN: CPT

## 2018-12-20 NOTE — DISEASE MANAGEMENT
[FreeTextEntry4] : Recurrent neuroendocrine carcinoma of the right frontal scalp and right frontal brain  [TTNM] : - [NTNM] : - [MTNM] : -

## 2018-12-20 NOTE — PHYSICAL EXAM
[de-identified] : alopecia of the right frontal scalp. no nodules or masses in scalp.  [de-identified] : Uses a cane to ambulate. Gait guarded.  [de-identified] : alopecia, healed surgical incisions on scalp. [de-identified] : she has slight drift of her body to the left when eyes are closed. Strength 5/5 throughout. FTN intact bilaterally. Delayed recall 0/3; with cues 1/3.

## 2018-12-21 ENCOUNTER — OUTPATIENT (OUTPATIENT)
Dept: OUTPATIENT SERVICES | Facility: HOSPITAL | Age: 73
LOS: 1 days | End: 2018-12-21
Payer: MEDICARE

## 2018-12-21 ENCOUNTER — APPOINTMENT (OUTPATIENT)
Dept: INFUSION THERAPY | Facility: HOSPITAL | Age: 73
End: 2018-12-21

## 2018-12-21 VITALS
OXYGEN SATURATION: 98 % | TEMPERATURE: 99 F | HEART RATE: 85 BPM | DIASTOLIC BLOOD PRESSURE: 67 MMHG | SYSTOLIC BLOOD PRESSURE: 116 MMHG | RESPIRATION RATE: 18 BRPM

## 2018-12-21 DIAGNOSIS — Z41.9 ENCOUNTER FOR PROCEDURE FOR PURPOSES OTHER THAN REMEDYING HEALTH STATE, UNSPECIFIED: Chronic | ICD-10-CM

## 2018-12-21 DIAGNOSIS — Z98.890 OTHER SPECIFIED POSTPROCEDURAL STATES: Chronic | ICD-10-CM

## 2018-12-21 DIAGNOSIS — C50.919 MALIGNANT NEOPLASM OF UNSPECIFIED SITE OF UNSPECIFIED FEMALE BREAST: Chronic | ICD-10-CM

## 2018-12-21 DIAGNOSIS — C79.31 SECONDARY MALIGNANT NEOPLASM OF BRAIN: ICD-10-CM

## 2018-12-21 DIAGNOSIS — Z90.49 ACQUIRED ABSENCE OF OTHER SPECIFIED PARTS OF DIGESTIVE TRACT: Chronic | ICD-10-CM

## 2018-12-21 LAB
ALBUMIN SERPL ELPH-MCNC: 3.9 G/DL — SIGNIFICANT CHANGE UP (ref 3.3–5)
ALP SERPL-CCNC: 58 U/L — SIGNIFICANT CHANGE UP (ref 40–120)
ALT FLD-CCNC: 15 U/L — SIGNIFICANT CHANGE UP (ref 10–45)
ANION GAP SERPL CALC-SCNC: 10 MMOL/L — SIGNIFICANT CHANGE UP (ref 5–17)
AST SERPL-CCNC: 13 U/L — SIGNIFICANT CHANGE UP (ref 10–40)
BASOPHILS NFR BLD AUTO: 0.2 % — SIGNIFICANT CHANGE UP (ref 0–2)
BILIRUB SERPL-MCNC: 0.3 MG/DL — SIGNIFICANT CHANGE UP (ref 0.2–1.2)
BUN SERPL-MCNC: 19 MG/DL — SIGNIFICANT CHANGE UP (ref 7–23)
CALCIUM SERPL-MCNC: 9.2 MG/DL — SIGNIFICANT CHANGE UP (ref 8.4–10.5)
CHLORIDE SERPL-SCNC: 104 MMOL/L — SIGNIFICANT CHANGE UP (ref 96–108)
CO2 SERPL-SCNC: 27 MMOL/L — SIGNIFICANT CHANGE UP (ref 22–31)
CREAT SERPL-MCNC: 0.82 MG/DL — SIGNIFICANT CHANGE UP (ref 0.5–1.3)
EOSINOPHIL NFR BLD AUTO: 3.1 % — SIGNIFICANT CHANGE UP (ref 0–6)
GLUCOSE SERPL-MCNC: 93 MG/DL — SIGNIFICANT CHANGE UP (ref 70–99)
HCT VFR BLD CALC: 36.9 % — SIGNIFICANT CHANGE UP (ref 34.5–45)
HGB BLD-MCNC: 11.9 G/DL — SIGNIFICANT CHANGE UP (ref 11.5–15.5)
LYMPHOCYTES # BLD AUTO: 23.6 % — SIGNIFICANT CHANGE UP (ref 13–44)
MCHC RBC-ENTMCNC: 29.4 PG — SIGNIFICANT CHANGE UP (ref 27–34)
MCHC RBC-ENTMCNC: 32.2 G/DL — SIGNIFICANT CHANGE UP (ref 32–36)
MCV RBC AUTO: 91.1 FL — SIGNIFICANT CHANGE UP (ref 80–100)
MONOCYTES NFR BLD AUTO: 8.5 % — SIGNIFICANT CHANGE UP (ref 2–14)
NEUTROPHILS NFR BLD AUTO: 64.6 % — SIGNIFICANT CHANGE UP (ref 43–77)
PLATELET # BLD AUTO: 255 K/UL — SIGNIFICANT CHANGE UP (ref 150–400)
POTASSIUM SERPL-MCNC: 3.8 MMOL/L — SIGNIFICANT CHANGE UP (ref 3.5–5.3)
POTASSIUM SERPL-SCNC: 3.8 MMOL/L — SIGNIFICANT CHANGE UP (ref 3.5–5.3)
PROT SERPL-MCNC: 6.7 G/DL — SIGNIFICANT CHANGE UP (ref 6–8.3)
RBC # BLD: 4.05 M/UL — SIGNIFICANT CHANGE UP (ref 3.8–5.2)
RBC # FLD: 15 % — SIGNIFICANT CHANGE UP (ref 10.3–16.9)
SODIUM SERPL-SCNC: 141 MMOL/L — SIGNIFICANT CHANGE UP (ref 135–145)
WBC # BLD: 10.3 K/UL — SIGNIFICANT CHANGE UP (ref 3.8–10.5)
WBC # FLD AUTO: 10.3 K/UL — SIGNIFICANT CHANGE UP (ref 3.8–10.5)

## 2018-12-21 PROCEDURE — 36415 COLL VENOUS BLD VENIPUNCTURE: CPT

## 2018-12-21 PROCEDURE — 96413 CHEMO IV INFUSION 1 HR: CPT

## 2018-12-21 PROCEDURE — 80053 COMPREHEN METABOLIC PANEL: CPT

## 2018-12-21 PROCEDURE — 85025 COMPLETE CBC W/AUTO DIFF WBC: CPT

## 2018-12-21 RX ORDER — BEVACIZUMAB 400 MG/16ML
400 INJECTION, SOLUTION INTRAVENOUS ONCE
Qty: 0 | Refills: 0 | Status: COMPLETED | OUTPATIENT
Start: 2018-12-21 | End: 2018-12-21

## 2018-12-21 RX ADMIN — BEVACIZUMAB 200 MILLIGRAM(S): 400 INJECTION, SOLUTION INTRAVENOUS at 12:05

## 2018-12-24 ENCOUNTER — RX RENEWAL (OUTPATIENT)
Age: 73
End: 2018-12-24

## 2018-12-31 ENCOUNTER — INBOUND DOCUMENT (OUTPATIENT)
Age: 73
End: 2018-12-31

## 2019-01-01 ENCOUNTER — APPOINTMENT (OUTPATIENT)
Dept: INTERNAL MEDICINE | Facility: CLINIC | Age: 74
End: 2019-01-01
Payer: MEDICARE

## 2019-01-01 ENCOUNTER — APPOINTMENT (OUTPATIENT)
Dept: HEMATOLOGY ONCOLOGY | Facility: CLINIC | Age: 74
End: 2019-01-01
Payer: MEDICARE

## 2019-01-01 ENCOUNTER — APPOINTMENT (OUTPATIENT)
Dept: MRI IMAGING | Facility: HOSPITAL | Age: 74
End: 2019-01-01

## 2019-01-01 ENCOUNTER — APPOINTMENT (OUTPATIENT)
Dept: NEUROSURGERY | Facility: CLINIC | Age: 74
End: 2019-01-01

## 2019-01-01 ENCOUNTER — APPOINTMENT (OUTPATIENT)
Dept: NEUROSURGERY | Facility: CLINIC | Age: 74
End: 2019-01-01
Payer: MEDICARE

## 2019-01-01 ENCOUNTER — OUTPATIENT (OUTPATIENT)
Dept: OUTPATIENT SERVICES | Facility: HOSPITAL | Age: 74
LOS: 1 days | End: 2019-01-01
Payer: MEDICARE

## 2019-01-01 ENCOUNTER — INBOUND DOCUMENT (OUTPATIENT)
Age: 74
End: 2019-01-01

## 2019-01-01 ENCOUNTER — APPOINTMENT (OUTPATIENT)
Dept: CT IMAGING | Facility: HOSPITAL | Age: 74
End: 2019-01-01

## 2019-01-01 ENCOUNTER — TRANSCRIPTION ENCOUNTER (OUTPATIENT)
Age: 74
End: 2019-01-01

## 2019-01-01 ENCOUNTER — APPOINTMENT (OUTPATIENT)
Dept: INTERNAL MEDICINE | Facility: CLINIC | Age: 74
End: 2019-01-01

## 2019-01-01 ENCOUNTER — INPATIENT (INPATIENT)
Facility: HOSPITAL | Age: 74
LOS: 2 days | Discharge: HOME HEALTH | DRG: 71 | End: 2019-09-24
Attending: NEUROLOGICAL SURGERY | Admitting: NEUROLOGICAL SURGERY
Payer: MEDICARE

## 2019-01-01 ENCOUNTER — APPOINTMENT (OUTPATIENT)
Age: 74
End: 2019-01-01

## 2019-01-01 ENCOUNTER — OTHER (OUTPATIENT)
Age: 74
End: 2019-01-01

## 2019-01-01 ENCOUNTER — APPOINTMENT (OUTPATIENT)
Dept: HEMATOLOGY ONCOLOGY | Facility: CLINIC | Age: 74
End: 2019-01-01

## 2019-01-01 VITALS
DIASTOLIC BLOOD PRESSURE: 63 MMHG | RESPIRATION RATE: 18 BRPM | HEART RATE: 78 BPM | SYSTOLIC BLOOD PRESSURE: 102 MMHG | BODY MASS INDEX: 26.16 KG/M2 | WEIGHT: 157 LBS | OXYGEN SATURATION: 97 % | HEIGHT: 65 IN | TEMPERATURE: 98.1 F

## 2019-01-01 VITALS
OXYGEN SATURATION: 97 % | HEART RATE: 83 BPM | DIASTOLIC BLOOD PRESSURE: 57 MMHG | RESPIRATION RATE: 17 BRPM | SYSTOLIC BLOOD PRESSURE: 119 MMHG | TEMPERATURE: 100 F

## 2019-01-01 VITALS
WEIGHT: 146 LBS | DIASTOLIC BLOOD PRESSURE: 71 MMHG | HEIGHT: 62 IN | BODY MASS INDEX: 26.87 KG/M2 | TEMPERATURE: 97.6 F | HEART RATE: 77 BPM | RESPIRATION RATE: 15 BRPM | OXYGEN SATURATION: 98 % | SYSTOLIC BLOOD PRESSURE: 112 MMHG

## 2019-01-01 VITALS
HEIGHT: 65 IN | OXYGEN SATURATION: 97 % | BODY MASS INDEX: 26.66 KG/M2 | SYSTOLIC BLOOD PRESSURE: 102 MMHG | DIASTOLIC BLOOD PRESSURE: 67 MMHG | HEART RATE: 74 BPM | TEMPERATURE: 98.2 F | WEIGHT: 160 LBS

## 2019-01-01 VITALS
RESPIRATION RATE: 16 BRPM | HEIGHT: 62 IN | WEIGHT: 156.09 LBS | DIASTOLIC BLOOD PRESSURE: 52 MMHG | SYSTOLIC BLOOD PRESSURE: 114 MMHG | OXYGEN SATURATION: 98 % | TEMPERATURE: 99 F | HEART RATE: 74 BPM

## 2019-01-01 VITALS
HEIGHT: 65 IN | RESPIRATION RATE: 16 BRPM | DIASTOLIC BLOOD PRESSURE: 66 MMHG | OXYGEN SATURATION: 95 % | TEMPERATURE: 97.9 F | HEART RATE: 80 BPM | SYSTOLIC BLOOD PRESSURE: 110 MMHG | BODY MASS INDEX: 25.49 KG/M2 | WEIGHT: 153 LBS

## 2019-01-01 VITALS
OXYGEN SATURATION: 100 % | RESPIRATION RATE: 16 BRPM | HEART RATE: 72 BPM | DIASTOLIC BLOOD PRESSURE: 66 MMHG | SYSTOLIC BLOOD PRESSURE: 111 MMHG | TEMPERATURE: 97 F | WEIGHT: 156.31 LBS

## 2019-01-01 VITALS
OXYGEN SATURATION: 99 % | TEMPERATURE: 99 F | RESPIRATION RATE: 18 BRPM | DIASTOLIC BLOOD PRESSURE: 58 MMHG | SYSTOLIC BLOOD PRESSURE: 116 MMHG | HEART RATE: 82 BPM

## 2019-01-01 VITALS
RESPIRATION RATE: 18 BRPM | BODY MASS INDEX: 26.87 KG/M2 | TEMPERATURE: 98.1 F | WEIGHT: 146 LBS | SYSTOLIC BLOOD PRESSURE: 104 MMHG | OXYGEN SATURATION: 99 % | DIASTOLIC BLOOD PRESSURE: 69 MMHG | HEIGHT: 62 IN | HEART RATE: 79 BPM

## 2019-01-01 VITALS
BODY MASS INDEX: 25.99 KG/M2 | DIASTOLIC BLOOD PRESSURE: 64 MMHG | SYSTOLIC BLOOD PRESSURE: 105 MMHG | OXYGEN SATURATION: 99 % | WEIGHT: 156 LBS | HEIGHT: 65 IN | TEMPERATURE: 98.5 F | HEART RATE: 77 BPM

## 2019-01-01 VITALS
TEMPERATURE: 98 F | OXYGEN SATURATION: 98 % | WEIGHT: 157 LBS | HEIGHT: 65 IN | SYSTOLIC BLOOD PRESSURE: 97 MMHG | BODY MASS INDEX: 26.16 KG/M2 | DIASTOLIC BLOOD PRESSURE: 59 MMHG | HEART RATE: 72 BPM

## 2019-01-01 VITALS
DIASTOLIC BLOOD PRESSURE: 67 MMHG | WEIGHT: 146 LBS | SYSTOLIC BLOOD PRESSURE: 100 MMHG | OXYGEN SATURATION: 96 % | HEART RATE: 81 BPM | BODY MASS INDEX: 26.87 KG/M2 | TEMPERATURE: 97.9 F | HEIGHT: 62 IN

## 2019-01-01 DIAGNOSIS — J44.9 CHRONIC OBSTRUCTIVE PULMONARY DISEASE, UNSPECIFIED: ICD-10-CM

## 2019-01-01 DIAGNOSIS — G93.89 OTHER SPECIFIED DISORDERS OF BRAIN: ICD-10-CM

## 2019-01-01 DIAGNOSIS — C7A.8 OTHER MALIGNANT NEUROENDOCRINE TUMORS: ICD-10-CM

## 2019-01-01 DIAGNOSIS — Z90.49 ACQUIRED ABSENCE OF OTHER SPECIFIED PARTS OF DIGESTIVE TRACT: ICD-10-CM

## 2019-01-01 DIAGNOSIS — Z90.2 ACQUIRED ABSENCE OF LUNG [PART OF]: Chronic | ICD-10-CM

## 2019-01-01 DIAGNOSIS — F41.9 ANXIETY DISORDER, UNSPECIFIED: ICD-10-CM

## 2019-01-01 DIAGNOSIS — Z41.9 ENCOUNTER FOR PROCEDURE FOR PURPOSES OTHER THAN REMEDYING HEALTH STATE, UNSPECIFIED: Chronic | ICD-10-CM

## 2019-01-01 DIAGNOSIS — Z98.890 OTHER SPECIFIED POSTPROCEDURAL STATES: Chronic | ICD-10-CM

## 2019-01-01 DIAGNOSIS — Z90.49 ACQUIRED ABSENCE OF OTHER SPECIFIED PARTS OF DIGESTIVE TRACT: Chronic | ICD-10-CM

## 2019-01-01 DIAGNOSIS — C78.7 SECONDARY MALIGNANT NEOPLASM OF LIVER AND INTRAHEPATIC BILE DUCT: ICD-10-CM

## 2019-01-01 DIAGNOSIS — C50.919 MALIGNANT NEOPLASM OF UNSPECIFIED SITE OF UNSPECIFIED FEMALE BREAST: ICD-10-CM

## 2019-01-01 DIAGNOSIS — K21.9 GASTRO-ESOPHAGEAL REFLUX DISEASE WITHOUT ESOPHAGITIS: ICD-10-CM

## 2019-01-01 DIAGNOSIS — Z98.890 OTHER SPECIFIED POSTPROCEDURAL STATES: ICD-10-CM

## 2019-01-01 DIAGNOSIS — Z87.19 PERSONAL HISTORY OF OTHER DISEASES OF THE DIGESTIVE SYSTEM: ICD-10-CM

## 2019-01-01 DIAGNOSIS — C34.90 MALIGNANT NEOPLASM OF UNSPECIFIED PART OF UNSPECIFIED BRONCHUS OR LUNG: ICD-10-CM

## 2019-01-01 DIAGNOSIS — C50.919 MALIGNANT NEOPLASM OF UNSPECIFIED SITE OF UNSPECIFIED FEMALE BREAST: Chronic | ICD-10-CM

## 2019-01-01 DIAGNOSIS — Z90.2 ACQUIRED ABSENCE OF LUNG [PART OF]: ICD-10-CM

## 2019-01-01 DIAGNOSIS — K21.9 GASTRO-ESOPHAGEAL REFLUX DISEASE W/OUT ESOPHAGITIS: ICD-10-CM

## 2019-01-01 DIAGNOSIS — C71.9 MALIGNANT NEOPLASM OF BRAIN, UNSPECIFIED: ICD-10-CM

## 2019-01-01 DIAGNOSIS — R29.898 OTHER SYMPTOMS AND SIGNS INVOLVING THE MUSCULOSKELETAL SYSTEM: ICD-10-CM

## 2019-01-01 DIAGNOSIS — K52.9 NONINFECTIVE GASTROENTERITIS AND COLITIS, UNSPECIFIED: ICD-10-CM

## 2019-01-01 DIAGNOSIS — R32 UNSPECIFIED URINARY INCONTINENCE: ICD-10-CM

## 2019-01-01 DIAGNOSIS — C22.9 MALIGNANT NEOPLASM OF LIVER, NOT SPECIFIED AS PRIMARY OR SECONDARY: ICD-10-CM

## 2019-01-01 DIAGNOSIS — C78.7 MALIGNANT NEOPLASM OF UNSPECIFIED SITE OF UNSPECIFIED FEMALE BREAST: ICD-10-CM

## 2019-01-01 DIAGNOSIS — C7B.8 OTHER MALIGNANT NEUROENDOCRINE TUMORS-C7A.8: ICD-10-CM

## 2019-01-01 LAB
ALBUMIN SERPL ELPH-MCNC: 4.1 G/DL — SIGNIFICANT CHANGE UP (ref 3.3–5)
ALBUMIN SERPL ELPH-MCNC: 4.1 G/DL — SIGNIFICANT CHANGE UP (ref 3.3–5)
ALP SERPL-CCNC: 68 U/L — SIGNIFICANT CHANGE UP (ref 40–120)
ALP SERPL-CCNC: 74 U/L — SIGNIFICANT CHANGE UP (ref 40–120)
ALT FLD-CCNC: 10 U/L — SIGNIFICANT CHANGE UP (ref 10–45)
ALT FLD-CCNC: 15 U/L — SIGNIFICANT CHANGE UP (ref 10–45)
ANION GAP SERPL CALC-SCNC: 10 MMOL/L — SIGNIFICANT CHANGE UP (ref 5–17)
ANION GAP SERPL CALC-SCNC: 11 MMOL/L — SIGNIFICANT CHANGE UP (ref 5–17)
ANION GAP SERPL CALC-SCNC: 9 MMOL/L — SIGNIFICANT CHANGE UP (ref 5–17)
ANION GAP SERPL CALC-SCNC: 9 MMOL/L — SIGNIFICANT CHANGE UP (ref 5–17)
APPEARANCE UR: ABNORMAL
APTT BLD: 29.2 SEC — SIGNIFICANT CHANGE UP (ref 27.5–36.3)
AST SERPL-CCNC: 12 U/L — SIGNIFICANT CHANGE UP (ref 10–40)
AST SERPL-CCNC: 22 U/L — SIGNIFICANT CHANGE UP (ref 10–40)
BACTERIA # UR AUTO: ABNORMAL /HPF
BASOPHILS # BLD AUTO: 0.02 K/UL — SIGNIFICANT CHANGE UP (ref 0–0.2)
BASOPHILS # BLD AUTO: 0.05 K/UL — SIGNIFICANT CHANGE UP (ref 0–0.2)
BASOPHILS NFR BLD AUTO: 0.2 % — SIGNIFICANT CHANGE UP (ref 0–2)
BASOPHILS NFR BLD AUTO: 0.8 % — SIGNIFICANT CHANGE UP (ref 0–2)
BILIRUB SERPL-MCNC: 0.4 MG/DL — SIGNIFICANT CHANGE UP (ref 0.2–1.2)
BILIRUB SERPL-MCNC: 0.5 MG/DL — SIGNIFICANT CHANGE UP (ref 0.2–1.2)
BILIRUB UR-MCNC: NEGATIVE — SIGNIFICANT CHANGE UP
BLD GP AB SCN SERPL QL: NEGATIVE — SIGNIFICANT CHANGE UP
BUN SERPL-MCNC: 11 MG/DL — SIGNIFICANT CHANGE UP (ref 7–23)
BUN SERPL-MCNC: 13 MG/DL — SIGNIFICANT CHANGE UP (ref 7–23)
BUN SERPL-MCNC: 14 MG/DL — SIGNIFICANT CHANGE UP (ref 7–23)
BUN SERPL-MCNC: 17 MG/DL — SIGNIFICANT CHANGE UP (ref 7–23)
CALCIUM SERPL-MCNC: 10.1 MG/DL — SIGNIFICANT CHANGE UP (ref 8.4–10.5)
CALCIUM SERPL-MCNC: 9.7 MG/DL — SIGNIFICANT CHANGE UP (ref 8.4–10.5)
CALCIUM SERPL-MCNC: 9.7 MG/DL — SIGNIFICANT CHANGE UP (ref 8.4–10.5)
CALCIUM SERPL-MCNC: 9.8 MG/DL — SIGNIFICANT CHANGE UP (ref 8.4–10.5)
CHLORIDE SERPL-SCNC: 102 MMOL/L — SIGNIFICANT CHANGE UP (ref 96–108)
CHLORIDE SERPL-SCNC: 104 MMOL/L — SIGNIFICANT CHANGE UP (ref 96–108)
CHLORIDE SERPL-SCNC: 105 MMOL/L — SIGNIFICANT CHANGE UP (ref 96–108)
CHLORIDE SERPL-SCNC: 105 MMOL/L — SIGNIFICANT CHANGE UP (ref 96–108)
CHOLEST SERPL-MCNC: 269 MG/DL — HIGH (ref 10–199)
CO2 SERPL-SCNC: 25 MMOL/L — SIGNIFICANT CHANGE UP (ref 22–31)
CO2 SERPL-SCNC: 25 MMOL/L — SIGNIFICANT CHANGE UP (ref 22–31)
CO2 SERPL-SCNC: 27 MMOL/L — SIGNIFICANT CHANGE UP (ref 22–31)
CO2 SERPL-SCNC: 27 MMOL/L — SIGNIFICANT CHANGE UP (ref 22–31)
COLOR SPEC: YELLOW — SIGNIFICANT CHANGE UP
COMMENT - URINE: SIGNIFICANT CHANGE UP
CREAT SERPL-MCNC: 0.82 MG/DL — SIGNIFICANT CHANGE UP (ref 0.5–1.3)
CREAT SERPL-MCNC: 0.82 MG/DL — SIGNIFICANT CHANGE UP (ref 0.5–1.3)
CREAT SERPL-MCNC: 0.85 MG/DL — SIGNIFICANT CHANGE UP (ref 0.5–1.3)
CREAT SERPL-MCNC: 0.85 MG/DL — SIGNIFICANT CHANGE UP (ref 0.5–1.3)
CULTURE RESULTS: SIGNIFICANT CHANGE UP
DIFF PNL FLD: ABNORMAL
EOSINOPHIL # BLD AUTO: 0.12 K/UL — SIGNIFICANT CHANGE UP (ref 0–0.5)
EOSINOPHIL # BLD AUTO: 0.25 K/UL — SIGNIFICANT CHANGE UP (ref 0–0.5)
EOSINOPHIL NFR BLD AUTO: 1.3 % — SIGNIFICANT CHANGE UP (ref 0–6)
EOSINOPHIL NFR BLD AUTO: 3.8 % — SIGNIFICANT CHANGE UP (ref 0–6)
EPI CELLS # UR: SIGNIFICANT CHANGE UP /HPF (ref 0–5)
GLUCOSE SERPL-MCNC: 104 MG/DL — HIGH (ref 70–99)
GLUCOSE SERPL-MCNC: 106 MG/DL — HIGH (ref 70–99)
GLUCOSE SERPL-MCNC: 120 MG/DL — HIGH (ref 70–99)
GLUCOSE SERPL-MCNC: 94 MG/DL — SIGNIFICANT CHANGE UP (ref 70–99)
GLUCOSE UR QL: NEGATIVE — SIGNIFICANT CHANGE UP
HCT VFR BLD CALC: 37.7 % — SIGNIFICANT CHANGE UP (ref 34.5–45)
HCT VFR BLD CALC: 37.9 % — SIGNIFICANT CHANGE UP (ref 34.5–45)
HCT VFR BLD CALC: 38.2 % — SIGNIFICANT CHANGE UP (ref 34.5–45)
HCT VFR BLD CALC: 38.5 % — SIGNIFICANT CHANGE UP (ref 34.5–45)
HDLC SERPL-MCNC: 47 MG/DL — LOW
HGB BLD-MCNC: 11.8 G/DL — SIGNIFICANT CHANGE UP (ref 11.5–15.5)
HGB BLD-MCNC: 11.9 G/DL — SIGNIFICANT CHANGE UP (ref 11.5–15.5)
HGB BLD-MCNC: 11.9 G/DL — SIGNIFICANT CHANGE UP (ref 11.5–15.5)
HGB BLD-MCNC: 12.3 G/DL — SIGNIFICANT CHANGE UP (ref 11.5–15.5)
IMM GRANULOCYTES NFR BLD AUTO: 0.2 % — SIGNIFICANT CHANGE UP (ref 0–1.5)
IMM GRANULOCYTES NFR BLD AUTO: 0.3 % — SIGNIFICANT CHANGE UP (ref 0–1.5)
INR BLD: 1 — SIGNIFICANT CHANGE UP (ref 0.88–1.16)
KETONES UR-MCNC: NEGATIVE — SIGNIFICANT CHANGE UP
LEUKOCYTE ESTERASE UR-ACNC: ABNORMAL
LIPID PNL WITH DIRECT LDL SERPL: 194 MG/DL — HIGH
LYMPHOCYTES # BLD AUTO: 1.31 K/UL — SIGNIFICANT CHANGE UP (ref 1–3.3)
LYMPHOCYTES # BLD AUTO: 1.51 K/UL — SIGNIFICANT CHANGE UP (ref 1–3.3)
LYMPHOCYTES # BLD AUTO: 14 % — SIGNIFICANT CHANGE UP (ref 13–44)
LYMPHOCYTES # BLD AUTO: 22.8 % — SIGNIFICANT CHANGE UP (ref 13–44)
MAGNESIUM SERPL-MCNC: 1.9 MG/DL — SIGNIFICANT CHANGE UP (ref 1.6–2.6)
MCHC RBC-ENTMCNC: 27.2 PG — SIGNIFICANT CHANGE UP (ref 27–34)
MCHC RBC-ENTMCNC: 27.3 PG — SIGNIFICANT CHANGE UP (ref 27–34)
MCHC RBC-ENTMCNC: 27.4 PG — SIGNIFICANT CHANGE UP (ref 27–34)
MCHC RBC-ENTMCNC: 28.7 PG — SIGNIFICANT CHANGE UP (ref 27–34)
MCHC RBC-ENTMCNC: 30.9 GM/DL — LOW (ref 32–36)
MCHC RBC-ENTMCNC: 31.4 GM/DL — LOW (ref 32–36)
MCHC RBC-ENTMCNC: 31.6 GM/DL — LOW (ref 32–36)
MCHC RBC-ENTMCNC: 31.9 GM/DL — LOW (ref 32–36)
MCV RBC AUTO: 86.5 FL — SIGNIFICANT CHANGE UP (ref 80–100)
MCV RBC AUTO: 87.3 FL — SIGNIFICANT CHANGE UP (ref 80–100)
MCV RBC AUTO: 88 FL — SIGNIFICANT CHANGE UP (ref 80–100)
MCV RBC AUTO: 89.7 FL — SIGNIFICANT CHANGE UP (ref 80–100)
MONOCYTES # BLD AUTO: 0.43 K/UL — SIGNIFICANT CHANGE UP (ref 0–0.9)
MONOCYTES # BLD AUTO: 0.69 K/UL — SIGNIFICANT CHANGE UP (ref 0–0.9)
MONOCYTES NFR BLD AUTO: 6.5 % — SIGNIFICANT CHANGE UP (ref 2–14)
MONOCYTES NFR BLD AUTO: 7.3 % — SIGNIFICANT CHANGE UP (ref 2–14)
NEUTROPHILS # BLD AUTO: 4.38 K/UL — SIGNIFICANT CHANGE UP (ref 1.8–7.4)
NEUTROPHILS # BLD AUTO: 7.22 K/UL — SIGNIFICANT CHANGE UP (ref 1.8–7.4)
NEUTROPHILS NFR BLD AUTO: 65.9 % — SIGNIFICANT CHANGE UP (ref 43–77)
NEUTROPHILS NFR BLD AUTO: 76.9 % — SIGNIFICANT CHANGE UP (ref 43–77)
NITRITE UR-MCNC: POSITIVE
NRBC # BLD: 0 /100 WBCS — SIGNIFICANT CHANGE UP (ref 0–0)
PH UR: 6 — SIGNIFICANT CHANGE UP (ref 5–8)
PHOSPHATE SERPL-MCNC: 3.3 MG/DL — SIGNIFICANT CHANGE UP (ref 2.5–4.5)
PLATELET # BLD AUTO: 217 K/UL — SIGNIFICANT CHANGE UP (ref 150–400)
PLATELET # BLD AUTO: 242 K/UL — SIGNIFICANT CHANGE UP (ref 150–400)
PLATELET # BLD AUTO: 270 K/UL — SIGNIFICANT CHANGE UP (ref 150–400)
PLATELET # BLD AUTO: 291 K/UL — SIGNIFICANT CHANGE UP (ref 150–400)
POTASSIUM SERPL-MCNC: 3.9 MMOL/L — SIGNIFICANT CHANGE UP (ref 3.5–5.3)
POTASSIUM SERPL-MCNC: 4 MMOL/L — SIGNIFICANT CHANGE UP (ref 3.5–5.3)
POTASSIUM SERPL-MCNC: 4.1 MMOL/L — SIGNIFICANT CHANGE UP (ref 3.5–5.3)
POTASSIUM SERPL-MCNC: 4.6 MMOL/L — SIGNIFICANT CHANGE UP (ref 3.5–5.3)
POTASSIUM SERPL-SCNC: 3.9 MMOL/L — SIGNIFICANT CHANGE UP (ref 3.5–5.3)
POTASSIUM SERPL-SCNC: 4 MMOL/L — SIGNIFICANT CHANGE UP (ref 3.5–5.3)
POTASSIUM SERPL-SCNC: 4.1 MMOL/L — SIGNIFICANT CHANGE UP (ref 3.5–5.3)
POTASSIUM SERPL-SCNC: 4.6 MMOL/L — SIGNIFICANT CHANGE UP (ref 3.5–5.3)
PROT SERPL-MCNC: 7 G/DL — SIGNIFICANT CHANGE UP (ref 6–8.3)
PROT SERPL-MCNC: 7.3 G/DL — SIGNIFICANT CHANGE UP (ref 6–8.3)
PROT UR-MCNC: ABNORMAL MG/DL
PROTHROM AB SERPL-ACNC: 11.3 SEC — SIGNIFICANT CHANGE UP (ref 10–12.9)
RBC # BLD: 4.29 M/UL — SIGNIFICANT CHANGE UP (ref 3.8–5.2)
RBC # BLD: 4.34 M/UL — SIGNIFICANT CHANGE UP (ref 3.8–5.2)
RBC # BLD: 4.34 M/UL — SIGNIFICANT CHANGE UP (ref 3.8–5.2)
RBC # BLD: 4.36 M/UL — SIGNIFICANT CHANGE UP (ref 3.8–5.2)
RBC # FLD: 16 % — HIGH (ref 10.3–14.5)
RBC # FLD: 16.5 % — HIGH (ref 10.3–14.5)
RBC # FLD: 16.5 % — HIGH (ref 10.3–14.5)
RBC # FLD: 16.8 % — HIGH (ref 10.3–14.5)
RBC CASTS # UR COMP ASSIST: ABNORMAL /HPF
RH IG SCN BLD-IMP: POSITIVE — SIGNIFICANT CHANGE UP
SODIUM SERPL-SCNC: 139 MMOL/L — SIGNIFICANT CHANGE UP (ref 135–145)
SODIUM SERPL-SCNC: 139 MMOL/L — SIGNIFICANT CHANGE UP (ref 135–145)
SODIUM SERPL-SCNC: 140 MMOL/L — SIGNIFICANT CHANGE UP (ref 135–145)
SODIUM SERPL-SCNC: 141 MMOL/L — SIGNIFICANT CHANGE UP (ref 135–145)
SP GR SPEC: >=1.03 — SIGNIFICANT CHANGE UP (ref 1–1.03)
SPECIMEN SOURCE: SIGNIFICANT CHANGE UP
TOTAL CHOLESTEROL/HDL RATIO MEASUREMENT: 5.7 RATIO — SIGNIFICANT CHANGE UP (ref 3.3–7.1)
TRIGL SERPL-MCNC: 140 MG/DL — SIGNIFICANT CHANGE UP (ref 10–149)
UROBILINOGEN FLD QL: 0.2 E.U./DL — SIGNIFICANT CHANGE UP
WBC # BLD: 6.32 K/UL — SIGNIFICANT CHANGE UP (ref 3.8–10.5)
WBC # BLD: 6.63 K/UL — SIGNIFICANT CHANGE UP (ref 3.8–10.5)
WBC # BLD: 7.71 K/UL — SIGNIFICANT CHANGE UP (ref 3.8–10.5)
WBC # BLD: 9.39 K/UL — SIGNIFICANT CHANGE UP (ref 3.8–10.5)
WBC # FLD AUTO: 6.32 K/UL — SIGNIFICANT CHANGE UP (ref 3.8–10.5)
WBC # FLD AUTO: 6.63 K/UL — SIGNIFICANT CHANGE UP (ref 3.8–10.5)
WBC # FLD AUTO: 7.71 K/UL — SIGNIFICANT CHANGE UP (ref 3.8–10.5)
WBC # FLD AUTO: 9.39 K/UL — SIGNIFICANT CHANGE UP (ref 3.8–10.5)
WBC UR QL: ABNORMAL /HPF

## 2019-01-01 PROCEDURE — 99213 OFFICE O/P EST LOW 20 MIN: CPT

## 2019-01-01 PROCEDURE — 36415 COLL VENOUS BLD VENIPUNCTURE: CPT

## 2019-01-01 PROCEDURE — 93970 EXTREMITY STUDY: CPT | Mod: 26

## 2019-01-01 PROCEDURE — 83735 ASSAY OF MAGNESIUM: CPT

## 2019-01-01 PROCEDURE — 70553 MRI BRAIN STEM W/O & W/DYE: CPT

## 2019-01-01 PROCEDURE — 99214 OFFICE O/P EST MOD 30 MIN: CPT

## 2019-01-01 PROCEDURE — 86850 RBC ANTIBODY SCREEN: CPT

## 2019-01-01 PROCEDURE — A9585: CPT

## 2019-01-01 PROCEDURE — 85025 COMPLETE CBC W/AUTO DIFF WBC: CPT

## 2019-01-01 PROCEDURE — 70450 CT HEAD/BRAIN W/O DYE: CPT | Mod: 26,59

## 2019-01-01 PROCEDURE — 99232 SBSQ HOSP IP/OBS MODERATE 35: CPT

## 2019-01-01 PROCEDURE — 99291 CRITICAL CARE FIRST HOUR: CPT

## 2019-01-01 PROCEDURE — 80061 LIPID PANEL: CPT

## 2019-01-01 PROCEDURE — 81001 URINALYSIS AUTO W/SCOPE: CPT

## 2019-01-01 PROCEDURE — 84100 ASSAY OF PHOSPHORUS: CPT

## 2019-01-01 PROCEDURE — 85730 THROMBOPLASTIN TIME PARTIAL: CPT

## 2019-01-01 PROCEDURE — 85610 PROTHROMBIN TIME: CPT

## 2019-01-01 PROCEDURE — 99222 1ST HOSP IP/OBS MODERATE 55: CPT

## 2019-01-01 PROCEDURE — 97530 THERAPEUTIC ACTIVITIES: CPT

## 2019-01-01 PROCEDURE — 99222 1ST HOSP IP/OBS MODERATE 55: CPT | Mod: GC

## 2019-01-01 PROCEDURE — 70498 CT ANGIOGRAPHY NECK: CPT

## 2019-01-01 PROCEDURE — 96413 CHEMO IV INFUSION 1 HR: CPT

## 2019-01-01 PROCEDURE — 70450 CT HEAD/BRAIN W/O DYE: CPT

## 2019-01-01 PROCEDURE — 70496 CT ANGIOGRAPHY HEAD: CPT

## 2019-01-01 PROCEDURE — 97116 GAIT TRAINING THERAPY: CPT

## 2019-01-01 PROCEDURE — 80053 COMPREHEN METABOLIC PANEL: CPT

## 2019-01-01 PROCEDURE — 97161 PT EVAL LOW COMPLEX 20 MIN: CPT

## 2019-01-01 PROCEDURE — 70553 MRI BRAIN STEM W/O & W/DYE: CPT | Mod: 26

## 2019-01-01 PROCEDURE — 86901 BLOOD TYPING SEROLOGIC RH(D): CPT

## 2019-01-01 PROCEDURE — 70496 CT ANGIOGRAPHY HEAD: CPT | Mod: 26

## 2019-01-01 PROCEDURE — 80048 BASIC METABOLIC PNL TOTAL CA: CPT

## 2019-01-01 PROCEDURE — 97162 PT EVAL MOD COMPLEX 30 MIN: CPT

## 2019-01-01 PROCEDURE — 94640 AIRWAY INHALATION TREATMENT: CPT

## 2019-01-01 PROCEDURE — 86900 BLOOD TYPING SEROLOGIC ABO: CPT

## 2019-01-01 PROCEDURE — 99232 SBSQ HOSP IP/OBS MODERATE 35: CPT | Mod: GC

## 2019-01-01 PROCEDURE — 85027 COMPLETE CBC AUTOMATED: CPT

## 2019-01-01 PROCEDURE — 93970 EXTREMITY STUDY: CPT

## 2019-01-01 PROCEDURE — 70498 CT ANGIOGRAPHY NECK: CPT | Mod: 26

## 2019-01-01 RX ORDER — FLUTICASONE PROPIONATE 50 MCG
1 SPRAY, SUSPENSION NASAL
Refills: 0 | Status: DISCONTINUED | OUTPATIENT
Start: 2019-01-01 | End: 2019-01-01

## 2019-01-01 RX ORDER — METOCLOPRAMIDE HCL 10 MG
10 TABLET ORAL EVERY 6 HOURS
Refills: 0 | Status: DISCONTINUED | OUTPATIENT
Start: 2019-01-01 | End: 2019-01-01

## 2019-01-01 RX ORDER — ALPRAZOLAM 0.25 MG
1 TABLET ORAL
Qty: 0 | Refills: 0 | DISCHARGE

## 2019-01-01 RX ORDER — BEVACIZUMAB 400 MG/16ML
350 INJECTION, SOLUTION INTRAVENOUS ONCE
Refills: 0 | Status: COMPLETED | OUTPATIENT
Start: 2019-01-01 | End: 2019-01-01

## 2019-01-01 RX ORDER — DEXLANSOPRAZOLE 30 MG/1
1 CAPSULE, DELAYED RELEASE ORAL
Qty: 0 | Refills: 0 | DISCHARGE

## 2019-01-01 RX ORDER — TIOTROPIUM BROMIDE AND OLODATEROL 3.124; 2.736 UG/1; UG/1
2 SPRAY, METERED RESPIRATORY (INHALATION)
Qty: 0 | Refills: 0 | DISCHARGE

## 2019-01-01 RX ORDER — LANOLIN ALCOHOL/MO/W.PET/CERES
1 CREAM (GRAM) TOPICAL
Qty: 0 | Refills: 0 | DISCHARGE
Start: 2019-01-01

## 2019-01-01 RX ORDER — FAMOTIDINE 10 MG/ML
20 INJECTION INTRAVENOUS
Refills: 0 | Status: DISCONTINUED | OUTPATIENT
Start: 2019-01-01 | End: 2019-01-01

## 2019-01-01 RX ORDER — OMEPRAZOLE AND SODIUM BICARBONATE 40; 1100 MG/1; MG/1
1 CAPSULE, GELATIN COATED ORAL
Qty: 0 | Refills: 0 | DISCHARGE

## 2019-01-01 RX ORDER — TIOTROPIUM BROMIDE AND OLODATEROL 3.124; 2.736 UG/1; UG/1
2.5-2.5 SPRAY, METERED RESPIRATORY (INHALATION) DAILY
Qty: 3 | Refills: 3 | Status: ACTIVE | COMMUNITY
Start: 2017-02-01 | End: 1900-01-01

## 2019-01-01 RX ORDER — TRAZODONE HCL 50 MG
1 TABLET ORAL
Qty: 0 | Refills: 0 | DISCHARGE

## 2019-01-01 RX ORDER — METOCLOPRAMIDE HCL 10 MG
1 TABLET ORAL
Qty: 0 | Refills: 0 | DISCHARGE

## 2019-01-01 RX ORDER — ENOXAPARIN SODIUM 100 MG/ML
40 INJECTION SUBCUTANEOUS
Refills: 0 | Status: DISCONTINUED | OUTPATIENT
Start: 2019-01-01 | End: 2019-01-01

## 2019-01-01 RX ORDER — LACOSAMIDE 50 MG/1
150 TABLET ORAL
Refills: 0 | Status: DISCONTINUED | OUTPATIENT
Start: 2019-01-01 | End: 2019-01-01

## 2019-01-01 RX ORDER — LANOLIN ALCOHOL/MO/W.PET/CERES
5 CREAM (GRAM) TOPICAL AT BEDTIME
Refills: 0 | Status: DISCONTINUED | OUTPATIENT
Start: 2019-01-01 | End: 2019-01-01

## 2019-01-01 RX ORDER — DOCUSATE SODIUM 100 MG
100 CAPSULE ORAL THREE TIMES A DAY
Refills: 0 | Status: DISCONTINUED | OUTPATIENT
Start: 2019-01-01 | End: 2019-01-01

## 2019-01-01 RX ORDER — LACOSAMIDE 50 MG/1
1 TABLET ORAL
Qty: 0 | Refills: 0 | DISCHARGE

## 2019-01-01 RX ORDER — ONDANSETRON 8 MG/1
4 TABLET, FILM COATED ORAL EVERY 6 HOURS
Refills: 0 | Status: DISCONTINUED | OUTPATIENT
Start: 2019-01-01 | End: 2019-01-01

## 2019-01-01 RX ORDER — LANOLIN ALCOHOL/MO/W.PET/CERES
3 CREAM (GRAM) TOPICAL ONCE
Refills: 0 | Status: COMPLETED | OUTPATIENT
Start: 2019-01-01 | End: 2019-01-01

## 2019-01-01 RX ORDER — LORATADINE 10 MG/1
10 TABLET ORAL DAILY
Refills: 0 | Status: DISCONTINUED | OUTPATIENT
Start: 2019-01-01 | End: 2019-01-01

## 2019-01-01 RX ORDER — ASPIRIN/CALCIUM CARB/MAGNESIUM 324 MG
1 TABLET ORAL
Qty: 0 | Refills: 0 | DISCHARGE

## 2019-01-01 RX ORDER — LETROZOLE 2.5 MG/1
1 TABLET, FILM COATED ORAL
Qty: 0 | Refills: 0 | DISCHARGE

## 2019-01-01 RX ORDER — LACOSAMIDE 50 MG/1
75 TABLET ORAL
Refills: 0 | Status: DISCONTINUED | OUTPATIENT
Start: 2019-01-01 | End: 2019-01-01

## 2019-01-01 RX ORDER — ESCITALOPRAM OXALATE 10 MG/1
1 TABLET, FILM COATED ORAL
Qty: 0 | Refills: 0 | DISCHARGE

## 2019-01-01 RX ORDER — LETROZOLE 2.5 MG/1
2.5 TABLET, FILM COATED ORAL DAILY
Refills: 0 | Status: DISCONTINUED | OUTPATIENT
Start: 2019-01-01 | End: 2019-01-01

## 2019-01-01 RX ORDER — TRAZODONE HCL 50 MG
100 TABLET ORAL AT BEDTIME
Refills: 0 | Status: DISCONTINUED | OUTPATIENT
Start: 2019-01-01 | End: 2019-01-01

## 2019-01-01 RX ORDER — ALBUTEROL 90 UG/1
2 AEROSOL, METERED ORAL EVERY 6 HOURS
Refills: 0 | Status: DISCONTINUED | OUTPATIENT
Start: 2019-01-01 | End: 2019-01-01

## 2019-01-01 RX ORDER — ALPRAZOLAM 0.25 MG
2 TABLET ORAL
Qty: 0 | Refills: 0 | DISCHARGE

## 2019-01-01 RX ORDER — DEXAMETHASONE 0.5 MG/5ML
1 ELIXIR ORAL
Qty: 0 | Refills: 0 | DISCHARGE

## 2019-01-01 RX ORDER — MONTELUKAST 4 MG/1
10 TABLET, CHEWABLE ORAL DAILY
Refills: 0 | Status: DISCONTINUED | OUTPATIENT
Start: 2019-01-01 | End: 2019-01-01

## 2019-01-01 RX ORDER — METOCLOPRAMIDE HCL 10 MG
0.25 TABLET ORAL
Qty: 0 | Refills: 0 | DISCHARGE

## 2019-01-01 RX ORDER — MONTELUKAST 4 MG/1
10 TABLET, CHEWABLE ORAL AT BEDTIME
Refills: 0 | Status: DISCONTINUED | OUTPATIENT
Start: 2019-01-01 | End: 2019-01-01

## 2019-01-01 RX ORDER — AZITHROMYCIN 250 MG/1
250 TABLET, FILM COATED ORAL
Qty: 6 | Refills: 1 | Status: ACTIVE | COMMUNITY
Start: 2019-01-01 | End: 1900-01-01

## 2019-01-01 RX ORDER — CETIRIZINE HYDROCHLORIDE 10 MG/1
1 TABLET ORAL
Qty: 0 | Refills: 0 | DISCHARGE

## 2019-01-01 RX ORDER — SODIUM CHLORIDE 9 MG/ML
1000 INJECTION INTRAMUSCULAR; INTRAVENOUS; SUBCUTANEOUS
Refills: 0 | Status: DISCONTINUED | OUTPATIENT
Start: 2019-01-01 | End: 2019-01-01

## 2019-01-01 RX ORDER — OXYBUTYNIN CHLORIDE 5 MG
0 TABLET ORAL
Qty: 0 | Refills: 0 | DISCHARGE

## 2019-01-01 RX ORDER — MONTELUKAST 4 MG/1
1 TABLET, CHEWABLE ORAL
Qty: 0 | Refills: 0 | DISCHARGE

## 2019-01-01 RX ORDER — TIOTROPIUM BROMIDE AND OLODATEROL 3.124; 2.736 UG/1; UG/1
2 SPRAY, METERED RESPIRATORY (INHALATION) DAILY
Refills: 0 | Status: DISCONTINUED | OUTPATIENT
Start: 2019-01-01 | End: 2019-01-01

## 2019-01-01 RX ORDER — ESCITALOPRAM OXALATE 10 MG/1
20 TABLET, FILM COATED ORAL
Qty: 0 | Refills: 0 | DISCHARGE

## 2019-01-01 RX ORDER — ALPRAZOLAM 0.25 MG
0.5 TABLET ORAL ONCE
Refills: 0 | Status: DISCONTINUED | OUTPATIENT
Start: 2019-01-01 | End: 2019-01-01

## 2019-01-01 RX ORDER — ALBUTEROL 90 UG/1
2 AEROSOL, METERED ORAL
Qty: 0 | Refills: 0 | DISCHARGE

## 2019-01-01 RX ORDER — OXYBUTYNIN CHLORIDE 5 MG
5 TABLET ORAL THREE TIMES A DAY
Refills: 0 | Status: DISCONTINUED | OUTPATIENT
Start: 2019-01-01 | End: 2019-01-01

## 2019-01-01 RX ORDER — ACETAMINOPHEN 500 MG
2 TABLET ORAL
Qty: 0 | Refills: 0 | DISCHARGE
Start: 2019-01-01

## 2019-01-01 RX ORDER — ALPRAZOLAM 0.25 MG
0.5 TABLET ORAL AT BEDTIME
Refills: 0 | Status: DISCONTINUED | OUTPATIENT
Start: 2019-01-01 | End: 2019-01-01

## 2019-01-01 RX ORDER — ACETAMINOPHEN 500 MG
650 TABLET ORAL EVERY 6 HOURS
Refills: 0 | Status: DISCONTINUED | OUTPATIENT
Start: 2019-01-01 | End: 2019-01-01

## 2019-01-01 RX ORDER — WHEELCHAIR
EACH MISCELLANEOUS
Qty: 1 | Refills: 0 | Status: ACTIVE | COMMUNITY
Start: 2019-01-01 | End: 1900-01-01

## 2019-01-01 RX ORDER — SENNA PLUS 8.6 MG/1
2 TABLET ORAL AT BEDTIME
Refills: 0 | Status: DISCONTINUED | OUTPATIENT
Start: 2019-01-01 | End: 2019-01-01

## 2019-01-01 RX ORDER — PANTOPRAZOLE SODIUM 20 MG/1
40 TABLET, DELAYED RELEASE ORAL
Refills: 0 | Status: DISCONTINUED | OUTPATIENT
Start: 2019-01-01 | End: 2019-01-01

## 2019-01-01 RX ORDER — SCOPALAMINE 1 MG/3D
1 PATCH, EXTENDED RELEASE TRANSDERMAL
Refills: 0 | Status: DISCONTINUED | OUTPATIENT
Start: 2019-01-01 | End: 2019-01-01

## 2019-01-01 RX ORDER — CETIRIZINE HYDROCHLORIDE 10 MG/1
5 TABLET ORAL DAILY
Refills: 0 | Status: DISCONTINUED | OUTPATIENT
Start: 2019-01-01 | End: 2019-01-01

## 2019-01-01 RX ORDER — FAMOTIDINE 20 MG/1
20 TABLET, FILM COATED ORAL DAILY
Qty: 90 | Refills: 3 | Status: ACTIVE | COMMUNITY
Start: 2019-01-01 | End: 1900-01-01

## 2019-01-01 RX ORDER — ALPRAZOLAM 0.25 MG
1 TABLET ORAL AT BEDTIME
Refills: 0 | Status: DISCONTINUED | OUTPATIENT
Start: 2019-01-01 | End: 2019-01-01

## 2019-01-01 RX ORDER — FAMOTIDINE 10 MG/ML
1 INJECTION INTRAVENOUS
Qty: 0 | Refills: 0 | DISCHARGE

## 2019-01-01 RX ADMIN — Medication 5 MILLIGRAM(S): at 05:40

## 2019-01-01 RX ADMIN — Medication 5 MILLIGRAM(S): at 05:38

## 2019-01-01 RX ADMIN — ONDANSETRON 4 MILLIGRAM(S): 8 TABLET, FILM COATED ORAL at 05:41

## 2019-01-01 RX ADMIN — Medication 1 SPRAY(S): at 11:24

## 2019-01-01 RX ADMIN — TIOTROPIUM BROMIDE AND OLODATEROL 2 PUFF(S): 3.124; 2.736 SPRAY, METERED RESPIRATORY (INHALATION) at 05:38

## 2019-01-01 RX ADMIN — PANTOPRAZOLE SODIUM 40 MILLIGRAM(S): 20 TABLET, DELAYED RELEASE ORAL at 05:38

## 2019-01-01 RX ADMIN — Medication 1 SPRAY(S): at 21:34

## 2019-01-01 RX ADMIN — Medication 1 SPRAY(S): at 12:37

## 2019-01-01 RX ADMIN — Medication 5 MILLIGRAM(S): at 13:37

## 2019-01-01 RX ADMIN — SCOPALAMINE 1 PATCH: 1 PATCH, EXTENDED RELEASE TRANSDERMAL at 06:28

## 2019-01-01 RX ADMIN — ONDANSETRON 4 MILLIGRAM(S): 8 TABLET, FILM COATED ORAL at 18:08

## 2019-01-01 RX ADMIN — BEVACIZUMAB 350 MILLIGRAM(S): 400 INJECTION, SOLUTION INTRAVENOUS at 14:18

## 2019-01-01 RX ADMIN — Medication 5 MILLIGRAM(S): at 13:04

## 2019-01-01 RX ADMIN — Medication 10 MILLIGRAM(S): at 09:34

## 2019-01-01 RX ADMIN — Medication 1 SPRAY(S): at 02:45

## 2019-01-01 RX ADMIN — MONTELUKAST 10 MILLIGRAM(S): 4 TABLET, CHEWABLE ORAL at 22:59

## 2019-01-01 RX ADMIN — LACOSAMIDE 150 MILLIGRAM(S): 50 TABLET ORAL at 18:08

## 2019-01-01 RX ADMIN — LACOSAMIDE 150 MILLIGRAM(S): 50 TABLET ORAL at 12:37

## 2019-01-01 RX ADMIN — LORATADINE 10 MILLIGRAM(S): 10 TABLET ORAL at 11:24

## 2019-01-01 RX ADMIN — Medication 5 MILLIGRAM(S): at 21:34

## 2019-01-01 RX ADMIN — ONDANSETRON 4 MILLIGRAM(S): 8 TABLET, FILM COATED ORAL at 00:08

## 2019-01-01 RX ADMIN — Medication 0.5 MILLIGRAM(S): at 01:56

## 2019-01-01 RX ADMIN — SCOPALAMINE 1 PATCH: 1 PATCH, EXTENDED RELEASE TRANSDERMAL at 00:08

## 2019-01-01 RX ADMIN — Medication 1 SPRAY(S): at 12:03

## 2019-01-01 RX ADMIN — SCOPALAMINE 1 PATCH: 1 PATCH, EXTENDED RELEASE TRANSDERMAL at 06:41

## 2019-01-01 RX ADMIN — Medication 0.5 MILLIGRAM(S): at 22:57

## 2019-01-01 RX ADMIN — PANTOPRAZOLE SODIUM 40 MILLIGRAM(S): 20 TABLET, DELAYED RELEASE ORAL at 05:41

## 2019-01-01 RX ADMIN — Medication 0.5 MILLIGRAM(S): at 21:34

## 2019-01-01 RX ADMIN — ONDANSETRON 4 MILLIGRAM(S): 8 TABLET, FILM COATED ORAL at 18:17

## 2019-01-01 RX ADMIN — TIOTROPIUM BROMIDE AND OLODATEROL 2 PUFF(S): 3.124; 2.736 SPRAY, METERED RESPIRATORY (INHALATION) at 12:37

## 2019-01-01 RX ADMIN — ONDANSETRON 4 MILLIGRAM(S): 8 TABLET, FILM COATED ORAL at 11:24

## 2019-01-01 RX ADMIN — Medication 3 MILLIGRAM(S): at 03:00

## 2019-01-01 RX ADMIN — Medication 5 MILLIGRAM(S): at 21:10

## 2019-01-01 RX ADMIN — SCOPALAMINE 1 PATCH: 1 PATCH, EXTENDED RELEASE TRANSDERMAL at 18:14

## 2019-01-01 RX ADMIN — Medication 0.5 MILLIGRAM(S): at 21:10

## 2019-01-01 RX ADMIN — LACOSAMIDE 150 MILLIGRAM(S): 50 TABLET ORAL at 05:41

## 2019-01-01 RX ADMIN — MONTELUKAST 10 MILLIGRAM(S): 4 TABLET, CHEWABLE ORAL at 12:37

## 2019-01-01 RX ADMIN — Medication 100 MILLIGRAM(S): at 13:57

## 2019-01-01 RX ADMIN — LETROZOLE 2.5 MILLIGRAM(S): 2.5 TABLET, FILM COATED ORAL at 13:37

## 2019-01-01 RX ADMIN — MONTELUKAST 10 MILLIGRAM(S): 4 TABLET, CHEWABLE ORAL at 21:10

## 2019-01-01 RX ADMIN — LACOSAMIDE 150 MILLIGRAM(S): 50 TABLET ORAL at 22:57

## 2019-01-01 RX ADMIN — Medication 100 MILLIGRAM(S): at 22:57

## 2019-01-01 RX ADMIN — LETROZOLE 2.5 MILLIGRAM(S): 2.5 TABLET, FILM COATED ORAL at 12:03

## 2019-01-01 RX ADMIN — LACOSAMIDE 75 MILLIGRAM(S): 50 TABLET ORAL at 06:50

## 2019-01-01 RX ADMIN — Medication 100 MILLIGRAM(S): at 21:10

## 2019-01-01 RX ADMIN — ONDANSETRON 4 MILLIGRAM(S): 8 TABLET, FILM COATED ORAL at 05:38

## 2019-01-01 RX ADMIN — ONDANSETRON 4 MILLIGRAM(S): 8 TABLET, FILM COATED ORAL at 23:47

## 2019-01-01 RX ADMIN — Medication 100 MILLIGRAM(S): at 21:34

## 2019-01-01 RX ADMIN — LORATADINE 10 MILLIGRAM(S): 10 TABLET ORAL at 12:38

## 2019-01-01 RX ADMIN — SODIUM CHLORIDE 75 MILLILITER(S): 9 INJECTION INTRAMUSCULAR; INTRAVENOUS; SUBCUTANEOUS at 00:00

## 2019-01-01 RX ADMIN — Medication 5 MILLIGRAM(S): at 06:58

## 2019-01-01 RX ADMIN — Medication 5 MILLIGRAM(S): at 01:56

## 2019-01-01 RX ADMIN — Medication 1 SPRAY(S): at 21:10

## 2019-01-01 RX ADMIN — LORATADINE 10 MILLIGRAM(S): 10 TABLET ORAL at 12:03

## 2019-01-01 RX ADMIN — ONDANSETRON 4 MILLIGRAM(S): 8 TABLET, FILM COATED ORAL at 12:03

## 2019-01-01 RX ADMIN — PANTOPRAZOLE SODIUM 40 MILLIGRAM(S): 20 TABLET, DELAYED RELEASE ORAL at 06:59

## 2019-01-01 RX ADMIN — Medication 5 MILLIGRAM(S): at 22:57

## 2019-01-01 RX ADMIN — BEVACIZUMAB 200 MILLIGRAM(S): 400 INJECTION, SOLUTION INTRAVENOUS at 13:48

## 2019-01-01 RX ADMIN — TIOTROPIUM BROMIDE AND OLODATEROL 2 PUFF(S): 3.124; 2.736 SPRAY, METERED RESPIRATORY (INHALATION) at 05:56

## 2019-01-01 RX ADMIN — LETROZOLE 2.5 MILLIGRAM(S): 2.5 TABLET, FILM COATED ORAL at 11:24

## 2019-01-03 ENCOUNTER — APPOINTMENT (OUTPATIENT)
Dept: THORACIC SURGERY | Facility: CLINIC | Age: 74
End: 2019-01-03
Payer: MEDICARE

## 2019-01-03 VITALS
HEART RATE: 95 BPM | DIASTOLIC BLOOD PRESSURE: 55 MMHG | TEMPERATURE: 97.5 F | SYSTOLIC BLOOD PRESSURE: 98 MMHG | RESPIRATION RATE: 20 BRPM | OXYGEN SATURATION: 95 %

## 2019-01-03 DIAGNOSIS — Z08 ENCOUNTER FOR FOLLOW-UP EXAMINATION AFTER COMPLETED TREATMENT FOR MALIGNANT NEOPLASM: ICD-10-CM

## 2019-01-03 DIAGNOSIS — Z85.118 ENCOUNTER FOR FOLLOW-UP EXAMINATION AFTER COMPLETED TREATMENT FOR MALIGNANT NEOPLASM: ICD-10-CM

## 2019-01-03 PROCEDURE — 99213 OFFICE O/P EST LOW 20 MIN: CPT

## 2019-01-04 PROBLEM — Z08 ENCOUNTER FOR FOLLOW-UP SURVEILLANCE OF LUNG CANCER: Status: ACTIVE | Noted: 2018-03-08

## 2019-01-05 ENCOUNTER — RX RENEWAL (OUTPATIENT)
Age: 74
End: 2019-01-05

## 2019-01-07 ENCOUNTER — OUTPATIENT (OUTPATIENT)
Dept: OUTPATIENT SERVICES | Facility: HOSPITAL | Age: 74
LOS: 1 days | End: 2019-01-07
Payer: MEDICARE

## 2019-01-07 ENCOUNTER — RX RENEWAL (OUTPATIENT)
Age: 74
End: 2019-01-07

## 2019-01-07 DIAGNOSIS — Z41.9 ENCOUNTER FOR PROCEDURE FOR PURPOSES OTHER THAN REMEDYING HEALTH STATE, UNSPECIFIED: Chronic | ICD-10-CM

## 2019-01-07 DIAGNOSIS — Z98.890 OTHER SPECIFIED POSTPROCEDURAL STATES: Chronic | ICD-10-CM

## 2019-01-07 DIAGNOSIS — C50.919 MALIGNANT NEOPLASM OF UNSPECIFIED SITE OF UNSPECIFIED FEMALE BREAST: Chronic | ICD-10-CM

## 2019-01-07 DIAGNOSIS — Z90.49 ACQUIRED ABSENCE OF OTHER SPECIFIED PARTS OF DIGESTIVE TRACT: Chronic | ICD-10-CM

## 2019-01-07 LAB — GLUCOSE BLDC GLUCOMTR-MCNC: 87 MG/DL — SIGNIFICANT CHANGE UP (ref 70–99)

## 2019-01-07 PROCEDURE — A9552: CPT

## 2019-01-07 PROCEDURE — 78815 PET IMAGE W/CT SKULL-THIGH: CPT | Mod: 26

## 2019-01-07 PROCEDURE — 82962 GLUCOSE BLOOD TEST: CPT

## 2019-01-07 PROCEDURE — 78815 PET IMAGE W/CT SKULL-THIGH: CPT

## 2019-01-08 ENCOUNTER — APPOINTMENT (OUTPATIENT)
Dept: INTERNAL MEDICINE | Facility: CLINIC | Age: 74
End: 2019-01-08
Payer: MEDICARE

## 2019-01-08 VITALS
WEIGHT: 169 LBS | DIASTOLIC BLOOD PRESSURE: 70 MMHG | TEMPERATURE: 97.4 F | HEIGHT: 62 IN | BODY MASS INDEX: 31.1 KG/M2 | OXYGEN SATURATION: 95 % | HEART RATE: 75 BPM | SYSTOLIC BLOOD PRESSURE: 116 MMHG

## 2019-01-08 PROCEDURE — 99213 OFFICE O/P EST LOW 20 MIN: CPT

## 2019-01-08 NOTE — HISTORY OF PRESENT ILLNESS
[FreeTextEntry1] : Follow up for multiple medical problems including brain mets with radiation necrosis;  [de-identified] : As above; Recent PET and waiting results. Decadron decreased to 1 mg a day; Also Vimpat cut back ; Getting IV Avastin\par Forgetful ; Hopefully that will improve; \par Will see Dr. Moore; neuro oncology;

## 2019-01-14 ENCOUNTER — APPOINTMENT (OUTPATIENT)
Dept: NEUROLOGY | Facility: CLINIC | Age: 74
End: 2019-01-14
Payer: MEDICARE

## 2019-01-14 VITALS
SYSTOLIC BLOOD PRESSURE: 115 MMHG | HEART RATE: 99 BPM | TEMPERATURE: 97.7 F | OXYGEN SATURATION: 97 % | DIASTOLIC BLOOD PRESSURE: 74 MMHG

## 2019-01-14 VITALS — WEIGHT: 169 LBS | BODY MASS INDEX: 30.32 KG/M2 | HEIGHT: 62.5 IN

## 2019-01-14 PROCEDURE — 99214 OFFICE O/P EST MOD 30 MIN: CPT

## 2019-01-14 RX ORDER — HYDROMORPHONE HYDROCHLORIDE 4 MG/1
4 TABLET ORAL
Qty: 84 | Refills: 0 | Status: DISCONTINUED | COMMUNITY
Start: 2018-05-11 | End: 2019-01-14

## 2019-01-14 RX ORDER — CLOBETASOL PROPIONATE 0.5 MG/G
0.05 OINTMENT TOPICAL
Qty: 15 | Refills: 3 | Status: DISCONTINUED | COMMUNITY
Start: 2018-05-01 | End: 2019-01-14

## 2019-01-14 NOTE — HISTORY OF PRESENT ILLNESS
[FreeTextEntry1] : Tita Sadler is a 74 yo female who presented at this office for a neuro oncology follow up.\par In brief\par 12/2016- Patient started losing items and was disorganized\par 3/29/2017- MRI brain showed a solitary right frontal enhancing mass with marked vasogenic edema.\par 4/6/2017- GTR by Dr Muniz . \par Path showed  metastatic high grade neuroendocrine carcinoma over a small cell carcinoma. \par  5/16 and 5/22/17 - Focused RT\par 4/30/2018- Noted scalp mass\par \par  5/10/18 - scalp mass was excised and showed metastatic high-grade neuroendocrine tumor consistent with lung primary - PD-L1 negative. \par  6/15/18 MRI  showed that she was s/p right frontal craniotomy with interval progression of enhancement along the margins of the cavity extending to the ependyma of the right lateral ventricle. \par \par 2017- PET showed liver uptake - this was biopsied and was consistent with her breast primary.\par She has been on Femara since that time. 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 Plan was made to Rx with IV Avastin for Radiation necrosis\par 1/14/2019- Patient is here for a follow up with Dr. Cam. Family reports that she is having a decline is her memory and also very forgetful. \par

## 2019-01-14 NOTE — PHYSICAL EXAM
[General Appearance - Alert] : alert [General Appearance - In No Acute Distress] : in no acute distress [General Appearance - Well-Appearing] : healthy appearing [Oriented To Time, Place, And Person] : oriented to person, place, and time [Impaired Insight] : insight and judgment were intact [] : no respiratory distress [Respiration, Rhythm And Depth] : normal respiratory rhythm and effort [Exaggerated Use Of Accessory Muscles For Inspiration] : no accessory muscle use [Edema] : there was no peripheral edema [FreeTextEntry1] : Patient seen and examined\par Alert, awake, oriented to self , palce. Do not know the year. Able to say President\par Memory at 0/3 at 3 minutes\par PERRLA, EOMI, VFF\par Face symmetrical, Tongue protrudes midline\par RIOS X4 with good strength\par FNF , Sensation intact bilaterally\par Ambulating independently

## 2019-01-14 NOTE — REASON FOR VISIT
[Follow-Up: _____] : a [unfilled] follow-up visit [Spouse] : spouse [Friend] : friend [Formal Caregiver] : formal caregiver

## 2019-01-14 NOTE — DISCUSSION/SUMMARY
[FreeTextEntry1] : Patient seen  and examined\par Will refer for neuro psychology evaluation\par Will do CBC, CMP Thyroid profile, B12, Folate levels\par Will do a clinical follow up after the evaluation

## 2019-01-15 ENCOUNTER — APPOINTMENT (OUTPATIENT)
Dept: MRI IMAGING | Facility: HOSPITAL | Age: 74
End: 2019-01-15

## 2019-01-15 LAB
ALBUMIN SERPL ELPH-MCNC: 4 G/DL
ALP BLD-CCNC: 54 U/L
ALT SERPL-CCNC: 15 U/L
ANION GAP SERPL CALC-SCNC: 14 MMOL/L
AST SERPL-CCNC: 15 U/L
BASOPHILS # BLD AUTO: 0.03 K/UL
BASOPHILS NFR BLD AUTO: 0.2 %
BILIRUB SERPL-MCNC: 0.4 MG/DL
BUN SERPL-MCNC: 18 MG/DL
CALCIUM SERPL-MCNC: 10.7 MG/DL
CHLORIDE SERPL-SCNC: 100 MMOL/L
CO2 SERPL-SCNC: 27 MMOL/L
CREAT SERPL-MCNC: 1.11 MG/DL
EOSINOPHIL # BLD AUTO: 0.19 K/UL
EOSINOPHIL NFR BLD AUTO: 1.5 %
FOLATE SERPL-MCNC: 19 NG/ML
GLUCOSE SERPL-MCNC: 88 MG/DL
HCT VFR BLD CALC: 40.1 %
HGB BLD-MCNC: 12.5 G/DL
IMM GRANULOCYTES NFR BLD AUTO: 0.2 %
LYMPHOCYTES # BLD AUTO: 1.62 K/UL
LYMPHOCYTES NFR BLD AUTO: 12.9 %
MAN DIFF?: NORMAL
MCHC RBC-ENTMCNC: 29 PG
MCHC RBC-ENTMCNC: 31.2 GM/DL
MCV RBC AUTO: 93 FL
MONOCYTES # BLD AUTO: 0.64 K/UL
MONOCYTES NFR BLD AUTO: 5.1 %
NEUTROPHILS # BLD AUTO: 10.08 K/UL
NEUTROPHILS NFR BLD AUTO: 80.1 %
PLATELET # BLD AUTO: 305 K/UL
POTASSIUM SERPL-SCNC: 4.6 MMOL/L
PROT SERPL-MCNC: 6.5 G/DL
RBC # BLD: 4.31 M/UL
RBC # FLD: 15.5 %
SODIUM SERPL-SCNC: 141 MMOL/L
T3 SERPL-MCNC: 78 NG/DL
T4 SERPL-MCNC: 6.2 UG/DL
TSH SERPL-ACNC: 2.09 UIU/ML
VIT B12 SERPL-MCNC: 1420 PG/ML
WBC # FLD AUTO: 12.59 K/UL

## 2019-01-16 ENCOUNTER — FORM ENCOUNTER (OUTPATIENT)
Age: 74
End: 2019-01-16

## 2019-01-17 ENCOUNTER — OUTPATIENT (OUTPATIENT)
Dept: OUTPATIENT SERVICES | Facility: HOSPITAL | Age: 74
LOS: 1 days | End: 2019-01-17
Payer: MEDICARE

## 2019-01-17 ENCOUNTER — APPOINTMENT (OUTPATIENT)
Dept: MRI IMAGING | Facility: HOSPITAL | Age: 74
End: 2019-01-17
Payer: MEDICARE

## 2019-01-17 ENCOUNTER — RX RENEWAL (OUTPATIENT)
Age: 74
End: 2019-01-17

## 2019-01-17 DIAGNOSIS — Z98.890 OTHER SPECIFIED POSTPROCEDURAL STATES: Chronic | ICD-10-CM

## 2019-01-17 DIAGNOSIS — Z41.9 ENCOUNTER FOR PROCEDURE FOR PURPOSES OTHER THAN REMEDYING HEALTH STATE, UNSPECIFIED: Chronic | ICD-10-CM

## 2019-01-17 DIAGNOSIS — C50.919 MALIGNANT NEOPLASM OF UNSPECIFIED SITE OF UNSPECIFIED FEMALE BREAST: Chronic | ICD-10-CM

## 2019-01-17 DIAGNOSIS — Z90.49 ACQUIRED ABSENCE OF OTHER SPECIFIED PARTS OF DIGESTIVE TRACT: Chronic | ICD-10-CM

## 2019-01-17 PROCEDURE — 74183 MRI ABD W/O CNTR FLWD CNTR: CPT | Mod: 26

## 2019-01-17 PROCEDURE — A9585: CPT

## 2019-01-17 PROCEDURE — 74183 MRI ABD W/O CNTR FLWD CNTR: CPT

## 2019-01-18 ENCOUNTER — OUTPATIENT (OUTPATIENT)
Dept: OUTPATIENT SERVICES | Facility: HOSPITAL | Age: 74
LOS: 1 days | End: 2019-01-18
Payer: MEDICARE

## 2019-01-18 ENCOUNTER — APPOINTMENT (OUTPATIENT)
Dept: INFUSION THERAPY | Facility: HOSPITAL | Age: 74
End: 2019-01-18

## 2019-01-18 VITALS
TEMPERATURE: 98 F | DIASTOLIC BLOOD PRESSURE: 74 MMHG | SYSTOLIC BLOOD PRESSURE: 126 MMHG | OXYGEN SATURATION: 100 % | RESPIRATION RATE: 18 BRPM | HEART RATE: 74 BPM

## 2019-01-18 DIAGNOSIS — Z98.890 OTHER SPECIFIED POSTPROCEDURAL STATES: Chronic | ICD-10-CM

## 2019-01-18 DIAGNOSIS — Z90.49 ACQUIRED ABSENCE OF OTHER SPECIFIED PARTS OF DIGESTIVE TRACT: Chronic | ICD-10-CM

## 2019-01-18 DIAGNOSIS — Z41.9 ENCOUNTER FOR PROCEDURE FOR PURPOSES OTHER THAN REMEDYING HEALTH STATE, UNSPECIFIED: Chronic | ICD-10-CM

## 2019-01-18 DIAGNOSIS — C79.31 SECONDARY MALIGNANT NEOPLASM OF BRAIN: ICD-10-CM

## 2019-01-18 DIAGNOSIS — C50.919 MALIGNANT NEOPLASM OF UNSPECIFIED SITE OF UNSPECIFIED FEMALE BREAST: Chronic | ICD-10-CM

## 2019-01-18 LAB
ALBUMIN SERPL ELPH-MCNC: 4 G/DL — SIGNIFICANT CHANGE UP (ref 3.3–5)
ALP SERPL-CCNC: 54 U/L — SIGNIFICANT CHANGE UP (ref 40–120)
ALT FLD-CCNC: 15 U/L — SIGNIFICANT CHANGE UP (ref 10–45)
ANION GAP SERPL CALC-SCNC: 9 MMOL/L — SIGNIFICANT CHANGE UP (ref 5–17)
AST SERPL-CCNC: 14 U/L — SIGNIFICANT CHANGE UP (ref 10–40)
BASOPHILS NFR BLD AUTO: 0.4 % — SIGNIFICANT CHANGE UP (ref 0–2)
BILIRUB SERPL-MCNC: 0.2 MG/DL — SIGNIFICANT CHANGE UP (ref 0.2–1.2)
BUN SERPL-MCNC: 15 MG/DL — SIGNIFICANT CHANGE UP (ref 7–23)
CALCIUM SERPL-MCNC: 9.8 MG/DL — SIGNIFICANT CHANGE UP (ref 8.4–10.5)
CHLORIDE SERPL-SCNC: 105 MMOL/L — SIGNIFICANT CHANGE UP (ref 96–108)
CO2 SERPL-SCNC: 30 MMOL/L — SIGNIFICANT CHANGE UP (ref 22–31)
CREAT SERPL-MCNC: 1.08 MG/DL — SIGNIFICANT CHANGE UP (ref 0.5–1.3)
EOSINOPHIL NFR BLD AUTO: 3.4 % — SIGNIFICANT CHANGE UP (ref 0–6)
GLUCOSE SERPL-MCNC: 93 MG/DL — SIGNIFICANT CHANGE UP (ref 70–99)
HCT VFR BLD CALC: 40.7 % — SIGNIFICANT CHANGE UP (ref 34.5–45)
HGB BLD-MCNC: 12.9 G/DL — SIGNIFICANT CHANGE UP (ref 11.5–15.5)
LYMPHOCYTES # BLD AUTO: 18.2 % — SIGNIFICANT CHANGE UP (ref 13–44)
MCHC RBC-ENTMCNC: 28.7 PG — SIGNIFICANT CHANGE UP (ref 27–34)
MCHC RBC-ENTMCNC: 31.7 G/DL — LOW (ref 32–36)
MCV RBC AUTO: 90.4 FL — SIGNIFICANT CHANGE UP (ref 80–100)
MONOCYTES NFR BLD AUTO: 7.4 % — SIGNIFICANT CHANGE UP (ref 2–14)
NEUTROPHILS NFR BLD AUTO: 70.6 % — SIGNIFICANT CHANGE UP (ref 43–77)
PLATELET # BLD AUTO: 231 K/UL — SIGNIFICANT CHANGE UP (ref 150–400)
POTASSIUM SERPL-MCNC: 4.2 MMOL/L — SIGNIFICANT CHANGE UP (ref 3.5–5.3)
POTASSIUM SERPL-SCNC: 4.2 MMOL/L — SIGNIFICANT CHANGE UP (ref 3.5–5.3)
PROT SERPL-MCNC: 6.8 G/DL — SIGNIFICANT CHANGE UP (ref 6–8.3)
RBC # BLD: 4.5 M/UL — SIGNIFICANT CHANGE UP (ref 3.8–5.2)
RBC # FLD: 15.2 % — SIGNIFICANT CHANGE UP (ref 10.3–16.9)
SODIUM SERPL-SCNC: 144 MMOL/L — SIGNIFICANT CHANGE UP (ref 135–145)
WBC # BLD: 9.8 K/UL — SIGNIFICANT CHANGE UP (ref 3.8–10.5)
WBC # FLD AUTO: 9.8 K/UL — SIGNIFICANT CHANGE UP (ref 3.8–10.5)

## 2019-01-18 PROCEDURE — 85025 COMPLETE CBC W/AUTO DIFF WBC: CPT

## 2019-01-18 PROCEDURE — 36415 COLL VENOUS BLD VENIPUNCTURE: CPT

## 2019-01-18 PROCEDURE — 80053 COMPREHEN METABOLIC PANEL: CPT

## 2019-01-18 PROCEDURE — 96413 CHEMO IV INFUSION 1 HR: CPT

## 2019-01-18 RX ORDER — BEVACIZUMAB 400 MG/16ML
400 INJECTION, SOLUTION INTRAVENOUS ONCE
Qty: 0 | Refills: 0 | Status: COMPLETED | OUTPATIENT
Start: 2019-01-18 | End: 2019-01-18

## 2019-01-18 RX ADMIN — BEVACIZUMAB 232 MILLIGRAM(S): 400 INJECTION, SOLUTION INTRAVENOUS at 12:16

## 2019-01-28 ENCOUNTER — FORM ENCOUNTER (OUTPATIENT)
Age: 74
End: 2019-01-28

## 2019-01-29 ENCOUNTER — OUTPATIENT (OUTPATIENT)
Dept: OUTPATIENT SERVICES | Facility: HOSPITAL | Age: 74
LOS: 1 days | End: 2019-01-29
Payer: MEDICARE

## 2019-01-29 ENCOUNTER — APPOINTMENT (OUTPATIENT)
Dept: INTERVENTIONAL RADIOLOGY/VASCULAR | Facility: HOSPITAL | Age: 74
End: 2019-01-29

## 2019-01-29 DIAGNOSIS — Z41.9 ENCOUNTER FOR PROCEDURE FOR PURPOSES OTHER THAN REMEDYING HEALTH STATE, UNSPECIFIED: Chronic | ICD-10-CM

## 2019-01-29 DIAGNOSIS — Z98.890 OTHER SPECIFIED POSTPROCEDURAL STATES: Chronic | ICD-10-CM

## 2019-01-29 DIAGNOSIS — Z90.49 ACQUIRED ABSENCE OF OTHER SPECIFIED PARTS OF DIGESTIVE TRACT: Chronic | ICD-10-CM

## 2019-01-29 DIAGNOSIS — C50.919 MALIGNANT NEOPLASM OF UNSPECIFIED SITE OF UNSPECIFIED FEMALE BREAST: Chronic | ICD-10-CM

## 2019-01-29 PROCEDURE — 76705 ECHO EXAM OF ABDOMEN: CPT

## 2019-01-29 PROCEDURE — 76705 ECHO EXAM OF ABDOMEN: CPT | Mod: 26

## 2019-01-31 ENCOUNTER — APPOINTMENT (OUTPATIENT)
Dept: INTERVENTIONAL RADIOLOGY/VASCULAR | Facility: HOSPITAL | Age: 74
End: 2019-01-31

## 2019-01-31 RX ORDER — OMEPRAZOLE, SODIUM BICARBONATE 40; 1100 MG/1; MG/1
40-1100 CAPSULE ORAL
Qty: 30 | Refills: 3 | Status: ACTIVE | COMMUNITY
Start: 2018-08-21 | End: 1900-01-01

## 2019-02-01 ENCOUNTER — APPOINTMENT (OUTPATIENT)
Dept: INFUSION THERAPY | Facility: HOSPITAL | Age: 74
End: 2019-02-01

## 2019-02-04 ENCOUNTER — APPOINTMENT (OUTPATIENT)
Dept: NEUROSURGERY | Facility: CLINIC | Age: 74
End: 2019-02-04
Payer: MEDICARE

## 2019-02-04 ENCOUNTER — APPOINTMENT (OUTPATIENT)
Dept: INTERNAL MEDICINE | Facility: CLINIC | Age: 74
End: 2019-02-04
Payer: MEDICARE

## 2019-02-04 VITALS
HEIGHT: 62.5 IN | DIASTOLIC BLOOD PRESSURE: 67 MMHG | BODY MASS INDEX: 30.32 KG/M2 | TEMPERATURE: 98.8 F | WEIGHT: 169 LBS | HEART RATE: 75 BPM | OXYGEN SATURATION: 96 % | SYSTOLIC BLOOD PRESSURE: 107 MMHG

## 2019-02-04 VITALS
DIASTOLIC BLOOD PRESSURE: 77 MMHG | SYSTOLIC BLOOD PRESSURE: 123 MMHG | RESPIRATION RATE: 16 BRPM | TEMPERATURE: 98 F | BODY MASS INDEX: 30.32 KG/M2 | OXYGEN SATURATION: 95 % | HEIGHT: 62.5 IN | WEIGHT: 169 LBS | HEART RATE: 71 BPM

## 2019-02-04 DIAGNOSIS — R91.1 SOLITARY PULMONARY NODULE: ICD-10-CM

## 2019-02-04 PROCEDURE — 99213 OFFICE O/P EST LOW 20 MIN: CPT

## 2019-02-04 PROCEDURE — 99214 OFFICE O/P EST MOD 30 MIN: CPT

## 2019-02-04 NOTE — ASSESSMENT
[FreeTextEntry1] : My impression is that the patient suffers from high grade neuroendocrine tumor .    Her KPS is 70. I had a long discussion with the patient regarding the role of repeating the IV avastin and then doing an MRI brain in March with the MRI abdomen.  The patient was extensively educated about the nature of her disease process. Therapeutic and diagnostic tests include MRI brain with and without contrast in March 2019.  The patient should see Dr. Mathias.  I will see the patient back in March 2019. I have explained the alternatives, risks and benefits to the patient and she understands and agrees to proceed.  This risk of the procedure including but not limited to pain, infection, seizure, stroke, recurrence, residual disease, neurovascular injury, heart attack, pulmonary embolism, blindness, weakness, paralysis and death have been carefully explained to the patient who clearly understands and agrees to proceed.\par \par

## 2019-02-04 NOTE — ASSESSMENT
[FreeTextEntry1] : Appears to stable at present time; Repeat MRI to be done of Brain and Liver; No change in other medications; Wants more Xanax;

## 2019-02-04 NOTE — HISTORY OF PRESENT ILLNESS
[FreeTextEntry1] : All noted reviewed; Feeling okay;  Continues on IV Avastin;  For Repeat MRI in march [de-identified] : In addition to above gets forgetful at times; Also left leg is weal at times;  Taking Decadron once a day; Also Vimpat once a day;  Eating well;

## 2019-02-04 NOTE — HISTORY OF PRESENT ILLNESS
[FreeTextEntry1] : 73yr old female with PMH of metastatic breast CA and neuroendocrine tumor. She is s/p right frontal resection of scalp mass on 5/10/18. She did not want to undergo any treatment following resection but agreed to SBRT, which completed on 7/30/18 with Dr. Cam. She does not want any chemotherapy. She follows with Dr. Hampton.\par \par She last saw Dr. Cam on 1/14/19 with c/o decline in her memory. She was referred to Neuropsych.\par \par Her last ABD MRI showed multiple new liver lesions (7) highly suspicious for metastasis. Unknown if she will undergo biopsy at this time.\par \par Patient's spouse, Geovani, called the office on 1/25/19 with similar c/o declining memory. Encouraged spouse to f/u with Dorina's office regarding Neuropsych referral. Referral was also made to Oncology Psych, Lili Murillo. \par \par She presents to office today for f/u appt. She does not have a recent MRI brain to review, her last one of note was on 12/7/18 which showed interval decreased size and enhancement and improvement edema mass effect of right frontal radiation necrosis. No new abnormal enhancement.\par \par She looks ok, reading through magazine during assessment. Per her spouse, Geovani, her short term memory is still declining, states they have an appt with Dr. Murillo next Monday. Spouse also states that Radiologist stated liver lesions were too small to bx at this time, recommendation was to do another abd U/S in March and evaluate the findings at that time for possible surgery per Dr. Givens. She has c/o intermittent h/a that are relieved with Tylenol and denies n/v, vision changes and seizures. She continues on Vimpat and Decadron 2mg.\par \par \par \par \par \par \par \par \par \par \par \par \par \par \par \par \par \par \par \par \par \par \par \par \par \par +

## 2019-02-04 NOTE — REASON FOR VISIT
[Follow-Up: _____] : a [unfilled] follow-up visit [Spouse] : spouse [FreeTextEntry1] : Surgery: 5/10/18\par Pathology: High grade neuro-endocrine carcinoma

## 2019-02-08 ENCOUNTER — INBOUND DOCUMENT (OUTPATIENT)
Age: 74
End: 2019-02-08

## 2019-02-13 ENCOUNTER — APPOINTMENT (OUTPATIENT)
Dept: HEMATOLOGY ONCOLOGY | Facility: CLINIC | Age: 74
End: 2019-02-13
Payer: MEDICARE

## 2019-02-13 VITALS
WEIGHT: 175 LBS | BODY MASS INDEX: 31.4 KG/M2 | DIASTOLIC BLOOD PRESSURE: 76 MMHG | RESPIRATION RATE: 16 BRPM | OXYGEN SATURATION: 96 % | HEART RATE: 85 BPM | HEIGHT: 62.5 IN | TEMPERATURE: 97.8 F | SYSTOLIC BLOOD PRESSURE: 132 MMHG

## 2019-02-13 PROCEDURE — 99214 OFFICE O/P EST MOD 30 MIN: CPT | Mod: 25

## 2019-02-13 NOTE — HISTORY OF PRESENT ILLNESS
[de-identified] : 72 year results being seen here for treatment with Avastin for her radiation necrosis

## 2019-02-19 ENCOUNTER — INBOUND DOCUMENT (OUTPATIENT)
Age: 74
End: 2019-02-19

## 2019-02-20 ENCOUNTER — APPOINTMENT (OUTPATIENT)
Dept: INFUSION THERAPY | Facility: HOSPITAL | Age: 74
End: 2019-02-20

## 2019-02-20 ENCOUNTER — OUTPATIENT (OUTPATIENT)
Dept: OUTPATIENT SERVICES | Facility: HOSPITAL | Age: 74
LOS: 1 days | End: 2019-02-20
Payer: MEDICARE

## 2019-02-20 VITALS
WEIGHT: 177.03 LBS | TEMPERATURE: 99 F | DIASTOLIC BLOOD PRESSURE: 70 MMHG | HEART RATE: 78 BPM | SYSTOLIC BLOOD PRESSURE: 121 MMHG | HEIGHT: 62 IN | RESPIRATION RATE: 18 BRPM | OXYGEN SATURATION: 96 %

## 2019-02-20 DIAGNOSIS — Z90.49 ACQUIRED ABSENCE OF OTHER SPECIFIED PARTS OF DIGESTIVE TRACT: Chronic | ICD-10-CM

## 2019-02-20 DIAGNOSIS — Z41.9 ENCOUNTER FOR PROCEDURE FOR PURPOSES OTHER THAN REMEDYING HEALTH STATE, UNSPECIFIED: Chronic | ICD-10-CM

## 2019-02-20 DIAGNOSIS — C50.919 MALIGNANT NEOPLASM OF UNSPECIFIED SITE OF UNSPECIFIED FEMALE BREAST: Chronic | ICD-10-CM

## 2019-02-20 DIAGNOSIS — C79.31 SECONDARY MALIGNANT NEOPLASM OF BRAIN: ICD-10-CM

## 2019-02-20 DIAGNOSIS — Z98.890 OTHER SPECIFIED POSTPROCEDURAL STATES: Chronic | ICD-10-CM

## 2019-02-20 LAB
ALBUMIN SERPL ELPH-MCNC: 4.1 G/DL — SIGNIFICANT CHANGE UP (ref 3.3–5)
ALP SERPL-CCNC: 56 U/L — SIGNIFICANT CHANGE UP (ref 40–120)
ALT FLD-CCNC: 18 U/L — SIGNIFICANT CHANGE UP (ref 10–45)
ANION GAP SERPL CALC-SCNC: 12 MMOL/L — SIGNIFICANT CHANGE UP (ref 5–17)
APPEARANCE UR: CLEAR — SIGNIFICANT CHANGE UP
AST SERPL-CCNC: 16 U/L — SIGNIFICANT CHANGE UP (ref 10–40)
BASOPHILS # BLD AUTO: 0.05 K/UL — SIGNIFICANT CHANGE UP (ref 0–0.2)
BASOPHILS NFR BLD AUTO: 0.4 % — SIGNIFICANT CHANGE UP (ref 0–2)
BILIRUB SERPL-MCNC: 0.3 MG/DL — SIGNIFICANT CHANGE UP (ref 0.2–1.2)
BILIRUB UR-MCNC: NEGATIVE — SIGNIFICANT CHANGE UP
BUN SERPL-MCNC: 20 MG/DL — SIGNIFICANT CHANGE UP (ref 7–23)
CALCIUM SERPL-MCNC: 10.8 MG/DL — HIGH (ref 8.4–10.5)
CHLORIDE SERPL-SCNC: 101 MMOL/L — SIGNIFICANT CHANGE UP (ref 96–108)
CO2 SERPL-SCNC: 27 MMOL/L — SIGNIFICANT CHANGE UP (ref 22–31)
COLOR SPEC: YELLOW — SIGNIFICANT CHANGE UP
CREAT SERPL-MCNC: 0.99 MG/DL — SIGNIFICANT CHANGE UP (ref 0.5–1.3)
DIFF PNL FLD: NEGATIVE — SIGNIFICANT CHANGE UP
EOSINOPHIL # BLD AUTO: 0.14 K/UL — SIGNIFICANT CHANGE UP (ref 0–0.5)
EOSINOPHIL NFR BLD AUTO: 1 % — SIGNIFICANT CHANGE UP (ref 0–6)
GLUCOSE SERPL-MCNC: 123 MG/DL — HIGH (ref 70–99)
GLUCOSE UR QL: NEGATIVE — SIGNIFICANT CHANGE UP
HCT VFR BLD CALC: 39.9 % — SIGNIFICANT CHANGE UP (ref 34.5–45)
HGB BLD-MCNC: 12.6 G/DL — SIGNIFICANT CHANGE UP (ref 11.5–15.5)
IMM GRANULOCYTES NFR BLD AUTO: 0.4 % — SIGNIFICANT CHANGE UP (ref 0–1.5)
KETONES UR-MCNC: NEGATIVE — SIGNIFICANT CHANGE UP
LEUKOCYTE ESTERASE UR-ACNC: NEGATIVE — SIGNIFICANT CHANGE UP
LYMPHOCYTES # BLD AUTO: 1.54 K/UL — SIGNIFICANT CHANGE UP (ref 1–3.3)
LYMPHOCYTES # BLD AUTO: 11.4 % — LOW (ref 13–44)
MCHC RBC-ENTMCNC: 29.1 PG — SIGNIFICANT CHANGE UP (ref 27–34)
MCHC RBC-ENTMCNC: 31.6 GM/DL — LOW (ref 32–36)
MCV RBC AUTO: 92.1 FL — SIGNIFICANT CHANGE UP (ref 80–100)
MONOCYTES # BLD AUTO: 0.58 K/UL — SIGNIFICANT CHANGE UP (ref 0–0.9)
MONOCYTES NFR BLD AUTO: 4.3 % — SIGNIFICANT CHANGE UP (ref 2–14)
NEUTROPHILS # BLD AUTO: 11.18 K/UL — HIGH (ref 1.8–7.4)
NEUTROPHILS NFR BLD AUTO: 82.5 % — HIGH (ref 43–77)
NITRITE UR-MCNC: NEGATIVE — SIGNIFICANT CHANGE UP
NRBC # BLD: 0 /100 WBCS — SIGNIFICANT CHANGE UP (ref 0–0)
PH UR: 6 — SIGNIFICANT CHANGE UP (ref 5–8)
PLATELET # BLD AUTO: 245 K/UL — SIGNIFICANT CHANGE UP (ref 150–400)
POTASSIUM SERPL-MCNC: 4.2 MMOL/L — SIGNIFICANT CHANGE UP (ref 3.5–5.3)
POTASSIUM SERPL-SCNC: 4.2 MMOL/L — SIGNIFICANT CHANGE UP (ref 3.5–5.3)
PROT SERPL-MCNC: 7.5 G/DL — SIGNIFICANT CHANGE UP (ref 6–8.3)
PROT UR-MCNC: NEGATIVE MG/DL — SIGNIFICANT CHANGE UP
RBC # BLD: 4.33 M/UL — SIGNIFICANT CHANGE UP (ref 3.8–5.2)
RBC # FLD: 15 % — HIGH (ref 10.3–14.5)
SODIUM SERPL-SCNC: 140 MMOL/L — SIGNIFICANT CHANGE UP (ref 135–145)
SP GR SPEC: 1.01 — SIGNIFICANT CHANGE UP (ref 1–1.03)
UROBILINOGEN FLD QL: 0.2 E.U./DL — SIGNIFICANT CHANGE UP
WBC # BLD: 13.55 K/UL — HIGH (ref 3.8–10.5)
WBC # FLD AUTO: 13.55 K/UL — HIGH (ref 3.8–10.5)

## 2019-02-20 PROCEDURE — 80053 COMPREHEN METABOLIC PANEL: CPT

## 2019-02-20 PROCEDURE — 96413 CHEMO IV INFUSION 1 HR: CPT

## 2019-02-20 PROCEDURE — 85025 COMPLETE CBC W/AUTO DIFF WBC: CPT

## 2019-02-20 PROCEDURE — 81003 URINALYSIS AUTO W/O SCOPE: CPT

## 2019-02-20 PROCEDURE — 36415 COLL VENOUS BLD VENIPUNCTURE: CPT

## 2019-02-20 RX ORDER — BEVACIZUMAB 400 MG/16ML
400 INJECTION, SOLUTION INTRAVENOUS ONCE
Qty: 0 | Refills: 0 | Status: COMPLETED | OUTPATIENT
Start: 2019-02-20 | End: 2019-02-20

## 2019-02-20 RX ADMIN — BEVACIZUMAB 200 MILLIGRAM(S): 400 INJECTION, SOLUTION INTRAVENOUS at 15:52

## 2019-02-26 ENCOUNTER — APPOINTMENT (OUTPATIENT)
Dept: INTERNAL MEDICINE | Facility: CLINIC | Age: 74
End: 2019-02-26
Payer: MEDICARE

## 2019-02-26 VITALS
HEIGHT: 62.5 IN | DIASTOLIC BLOOD PRESSURE: 82 MMHG | SYSTOLIC BLOOD PRESSURE: 134 MMHG | OXYGEN SATURATION: 95 % | BODY MASS INDEX: 31.4 KG/M2 | HEART RATE: 88 BPM | TEMPERATURE: 97.7 F | WEIGHT: 175 LBS

## 2019-02-26 DIAGNOSIS — R09.89 OTHER SPECIFIED SYMPTOMS AND SIGNS INVOLVING THE CIRCULATORY AND RESPIRATORY SYSTEMS: ICD-10-CM

## 2019-02-26 PROCEDURE — 99213 OFFICE O/P EST LOW 20 MIN: CPT

## 2019-02-26 NOTE — HISTORY OF PRESENT ILLNESS
[FreeTextEntry1] : Aide called because felt patient was confused;  [de-identified] : Oncology is going to obtain MRI of brain and Liver  still getting IV Avastatin  Eating well; No fever\par

## 2019-02-26 NOTE — PHYSICAL EXAM

## 2019-02-26 NOTE — ASSESSMENT
[FreeTextEntry1] : Confusion but no focal neuro signs; Patient has no fever; Lungs are clear Will contact  Dr. Gonzales

## 2019-03-01 ENCOUNTER — FORM ENCOUNTER (OUTPATIENT)
Age: 74
End: 2019-03-01

## 2019-03-02 ENCOUNTER — OUTPATIENT (OUTPATIENT)
Dept: OUTPATIENT SERVICES | Facility: HOSPITAL | Age: 74
LOS: 1 days | End: 2019-03-02
Payer: MEDICARE

## 2019-03-02 ENCOUNTER — APPOINTMENT (OUTPATIENT)
Dept: MRI IMAGING | Facility: HOSPITAL | Age: 74
End: 2019-03-02
Payer: MEDICARE

## 2019-03-02 DIAGNOSIS — C50.919 MALIGNANT NEOPLASM OF UNSPECIFIED SITE OF UNSPECIFIED FEMALE BREAST: Chronic | ICD-10-CM

## 2019-03-02 DIAGNOSIS — Z90.49 ACQUIRED ABSENCE OF OTHER SPECIFIED PARTS OF DIGESTIVE TRACT: Chronic | ICD-10-CM

## 2019-03-02 DIAGNOSIS — Z41.9 ENCOUNTER FOR PROCEDURE FOR PURPOSES OTHER THAN REMEDYING HEALTH STATE, UNSPECIFIED: Chronic | ICD-10-CM

## 2019-03-02 DIAGNOSIS — Z98.890 OTHER SPECIFIED POSTPROCEDURAL STATES: Chronic | ICD-10-CM

## 2019-03-02 PROCEDURE — 70551 MRI BRAIN STEM W/O DYE: CPT | Mod: 26

## 2019-03-02 PROCEDURE — 70551 MRI BRAIN STEM W/O DYE: CPT

## 2019-03-15 ENCOUNTER — EMERGENCY (EMERGENCY)
Facility: HOSPITAL | Age: 74
LOS: 1 days | Discharge: HOME | End: 2019-03-15
Attending: EMERGENCY MEDICINE | Admitting: EMERGENCY MEDICINE
Payer: MEDICARE

## 2019-03-15 VITALS
TEMPERATURE: 98 F | DIASTOLIC BLOOD PRESSURE: 57 MMHG | SYSTOLIC BLOOD PRESSURE: 120 MMHG | RESPIRATION RATE: 16 BRPM | OXYGEN SATURATION: 95 % | HEART RATE: 88 BPM

## 2019-03-15 VITALS
DIASTOLIC BLOOD PRESSURE: 72 MMHG | OXYGEN SATURATION: 95 % | RESPIRATION RATE: 16 BRPM | HEART RATE: 75 BPM | SYSTOLIC BLOOD PRESSURE: 123 MMHG | WEIGHT: 169.98 LBS | TEMPERATURE: 98 F | HEIGHT: 62 IN

## 2019-03-15 DIAGNOSIS — C50.919 MALIGNANT NEOPLASM OF UNSPECIFIED SITE OF UNSPECIFIED FEMALE BREAST: Chronic | ICD-10-CM

## 2019-03-15 DIAGNOSIS — Z90.49 ACQUIRED ABSENCE OF OTHER SPECIFIED PARTS OF DIGESTIVE TRACT: Chronic | ICD-10-CM

## 2019-03-15 DIAGNOSIS — Z98.890 OTHER SPECIFIED POSTPROCEDURAL STATES: Chronic | ICD-10-CM

## 2019-03-15 DIAGNOSIS — Z41.9 ENCOUNTER FOR PROCEDURE FOR PURPOSES OTHER THAN REMEDYING HEALTH STATE, UNSPECIFIED: Chronic | ICD-10-CM

## 2019-03-15 LAB
APTT BLD: 28.2 SEC — SIGNIFICANT CHANGE UP (ref 27.5–36.3)
BASOPHILS # BLD AUTO: 0.04 K/UL — SIGNIFICANT CHANGE UP (ref 0–0.2)
BASOPHILS NFR BLD AUTO: 0.4 % — SIGNIFICANT CHANGE UP (ref 0–2)
EOSINOPHIL # BLD AUTO: 0.25 K/UL — SIGNIFICANT CHANGE UP (ref 0–0.5)
EOSINOPHIL NFR BLD AUTO: 2.5 % — SIGNIFICANT CHANGE UP (ref 0–6)
HCT VFR BLD CALC: 39.1 % — SIGNIFICANT CHANGE UP (ref 34.5–45)
HGB BLD-MCNC: 12.1 G/DL — SIGNIFICANT CHANGE UP (ref 11.5–15.5)
IMM GRANULOCYTES NFR BLD AUTO: 0.4 % — SIGNIFICANT CHANGE UP (ref 0–1.5)
INR BLD: 0.85 — LOW (ref 0.88–1.16)
LYMPHOCYTES # BLD AUTO: 2.08 K/UL — SIGNIFICANT CHANGE UP (ref 1–3.3)
LYMPHOCYTES # BLD AUTO: 20.8 % — SIGNIFICANT CHANGE UP (ref 13–44)
MCHC RBC-ENTMCNC: 29.3 PG — SIGNIFICANT CHANGE UP (ref 27–34)
MCHC RBC-ENTMCNC: 30.9 GM/DL — LOW (ref 32–36)
MCV RBC AUTO: 94.7 FL — SIGNIFICANT CHANGE UP (ref 80–100)
MONOCYTES # BLD AUTO: 0.73 K/UL — SIGNIFICANT CHANGE UP (ref 0–0.9)
MONOCYTES NFR BLD AUTO: 7.3 % — SIGNIFICANT CHANGE UP (ref 2–14)
NEUTROPHILS # BLD AUTO: 6.86 K/UL — SIGNIFICANT CHANGE UP (ref 1.8–7.4)
NEUTROPHILS NFR BLD AUTO: 68.6 % — SIGNIFICANT CHANGE UP (ref 43–77)
NRBC # BLD: 0 /100 WBCS — SIGNIFICANT CHANGE UP (ref 0–0)
PLATELET # BLD AUTO: 221 K/UL — SIGNIFICANT CHANGE UP (ref 150–400)
PROTHROM AB SERPL-ACNC: 9.5 SEC — LOW (ref 10–12.9)
RBC # BLD: 4.13 M/UL — SIGNIFICANT CHANGE UP (ref 3.8–5.2)
RBC # FLD: 15.5 % — HIGH (ref 10.3–14.5)
WBC # BLD: 10 K/UL — SIGNIFICANT CHANGE UP (ref 3.8–10.5)
WBC # FLD AUTO: 10 K/UL — SIGNIFICANT CHANGE UP (ref 3.8–10.5)

## 2019-03-15 PROCEDURE — 85025 COMPLETE CBC W/AUTO DIFF WBC: CPT

## 2019-03-15 PROCEDURE — 99283 EMERGENCY DEPT VISIT LOW MDM: CPT

## 2019-03-15 PROCEDURE — 85610 PROTHROMBIN TIME: CPT

## 2019-03-15 PROCEDURE — 85730 THROMBOPLASTIN TIME PARTIAL: CPT

## 2019-03-15 PROCEDURE — 99284 EMERGENCY DEPT VISIT MOD MDM: CPT

## 2019-03-15 NOTE — ED ADULT TRIAGE NOTE - OTHER COMPLAINTS
pt c/o nosebleed x 3 days. last nose bleed this morning and pt states it was a lot of blood. denies use of blood thinners. no active bleeding at this time.

## 2019-03-15 NOTE — ED PROVIDER NOTE - CLINICAL SUMMARY MEDICAL DECISION MAKING FREE TEXT BOX
Anterior epistaxis left nostril, no active bleeding.  HD stable. No stigmata of other bleeding.  No signs of trauma or infection.  Plan: pt on avastin, check CBC and coags.

## 2019-03-15 NOTE — ED PROVIDER NOTE - DISCHARGE REVIEW MATERIAL PRESENTED
-started on amlodipine 5 mg, SBP currently (acceptable), continue to monitor BP and adjust regimen as needed .

## 2019-03-15 NOTE — ED ADULT NURSE NOTE - CHPI ED NUR SYMPTOMS NEG
no bleeding gums/no weakness/no vomiting/no fever/no chills/no loss of consciousness/no nausea/no numbness/no syncope

## 2019-03-15 NOTE — ED ADULT NURSE NOTE - NSIMPLEMENTINTERV_GEN_ALL_ED
Implemented All Universal Safety Interventions:  [place] to call system. Call bell, personal items and telephone within reach. Instruct patient to call for assistance. Room bathroom lighting operational. Non-slip footwear when patient is off stretcher. Physically safe environment: no spills, clutter or unnecessary equipment. Stretcher in lowest position, wheels locked, appropriate side rails in place.

## 2019-03-15 NOTE — ED PROVIDER NOTE - ATTENDING CONTRIBUTION TO CARE
hx of metastatic lung CA, on avastin, w epistaxis x3 over 5d, Well appearing, nad, nc/at, lung cta, heart reg, abd soft, nt, ext no gross deformity, no gross neuro deficits, no blood in oropharynx, bleeding resolved from L nare, , anticipate dc.

## 2019-03-15 NOTE — ED ADULT NURSE NOTE - OBJECTIVE STATEMENT
Patient w/ extensive medical Hx including Liver CA, states had 2 episodes of spontaneous nose bleed, noted especially after blowing nose, bleeding resolved on its own.  Denies any pain or weakness or dizziness.

## 2019-03-15 NOTE — ED PROVIDER NOTE - CARE PROVIDER_API CALL
Chantel Tellez)  Critical Care Medicine; Internal Medicine  [address], [address]  [place], [place] [address]  Phone: [phone]  Fax: [phone]  Follow Up Time:

## 2019-03-15 NOTE — ED PROVIDER NOTE - NS ED ROS FT
CONSTITUTIONAL: No fever, chills, or weakness  NEURO: No headache, no dizziness, no syncope; No focal weakness/tingling/numbness  EYES: No visual changes  ENT: No sensation of blood in throat  PULM: No cough or dyspnea, no hemoptysis  CV: No chest pain or palpitations  GI: No BRBPR or melena  : No hematuria  MSK: No neck pain or back pain, no joint pain or swelling  SKIN: no rash or unusual bruising

## 2019-03-15 NOTE — ED PROVIDER NOTE - NSFOLLOWUPINSTRUCTIONS_ED_ALL_ED_FT
Apply bacitracin ointment to inside of left lower nostril to moisten the nasal mucosa.   You can also use nasal saline gel/drops.  Follow up with your primary care/hematologist/ENT after the weekend; in the meantime return to ER for new/worsening symptoms.  ___________________________________________________________________________  Nosebleed  A nosebleed is when blood comes out of the nose. Nosebleeds are common. They are usually not a sign of a serious medical problem.    Follow these instructions at home:  When you have a nosebleed:     Sit down.  Tilt your head a little forward.  Follow these steps:    Pinch your nose with a clean towel or tissue.  Keep pinching your nose for 10 minutes. Do not let go.  After 10 minutes, let go of your nose.  If there is still bleeding, do these steps again. Keep doing these steps until the bleeding stops.    Do not put things in your nose to stop the bleeding.  Try not to lie down or put your head back.  Use a nose spray decongestant as told by your doctor.  Do not use petroleum jelly or mineral oil in your nose. These things can get into your lungs.  After a nosebleed:     Try not to blow your nose or sniffle for several hours.  Try not to strain, lift, or bend at the waist for several days.  Use saline spray or a humidifier as told by your doctor.  Aspirin and blood-thinning medicines make bleeding more likely. If you take these medicines, ask your doctor if you should stop taking them, or if you should change how much you take. Do not stop taking the medicine unless your doctor tells you to.  Contact a doctor if:  You have a fever.  You get nosebleeds often.  You are getting nosebleeds more often than usual.  You bruise very easily.  You have something stuck in your nose.  You have bleeding in your mouth.  You throw up (vomit) or cough up brown material.  You get a nosebleed after you start a new medicine.  Get help right away if:  You have a nosebleed after you fall or hurt your head.  Your nosebleed does not go away after 20 minutes.  You feel dizzy or weak.  You have unusual bleeding from other parts of your body.  You have unusual bruising on other parts of your body.  You get sweaty.  You throw up blood.  Summary  Nosebleeds are common. They are usually not a sign of a serious medical problem.  When you have a nosebleed, sit down and tilt your head a little forward. Pinch your nose with a clean tissue.  After the bleeding stops, try not to blow your nose or sniffle for several hours.  This information is not intended to replace advice given to you by your health care provider. Make sure you discuss any questions you have with your health care provider.

## 2019-03-15 NOTE — ED PROVIDER NOTE - PHYSICAL EXAMINATION
CONSTITUTIONAL: WD,WN. NAD.    SKIN: Normal color and turgor. No rash.    HEAD: NC/AT.  EYES: Conjunctiva clear. EOMI. PERRL.    ENT: Airway patent, no blood dripping down throat; uvula midline without swelling. No active nosebleed; small amt dried blood on floor of left nostril, tiny suspected site of bleeding left lower nasal septum.    RESPIRATORY:  Breathing non-labored. No retractions or accessory muscle use.  Lungs CTA bilat.  CARDIOVASCULAR:  RRR, S1S2. No M/R/G.      GI:  Abdomen soft, nontender.    MSK: Neck supple with painless ROM.  No lower extremity edema or calf tenderness.  No joint swelling or ROM limitation.  NEURO: Alert and oriented; CN II-XII grossly intact. Speech clear. 5/5 strength in all extremities.  Normal balance and gait.

## 2019-03-15 NOTE — ED PROVIDER NOTE - OBJECTIVE STATEMENT
74 yo fem with hx met lung ca on avastin c/o nose bleed.  3 left sided nosebleeds in the past 5 days.  Bleeding lasts about 5 minutes and is easily controlled with pinching.  Only left nostril, no choking on blood or diff breathing.  No hx of nose picking/trauma.      PMHx as above  PSHx lung resection, craniotomy  Meds: vinpat stiolto xanax decadron singulair lexapro omeprazole ventolin oxybutinyn zofran femara trazodone  All: keflex

## 2019-03-18 ENCOUNTER — FORM ENCOUNTER (OUTPATIENT)
Age: 74
End: 2019-03-18

## 2019-03-19 ENCOUNTER — APPOINTMENT (OUTPATIENT)
Dept: INTERNAL MEDICINE | Facility: CLINIC | Age: 74
End: 2019-03-19
Payer: MEDICARE

## 2019-03-19 ENCOUNTER — APPOINTMENT (OUTPATIENT)
Dept: MRI IMAGING | Facility: HOSPITAL | Age: 74
End: 2019-03-19
Payer: MEDICARE

## 2019-03-19 ENCOUNTER — OUTPATIENT (OUTPATIENT)
Dept: OUTPATIENT SERVICES | Facility: HOSPITAL | Age: 74
LOS: 1 days | End: 2019-03-19
Payer: MEDICARE

## 2019-03-19 VITALS
SYSTOLIC BLOOD PRESSURE: 137 MMHG | WEIGHT: 174 LBS | HEART RATE: 78 BPM | OXYGEN SATURATION: 98 % | HEIGHT: 62.5 IN | DIASTOLIC BLOOD PRESSURE: 75 MMHG | BODY MASS INDEX: 31.22 KG/M2 | TEMPERATURE: 98.7 F

## 2019-03-19 DIAGNOSIS — R04.0 EPISTAXIS: ICD-10-CM

## 2019-03-19 DIAGNOSIS — Z88.8 ALLERGY STATUS TO OTHER DRUGS, MEDICAMENTS AND BIOLOGICAL SUBSTANCES STATUS: ICD-10-CM

## 2019-03-19 DIAGNOSIS — Z79.82 LONG TERM (CURRENT) USE OF ASPIRIN: ICD-10-CM

## 2019-03-19 DIAGNOSIS — Z41.9 ENCOUNTER FOR PROCEDURE FOR PURPOSES OTHER THAN REMEDYING HEALTH STATE, UNSPECIFIED: Chronic | ICD-10-CM

## 2019-03-19 DIAGNOSIS — Z98.890 OTHER SPECIFIED POSTPROCEDURAL STATES: Chronic | ICD-10-CM

## 2019-03-19 DIAGNOSIS — Z90.49 ACQUIRED ABSENCE OF OTHER SPECIFIED PARTS OF DIGESTIVE TRACT: Chronic | ICD-10-CM

## 2019-03-19 DIAGNOSIS — C50.919 MALIGNANT NEOPLASM OF UNSPECIFIED SITE OF UNSPECIFIED FEMALE BREAST: Chronic | ICD-10-CM

## 2019-03-19 DIAGNOSIS — Z79.899 OTHER LONG TERM (CURRENT) DRUG THERAPY: ICD-10-CM

## 2019-03-19 DIAGNOSIS — J44.9 CHRONIC OBSTRUCTIVE PULMONARY DISEASE, UNSPECIFIED: ICD-10-CM

## 2019-03-19 PROCEDURE — 74183 MRI ABD W/O CNTR FLWD CNTR: CPT

## 2019-03-19 PROCEDURE — 99213 OFFICE O/P EST LOW 20 MIN: CPT

## 2019-03-19 PROCEDURE — 71046 X-RAY EXAM CHEST 2 VIEWS: CPT

## 2019-03-19 PROCEDURE — 71046 X-RAY EXAM CHEST 2 VIEWS: CPT | Mod: 26

## 2019-03-19 PROCEDURE — 74183 MRI ABD W/O CNTR FLWD CNTR: CPT | Mod: 26

## 2019-03-19 PROCEDURE — A9585: CPT

## 2019-03-19 NOTE — HISTORY OF PRESENT ILLNESS
[FreeTextEntry1] : Follow up on various medical problems; Recent nose bleed; \par Now with cough; \par For MRI of Liver today [de-identified] : With this cough no fever; Cough has been present for a week;

## 2019-03-19 NOTE — ASSESSMENT
[FreeTextEntry1] : Patient with productive cough; Will start antibiotic; Also to get a chest film today while at radiology for MRI;

## 2019-03-19 NOTE — PHYSICAL EXAM
[No Acute Distress] : no acute distress [Well Nourished] : well nourished [Well Developed] : well developed [Well-Appearing] : well-appearing [Normal Sclera/Conjunctiva] : normal sclera/conjunctiva [PERRL] : pupils equal round and reactive to light [EOMI] : extraocular movements intact [Normal Outer Ear/Nose] : the outer ears and nose were normal in appearance [Normal Oropharynx] : the oropharynx was normal [Normal TMs] : both tympanic membranes were normal [Soft] : abdomen soft [Normal Nasal Mucosa] : the nasal mucosa was normal [Non Tender] : non-tender [No Masses] : no abdominal mass palpated [de-identified] : Clear

## 2019-03-20 ENCOUNTER — OUTPATIENT (OUTPATIENT)
Dept: OUTPATIENT SERVICES | Facility: HOSPITAL | Age: 74
LOS: 1 days | End: 2019-03-20
Payer: MEDICARE

## 2019-03-20 ENCOUNTER — APPOINTMENT (OUTPATIENT)
Dept: INFUSION THERAPY | Facility: HOSPITAL | Age: 74
End: 2019-03-20

## 2019-03-20 VITALS
RESPIRATION RATE: 18 BRPM | OXYGEN SATURATION: 99 % | HEART RATE: 80 BPM | TEMPERATURE: 98 F | SYSTOLIC BLOOD PRESSURE: 108 MMHG | DIASTOLIC BLOOD PRESSURE: 79 MMHG

## 2019-03-20 DIAGNOSIS — Z41.9 ENCOUNTER FOR PROCEDURE FOR PURPOSES OTHER THAN REMEDYING HEALTH STATE, UNSPECIFIED: Chronic | ICD-10-CM

## 2019-03-20 DIAGNOSIS — Z98.890 OTHER SPECIFIED POSTPROCEDURAL STATES: Chronic | ICD-10-CM

## 2019-03-20 DIAGNOSIS — C50.919 MALIGNANT NEOPLASM OF UNSPECIFIED SITE OF UNSPECIFIED FEMALE BREAST: Chronic | ICD-10-CM

## 2019-03-20 DIAGNOSIS — C79.31 SECONDARY MALIGNANT NEOPLASM OF BRAIN: ICD-10-CM

## 2019-03-20 DIAGNOSIS — Z90.49 ACQUIRED ABSENCE OF OTHER SPECIFIED PARTS OF DIGESTIVE TRACT: Chronic | ICD-10-CM

## 2019-03-20 LAB
ALBUMIN SERPL ELPH-MCNC: 2.7 G/DL — LOW (ref 3.3–5)
ALP SERPL-CCNC: 43 U/L — SIGNIFICANT CHANGE UP (ref 40–120)
ALT FLD-CCNC: 19 U/L — SIGNIFICANT CHANGE UP (ref 10–45)
ANION GAP SERPL CALC-SCNC: 8 MMOL/L — SIGNIFICANT CHANGE UP (ref 5–17)
APPEARANCE UR: CLEAR — SIGNIFICANT CHANGE UP
AST SERPL-CCNC: 19 U/L — SIGNIFICANT CHANGE UP (ref 10–40)
BILIRUB SERPL-MCNC: <0.2 MG/DL — SIGNIFICANT CHANGE UP (ref 0.2–1.2)
BILIRUB UR-MCNC: NEGATIVE — SIGNIFICANT CHANGE UP
BUN SERPL-MCNC: 18 MG/DL — SIGNIFICANT CHANGE UP (ref 7–23)
CALCIUM SERPL-MCNC: 8.1 MG/DL — LOW (ref 8.4–10.5)
CHLORIDE SERPL-SCNC: 114 MMOL/L — HIGH (ref 96–108)
CO2 SERPL-SCNC: 22 MMOL/L — SIGNIFICANT CHANGE UP (ref 22–31)
COLOR SPEC: YELLOW — SIGNIFICANT CHANGE UP
CREAT SERPL-MCNC: 0.73 MG/DL — SIGNIFICANT CHANGE UP (ref 0.5–1.3)
DIFF PNL FLD: NEGATIVE — SIGNIFICANT CHANGE UP
GLUCOSE SERPL-MCNC: 77 MG/DL — SIGNIFICANT CHANGE UP (ref 70–99)
GLUCOSE UR QL: NEGATIVE — SIGNIFICANT CHANGE UP
HCT VFR BLD CALC: 37.9 % — SIGNIFICANT CHANGE UP (ref 34.5–45)
HGB BLD-MCNC: 11.9 G/DL — SIGNIFICANT CHANGE UP (ref 11.5–15.5)
KETONES UR-MCNC: NEGATIVE — SIGNIFICANT CHANGE UP
LEUKOCYTE ESTERASE UR-ACNC: NEGATIVE — SIGNIFICANT CHANGE UP
MCHC RBC-ENTMCNC: 29.5 PG — SIGNIFICANT CHANGE UP (ref 27–34)
MCHC RBC-ENTMCNC: 31.4 GM/DL — LOW (ref 32–36)
MCV RBC AUTO: 94 FL — SIGNIFICANT CHANGE UP (ref 80–100)
NITRITE UR-MCNC: NEGATIVE — SIGNIFICANT CHANGE UP
NRBC # BLD: 0 /100 WBCS — SIGNIFICANT CHANGE UP (ref 0–0)
PH UR: 6.5 — SIGNIFICANT CHANGE UP (ref 5–8)
PLATELET # BLD AUTO: 234 K/UL — SIGNIFICANT CHANGE UP (ref 150–400)
POTASSIUM SERPL-MCNC: 3 MMOL/L — LOW (ref 3.5–5.3)
POTASSIUM SERPL-SCNC: 3 MMOL/L — LOW (ref 3.5–5.3)
PROT SERPL-MCNC: 5.1 G/DL — LOW (ref 6–8.3)
PROT UR-MCNC: NEGATIVE MG/DL — SIGNIFICANT CHANGE UP
RBC # BLD: 4.03 M/UL — SIGNIFICANT CHANGE UP (ref 3.8–5.2)
RBC # FLD: 15.7 % — HIGH (ref 10.3–14.5)
SODIUM SERPL-SCNC: 144 MMOL/L — SIGNIFICANT CHANGE UP (ref 135–145)
SP GR SPEC: 1.02 — SIGNIFICANT CHANGE UP (ref 1–1.03)
UROBILINOGEN FLD QL: 0.2 E.U./DL — SIGNIFICANT CHANGE UP
WBC # BLD: 9.49 K/UL — SIGNIFICANT CHANGE UP (ref 3.8–10.5)
WBC # FLD AUTO: 9.49 K/UL — SIGNIFICANT CHANGE UP (ref 3.8–10.5)

## 2019-03-20 PROCEDURE — 80053 COMPREHEN METABOLIC PANEL: CPT

## 2019-03-20 PROCEDURE — 36415 COLL VENOUS BLD VENIPUNCTURE: CPT

## 2019-03-20 PROCEDURE — 85027 COMPLETE CBC AUTOMATED: CPT

## 2019-03-20 PROCEDURE — 96413 CHEMO IV INFUSION 1 HR: CPT

## 2019-03-20 PROCEDURE — 81003 URINALYSIS AUTO W/O SCOPE: CPT

## 2019-03-20 RX ORDER — ALBUTEROL SULFATE 90 UG/1
108 (90 BASE) AEROSOL, METERED RESPIRATORY (INHALATION)
Qty: 1 | Refills: 5 | Status: ACTIVE | COMMUNITY
Start: 2017-11-09 | End: 1900-01-01

## 2019-03-20 RX ORDER — POTASSIUM CHLORIDE 20 MEQ
20 PACKET (EA) ORAL ONCE
Qty: 0 | Refills: 0 | Status: COMPLETED | OUTPATIENT
Start: 2019-03-20 | End: 2019-03-20

## 2019-03-20 RX ORDER — BEVACIZUMAB 400 MG/16ML
400 INJECTION, SOLUTION INTRAVENOUS ONCE
Qty: 0 | Refills: 0 | Status: COMPLETED | OUTPATIENT
Start: 2019-03-20 | End: 2019-03-20

## 2019-03-20 RX ORDER — MONTELUKAST 4 MG/1
1 TABLET, CHEWABLE ORAL
Qty: 0 | Refills: 0 | COMMUNITY

## 2019-03-20 RX ADMIN — BEVACIZUMAB 400 MILLIGRAM(S): 400 INJECTION, SOLUTION INTRAVENOUS at 14:02

## 2019-03-20 RX ADMIN — Medication 300 UNIT(S): at 14:10

## 2019-03-20 RX ADMIN — Medication 20 MILLIEQUIVALENT(S): at 14:06

## 2019-03-20 RX ADMIN — BEVACIZUMAB 200 MILLIGRAM(S): 400 INJECTION, SOLUTION INTRAVENOUS at 13:32

## 2019-03-25 ENCOUNTER — APPOINTMENT (OUTPATIENT)
Dept: INTERNAL MEDICINE | Facility: CLINIC | Age: 74
End: 2019-03-25
Payer: MEDICARE

## 2019-03-25 VITALS
DIASTOLIC BLOOD PRESSURE: 75 MMHG | TEMPERATURE: 98.7 F | HEIGHT: 62.5 IN | BODY MASS INDEX: 31.22 KG/M2 | WEIGHT: 174 LBS | OXYGEN SATURATION: 95 % | SYSTOLIC BLOOD PRESSURE: 116 MMHG | HEART RATE: 89 BPM

## 2019-03-25 PROCEDURE — 99213 OFFICE O/P EST LOW 20 MIN: CPT

## 2019-03-25 NOTE — HISTORY OF PRESENT ILLNESS
[FreeTextEntry1] : Chronic cough;   Still getting IV Avastatin;  [de-identified] : This cough was not helped with antibiotic;

## 2019-03-25 NOTE — ASSESSMENT
[FreeTextEntry1] : Looks good today; MRI  of Liver stable;  MRI of Brain stable;  Chest remigio stable;  No change in other medication ; Seeing a psychiatrist;  Lungs clear today;  Follow up with other consultants;

## 2019-03-26 ENCOUNTER — CLINICAL ADVICE (OUTPATIENT)
Age: 74
End: 2019-03-26

## 2019-03-27 ENCOUNTER — APPOINTMENT (OUTPATIENT)
Dept: PULMONOLOGY | Facility: CLINIC | Age: 74
End: 2019-03-27
Payer: MEDICARE

## 2019-03-27 VITALS
OXYGEN SATURATION: 97 % | HEIGHT: 65 IN | RESPIRATION RATE: 12 BRPM | TEMPERATURE: 97.7 F | DIASTOLIC BLOOD PRESSURE: 84 MMHG | HEART RATE: 84 BPM | SYSTOLIC BLOOD PRESSURE: 120 MMHG

## 2019-03-27 PROCEDURE — 99214 OFFICE O/P EST MOD 30 MIN: CPT

## 2019-03-27 RX ORDER — IPRATROPIUM BROMIDE AND ALBUTEROL SULFATE 2.5; .5 MG/3ML; MG/3ML
0.5-2.5 (3) SOLUTION RESPIRATORY (INHALATION)
Qty: 25 | Refills: 2 | Status: ACTIVE | COMMUNITY
Start: 2019-03-27 | End: 1900-01-01

## 2019-03-27 NOTE — DISCUSSION/SUMMARY
[FreeTextEntry1] : Despite her long and complicated medical history she is doing remarkably well. There does not seem to be any evidence of new pulmonary problems causing cough with a normal exam, chest x-ray, and no shortness of breath. I think her cough may be coming from some chronic reflux symptoms. She is treated for this, I have given her some advice about taking Zegerid one hour before the biggest meal of the day. Do not think she needs further antibiotics. If her cough persists we might go further but for now I have reassured her.

## 2019-03-27 NOTE — PHYSICAL EXAM
[General Appearance - Well Developed] : well developed [Normal Appearance] : normal appearance [Well Groomed] : well groomed [General Appearance - Well Nourished] : well nourished [No Deformities] : no deformities [General Appearance - In No Acute Distress] : no acute distress [Normal Conjunctiva] : the conjunctiva exhibited no abnormalities [Eyelids - No Xanthelasma] : the eyelids demonstrated no xanthelasmas [Normal Oropharynx] : normal oropharynx [Erythema] : no erythema of the pharynx [Neck Appearance] : the appearance of the neck was normal [Neck Cervical Mass (___cm)] : no neck mass was observed [Jugular Venous Distention Increased] : there was no jugular-venous distention [Thyroid Diffuse Enlargement] : the thyroid was not enlarged [Thyroid Nodule] : there were no palpable thyroid nodules [Heart Rate And Rhythm] : heart rate and rhythm were normal [Heart Sounds] : normal S1 and S2 [Murmurs] : no murmurs present [Respiration, Rhythm And Depth] : normal respiratory rhythm and effort [Exaggerated Use Of Accessory Muscles For Inspiration] : no accessory muscle use [Auscultation Breath Sounds / Voice Sounds] : lungs were clear to auscultation bilaterally [FreeTextEntry1] : clear [Abnormal Walk] : normal gait [Gait - Sufficient For Exercise Testing] : the gait was sufficient for exercise testing [Nail Clubbing] : no clubbing of the fingernails [Cyanosis, Localized] : no localized cyanosis [Petechial Hemorrhages (___cm)] : no petechial hemorrhages [Skin Color & Pigmentation] : normal skin color and pigmentation [] : no rash [No Venous Stasis] : no venous stasis [Skin Lesions] : no skin lesions [No Skin Ulcers] : no skin ulcer [No Xanthoma] : no  xanthoma was observed

## 2019-03-29 ENCOUNTER — APPOINTMENT (OUTPATIENT)
Dept: NEUROSURGERY | Facility: CLINIC | Age: 74
End: 2019-03-29
Payer: MEDICARE

## 2019-03-29 VITALS
RESPIRATION RATE: 16 BRPM | OXYGEN SATURATION: 96 % | TEMPERATURE: 98 F | SYSTOLIC BLOOD PRESSURE: 111 MMHG | HEART RATE: 78 BPM | HEIGHT: 65 IN | BODY MASS INDEX: 28.99 KG/M2 | DIASTOLIC BLOOD PRESSURE: 72 MMHG | WEIGHT: 174 LBS

## 2019-03-29 PROCEDURE — 99214 OFFICE O/P EST MOD 30 MIN: CPT

## 2019-03-29 NOTE — HISTORY OF PRESENT ILLNESS
[FreeTextEntry1] : 73yr old female with PMH of metastatic breast CA and neuroendocrine tumor. She is s/p right frontal resection of scalp mass on 5/10/18. She did not want to undergo any treatment following resection but agreed to SBRT, which completed on 7/30/18 with Dr. Cam. She does not want any chemotherapy. She follows with Dr. Hampton.\par \par Radiologist stated liver lesions were too small to bx at this time, recommendation was to do another abd U/S in March and evaluate the findings at that time for possible surgery per Dr. Givens. \par \par She was seen by Dr. Castillo 2/13/19 and started on IV Avastin 2/15/19, she is getting IV Avastin Q4 weeks\par \par She complains of subjective confusion. Her caregiver and  confirms this. Her  reports she has had some mood changes and is more stubborn.

## 2019-03-29 NOTE — PHYSICAL EXAM
[Clean] : clean [Dry] : dry [Well-Healed] : well-healed [No Drainage] : without drainage [Other: ___] : [unfilled] [FreeTextEntry1] : right frontal

## 2019-03-29 NOTE — DATA REVIEWED
[de-identified] : I have reviewed the most recent MRI 3/2/19 which shows stable right frontal subcortical white matter signal with heterogeneity the long TR scans and diffusion signal abnormality consistent with Avastin and/or posttreatment effects\par  \par  [de-identified] : MRI abdomen 3/19/19 demonstrates no interval change in 7-8 lesions of right and left liver lobes\par

## 2019-04-01 ENCOUNTER — APPOINTMENT (OUTPATIENT)
Dept: HEMATOLOGY ONCOLOGY | Facility: CLINIC | Age: 74
End: 2019-04-01
Payer: MEDICARE

## 2019-04-01 VITALS
BODY MASS INDEX: 29.99 KG/M2 | SYSTOLIC BLOOD PRESSURE: 120 MMHG | WEIGHT: 180 LBS | HEART RATE: 75 BPM | TEMPERATURE: 97.2 F | DIASTOLIC BLOOD PRESSURE: 73 MMHG | HEIGHT: 65 IN | RESPIRATION RATE: 14 BRPM | OXYGEN SATURATION: 96 %

## 2019-04-01 PROCEDURE — 99214 OFFICE O/P EST MOD 30 MIN: CPT

## 2019-04-03 ENCOUNTER — EMERGENCY (EMERGENCY)
Facility: HOSPITAL | Age: 74
LOS: 1 days | Discharge: HOME | End: 2019-04-03
Attending: EMERGENCY MEDICINE | Admitting: EMERGENCY MEDICINE
Payer: MEDICARE

## 2019-04-03 VITALS
HEART RATE: 63 BPM | TEMPERATURE: 98 F | RESPIRATION RATE: 18 BRPM | HEIGHT: 62 IN | SYSTOLIC BLOOD PRESSURE: 148 MMHG | OXYGEN SATURATION: 95 % | WEIGHT: 181 LBS | DIASTOLIC BLOOD PRESSURE: 78 MMHG

## 2019-04-03 VITALS
HEART RATE: 67 BPM | DIASTOLIC BLOOD PRESSURE: 76 MMHG | SYSTOLIC BLOOD PRESSURE: 138 MMHG | RESPIRATION RATE: 18 BRPM | OXYGEN SATURATION: 98 %

## 2019-04-03 DIAGNOSIS — Z41.9 ENCOUNTER FOR PROCEDURE FOR PURPOSES OTHER THAN REMEDYING HEALTH STATE, UNSPECIFIED: Chronic | ICD-10-CM

## 2019-04-03 DIAGNOSIS — Z98.890 OTHER SPECIFIED POSTPROCEDURAL STATES: Chronic | ICD-10-CM

## 2019-04-03 DIAGNOSIS — C50.919 MALIGNANT NEOPLASM OF UNSPECIFIED SITE OF UNSPECIFIED FEMALE BREAST: Chronic | ICD-10-CM

## 2019-04-03 DIAGNOSIS — Z90.49 ACQUIRED ABSENCE OF OTHER SPECIFIED PARTS OF DIGESTIVE TRACT: Chronic | ICD-10-CM

## 2019-04-03 LAB
ALBUMIN SERPL ELPH-MCNC: 3.9 G/DL — SIGNIFICANT CHANGE UP (ref 3.3–5)
ALP SERPL-CCNC: 52 U/L — SIGNIFICANT CHANGE UP (ref 40–120)
ALT FLD-CCNC: 16 U/L — SIGNIFICANT CHANGE UP (ref 10–45)
ANION GAP SERPL CALC-SCNC: 11 MMOL/L — SIGNIFICANT CHANGE UP (ref 5–17)
APTT BLD: 29 SEC — SIGNIFICANT CHANGE UP (ref 27.5–36.3)
AST SERPL-CCNC: 15 U/L — SIGNIFICANT CHANGE UP (ref 10–40)
BASOPHILS # BLD AUTO: 0.04 K/UL — SIGNIFICANT CHANGE UP (ref 0–0.2)
BASOPHILS NFR BLD AUTO: 0.4 % — SIGNIFICANT CHANGE UP (ref 0–2)
BILIRUB SERPL-MCNC: 0.3 MG/DL — SIGNIFICANT CHANGE UP (ref 0.2–1.2)
BUN SERPL-MCNC: 15 MG/DL — SIGNIFICANT CHANGE UP (ref 7–23)
CALCIUM SERPL-MCNC: 10.2 MG/DL — SIGNIFICANT CHANGE UP (ref 8.4–10.5)
CHLORIDE SERPL-SCNC: 103 MMOL/L — SIGNIFICANT CHANGE UP (ref 96–108)
CK MB CFR SERPL CALC: 1.8 NG/ML — SIGNIFICANT CHANGE UP (ref 0–6.7)
CK SERPL-CCNC: 46 U/L — SIGNIFICANT CHANGE UP (ref 25–170)
CO2 SERPL-SCNC: 28 MMOL/L — SIGNIFICANT CHANGE UP (ref 22–31)
CREAT SERPL-MCNC: 1.1 MG/DL — SIGNIFICANT CHANGE UP (ref 0.5–1.3)
EOSINOPHIL # BLD AUTO: 0.28 K/UL — SIGNIFICANT CHANGE UP (ref 0–0.5)
EOSINOPHIL NFR BLD AUTO: 2.9 % — SIGNIFICANT CHANGE UP (ref 0–6)
GLUCOSE SERPL-MCNC: 90 MG/DL — SIGNIFICANT CHANGE UP (ref 70–99)
HCT VFR BLD CALC: 38.8 % — SIGNIFICANT CHANGE UP (ref 34.5–45)
HGB BLD-MCNC: 12.4 G/DL — SIGNIFICANT CHANGE UP (ref 11.5–15.5)
IMM GRANULOCYTES NFR BLD AUTO: 0.3 % — SIGNIFICANT CHANGE UP (ref 0–1.5)
INR BLD: 0.86 — LOW (ref 0.88–1.16)
LYMPHOCYTES # BLD AUTO: 2.67 K/UL — SIGNIFICANT CHANGE UP (ref 1–3.3)
LYMPHOCYTES # BLD AUTO: 28 % — SIGNIFICANT CHANGE UP (ref 13–44)
MCHC RBC-ENTMCNC: 29.9 PG — SIGNIFICANT CHANGE UP (ref 27–34)
MCHC RBC-ENTMCNC: 32 GM/DL — SIGNIFICANT CHANGE UP (ref 32–36)
MCV RBC AUTO: 93.5 FL — SIGNIFICANT CHANGE UP (ref 80–100)
MONOCYTES # BLD AUTO: 0.68 K/UL — SIGNIFICANT CHANGE UP (ref 0–0.9)
MONOCYTES NFR BLD AUTO: 7.1 % — SIGNIFICANT CHANGE UP (ref 2–14)
NEUTROPHILS # BLD AUTO: 5.84 K/UL — SIGNIFICANT CHANGE UP (ref 1.8–7.4)
NEUTROPHILS NFR BLD AUTO: 61.3 % — SIGNIFICANT CHANGE UP (ref 43–77)
NRBC # BLD: 0 /100 WBCS — SIGNIFICANT CHANGE UP (ref 0–0)
PLATELET # BLD AUTO: 271 K/UL — SIGNIFICANT CHANGE UP (ref 150–400)
POTASSIUM SERPL-MCNC: 4.6 MMOL/L — SIGNIFICANT CHANGE UP (ref 3.5–5.3)
POTASSIUM SERPL-SCNC: 4.6 MMOL/L — SIGNIFICANT CHANGE UP (ref 3.5–5.3)
PROT SERPL-MCNC: 6.8 G/DL — SIGNIFICANT CHANGE UP (ref 6–8.3)
PROTHROM AB SERPL-ACNC: 9.7 SEC — LOW (ref 10–12.9)
RBC # BLD: 4.15 M/UL — SIGNIFICANT CHANGE UP (ref 3.8–5.2)
RBC # FLD: 15.5 % — HIGH (ref 10.3–14.5)
SODIUM SERPL-SCNC: 142 MMOL/L — SIGNIFICANT CHANGE UP (ref 135–145)
TROPONIN T SERPL-MCNC: <0.01 NG/ML — SIGNIFICANT CHANGE UP (ref 0–0.01)
WBC # BLD: 9.54 K/UL — SIGNIFICANT CHANGE UP (ref 3.8–10.5)
WBC # FLD AUTO: 9.54 K/UL — SIGNIFICANT CHANGE UP (ref 3.8–10.5)

## 2019-04-03 PROCEDURE — 99283 EMERGENCY DEPT VISIT LOW MDM: CPT | Mod: 25

## 2019-04-03 PROCEDURE — 85025 COMPLETE CBC W/AUTO DIFF WBC: CPT

## 2019-04-03 PROCEDURE — 82553 CREATINE MB FRACTION: CPT

## 2019-04-03 PROCEDURE — 36415 COLL VENOUS BLD VENIPUNCTURE: CPT

## 2019-04-03 PROCEDURE — 99285 EMERGENCY DEPT VISIT HI MDM: CPT

## 2019-04-03 PROCEDURE — 84484 ASSAY OF TROPONIN QUANT: CPT

## 2019-04-03 PROCEDURE — 71045 X-RAY EXAM CHEST 1 VIEW: CPT | Mod: 26

## 2019-04-03 PROCEDURE — 85610 PROTHROMBIN TIME: CPT

## 2019-04-03 PROCEDURE — 71045 X-RAY EXAM CHEST 1 VIEW: CPT

## 2019-04-03 PROCEDURE — 80053 COMPREHEN METABOLIC PANEL: CPT

## 2019-04-03 PROCEDURE — 85730 THROMBOPLASTIN TIME PARTIAL: CPT

## 2019-04-03 PROCEDURE — 82550 ASSAY OF CK (CPK): CPT

## 2019-04-03 RX ORDER — SODIUM CHLORIDE 9 MG/ML
1000 INJECTION INTRAMUSCULAR; INTRAVENOUS; SUBCUTANEOUS
Qty: 0 | Refills: 0 | Status: DISCONTINUED | OUTPATIENT
Start: 2019-04-03 | End: 2019-04-07

## 2019-04-03 RX ADMIN — SODIUM CHLORIDE 150 MILLILITER(S): 9 INJECTION INTRAMUSCULAR; INTRAVENOUS; SUBCUTANEOUS at 11:48

## 2019-04-03 NOTE — ED PROVIDER NOTE - ATTENDING CONTRIBUTION TO CARE
72 y/o F h/o asthma, copd, breast ca, lung ca, brain tumor, carcinoid tumor c/o epigastric area pain to me (c/o R sided CP to PA) last night and this am, now resolved.  Pain worse w movement, constant, dull, no radiation, no associated palpitations, sob, n/v/d, change w po's, fever, chills.  + relief w tylenol pta.  Well appearing, nad, nc/at, lung cta, heart reg, abd soft, nt, ext no gross deformity, no gross neuro deficits   Labs wnl.  Pt feeling well, no complaints.  Will dc to fu pmd.

## 2019-04-03 NOTE — ED PROVIDER NOTE - OBJECTIVE STATEMENT
The pt is a 72 y/o F, who presents to ED c/o R sided CP last night. Pt states pain was constant, dull, non radiating, aggravated w/mov, took tyl w/complete relief, currently cp free. Denies palpitations, sob, n/v/d, abd pain, fevers, chills, leg pain or swelling.

## 2019-04-03 NOTE — ED ADULT TRIAGE NOTE - CHIEF COMPLAINT QUOTE
pt c/o nausea and chest pain since this morning, took zofran PO at 1030am with relief. pmhx lung CA, brain CA. denies SOB, dizziness. ekg completed.

## 2019-04-03 NOTE — ED ADULT NURSE NOTE - NSIMPLEMENTINTERV_GEN_ALL_ED
Implemented All Universal Safety Interventions:  [place] to call system. Call bell, personal items and telephone within reach. Instruct patient to call for assistance. Room bathroom lighting operational. Non-slip footwear when patient is off stretcher. Physically safe environment: no spills, clutter or unnecessary equipment. Stretcher in lowest position, wheels locked, appropriate side rails in place.

## 2019-04-03 NOTE — ED PROVIDER NOTE - CLINICAL SUMMARY MEDICAL DECISION MAKING FREE TEXT BOX
pt had some r sided cp yest - related to mov, took tyl and pain resolved, no cp today, no sob/palpitation/dizziness, do not suspect pe, ekg non ischemic - do not suspect acs and given pain > 12 hrs ago a single trop is sufficient r/o, doubt dissection given no pain at this time, likely msk, to f/u w/pmd, pt understands and agrees w/plan

## 2019-04-03 NOTE — ED PROVIDER NOTE - CARE PROVIDER_API CALL
Chantel Tellez)  Critical Care Medicine; Internal Medicine  [address], [address]  [place], [place] [address]  Phone: [phone]  Fax: [phone]  Follow Up Time:

## 2019-04-04 ENCOUNTER — APPOINTMENT (OUTPATIENT)
Dept: THORACIC SURGERY | Facility: CLINIC | Age: 74
End: 2019-04-04
Payer: MEDICARE

## 2019-04-04 PROCEDURE — 99214 OFFICE O/P EST MOD 30 MIN: CPT

## 2019-04-04 NOTE — PHYSICAL EXAM
[] : no respiratory distress [Respiration, Rhythm And Depth] : normal respiratory rhythm and effort [Exaggerated Use Of Accessory Muscles For Inspiration] : no accessory muscle use [Auscultation Breath Sounds / Voice Sounds] : lungs were clear to auscultation bilaterally [Apical Impulse] : the apical impulse was normal [Heart Rate And Rhythm] : heart rate was normal and rhythm regular [Heart Sounds] : normal S1 and S2 [2+] : left 2+ [Oriented To Time, Place, And Person] : oriented to person, place, and time

## 2019-04-05 NOTE — HISTORY OF PRESENT ILLNESS
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 She presents today for a follow up visit. \par \par She completed brain RT with Dr. Kenton Owens.\par \par On 6/6/17 she underwent radiofrequency ablation of a metastatic breast cancer lesion in the liver. On 6/29/17 the patient underwent a colonoscopy with Dr. 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 She subsequently underwent laparoscopic Right hemicolectomy on 7/6/17. 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 She continues to be followed by Dr. Li Hampton. \par \par CT chest done 3/6/18 showed no evidence of local recurrence of disease, and no intrathoracic adenopathy. Slightly decrease in size of previously ablated right hepatic lobe lesion.\par \par On 5/10/18 patient underwent exploration of RIGHT cranioplasty with excision of RIGHT scalp mass. 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 \par \par On 5/14/18 she presented to the [facility] ED with complaints of epigastric pain. CT angio was negative for PE, and there was no evidence of disease recurrence; bilateral pulmonary nodules were stable. \par \par On 5/15/18 she was brought to the [facility] ED on the recommendation of her psychiatrist for an emotional and threats of violence against her . She was evaluated by neurosurgery and psychiatry. Head CT was done which showed a slightly greater degree of diminished attenuation within the right frontal subcortical, likely edema. She was started on Abilify, which has since been stopped.\par \par She was seen by Dr. Hampton on 5/24/18, who again recommended systemic chemotherapy, however the patient again refused. She continues to take Femara, however she is no longer taking ribociclib. \par \par Restaging PET CT done on 6/3/18 showed no abnormal hypermetabolic activity in the lungs or mediastinum, nor in the metastatic liver lesion.\par \par ECHO done 6/6/18 to evaluate acute shortness of breath. \par ECHO revealed mild LV hypertrophy. EF of 60-65%. No evidence of valvular disease. Normal right atrial pressure.\par \par MRI brain completed on 12/7/18:\par - interval decreased size and enhancement and improved edema and mass effect of right frontal radiation necrosis. \par - no new abnormal enhancement\par \par PET CT completed on 01/07/19:\par -new foci of abnormal FDG uptake in hepatic segments II and VII without CT correlate. \par -no change in the previously ablated 3.6cm hypoattenuating lesion in the hepatic segment Coleen without abnormal FDG-uptake\par -no change in the Right middle lobe nodule since June 2018 \par \par MRI abdomen completed on 03/19/19:\par -no interval change in the 7-8 lesions within the Right and left lobe of the liver\par -postablation lesion in hepatic segment 4 lesion, unchanged \par \par Thoracic Tumor Board recommendation was to have the liver lesion biopsied, however was unable to biopsied. \par

## 2019-04-05 NOTE — ASSESSMENT
[FreeTextEntry1] : 74 y/o female nearly five years s/p Right upper lobectomy. 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 \par \par Overall, she generally appears well, she reports that she presented to the ED yesterday with right sided chest pain, which has now resolved. \par \par Recent PET CT reviewed. From a thoracic standpoint she is stable, but there were three new liver lesions that will need to be further addressed. She underwent an MRI, which was reviewed and per IR they were to small to be biopsied.\par \par She has plans to see Dr. Gonzales tomorrow in medical oncology. Will defer to him for further treatment of liver masses. She will follow-up here as needed. \par \par I have reviewed the patient's medical records and diagnostic images at the time of this office visit and have made the following recommendations\par Plan:\par 1. Continue follow-up with Dr. Gonzales in medical oncology and Dr. Muniz from neurosurgery\par 2. Follow-up here as needed

## 2019-04-05 NOTE — END OF VISIT
[FreeTextEntry3] : All medical record entries made by the Scribe were at my, Dr. Garcia's direction and personally dictated by me on 04/04/2019  I have reviewed the chart and agree that the record accurately reflects my personal performance of the history, physical exam, assessment and plan. I have also personally directed, reviewed, and agreed with the chart.

## 2019-04-05 NOTE — ADDENDUM
[FreeTextEntry1] : Documented by Mathieu South acting as a scribe for Dr. Hector Garcia on 04/04/2019

## 2019-04-07 ENCOUNTER — INPATIENT (INPATIENT)
Facility: HOSPITAL | Age: 74
LOS: 7 days | Discharge: HOME HEALTH | DRG: 392 | End: 2019-04-15
Attending: INTERNAL MEDICINE | Admitting: INTERNAL MEDICINE
Payer: MEDICARE

## 2019-04-07 VITALS
DIASTOLIC BLOOD PRESSURE: 75 MMHG | RESPIRATION RATE: 16 BRPM | SYSTOLIC BLOOD PRESSURE: 124 MMHG | OXYGEN SATURATION: 95 % | TEMPERATURE: 97 F | HEART RATE: 82 BPM

## 2019-04-07 DIAGNOSIS — Z41.9 ENCOUNTER FOR PROCEDURE FOR PURPOSES OTHER THAN REMEDYING HEALTH STATE, UNSPECIFIED: Chronic | ICD-10-CM

## 2019-04-07 DIAGNOSIS — Z98.890 OTHER SPECIFIED POSTPROCEDURAL STATES: Chronic | ICD-10-CM

## 2019-04-07 DIAGNOSIS — Z79.899 OTHER LONG TERM (CURRENT) DRUG THERAPY: ICD-10-CM

## 2019-04-07 DIAGNOSIS — R07.89 OTHER CHEST PAIN: ICD-10-CM

## 2019-04-07 DIAGNOSIS — Z88.1 ALLERGY STATUS TO OTHER ANTIBIOTIC AGENTS STATUS: ICD-10-CM

## 2019-04-07 DIAGNOSIS — Z79.82 LONG TERM (CURRENT) USE OF ASPIRIN: ICD-10-CM

## 2019-04-07 DIAGNOSIS — Z91.048 OTHER NONMEDICINAL SUBSTANCE ALLERGY STATUS: ICD-10-CM

## 2019-04-07 DIAGNOSIS — J44.9 CHRONIC OBSTRUCTIVE PULMONARY DISEASE, UNSPECIFIED: ICD-10-CM

## 2019-04-07 DIAGNOSIS — C50.919 MALIGNANT NEOPLASM OF UNSPECIFIED SITE OF UNSPECIFIED FEMALE BREAST: Chronic | ICD-10-CM

## 2019-04-07 DIAGNOSIS — Z90.49 ACQUIRED ABSENCE OF OTHER SPECIFIED PARTS OF DIGESTIVE TRACT: Chronic | ICD-10-CM

## 2019-04-07 LAB — MAGNESIUM SERPL-MCNC: 1.6 MG/DL — SIGNIFICANT CHANGE UP (ref 1.6–2.6)

## 2019-04-07 PROCEDURE — 74177 CT ABD & PELVIS W/CONTRAST: CPT | Mod: 26

## 2019-04-07 PROCEDURE — 71045 X-RAY EXAM CHEST 1 VIEW: CPT | Mod: 26

## 2019-04-07 PROCEDURE — 93010 ELECTROCARDIOGRAM REPORT: CPT | Mod: 59

## 2019-04-07 PROCEDURE — 74018 RADEX ABDOMEN 1 VIEW: CPT | Mod: 26

## 2019-04-07 PROCEDURE — 99285 EMERGENCY DEPT VISIT HI MDM: CPT | Mod: 25

## 2019-04-07 RX ORDER — SODIUM CHLORIDE 9 MG/ML
1000 INJECTION INTRAMUSCULAR; INTRAVENOUS; SUBCUTANEOUS ONCE
Qty: 0 | Refills: 0 | Status: COMPLETED | OUTPATIENT
Start: 2019-04-07 | End: 2019-04-07

## 2019-04-07 RX ORDER — IOHEXOL 300 MG/ML
30 INJECTION, SOLUTION INTRAVENOUS ONCE
Qty: 0 | Refills: 0 | Status: COMPLETED | OUTPATIENT
Start: 2019-04-07 | End: 2019-04-07

## 2019-04-07 RX ORDER — FAMOTIDINE 10 MG/ML
20 INJECTION INTRAVENOUS ONCE
Qty: 0 | Refills: 0 | Status: COMPLETED | OUTPATIENT
Start: 2019-04-07 | End: 2019-04-07

## 2019-04-07 RX ORDER — DOCUSATE SODIUM 100 MG
100 CAPSULE ORAL
Qty: 0 | Refills: 0 | Status: DISCONTINUED | OUTPATIENT
Start: 2019-04-07 | End: 2019-04-09

## 2019-04-07 RX ORDER — PIPERACILLIN AND TAZOBACTAM 4; .5 G/20ML; G/20ML
3.38 INJECTION, POWDER, LYOPHILIZED, FOR SOLUTION INTRAVENOUS ONCE
Qty: 0 | Refills: 0 | Status: COMPLETED | OUTPATIENT
Start: 2019-04-07 | End: 2019-04-07

## 2019-04-07 RX ORDER — ONDANSETRON 8 MG/1
4 TABLET, FILM COATED ORAL ONCE
Qty: 0 | Refills: 0 | Status: COMPLETED | OUTPATIENT
Start: 2019-04-07 | End: 2019-04-07

## 2019-04-07 RX ADMIN — ONDANSETRON 4 MILLIGRAM(S): 8 TABLET, FILM COATED ORAL at 18:32

## 2019-04-07 RX ADMIN — FAMOTIDINE 20 MILLIGRAM(S): 10 INJECTION INTRAVENOUS at 18:32

## 2019-04-07 RX ADMIN — IOHEXOL 30 MILLILITER(S): 300 INJECTION, SOLUTION INTRAVENOUS at 18:35

## 2019-04-07 RX ADMIN — PIPERACILLIN AND TAZOBACTAM 200 GRAM(S): 4; .5 INJECTION, POWDER, LYOPHILIZED, FOR SOLUTION INTRAVENOUS at 18:49

## 2019-04-07 RX ADMIN — SODIUM CHLORIDE 1000 MILLILITER(S): 9 INJECTION INTRAMUSCULAR; INTRAVENOUS; SUBCUTANEOUS at 18:32

## 2019-04-07 NOTE — ED PROVIDER NOTE - OBJECTIVE STATEMENT
72 yo F with hx of carcinoid  met to liver and brain s/p crani 1 year ago with abd pain0 rlq- since am today- nausea diarrhea  constipation x 3 days took a laxative with partial relief  earlier this afternoon still will rla pain no fevers or chills   no  ruq pain no sob or cp  no flank pain or dysuria 72 yo F with hx of carcinoid  met to liver and brain s/p craniotomy 1 year ago with abd pain- rlq- since am today- nausea diarrhea  constipation x 3 days took a laxative with partial relief  earlier this afternoon still will rlq abd pain no fevers or chills- no  ruq pain no sob or cp-  no flank pain or dysuria

## 2019-04-07 NOTE — ED PROVIDER NOTE - CARE PLAN
Principal Discharge DX:	UTI (urinary tract infection)  Secondary Diagnosis:	Abdominal pain Principal Discharge DX:	UTI (urinary tract infection)  Secondary Diagnosis:	Abdominal pain  Secondary Diagnosis:	Colitis

## 2019-04-07 NOTE — ED PROVIDER NOTE - CLINICAL SUMMARY MEDICAL DECISION MAKING FREE TEXT BOX
72 yo F with hx of met carcinoid tumors, with rlq pain and diarrhea x 1 day 0 still with lower abd pain noted coitis on CT will require admission for iv hydration antibiotics and GI eval

## 2019-04-07 NOTE — ED ADULT NURSE NOTE - NSIMPLEMENTINTERV_GEN_ALL_ED
Implemented All Universal Safety Interventions:  [place] to call system. Call bell, personal items and telephone within reach. Instruct patient to call for assistance. Room bathroom lighting operational. Non-slip footwear when patient is off stretcher. Physically safe environment: no spills, clutter or unnecessary equipment. Stretcher in lowest position, wheels locked, appropriate side rails in place.

## 2019-04-07 NOTE — ED ADULT NURSE NOTE - OBJECTIVE STATEMENT
Pt BIBA from home CO lower Abd Pain with N/V starting this morning.   states "I'm worried she's sick."  Pt denies SOB, Fevers, CP, and Dizziness.

## 2019-04-08 DIAGNOSIS — R63.8 OTHER SYMPTOMS AND SIGNS CONCERNING FOOD AND FLUID INTAKE: ICD-10-CM

## 2019-04-08 DIAGNOSIS — C50.919 MALIGNANT NEOPLASM OF UNSPECIFIED SITE OF UNSPECIFIED FEMALE BREAST: ICD-10-CM

## 2019-04-08 DIAGNOSIS — D3A.00 BENIGN CARCINOID TUMOR OF UNSPECIFIED SITE: ICD-10-CM

## 2019-04-08 DIAGNOSIS — R31.9 HEMATURIA, UNSPECIFIED: ICD-10-CM

## 2019-04-08 DIAGNOSIS — Z91.89 OTHER SPECIFIED PERSONAL RISK FACTORS, NOT ELSEWHERE CLASSIFIED: ICD-10-CM

## 2019-04-08 DIAGNOSIS — R10.9 UNSPECIFIED ABDOMINAL PAIN: ICD-10-CM

## 2019-04-08 DIAGNOSIS — Z90.49 ACQUIRED ABSENCE OF OTHER SPECIFIED PARTS OF DIGESTIVE TRACT: Chronic | ICD-10-CM

## 2019-04-08 LAB
ANION GAP SERPL CALC-SCNC: 12 MMOL/L — SIGNIFICANT CHANGE UP (ref 5–17)
BASOPHILS # BLD AUTO: 0.04 K/UL — SIGNIFICANT CHANGE UP (ref 0–0.2)
BASOPHILS NFR BLD AUTO: 0.3 % — SIGNIFICANT CHANGE UP (ref 0–2)
BLD GP AB SCN SERPL QL: NEGATIVE — SIGNIFICANT CHANGE UP
BUN SERPL-MCNC: 13 MG/DL — SIGNIFICANT CHANGE UP (ref 7–23)
CALCIUM SERPL-MCNC: 9.2 MG/DL — SIGNIFICANT CHANGE UP (ref 8.4–10.5)
CHLORIDE SERPL-SCNC: 102 MMOL/L — SIGNIFICANT CHANGE UP (ref 96–108)
CO2 SERPL-SCNC: 24 MMOL/L — SIGNIFICANT CHANGE UP (ref 22–31)
CREAT SERPL-MCNC: 1.03 MG/DL — SIGNIFICANT CHANGE UP (ref 0.5–1.3)
EOSINOPHIL # BLD AUTO: 0.16 K/UL — SIGNIFICANT CHANGE UP (ref 0–0.5)
EOSINOPHIL NFR BLD AUTO: 1.1 % — SIGNIFICANT CHANGE UP (ref 0–6)
GLUCOSE SERPL-MCNC: 101 MG/DL — HIGH (ref 70–99)
HCT VFR BLD CALC: 41.5 % — SIGNIFICANT CHANGE UP (ref 34.5–45)
HGB BLD-MCNC: 13.2 G/DL — SIGNIFICANT CHANGE UP (ref 11.5–15.5)
IMM GRANULOCYTES NFR BLD AUTO: 0.3 % — SIGNIFICANT CHANGE UP (ref 0–1.5)
LYMPHOCYTES # BLD AUTO: 1.45 K/UL — SIGNIFICANT CHANGE UP (ref 1–3.3)
LYMPHOCYTES # BLD AUTO: 9.9 % — LOW (ref 13–44)
MAGNESIUM SERPL-MCNC: 1.4 MG/DL — LOW (ref 1.6–2.6)
MCHC RBC-ENTMCNC: 29.1 PG — SIGNIFICANT CHANGE UP (ref 27–34)
MCHC RBC-ENTMCNC: 31.8 GM/DL — LOW (ref 32–36)
MCV RBC AUTO: 91.6 FL — SIGNIFICANT CHANGE UP (ref 80–100)
MONOCYTES # BLD AUTO: 0.92 K/UL — HIGH (ref 0–0.9)
MONOCYTES NFR BLD AUTO: 6.3 % — SIGNIFICANT CHANGE UP (ref 2–14)
NEUTROPHILS # BLD AUTO: 12.1 K/UL — HIGH (ref 1.8–7.4)
NEUTROPHILS NFR BLD AUTO: 82.1 % — HIGH (ref 43–77)
NRBC # BLD: 0 /100 WBCS — SIGNIFICANT CHANGE UP (ref 0–0)
PLATELET # BLD AUTO: 237 K/UL — SIGNIFICANT CHANGE UP (ref 150–400)
POTASSIUM SERPL-MCNC: 3.3 MMOL/L — LOW (ref 3.5–5.3)
POTASSIUM SERPL-SCNC: 3.3 MMOL/L — LOW (ref 3.5–5.3)
RBC # BLD: 4.53 M/UL — SIGNIFICANT CHANGE UP (ref 3.8–5.2)
RBC # FLD: 15.8 % — HIGH (ref 10.3–14.5)
RH IG SCN BLD-IMP: POSITIVE — SIGNIFICANT CHANGE UP
SODIUM SERPL-SCNC: 138 MMOL/L — SIGNIFICANT CHANGE UP (ref 135–145)
WBC # BLD: 14.72 K/UL — HIGH (ref 3.8–10.5)
WBC # FLD AUTO: 14.72 K/UL — HIGH (ref 3.8–10.5)

## 2019-04-08 PROCEDURE — 99232 SBSQ HOSP IP/OBS MODERATE 35: CPT

## 2019-04-08 PROCEDURE — 99222 1ST HOSP IP/OBS MODERATE 55: CPT | Mod: GC

## 2019-04-08 RX ORDER — ASPIRIN/CALCIUM CARB/MAGNESIUM 324 MG
81 TABLET ORAL DAILY
Qty: 0 | Refills: 0 | Status: DISCONTINUED | OUTPATIENT
Start: 2019-04-08 | End: 2019-04-15

## 2019-04-08 RX ORDER — ONDANSETRON 8 MG/1
4 TABLET, FILM COATED ORAL ONCE
Qty: 0 | Refills: 0 | Status: COMPLETED | OUTPATIENT
Start: 2019-04-08 | End: 2019-04-08

## 2019-04-08 RX ORDER — HEPARIN SODIUM 5000 [USP'U]/ML
5000 INJECTION INTRAVENOUS; SUBCUTANEOUS EVERY 8 HOURS
Qty: 0 | Refills: 0 | Status: DISCONTINUED | OUTPATIENT
Start: 2019-04-08 | End: 2019-04-15

## 2019-04-08 RX ORDER — PIPERACILLIN AND TAZOBACTAM 4; .5 G/20ML; G/20ML
3.38 INJECTION, POWDER, LYOPHILIZED, FOR SOLUTION INTRAVENOUS EVERY 6 HOURS
Qty: 0 | Refills: 0 | Status: DISCONTINUED | OUTPATIENT
Start: 2019-04-08 | End: 2019-04-15

## 2019-04-08 RX ORDER — NYSTATIN CREAM 100000 [USP'U]/G
1 CREAM TOPICAL
Qty: 0 | Refills: 0 | Status: DISCONTINUED | OUTPATIENT
Start: 2019-04-08 | End: 2019-04-15

## 2019-04-08 RX ORDER — ESCITALOPRAM OXALATE 10 MG/1
10 TABLET, FILM COATED ORAL DAILY
Qty: 0 | Refills: 0 | Status: DISCONTINUED | OUTPATIENT
Start: 2019-04-08 | End: 2019-04-15

## 2019-04-08 RX ORDER — LETROZOLE 2.5 MG/1
2.5 TABLET, FILM COATED ORAL DAILY
Qty: 0 | Refills: 0 | Status: DISCONTINUED | OUTPATIENT
Start: 2019-04-08 | End: 2019-04-15

## 2019-04-08 RX ORDER — FAMOTIDINE 10 MG/ML
20 INJECTION INTRAVENOUS ONCE
Qty: 0 | Refills: 0 | Status: COMPLETED | OUTPATIENT
Start: 2019-04-08 | End: 2019-04-08

## 2019-04-08 RX ORDER — SOD SULF/SODIUM/NAHCO3/KCL/PEG
2000 SOLUTION, RECONSTITUTED, ORAL ORAL ONCE
Qty: 0 | Refills: 0 | Status: COMPLETED | OUTPATIENT
Start: 2019-04-09 | End: 2019-04-09

## 2019-04-08 RX ORDER — TIOTROPIUM BROMIDE AND OLODATEROL 3.124; 2.736 UG/1; UG/1
2 SPRAY, METERED RESPIRATORY (INHALATION) DAILY
Qty: 0 | Refills: 0 | Status: DISCONTINUED | OUTPATIENT
Start: 2019-04-08 | End: 2019-04-15

## 2019-04-08 RX ORDER — POTASSIUM CHLORIDE 20 MEQ
40 PACKET (EA) ORAL EVERY 4 HOURS
Qty: 0 | Refills: 0 | Status: COMPLETED | OUTPATIENT
Start: 2019-04-08 | End: 2019-04-08

## 2019-04-08 RX ORDER — ACETAMINOPHEN 500 MG
650 TABLET ORAL ONCE
Qty: 0 | Refills: 0 | Status: COMPLETED | OUTPATIENT
Start: 2019-04-08 | End: 2019-04-08

## 2019-04-08 RX ORDER — MONTELUKAST 4 MG/1
10 TABLET, CHEWABLE ORAL DAILY
Qty: 0 | Refills: 0 | Status: DISCONTINUED | OUTPATIENT
Start: 2019-04-08 | End: 2019-04-15

## 2019-04-08 RX ORDER — LACOSAMIDE 50 MG/1
150 TABLET ORAL
Qty: 0 | Refills: 0 | Status: DISCONTINUED | OUTPATIENT
Start: 2019-04-08 | End: 2019-04-15

## 2019-04-08 RX ORDER — SOD SULF/SODIUM/NAHCO3/KCL/PEG
2000 SOLUTION, RECONSTITUTED, ORAL ORAL ONCE
Qty: 0 | Refills: 0 | Status: COMPLETED | OUTPATIENT
Start: 2019-04-08 | End: 2019-04-08

## 2019-04-08 RX ORDER — MAGNESIUM SULFATE 500 MG/ML
2 VIAL (ML) INJECTION EVERY 4 HOURS
Qty: 0 | Refills: 0 | Status: COMPLETED | OUTPATIENT
Start: 2019-04-08 | End: 2019-04-08

## 2019-04-08 RX ORDER — DEXAMETHASONE 0.5 MG/5ML
2 ELIXIR ORAL DAILY
Qty: 0 | Refills: 0 | Status: DISCONTINUED | OUTPATIENT
Start: 2019-04-08 | End: 2019-04-15

## 2019-04-08 RX ADMIN — ESCITALOPRAM OXALATE 10 MILLIGRAM(S): 10 TABLET, FILM COATED ORAL at 11:25

## 2019-04-08 RX ADMIN — NYSTATIN CREAM 1 APPLICATION(S): 100000 CREAM TOPICAL at 18:28

## 2019-04-08 RX ADMIN — Medication 100 MILLIGRAM(S): at 07:02

## 2019-04-08 RX ADMIN — FAMOTIDINE 20 MILLIGRAM(S): 10 INJECTION INTRAVENOUS at 21:58

## 2019-04-08 RX ADMIN — Medication 650 MILLIGRAM(S): at 16:49

## 2019-04-08 RX ADMIN — PIPERACILLIN AND TAZOBACTAM 200 GRAM(S): 4; .5 INJECTION, POWDER, LYOPHILIZED, FOR SOLUTION INTRAVENOUS at 02:35

## 2019-04-08 RX ADMIN — LACOSAMIDE 150 MILLIGRAM(S): 50 TABLET ORAL at 07:02

## 2019-04-08 RX ADMIN — ONDANSETRON 4 MILLIGRAM(S): 8 TABLET, FILM COATED ORAL at 21:58

## 2019-04-08 RX ADMIN — PIPERACILLIN AND TAZOBACTAM 200 GRAM(S): 4; .5 INJECTION, POWDER, LYOPHILIZED, FOR SOLUTION INTRAVENOUS at 08:20

## 2019-04-08 RX ADMIN — Medication 40 MILLIEQUIVALENT(S): at 13:21

## 2019-04-08 RX ADMIN — HEPARIN SODIUM 5000 UNIT(S): 5000 INJECTION INTRAVENOUS; SUBCUTANEOUS at 07:02

## 2019-04-08 RX ADMIN — MONTELUKAST 10 MILLIGRAM(S): 4 TABLET, CHEWABLE ORAL at 11:25

## 2019-04-08 RX ADMIN — Medication 81 MILLIGRAM(S): at 11:25

## 2019-04-08 RX ADMIN — Medication 2 MILLIGRAM(S): at 16:08

## 2019-04-08 RX ADMIN — LETROZOLE 2.5 MILLIGRAM(S): 2.5 TABLET, FILM COATED ORAL at 11:29

## 2019-04-08 RX ADMIN — Medication 50 GRAM(S): at 13:21

## 2019-04-08 RX ADMIN — Medication 2000 MILLILITER(S): at 18:47

## 2019-04-08 RX ADMIN — PIPERACILLIN AND TAZOBACTAM 200 GRAM(S): 4; .5 INJECTION, POWDER, LYOPHILIZED, FOR SOLUTION INTRAVENOUS at 19:30

## 2019-04-08 RX ADMIN — Medication 650 MILLIGRAM(S): at 16:09

## 2019-04-08 RX ADMIN — PIPERACILLIN AND TAZOBACTAM 200 GRAM(S): 4; .5 INJECTION, POWDER, LYOPHILIZED, FOR SOLUTION INTRAVENOUS at 14:21

## 2019-04-08 RX ADMIN — Medication 50 GRAM(S): at 09:26

## 2019-04-08 RX ADMIN — Medication 40 MILLIEQUIVALENT(S): at 09:27

## 2019-04-08 RX ADMIN — LACOSAMIDE 150 MILLIGRAM(S): 50 TABLET ORAL at 18:28

## 2019-04-08 RX ADMIN — TIOTROPIUM BROMIDE AND OLODATEROL 2 PUFF(S): 3.124; 2.736 SPRAY, METERED RESPIRATORY (INHALATION) at 11:26

## 2019-04-08 NOTE — CONSULT NOTE ADULT - SUBJECTIVE AND OBJECTIVE BOX
note:               Team called to consult 73 year old female w/ significant cancer history and heavy smoking 2-3ppd x 15 years, w/ sudden onset hematuria x 2 episodes.  Hematuria was first noted last night, and again this AM when patient urinated.  As per nurse patient had gross hematuria during her morning pee with some visible clots.  Patient does not endorse any pain or discomfort to  area.  Patient was bladder scanned to assess for retention  but yielded <50cc in bladder. Patient denies dysuria, suprapubic tenderness, urge incontinence, and    HPI:  History limited as pt reports some confusion - consistent w/ outpatient records,  72 yo F w/ PMHx of heavy smoking, COPD, metastatic breast cancer (liver, chest wall), s/p right upper lobectomy (2014), metastatic brain lesion s/p craniotomy with tumor resection 4/2017, carcinoid tumor of ileum s/p R hemicolectomy 7/2017, s/p excision of scalp mass 5/10/18 with SBRT presents from home w/ abdominal pain and nausea x 1 day.  Pt unable to qualify abdominal pain currently.  Previously told ED staff that it was predominantly in RLQ.  Pt also reported constipation x 3 days and took a laxative w/ subsequent loose stool.  Pt also reports new onset hematuria that started today.  ROS negative for recent changes in weight, HA, changes in vision, fevers, chills, vomiting, CP, SOB, palpitations, flank pain, dysuria and LE edema.     From outpt records:   ECHO 6/2018 revealed mild LV hypertrophy. EF of 60-65%. No evidence of valvular disease. Normal right atrial pressure.    MRI brain completed on 12/7/18:  - interval decreased size and enhancement and improved edema and mass effect of right frontal radiation necrosis.   - no new abnormal enhancement    PET CT completed on 01/07/19:  -new foci of abnormal FDG uptake in hepatic segments II and VII without CT correlate.   -no change in the previously ablated 3.6cm hypoattenuating lesion in the hepatic segment Coleen without abnormal FDG-uptake  -no change in the Right middle lobe nodule since June 2018     MRI abdomen completed on 03/19/19:  -no interval change in the 7-8 lesions within the Right and left lobe of the liver  -postablation lesion in hepatic segment 4 lesion, unchanged (08 Apr 2019 00:42)      Vital Signs Last 24 Hrs  T(C): 36.8 (08 Apr 2019 15:46), Max: 37.6 (08 Apr 2019 08:15)  T(F): 98.2 (08 Apr 2019 15:46), Max: 99.7 (08 Apr 2019 08:15)  HR: 87 (08 Apr 2019 15:46) (86 - 97)  BP: 110/58 (08 Apr 2019 15:46) (110/58 - 159/86)  BP(mean): --  RR: 18 (08 Apr 2019 15:46) (16 - 18)  SpO2: 97% (08 Apr 2019 15:46) (95% - 99%)  I&O's Summary      PE:  Gen: Answers questions and speaks in full sentences. NAD.  Abd: soft nt/nd. NEgative CVAT b/L  : +BRP      LABS:                        13.2   14.72 )-----------( 237      ( 08 Apr 2019 05:54 )             41.5     04-08    138  |  102  |  13  ----------------------------<  101<H>  3.3<L>   |  24  |  1.03    Ca    9.2      08 Apr 2019 05:54  Mg     1.4     04-08    TPro  7.0  /  Alb  4.0  /  TBili  0.4  /  DBili  x   /  AST  17  /  ALT  18  /  AlkPhos  54  04-07    PT/INR - ( 07 Apr 2019 18:28 )   PT: 10.4 sec;   INR: 0.92          PTT - ( 07 Apr 2019 18:28 )  PTT:26.8 sec  Cultures  Culture Results:   No growth at 12 hours (04-07 @ 20:40)  Culture Results:   No growth at 12 hours (04-07 @ 20:40)  Culture Results:   No growth to date. (04-07 @ 20:37)      A/P

## 2019-04-08 NOTE — H&P ADULT - PROBLEM SELECTOR PLAN 1
Pt w/ abdominal pain w/ associated constipation.  UA weakly positive.  Reports hematuria x 1 day.  Had nausea without vomiting.  WBC elevated.  No associated fevers.  Lactate WNL.  Pt had BM today - loose after taking laxative.   - C/w zosyn  - F/u blood cx  - F/u urine cx  - Docusate

## 2019-04-08 NOTE — H&P ADULT - PROBLEM SELECTOR PLAN 2
New onset.  Pt w/ complicated oncologic hx.  UA weakly positive w/ small blood.   - Urine cytopathology  - C/w zosyn

## 2019-04-08 NOTE — CONSULT NOTE ADULT - ASSESSMENT
Patient is a 73 year old female w/ significant cancer history and heavy smoking w/ hematuria.  UA can be repeated due to RBC's not being present in UA results.  If repeat UA still presents with not RBC's in sample then can consider Gyn consult.  Otherwise patient can follow up w/ Dr. Rodriguez for Office cystoscopy.    Plan:  -Repeat UA, if repeat has no RBC's found in sample then consider Gyn Consult  -Patient can follow up w/ Dr. Rodriguez in office for office Cystoscopy.  -Will follow.   -Plan D/w  team

## 2019-04-08 NOTE — H&P ADULT - ASSESSMENT
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

## 2019-04-08 NOTE — H&P ADULT - ATTENDING COMMENTS
Patient seen and examined with house-staff during bedside rounds  Resident note read, including vitals, physical findings, laboratory data, and radiological reports.   Revisions included below.  Case discussed with House staff  Direct personal management at bedside  and extensive interpretation of data. Decision making of high complexity.    Plans noted for colonoscopy tomorrow; Patient is cleared for the procedure
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
## 2019-04-08 NOTE — H&P ADULT - NSHPLABSRESULTS_GEN_ALL_CORE
.  LABS:                         13.1   14.09 )-----------( 222      ( 2019 18:28 )             40.7     -    144  |  102  |  18  ----------------------------<  139<H>  3.2<L>   |  27  |  0.93    Ca    10.1      2019 18:28  Mg     1.6         TPro  7.0  /  Alb  4.0  /  TBili  0.4  /  DBili  x   /  AST  17  /  ALT  18  /  AlkPhos  54      PT/INR - ( 2019 18:28 )   PT: 10.4 sec;   INR: 0.92          PTT - ( 2019 18:28 )  PTT:26.8 sec  Urinalysis Basic - ( 2019 20:11 )    Color: Yellow / Appearance: SL Cloudy / S.025 / pH: x  Gluc: x / Ketone: 40 mg/dL  / Bili: Negative / Urobili: 0.2 E.U./dL   Blood: x / Protein: NEGATIVE mg/dL / Nitrite: NEGATIVE   Leuk Esterase: Trace / RBC: < 5 /HPF / WBC > 10 /HPF   Sq Epi: x / Non Sq Epi: 0-5 /HPF / Bacteria: Present /HPF      CARDIAC MARKERS ( 2019 18:28 )  x     / <0.01 ng/mL / 32 U/L / x     / 1.7 ng/mL        Lactate, Blood: 1.6 mmoL/L ( @ 18:28)      RADIOLOGY, EKG & ADDITIONAL TESTS: Reviewed.    CT a/p w/ oral and IV contrast:  1.  Bowel wall thickening of descending and rectosigmoid colon,   suspicious for that is of infectious or inflammatory etiology.  2.  2 hypodensities in the liver, may represent metastasis. Please   correlate with prior imaging since patient has reported history of liver   metastasis.  3.  Significantly dilated esophagus distended with oral contrast.  4.  0.5 cm right perifissural nodule. Given patient's history of cancer,   short interval follow-up is recommended.  5.  Deflated and rim calcified saline implant in the left breast. Please   correlate with patient's past history.

## 2019-04-08 NOTE — CONSULT NOTE ADULT - SUBJECTIVE AND OBJECTIVE BOX
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 ED on 4/7/19 with complaints of abdominal pain and nausea/vomiting x 2 days.  Pt unable to qualify abdominal pain currently.  Previously told ED staff that it was predominantly in RLQ.  Pt also reported constipation x 3 days and took a laxative w/ subsequent loose stool.  Pt also reports new onset hematuria that started today.  ROS negative for recent changes in weight, HA, changes in vision, fevers, chills, vomiting, CP, SOB, palpitations, flank pain, dysuria and LE edema.     From outpt records:   ECHO 6/2018 revealed mild LV hypertrophy. EF of 60-65%. No evidence of valvular disease. Normal right atrial pressure.    MRI brain completed on 12/7/18:  - interval decreased size and enhancement and improved edema and mass effect of right frontal radiation necrosis.   - no new abnormal enhancement    PET CT completed on 01/07/19:  -new foci of abnormal FDG uptake in hepatic segments II and VII without CT correlate.   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, e/p excision of scalp mass 5/10/2018 with SBRT  PSHx: craniotomy, hemicolectomy, L upper lobe lobectomy, scalp mass excision  Allergies: NKDA  Meds: Decadron 2 mg daily, Reglan, Vimpat, Oxybutinin, Xanax, Trazodone, ASA 81, Singulair 10, Femara, Lexapro, Stiolto, Omeprazole  SH: Former smoker (1 PPD, quit many years ago, unable to recall smoking history duration), denies EtOH/illicit drug use  FH: HTN, DM      MEDICATIONS:  MEDICATIONS  (STANDING):  aspirin enteric coated 81 milliGRAM(s) Oral daily  docusate sodium 100 milliGRAM(s) Oral two times a day  escitalopram 10 milliGRAM(s) Oral daily  heparin  Injectable 5000 Unit(s) SubCutaneous every 8 hours  lacosamide 150 milliGRAM(s) Oral two times a day  letrozole 2.5 milliGRAM(s) Oral daily  magnesium sulfate  IVPB 2 Gram(s) IV Intermittent every 4 hours  montelukast 10 milliGRAM(s) Oral daily  nystatin Powder 1 Application(s) Topical two times a day  piperacillin/tazobactam IVPB. 3.375 Gram(s) IV Intermittent every 6 hours  potassium chloride    Tablet ER 40 milliEquivalent(s) Oral every 4 hours  tiotropium 2.5 MICROgram(s)/olodaterol 2.5 MICROgram(s) (STIOLTO) Inhaler 2 Puff(s) Inhalation daily    MEDICATIONS  (PRN):    PAST MEDICAL & SURGICAL HISTORY:  Brain metastasis  Liver metastasis  H/O hemicolectomy  H/O craniotomy    FAMILY HISTORY:  No pertinent family history in first degree relatives    SOCIAL HISTORY:  [place]: [ ] Current, [ ] Former, [ ] Never; Pack Years:  Alcohol:  Illicit Drugs:    REVIEW OF SYSTEMS:  Constitutional: No fever, chills, weight loss, or fatigue  ENMT:  No visual changes; No difficulty hearing, tinnitus, vertigo; No sinus or throat pain  Neck: No pain or stiffness  Respiratory: No cough, wheezing, or hemoptysis; No shortness of breath  Cardiovascular: No chest pain, palpitations, dizziness, orthopnea, PND, or leg swelling  Gastrointestinal: No abdominal or epigastric pain. No  nausea, vomiting, or hematemesis. No diarrhea, constipation, or steatorrhea. No melena or hematochezia  Genitourinary: No dysuria, urinary frequency/hesitancy, or hematuria  Skin: No itching, burning, rashes or lesions   Musculoskeletal: No joint pain or swelling; No muscle, back or extremity pain    Vital Signs Last 24 Hrs  T(C): 36.7 (08 Apr 2019 09:43), Max: 37.6 (08 Apr 2019 08:15)  T(F): 98 (08 Apr 2019 09:43), Max: 99.7 (08 Apr 2019 08:15)  HR: 96 (08 Apr 2019 09:43) (82 - 97)  BP: 113/59 (08 Apr 2019 09:43) (113/59 - 159/86)  BP(mean): --  RR: 16 (08 Apr 2019 09:43) (16 - 18)  SpO2: 96% (08 Apr 2019 09:43) (95% - 99%)      PHYSICAL EXAM:    General: Well developed; well nourished; in no acute distress  HEENT: MMM, conjunctiva and sclera clear  Gastrointestinal: Soft, non-tender non-distended; Normal bowel sounds; No rebound or guarding  Extremities: Normal range of motion, No clubbing, cyanosis or edema  Neurological: Alert and oriented x3  Skin: Warm and dry. No obvious rash    LABS:                        13.2   14.72 )-----------( 237      ( 08 Apr 2019 05:54 )             41.5     04-08    138  |  102  |  13  ----------------------------<  101<H>  3.3<L>   |  24  |  1.03    Ca    9.2      08 Apr 2019 05:54  Mg     1.4     04-08    TPro  7.0  /  Alb  4.0  /  TBili  0.4  /  DBili  x   /  AST  17  /  ALT  18  /  AlkPhos  54  04-07      Culture Results:   No growth at 12 hours (04-07 @ 20:40)  Culture Results:   No growth at 12 hours (04-07 @ 20:40)  Culture Results:   No growth to date. 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 ED on 4/7/19 with complaints of abdominal pain and nausea/vomiting x 2 days.  Patient AA0x3 however provides a limited history for which her  was able to provide additional information. Patient reports her abdominal pain began 1-2 days ago, is localized to the wayne-umbilical region, is intermittent, non-radiating, sharp in quality. Unable to quantitate severity of pain.   Per patient's , patient was thought to be constipated for several days leading up to this hospitalization. At home, she was given Miralax and Colace. Shortly afterwards (approximately 1 hr later), patient was noted to have a large bout of diarrhea at home. Last episode of diarrhea, vomiting prior to arrival to the ED. She continues to endorse intermittent nausea and more recently patient endorses hematuria (no prior history).  ROS negative for fevers, chills, NS, dizziness, chest pain, dysphagia, odynophagia, shortness of breath, cough. Remainder of ROS negative.     Upon arrival to the ED, VSS, afebrile, HD stable. Labs notable for leukocytosis of 14. Negative lactate. UA positive. CT A/P demonstrated bowel wall thickening of descending and rectosigmoid colon, c/w colitis (infectious vs inflammatory vs ischemic?), 2 hypodensities in the liver (indeterminate), markedly dilated esophagus distended with oral contrast-superimposed GE reflux, dilated and rim calcified saline impact in L breast.     Last colonoscopy/EGD <10 years ago per patient however per , patient never underwent one given multiple underlying comorbidities.    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, e/p excision of scalp mass 5/10/2018 with SBRT  PSHx: craniotomy, hemicolectomy, L upper lobe lobectomy, scalp mass excision  Allergies: NKDA  Meds: Decadron 2 mg daily, Reglan, Vimpat, Oxybutinin, Xanax, Trazodone, ASA 81, Singulair 10, Femara, Lexapro, Stiolto, Omeprazole  SH: Former smoker (1 PPD, quit many years ago, unable to recall smoking history duration), denies EtOH/illicit drug use  FH: HTN, DM      MEDICATIONS:  MEDICATIONS  (STANDING):  aspirin enteric coated 81 milliGRAM(s) Oral daily  docusate sodium 100 milliGRAM(s) Oral two times a day  escitalopram 10 milliGRAM(s) Oral daily  heparin  Injectable 5000 Unit(s) SubCutaneous every 8 hours  lacosamide 150 milliGRAM(s) Oral two times a day  letrozole 2.5 milliGRAM(s) Oral daily  magnesium sulfate  IVPB 2 Gram(s) IV Intermittent every 4 hours  montelukast 10 milliGRAM(s) Oral daily  nystatin Powder 1 Application(s) Topical two times a day  piperacillin/tazobactam IVPB. 3.375 Gram(s) IV Intermittent every 6 hours  potassium chloride    Tablet ER 40 milliEquivalent(s) Oral every 4 hours  tiotropium 2.5 MICROgram(s)/olodaterol 2.5 MICROgram(s) (STIOLTO) Inhaler 2 Puff(s) Inhalation daily    REVIEW OF SYSTEMS:  ROS as noted above    Vital Signs Last 24 Hrs  T(C): 36.7 (08 Apr 2019 09:43), Max: 37.6 (08 Apr 2019 08:15)  T(F): 98 (08 Apr 2019 09:43), Max: 99.7 (08 Apr 2019 08:15)  HR: 96 (08 Apr 2019 09:43) (82 - 97)  BP: 113/59 (08 Apr 2019 09:43) (113/59 - 159/86)  BP(mean): --  RR: 16 (08 Apr 2019 09:43) (16 - 18)  SpO2: 96% (08 Apr 2019 09:43) (95% - 99%)      PHYSICAL EXAM:    General: Well developed; well nourished , non-toxic appearing; NAD, AA0x3 however with intermittent delayed terse responses  HEENT: MMM, conjunctiva and sclera clear  CV: S1S2, RRR, no m/r/g appreciated on auscultation  Lungs: trace rales at bases, remainder CTA b/l  Gastrointestinal: Soft, non-distended; tender to palpation in periumbical region as well as LLQ, decreased bowel sounds; No rebound or guarding  Extremities: Normal range of motion, No clubbing, cyanosis or edema  Neurological: Alert and oriented x3, CN II-XII grossly intact, no focal deficits  Skin: Warm and dry. No obvious rash    LABS:                        13.2   14.72 )-----------( 237      ( 08 Apr 2019 05:54 )             41.5     04-08    138  |  102  |  13  ----------------------------<  101<H>  3.3<L>   |  24  |  1.03    Ca    9.2      08 Apr 2019 05:54  Mg     1.4     04-08    TPro  7.0  /  Alb  4.0  /  TBili  0.4  /  DBili  x   /  AST  17  /  ALT  18  /  AlkPhos  54  04-07      Culture Results:   No growth at 12 hours (04-07 @ 20:40)  Culture Results:   No growth at 12 hours (04-07 @ 20:40)  Culture Results:   No growth to date. 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  Patient AA0x3 however provides a limited history for which her  was able to provide additional information. Patient reports her abdominal pain began 1-2 days ago, is localized to the wayne-umbilical region, is intermittent, non-radiating, sharp in quality. Unable to quantitate severity of pain. Per patient's , patient was thought to be constipated for several days leading up to this hospitalization. At home, she was given Miralax and Colace. Shortly afterwards (approximately 1 hr later), patient was noted to have a large bout of diarrhea at home. Last episode of diarrhea, vomiting prior to arrival to the ED. She continues to endorse intermittent nausea and more recently patient endorses hematuria (no prior history).  ROS negative for fevers, chills, NS, dizziness, chest pain, dysphagia, odynophagia, shortness of breath, cough. Remainder of ROS negative.     Upon arrival to the ED, VSS, afebrile, HD stable. Labs notable for leukocytosis of 14. Negative lactate. UA positive. CT A/P demonstrated bowel wall thickening of descending and rectosigmoid colon, c/w colitis (infectious vs inflammatory vs ischemic?), 2 hypodensities in the liver (indeterminate), markedly dilated esophagus distended with oral contrast-superimposed GE reflux, dilated and rim calcified saline impact in L breast.     Last colonoscopy/EGD <10 years ago per patient however per , patient never underwent one given multiple underlying comorbidities.    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, e/p excision of scalp mass 5/10/2018 with SBRT  PSHx: craniotomy, hemicolectomy, L upper lobe lobectomy, scalp mass excision  Allergies: NKDA  Meds: Decadron 2 mg daily, Reglan, Vimpat, Oxybutinin, Xanax, Trazodone, ASA 81, Singulair 10, Femara, Lexapro, Stiolto, Omeprazole  SH: Former smoker (1 PPD, quit many years ago, unable to recall smoking history duration), denies EtOH/illicit drug use  FH: HTN, DM, renal malignancy - mother      MEDICATIONS:  MEDICATIONS  (STANDING):  aspirin enteric coated 81 milliGRAM(s) Oral daily  docusate sodium 100 milliGRAM(s) Oral two times a day  escitalopram 10 milliGRAM(s) Oral daily  heparin  Injectable 5000 Unit(s) SubCutaneous every 8 hours  lacosamide 150 milliGRAM(s) Oral two times a day  letrozole 2.5 milliGRAM(s) Oral daily  magnesium sulfate  IVPB 2 Gram(s) IV Intermittent every 4 hours  montelukast 10 milliGRAM(s) Oral daily  nystatin Powder 1 Application(s) Topical two times a day  piperacillin/tazobactam IVPB. 3.375 Gram(s) IV Intermittent every 6 hours  potassium chloride    Tablet ER 40 milliEquivalent(s) Oral every 4 hours  tiotropium 2.5 MICROgram(s)/olodaterol 2.5 MICROgram(s) (STIOLTO) Inhaler 2 Puff(s) Inhalation daily    REVIEW OF SYSTEMS:  ROS as noted above    Vital Signs Last 24 Hrs  T(C): 36.7 (08 Apr 2019 09:43), Max: 37.6 (08 Apr 2019 08:15)  T(F): 98 (08 Apr 2019 09:43), Max: 99.7 (08 Apr 2019 08:15)  HR: 96 (08 Apr 2019 09:43) (82 - 97)  BP: 113/59 (08 Apr 2019 09:43) (113/59 - 159/86)  BP(mean): --  RR: 16 (08 Apr 2019 09:43) (16 - 18)  SpO2: 96% (08 Apr 2019 09:43) (95% - 99%)      PHYSICAL EXAM:    General: Well developed; well nourished , non-toxic appearing; NAD, AA0x3 however with intermittent delayed terse responses  HEENT: MMM, conjunctiva and sclera clear  CV: S1S2, RRR, no m/r/g appreciated on auscultation  Lungs: trace rales at bases, remainder CTA b/l  Gastrointestinal: Soft, non-distended; tender to palpation in periumbical region as well as LLQ, decreased bowel sounds; No rebound or guarding  Extremities: Normal range of motion, No clubbing, cyanosis or edema  Neurological: Alert and oriented x3, CN II-XII grossly intact, no focal deficits  Skin: Warm and dry. No obvious rash    LABS:                        13.2   14.72 )-----------( 237      ( 08 Apr 2019 05:54 )             41.5     04-08    138  |  102  |  13  ----------------------------<  101<H>  3.3<L>   |  24  |  1.03    Ca    9.2      08 Apr 2019 05:54  Mg     1.4     04-08    TPro  7.0  /  Alb  4.0  /  TBili  0.4  /  DBili  x   /  AST  17  /  ALT  18  /  AlkPhos  54  04-07      Culture Results:   No growth at 12 hours (04-07 @ 20:40)  Culture Results:   No growth at 12 hours (04-07 @ 20:40)  Culture Results:   No growth to date. 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  Patient AA0x3 however provides a limited history for which her  was able to provide additional information. Patient reports her abdominal pain began 1-2 days ago, is localized to the wayne-umbilical region, is intermittent, non-radiating, sharp in quality. Unable to quantitate severity of pain. Per patient's , patient was thought to be constipated for several days leading up to this hospitalization. At home, she was given Miralax and Colace. Shortly afterwards (approximately 1 hr later), patient was noted to have a large bout of diarrhea at home. Last episode of diarrhea, vomiting prior to arrival to the ED. She continues to endorse intermittent nausea and more recently patient endorses hematuria (no prior history).  ROS negative for fevers, chills, NS, dizziness, chest pain, dysphagia, odynophagia, shortness of breath, cough. Remainder of ROS negative.     Upon arrival to the ED, VSS, afebrile, HD stable. Labs notable for leukocytosis of 14. Negative lactate. UA positive. CT A/P demonstrated bowel wall thickening of descending and rectosigmoid colon, c/w colitis (infectious vs inflammatory vs ischemic?), 2 hypodensities in the liver (indeterminate), markedly dilated esophagus distended with oral contrast-superimposed GE reflux, dilated and rim calcified saline impact in L breast.     Last colonoscopy/EGD <10 years ago per patient.  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, e/p excision of scalp mass 5/10/2018 with SBRT  PSHx: craniotomy, hemicolectomy, L upper lobe lobectomy, scalp mass excision  Allergies: NKDA  Meds: Decadron 2 mg daily, Reglan, Vimpat, Oxybutinin, Xanax, Trazodone, ASA 81, Singulair 10, Femara, Lexapro, Stiolto, Omeprazole  SH: Former smoker (1 PPD, quit many years ago, unable to recall smoking history duration), denies EtOH/illicit drug use  FH: HTN, DM, renal malignancy - mother      MEDICATIONS:  MEDICATIONS  (STANDING):  aspirin enteric coated 81 milliGRAM(s) Oral daily  docusate sodium 100 milliGRAM(s) Oral two times a day  escitalopram 10 milliGRAM(s) Oral daily  heparin  Injectable 5000 Unit(s) SubCutaneous every 8 hours  lacosamide 150 milliGRAM(s) Oral two times a day  letrozole 2.5 milliGRAM(s) Oral daily  magnesium sulfate  IVPB 2 Gram(s) IV Intermittent every 4 hours  montelukast 10 milliGRAM(s) Oral daily  nystatin Powder 1 Application(s) Topical two times a day  piperacillin/tazobactam IVPB. 3.375 Gram(s) IV Intermittent every 6 hours  potassium chloride    Tablet ER 40 milliEquivalent(s) Oral every 4 hours  tiotropium 2.5 MICROgram(s)/olodaterol 2.5 MICROgram(s) (STIOLTO) Inhaler 2 Puff(s) Inhalation daily    REVIEW OF SYSTEMS:  ROS as noted above    Vital Signs Last 24 Hrs  T(C): 36.7 (08 Apr 2019 09:43), Max: 37.6 (08 Apr 2019 08:15)  T(F): 98 (08 Apr 2019 09:43), Max: 99.7 (08 Apr 2019 08:15)  HR: 96 (08 Apr 2019 09:43) (82 - 97)  BP: 113/59 (08 Apr 2019 09:43) (113/59 - 159/86)  BP(mean): --  RR: 16 (08 Apr 2019 09:43) (16 - 18)  SpO2: 96% (08 Apr 2019 09:43) (95% - 99%)      PHYSICAL EXAM:    General: Well developed; well nourished , non-toxic appearing; NAD, AA0x3 however with intermittent delayed terse responses  HEENT: MMM, conjunctiva and sclera clear  CV: S1S2, RRR, no m/r/g appreciated on auscultation  Lungs: trace rales at bases, remainder CTA b/l  Gastrointestinal: Soft, non-distended; tender to palpation in periumbical region as well as LLQ, decreased bowel sounds; No rebound or guarding  Extremities: Normal range of motion, No clubbing, cyanosis or edema  Neurological: Alert and oriented x3, CN II-XII grossly intact, no focal deficits  Skin: Warm and dry. No obvious rash    LABS:                        13.2   14.72 )-----------( 237      ( 08 Apr 2019 05:54 )             41.5     04-08    138  |  102  |  13  ----------------------------<  101<H>  3.3<L>   |  24  |  1.03    Ca    9.2      08 Apr 2019 05:54  Mg     1.4     04-08    TPro  7.0  /  Alb  4.0  /  TBili  0.4  /  DBili  x   /  AST  17  /  ALT  18  /  AlkPhos  54  04-07      Culture Results:   No growth at 12 hours (04-07 @ 20:40)  Culture Results:   No growth at 12 hours (04-07 @ 20:40)  Culture Results:   No growth to date. 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  Patient AA0x3 however provides a limited history for which her  was able to provide additional information. Patient reports her abdominal pain began 1-2 days ago, is localized to the wayne-umbilical region, is intermittent, non-radiating, sharp in quality. Unable to quantitate severity of pain. Per patient's , patient was thought to be constipated for several days leading up to this hospitalization. At home, she was given Miralax and Colace. Shortly afterwards (approximately 1 hr later), patient was noted to have a large episode of diarrhea at home. Last episode of diarrhea, vomiting prior to arrival to the ED. She continues to endorse intermittent nausea and more recently patient endorses hematuria (no prior history).  ROS negative for fevers, chills, NS, dizziness, chest pain, dysphagia, odynophagia, shortness of breath, cough. Remainder of ROS negative.       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, e/p excision of scalp mass 5/10/2018 with SBRT  PSHx: craniotomy, hemicolectomy, L upper lobe lobectomy, scalp mass excision  Allergies: NKDA  Meds: Decadron 2 mg daily, Reglan, Vimpat, Oxybutinin, Xanax, Trazodone, ASA 81, Singulair 10, Femara, Lexapro, Stiolto, Omeprazole  SH: Former smoker (1 PPD, quit many years ago, unable to recall smoking history duration), denies EtOH/illicit drug use  FH: HTN, DM, renal malignancy - mother      MEDICATIONS:  MEDICATIONS  (STANDING):  aspirin enteric coated 81 milliGRAM(s) Oral daily  docusate sodium 100 milliGRAM(s) Oral two times a day  escitalopram 10 milliGRAM(s) Oral daily  heparin  Injectable 5000 Unit(s) SubCutaneous every 8 hours  lacosamide 150 milliGRAM(s) Oral two times a day  letrozole 2.5 milliGRAM(s) Oral daily  magnesium sulfate  IVPB 2 Gram(s) IV Intermittent every 4 hours  montelukast 10 milliGRAM(s) Oral daily  nystatin Powder 1 Application(s) Topical two times a day  piperacillin/tazobactam IVPB. 3.375 Gram(s) IV Intermittent every 6 hours  potassium chloride    Tablet ER 40 milliEquivalent(s) Oral every 4 hours  tiotropium 2.5 MICROgram(s)/olodaterol 2.5 MICROgram(s) (STIOLTO) Inhaler 2 Puff(s) Inhalation daily    REVIEW OF SYSTEMS:  ROS as noted above    Vital Signs Last 24 Hrs  T(C): 36.7 (08 Apr 2019 09:43), Max: 37.6 (08 Apr 2019 08:15)  T(F): 98 (08 Apr 2019 09:43), Max: 99.7 (08 Apr 2019 08:15)  HR: 96 (08 Apr 2019 09:43) (82 - 97)  BP: 113/59 (08 Apr 2019 09:43) (113/59 - 159/86)  BP(mean): --  RR: 16 (08 Apr 2019 09:43) (16 - 18)  SpO2: 96% (08 Apr 2019 09:43) (95% - 99%)      PHYSICAL EXAM:    General: Well developed; well nourished , non-toxic appearing; NAD, AA0x3 however with intermittent delayed terse responses  HEENT: MMM, conjunctiva and sclera clear  CV: S1S2, RRR, no m/r/g appreciated on auscultation  Lungs: trace rales at bases, remainder CTA b/l  Gastrointestinal: Soft, non-distended; tender to palpation in periumbical region as well as LLQ, decreased bowel sounds; No rebound or guarding  Extremities: Normal range of motion, No clubbing, cyanosis or edema  Neurological: Alert and oriented x3, CN II-XII grossly intact, no focal deficits  Skin: Warm and dry. No obvious rash    LABS:                        13.2   14.72 )-----------( 237      ( 08 Apr 2019 05:54 )             41.5     04-08    138  |  102  |  13  ----------------------------<  101<H>  3.3<L>   |  24  |  1.03    Ca    9.2      08 Apr 2019 05:54  Mg     1.4     04-08    TPro  7.0  /  Alb  4.0  /  TBili  0.4  /  DBili  x   /  AST  17  /  ALT  18  /  AlkPhos  54  04-07      Culture Results:   No growth at 12 hours (04-07 @ 20:40)  Culture Results:   No growth at 12 hours (04-07 @ 20:40)  Culture Results:   No growth to date. 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  Patient AA0x3 however provides a limited history for which her  was able to provide additional information. Patient reports her abdominal pain began 1-2 days ago, is localized to the wayne-umbilical region, is intermittent, non-radiating, sharp in quality. Unable to quantitate severity of pain. Per patient's , patient was thought to be constipated for several days leading up to this hospitalization. At home, she was given Miralax and Colace. Shortly afterwards (approximately 1 hr later), patient was noted to have a large episode of diarrhea at home. Last episode of diarrhea, vomiting prior to arrival to the ED. She continues to endorse intermittent nausea and more recently patient endorses hematuria (no prior history).  ROS negative for fevers, chills, NS, dizziness, chest pain, dysphagia, odynophagia, shortness of breath, cough. Remainder of ROS negative.       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, e/p excision of scalp mass 5/10/2018 with SBRT  PSHx: craniotomy, hemicolectomy, L upper lobe lobectomy, scalp mass excision  Allergies: NKDA  Meds: Decadron 2 mg daily, Reglan, Vimpat, Oxybutinin, Xanax, Trazodone, ASA 81, Singulair 10, Femara, Lexapro, Stiolto, Omeprazole  SH: Former smoker (1 PPD, quit many years ago, unable to recall smoking history duration), denies EtOH/illicit drug use  FH: HTN, DM, renal malignancy - mother      MEDICATIONS:  MEDICATIONS  (STANDING):  aspirin enteric coated 81 milliGRAM(s) Oral daily  docusate sodium 100 milliGRAM(s) Oral two times a day  escitalopram 10 milliGRAM(s) Oral daily  heparin  Injectable 5000 Unit(s) SubCutaneous every 8 hours  lacosamide 150 milliGRAM(s) Oral two times a day  letrozole 2.5 milliGRAM(s) Oral daily  magnesium sulfate  IVPB 2 Gram(s) IV Intermittent every 4 hours  montelukast 10 milliGRAM(s) Oral daily  nystatin Powder 1 Application(s) Topical two times a day  piperacillin/tazobactam IVPB. 3.375 Gram(s) IV Intermittent every 6 hours  potassium chloride    Tablet ER 40 milliEquivalent(s) Oral every 4 hours  tiotropium 2.5 MICROgram(s)/olodaterol 2.5 MICROgram(s) (STIOLTO) Inhaler 2 Puff(s) Inhalation daily    REVIEW OF SYSTEMS:  ROS as noted above    Vital Signs Last 24 Hrs  T(C): 36.7 (08 Apr 2019 09:43), Max: 37.6 (08 Apr 2019 08:15)  T(F): 98 (08 Apr 2019 09:43), Max: 99.7 (08 Apr 2019 08:15)  HR: 96 (08 Apr 2019 09:43) (82 - 97)  BP: 113/59 (08 Apr 2019 09:43) (113/59 - 159/86)  BP(mean): --  RR: 16 (08 Apr 2019 09:43) (16 - 18)  SpO2: 96% (08 Apr 2019 09:43) (95% - 99%)      PHYSICAL EXAM:    General: Well developed; well nourished , non-toxic appearing; NAD, AA0x3 however with intermittent delayed terse responses  HEENT: MMM, conjunctiva and sclera clear  CV: S1S2, RRR, no m/r/g appreciated on auscultation  Lungs: trace rales at bases, remainder CTA b/l  Gastrointestinal: Soft, non-distended; tender to palpation in periumbical region as well as LLQ, decreased bowel sounds; No rebound or guarding  Extremities: Normal range of motion, No clubbing, cyanosis or edema  Neurological: Alert and oriented x3, CN II-XII grossly intact, no focal deficits  Skin: Warm and dry. No obvious rash    LABS:                        13.2   14.72 )-----------( 237      ( 08 Apr 2019 05:54 )             41.5     04-08    138  |  102  |  13  ----------------------------<  101<H>  3.3<L>   |  24  |  1.03    Ca    9.2      08 Apr 2019 05:54  Mg     1.4     04-08    TPro  7.0  /  Alb  4.0  /  TBili  0.4  /  DBili  x   /  AST  17  /  ALT  18  /  AlkPhos  54  04-07      Culture Results:   No growth at 12 hours (04-07 @ 20:40)  Culture Results:   No growth at 12 hours (04-07 @ 20:40)  Culture Results:   No growth to date. 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  Patient AA0x3 however provides a limited history for which her  was able to provide additional information. Patient reports her abdominal pain began 1-2 days ago, is localized to the wayne-umbilical region, is intermittent, non-radiating, sharp in quality. Unable to quantitate severity of pain. Per patient's , patient was thought to be constipated for several days leading up to this hospitalization. At home, she was given Miralax and Colace. Shortly afterwards (approximately 1 hr later), patient was noted to have a large episode of diarrhea at home. Last episode of diarrhea, vomiting prior to arrival to the ED. She continues to endorse intermittent nausea and more recently patient endorses hematuria (no prior history).  ROS negative for fevers, chills, NS, dizziness, chest pain, dysphagia, odynophagia, shortness of breath, cough. Remainder of ROS negative.       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, e/p excision of scalp mass 5/10/2018 with SBRT  PSHx: craniotomy, hemicolectomy, L upper lobe lobectomy, scalp mass excision  Allergies: NKDA  Meds: Decadron 2 mg daily, Reglan, Vimpat, Oxybutinin, Xanax, Trazodone, ASA 81, Singulair 10, Femara, Lexapro, Stiolto, Omeprazole  SH: Former smoker (1 PPD, quit many years ago, unable to recall smoking history duration), denies EtOH/illicit drug use  FH: HTN, DM, renal malignancy - mother      MEDICATIONS:  MEDICATIONS  (STANDING):  aspirin enteric coated 81 milliGRAM(s) Oral daily  docusate sodium 100 milliGRAM(s) Oral two times a day  escitalopram 10 milliGRAM(s) Oral daily  heparin  Injectable 5000 Unit(s) SubCutaneous every 8 hours  lacosamide 150 milliGRAM(s) Oral two times a day  letrozole 2.5 milliGRAM(s) Oral daily  magnesium sulfate  IVPB 2 Gram(s) IV Intermittent every 4 hours  montelukast 10 milliGRAM(s) Oral daily  nystatin Powder 1 Application(s) Topical two times a day  piperacillin/tazobactam IVPB. 3.375 Gram(s) IV Intermittent every 6 hours  potassium chloride    Tablet ER 40 milliEquivalent(s) Oral every 4 hours  tiotropium 2.5 MICROgram(s)/olodaterol 2.5 MICROgram(s) (STIOLTO) Inhaler 2 Puff(s) Inhalation daily    REVIEW OF SYSTEMS:  ROS as noted above    Vital Signs Last 24 Hrs  T(C): 36.7 (08 Apr 2019 09:43), Max: 37.6 (08 Apr 2019 08:15)  T(F): 98 (08 Apr 2019 09:43), Max: 99.7 (08 Apr 2019 08:15)  HR: 96 (08 Apr 2019 09:43) (82 - 97)  BP: 113/59 (08 Apr 2019 09:43) (113/59 - 159/86)  BP(mean): --  RR: 16 (08 Apr 2019 09:43) (16 - 18)  SpO2: 96% (08 Apr 2019 09:43) (95% - 99%)      PHYSICAL EXAM:    General: Well developed; well nourished , non-toxic appearing; NAD, AA0x3 however with intermittent delayed terse responses  HEENT: MMM, conjunctiva and sclera clear  CV: S1S2, RRR, no m/r/g appreciated on auscultation  Lungs: trace rales at bases, remainder CTA b/l  Gastrointestinal: Soft, non-distended; tender to palpation in periumbical region as well as LLQ, decreased bowel sounds; No rebound or guarding  Extremities: Normal range of motion, No clubbing, cyanosis or edema  Neurological: Alert and oriented x3, CN II-XII grossly intact, no focal deficits  Skin: Warm and dry. No obvious rash    LABS:                        13.2   14.72 )-----------( 237      ( 08 Apr 2019 05:54 )             41.5     04-08    138  |  102  |  13  ----------------------------<  101<H>  3.3<L>   |  24  |  1.03    Ca    9.2      08 Apr 2019 05:54  Mg     1.4     04-08    TPro  7.0  /  Alb  4.0  /  TBili  0.4  /  DBili  x   /  AST  17  /  ALT  18  /  AlkPhos  54  04-07      Culture Results:   No growth at 12 hours (04-07 @ 20:40)  Culture Results:   No growth at 12 hours (04-07 @ 20:40)  Culture Results:   No growth to date. (04-07 @ 20:37)    RADIOLOGY & ADDITIONAL STUDIES:

## 2019-04-08 NOTE — H&P ADULT - NSHPPHYSICALEXAM_GEN_ALL_CORE
VITAL SIGNS:  T(F): 98 (04-07-19 @ 22:56), Max: 98.3 (04-07-19 @ 20:53)  HR: 86 (04-07-19 @ 22:56) (82 - 86)  BP: 138/79 (04-07-19 @ 22:56) (124/75 - 138/79)  BP(mean): --  RR: 18 (04-07-19 @ 22:56) (16 - 18)  SpO2: 95% (04-07-19 @ 22:56) (95% - 96%)    PHYSICAL EXAM:    Constitutional: WDWN resting comfortably in bed; NAD  HEENT: NC/AT, PERRL, EOMI, anicteric sclera, no nasal discharge; uvula midline, no oropharyngeal erythema or exudates; MMM  Neck: supple; no JVD or thyromegaly  Respiratory: CTA B/L; no W/R/R, no retractions  Cardiac: +S1/S2; RRR; no M/R/G; PMI non-displaced  Gastrointestinal: soft, NT/ND; no rebound or guarding; +BSx4  Extremities: WWP, no clubbing or cyanosis; no peripheral edema  Musculoskeletal: NROM x4; no joint swelling, tenderness or erythema  Vascular: 2+ radial, DP/PT pulses B/L  Dermatologic: skin warm, dry and intact; no rashes, wounds, or scars  Neurologic: Pt intermittently answering questions, does not know her birthday, knows she is in hospital, month and presdient; no focal deficits, diffusely weak  Psychiatric: affect and characteristics of appearance, verbalizations, behaviors are appropriate

## 2019-04-08 NOTE — CONSULT NOTE ADULT - ASSESSMENT
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     #Colitis  Patient with complaints of periumbical pain, exam consistent with pain with additional tenderness in LLQ. CT A/P revealing bowel thickening of the descending/rectosigmoid colon consistent with colitis of infectious vs inflammatory vs ischemic etiology. With mildly elevated leukocytosis of 14, afebrile, likely more inflammatory in nature however cannot rule out infectious causes at this time time  -Plan for colonoscopy 4/9  -Split prep (2 L GoLytely at 5 pm 4/8, 2L GoLytely at 5 am 4/9)   -Clear liquid diet for now, NPO after MN     #Dilated esophagus  CT A/P revealing markedly dilated esophagus, prior imaging upon record review demonstrating moderate hiatal hernia, unchanged, currently with no complaints of dysphagia, odynophagia.   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     #Colitis  Patient with complaints of periumbical pain, with diarrhea reported prior to admission, now resolved. Exam consistent with pain with additional tenderness in LLQ. CT A/P revealing bowel thickening of the descending/rectosigmoid colon consistent with colitis of infectious vs inflammatory vs ischemic etiology. With mildly elevated leukocytosis of 14, afebrile, likely more inflammatory in nature however cannot rule out infectious causes at this time.  -Plan for colonoscopy 4/9  -Split prep (2 L GoLytely at 5 pm 4/8, 2L GoLytely at 5 am 4/9)   -Clear liquid diet for now, NPO after MN   -Monitor BM output, reports no additional episodes of diarrhea at this time, if with additional episodes, consider sending workup including GI PCR, C diff (recent history of abx use for sinusitis)    #Dilated esophagus  CT A/P revealing markedly dilated esophagus, prior imaging upon record review demonstrating moderate hiatal hernia, unchanged, currently with no complaints of dysphagia, odynophagia.   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     #Colitis  Patient with complaints of periumbical pain, with diarrhea reported prior to admission, now resolved. Exam consistent with pain with additional tenderness in LLQ. CT A/P revealing bowel thickening of the descending/rectosigmoid colon consistent with colitis of infectious vs inflammatory vs ischemic etiology. With mildly elevated leukocytosis of 14, afebrile, likely more inflammatory in nature however cannot rule out infectious causes at this time.  Last C-scope June 2017, demonstrated ileal carcinoid, mild sigmoid diverticulosis.  -Plan for colonoscopy 4/9  -Split prep (2 L GoLytely at 5 pm 4/8, 2L GoLytely at 5 am 4/9)   -Clear liquid diet for now, NPO after MN   -Monitor BM output, reports no additional episodes of diarrhea at this time, if with additional episodes, consider sending workup including GI PCR, C diff (recent history of abx use for sinusitis)    #Dilated esophagus  CT A/P revealing markedly dilated esophagus, prior imaging upon record review demonstrating moderate hiatal hernia, unchanged, currently with no complaints of dysphagia, odynophagia. Last EGD 11/2015, revealing 3 cm hiatal hernia, LA grade 3 esophagitis, mild antral erythema (negative for H pylori)  -No intervention at this time  -Can continue to follow up outpatient    Case discussed and reviewed with attending, Dr Wolff

## 2019-04-09 ENCOUNTER — RESULT REVIEW (OUTPATIENT)
Age: 74
End: 2019-04-09

## 2019-04-09 DIAGNOSIS — N39.0 URINARY TRACT INFECTION, SITE NOT SPECIFIED: ICD-10-CM

## 2019-04-09 DIAGNOSIS — C78.7 SECONDARY MALIGNANT NEOPLASM OF LIVER AND INTRAHEPATIC BILE DUCT: ICD-10-CM

## 2019-04-09 DIAGNOSIS — K52.9 NONINFECTIVE GASTROENTERITIS AND COLITIS, UNSPECIFIED: ICD-10-CM

## 2019-04-09 DIAGNOSIS — F32.9 MAJOR DEPRESSIVE DISORDER, SINGLE EPISODE, UNSPECIFIED: ICD-10-CM

## 2019-04-09 DIAGNOSIS — C79.31 SECONDARY MALIGNANT NEOPLASM OF BRAIN: ICD-10-CM

## 2019-04-09 LAB
ALBUMIN SERPL ELPH-MCNC: 3.4 G/DL — SIGNIFICANT CHANGE UP (ref 3.3–5)
ALP SERPL-CCNC: 63 U/L — SIGNIFICANT CHANGE UP (ref 40–120)
ALT FLD-CCNC: 27 U/L — SIGNIFICANT CHANGE UP (ref 10–45)
ANION GAP SERPL CALC-SCNC: 12 MMOL/L — SIGNIFICANT CHANGE UP (ref 5–17)
AST SERPL-CCNC: 19 U/L — SIGNIFICANT CHANGE UP (ref 10–40)
BASOPHILS # BLD AUTO: 0.03 K/UL — SIGNIFICANT CHANGE UP (ref 0–0.2)
BASOPHILS NFR BLD AUTO: 0.2 % — SIGNIFICANT CHANGE UP (ref 0–2)
BILIRUB SERPL-MCNC: 0.5 MG/DL — SIGNIFICANT CHANGE UP (ref 0.2–1.2)
BLD GP AB SCN SERPL QL: NEGATIVE — SIGNIFICANT CHANGE UP
BUN SERPL-MCNC: 11 MG/DL — SIGNIFICANT CHANGE UP (ref 7–23)
CALCIUM SERPL-MCNC: 9.1 MG/DL — SIGNIFICANT CHANGE UP (ref 8.4–10.5)
CHLORIDE SERPL-SCNC: 105 MMOL/L — SIGNIFICANT CHANGE UP (ref 96–108)
CO2 SERPL-SCNC: 21 MMOL/L — LOW (ref 22–31)
CREAT SERPL-MCNC: 0.91 MG/DL — SIGNIFICANT CHANGE UP (ref 0.5–1.3)
EOSINOPHIL # BLD AUTO: 0.03 K/UL — SIGNIFICANT CHANGE UP (ref 0–0.5)
EOSINOPHIL NFR BLD AUTO: 0.2 % — SIGNIFICANT CHANGE UP (ref 0–6)
GLUCOSE SERPL-MCNC: 126 MG/DL — HIGH (ref 70–99)
HCT VFR BLD CALC: 38.8 % — SIGNIFICANT CHANGE UP (ref 34.5–45)
HGB BLD-MCNC: 12.7 G/DL — SIGNIFICANT CHANGE UP (ref 11.5–15.5)
IMM GRANULOCYTES NFR BLD AUTO: 0.5 % — SIGNIFICANT CHANGE UP (ref 0–1.5)
LACTATE SERPL-SCNC: 1.1 MMOL/L — SIGNIFICANT CHANGE UP (ref 0.5–2)
LYMPHOCYTES # BLD AUTO: 0.79 K/UL — LOW (ref 1–3.3)
LYMPHOCYTES # BLD AUTO: 5.9 % — LOW (ref 13–44)
MAGNESIUM SERPL-MCNC: 2.3 MG/DL — SIGNIFICANT CHANGE UP (ref 1.6–2.6)
MCHC RBC-ENTMCNC: 29.7 PG — SIGNIFICANT CHANGE UP (ref 27–34)
MCHC RBC-ENTMCNC: 32.7 GM/DL — SIGNIFICANT CHANGE UP (ref 32–36)
MCV RBC AUTO: 90.9 FL — SIGNIFICANT CHANGE UP (ref 80–100)
MONOCYTES # BLD AUTO: 0.47 K/UL — SIGNIFICANT CHANGE UP (ref 0–0.9)
MONOCYTES NFR BLD AUTO: 3.5 % — SIGNIFICANT CHANGE UP (ref 2–14)
NEUTROPHILS # BLD AUTO: 11.95 K/UL — HIGH (ref 1.8–7.4)
NEUTROPHILS NFR BLD AUTO: 89.7 % — HIGH (ref 43–77)
NRBC # BLD: 0 /100 WBCS — SIGNIFICANT CHANGE UP (ref 0–0)
PHOSPHATE SERPL-MCNC: 2.1 MG/DL — LOW (ref 2.5–4.5)
PLATELET # BLD AUTO: 242 K/UL — SIGNIFICANT CHANGE UP (ref 150–400)
POTASSIUM SERPL-MCNC: 3.8 MMOL/L — SIGNIFICANT CHANGE UP (ref 3.5–5.3)
POTASSIUM SERPL-SCNC: 3.8 MMOL/L — SIGNIFICANT CHANGE UP (ref 3.5–5.3)
PROT SERPL-MCNC: 6.7 G/DL — SIGNIFICANT CHANGE UP (ref 6–8.3)
RBC # BLD: 4.27 M/UL — SIGNIFICANT CHANGE UP (ref 3.8–5.2)
RBC # FLD: 15.3 % — HIGH (ref 10.3–14.5)
RH IG SCN BLD-IMP: POSITIVE — SIGNIFICANT CHANGE UP
SODIUM SERPL-SCNC: 138 MMOL/L — SIGNIFICANT CHANGE UP (ref 135–145)
WBC # BLD: 13.33 K/UL — HIGH (ref 3.8–10.5)
WBC # FLD AUTO: 13.33 K/UL — HIGH (ref 3.8–10.5)

## 2019-04-09 PROCEDURE — 45330 DIAGNOSTIC SIGMOIDOSCOPY: CPT

## 2019-04-09 PROCEDURE — 99233 SBSQ HOSP IP/OBS HIGH 50: CPT | Mod: GC

## 2019-04-09 RX ORDER — ONDANSETRON 8 MG/1
4 TABLET, FILM COATED ORAL ONCE
Qty: 0 | Refills: 0 | Status: COMPLETED | OUTPATIENT
Start: 2019-04-09 | End: 2019-04-09

## 2019-04-09 RX ORDER — SODIUM CHLORIDE 9 MG/ML
1000 INJECTION INTRAMUSCULAR; INTRAVENOUS; SUBCUTANEOUS
Qty: 0 | Refills: 0 | Status: DISCONTINUED | OUTPATIENT
Start: 2019-04-09 | End: 2019-04-14

## 2019-04-09 RX ORDER — POTASSIUM PHOSPHATE, MONOBASIC POTASSIUM PHOSPHATE, DIBASIC 236; 224 MG/ML; MG/ML
15 INJECTION, SOLUTION INTRAVENOUS ONCE
Qty: 0 | Refills: 0 | Status: COMPLETED | OUTPATIENT
Start: 2019-04-09 | End: 2019-04-09

## 2019-04-09 RX ORDER — SODIUM CHLORIDE 9 MG/ML
1000 INJECTION INTRAMUSCULAR; INTRAVENOUS; SUBCUTANEOUS
Qty: 0 | Refills: 0 | Status: DISCONTINUED | OUTPATIENT
Start: 2019-04-09 | End: 2019-04-09

## 2019-04-09 RX ORDER — ALPRAZOLAM 0.25 MG
0.5 TABLET ORAL ONCE
Qty: 0 | Refills: 0 | Status: DISCONTINUED | OUTPATIENT
Start: 2019-04-09 | End: 2019-04-09

## 2019-04-09 RX ADMIN — SODIUM CHLORIDE 75 MILLILITER(S): 9 INJECTION INTRAMUSCULAR; INTRAVENOUS; SUBCUTANEOUS at 18:14

## 2019-04-09 RX ADMIN — PIPERACILLIN AND TAZOBACTAM 200 GRAM(S): 4; .5 INJECTION, POWDER, LYOPHILIZED, FOR SOLUTION INTRAVENOUS at 23:24

## 2019-04-09 RX ADMIN — SODIUM CHLORIDE 120 MILLILITER(S): 9 INJECTION INTRAMUSCULAR; INTRAVENOUS; SUBCUTANEOUS at 21:20

## 2019-04-09 RX ADMIN — Medication 2 MILLIGRAM(S): at 07:32

## 2019-04-09 RX ADMIN — PIPERACILLIN AND TAZOBACTAM 200 GRAM(S): 4; .5 INJECTION, POWDER, LYOPHILIZED, FOR SOLUTION INTRAVENOUS at 01:30

## 2019-04-09 RX ADMIN — NYSTATIN CREAM 1 APPLICATION(S): 100000 CREAM TOPICAL at 07:28

## 2019-04-09 RX ADMIN — PIPERACILLIN AND TAZOBACTAM 200 GRAM(S): 4; .5 INJECTION, POWDER, LYOPHILIZED, FOR SOLUTION INTRAVENOUS at 18:14

## 2019-04-09 RX ADMIN — LETROZOLE 2.5 MILLIGRAM(S): 2.5 TABLET, FILM COATED ORAL at 12:05

## 2019-04-09 RX ADMIN — Medication 81 MILLIGRAM(S): at 12:03

## 2019-04-09 RX ADMIN — MONTELUKAST 10 MILLIGRAM(S): 4 TABLET, CHEWABLE ORAL at 12:03

## 2019-04-09 RX ADMIN — LACOSAMIDE 150 MILLIGRAM(S): 50 TABLET ORAL at 07:28

## 2019-04-09 RX ADMIN — Medication 0.5 MILLIGRAM(S): at 23:24

## 2019-04-09 RX ADMIN — ONDANSETRON 4 MILLIGRAM(S): 8 TABLET, FILM COATED ORAL at 18:48

## 2019-04-09 RX ADMIN — ESCITALOPRAM OXALATE 10 MILLIGRAM(S): 10 TABLET, FILM COATED ORAL at 12:03

## 2019-04-09 RX ADMIN — TIOTROPIUM BROMIDE AND OLODATEROL 2 PUFF(S): 3.124; 2.736 SPRAY, METERED RESPIRATORY (INHALATION) at 12:04

## 2019-04-09 RX ADMIN — POTASSIUM PHOSPHATE, MONOBASIC POTASSIUM PHOSPHATE, DIBASIC 62.5 MILLIMOLE(S): 236; 224 INJECTION, SOLUTION INTRAVENOUS at 15:54

## 2019-04-09 RX ADMIN — NYSTATIN CREAM 1 APPLICATION(S): 100000 CREAM TOPICAL at 18:43

## 2019-04-09 RX ADMIN — PIPERACILLIN AND TAZOBACTAM 200 GRAM(S): 4; .5 INJECTION, POWDER, LYOPHILIZED, FOR SOLUTION INTRAVENOUS at 11:12

## 2019-04-09 RX ADMIN — LACOSAMIDE 150 MILLIGRAM(S): 50 TABLET ORAL at 18:14

## 2019-04-09 RX ADMIN — ONDANSETRON 4 MILLIGRAM(S): 8 TABLET, FILM COATED ORAL at 21:42

## 2019-04-09 RX ADMIN — Medication 2000 MILLILITER(S): at 07:28

## 2019-04-09 NOTE — CONSULT NOTE ADULT - ASSESSMENT
Ms. Jennings is a 72 yo with ischemic colitis.    FINAL PLANS PENDING    - Patient is currently hemodynamically stable with a benign abdominal exam and is having normal bowel movements without hematochezia or melena. Patient also denies any nausea or emesis at this time.  - Given patient's clinical picture, no acute surgical intervention at this time.  - Monitor patient with serial abdominal exams.   - NPO, IVF  - IV Abx  - Plan discussed with attending and chief resident. Ms. Jennings is a 72 yo with ischemic colitis.    - Patient is currently hemodynamically stable with a benign abdominal exam and is having normal bowel movements without hematochezia or melena. Patient also denies any nausea or emesis at this time. Patient's WBC is trending down and lactate is within normal limits. Therefore, given patient's clinical picture, no acute surgical intervention at this time.  - Monitor patient with serial abdominal exams.   - NPO, IVF  - IV Zosyn  - Plan discussed with attending and chief resident. Ms. Jennings is a 74 yo with ischemic colitis.    - Patient is currently hemodynamically stable with a benign abdominal exam and is having normal bowel movements without hematochezia or melena. Patient also denies any nausea or emesis at this time. Patient's WBC is trending down and lactate is within normal limits. Therefore, given patient's clinical picture, no acute surgical intervention at this time.  - Monitor patient with serial abdominal exams.   - NPO (can have ice chips, within reason), IVF at full maintenance rate.  - IV Zosyn  - Plan discussed with attending and chief resident. Ms. Jennings is a 72 yo with ischemic colitis.    - Patient is currently hemodynamically stable with a benign abdominal exam and is having normal bowel movements without hematochezia or melena. Patient also denies any nausea or emesis at this time. Patient's WBC is trending down and lactate is within normal limits. Therefore, given patient's clinical picture, no acute surgical intervention at this time.  - Recommend telemetry level of care.  - Monitor patient with serial abdominal exams.   - NPO (can have ice chips, within reason), IVF at full maintenance rate.  - IV Zosyn  - Plan discussed with attending and chief resident.

## 2019-04-09 NOTE — PHYSICAL THERAPY INITIAL EVALUATION ADULT - PATIENT/FAMILY/SIGNIFICANT OTHER GOALS STATEMENT, PT EVAL
Received pt this am asleep, on precedex for sedation. Woke for am meds, pt refusing. Offered meals, pt refusing. Bilateral wrist restraints. Posey vest. Bed alarm on.      CARDIOVASCULAR - ADULT    • Maintains optimal cardiac output and hemodynamic stabilit Pt wants to get better

## 2019-04-09 NOTE — PHYSICAL THERAPY INITIAL EVALUATION ADULT - ADDITIONAL COMMENTS
Pt lives with spouse in an apartment with elevator, pt has aide 8 hours x 7 days. Prior to admission, pt ambulated independently without assistive device indoor, rollator outdoor. Pt denies recent fall. Pt has a rollator and cane at home.

## 2019-04-09 NOTE — CONSULT NOTE ADULT - ATTENDING COMMENTS
pt seen  examnd by me  called this afternoon to see pt  as above note  pt known to me  see above  currently stable   NAD  afeb  Abdom totally completely NONtender - including L posterolateral / LLQ deep palp  C-Scope images seen;  CAT rev'd    picture c/w ischemic colitis - w/poss mucosal patchy necrosis -  but not full thickness - at least for now    REC: telemetry 1-2 days, NPO (x  ice chips & necess po meds), IVF,  IV ABX,  serial exams, serial labs (to include at least daily LACTATE)    Will follow with you
Tentative colonoscopy tomorrow pending optimization of medical status and bowel prep with strict aspiration precautions, for further evaluation.

## 2019-04-09 NOTE — PROGRESS NOTE ADULT - PROBLEM SELECTOR PLAN 1
Pt w/ abdominal pain w/ associated constipation.  Had nausea without vomiting.  WBC elevated. No associated fevers.  Lactate WNL.  Pt had BM on day of admission that was loose after taking laxative and continued to be loose throughout the day. CT A/P with contrast on admission demonstrated, bowel wall thickening of descending and rectosigmoid colon, consistent with colitis of infectious or inflammatory or ischemic etiology.   -c/w Zosyn  -GI consulted, appreciate recs  -plan for colonoscopy today, f/u results Pt w/ abdominal pain w/ associated constipation.  Had nausea without vomiting.  WBC elevated. No associated fevers.  Lactate WNL.  Pt had BM on day of admission that was loose after taking laxative and continued to be loose throughout the day. CT A/P with contrast on admission demonstrated, bowel wall thickening of descending and rectosigmoid colon, consistent with colitis of infectious or inflammatory or ischemic etiology.   -GI recs appreciated  -patient s/p sigmoidoscopy today that demonstrated severe left sided colitis with proximal and distal transition points at the splenic flexure and sigmoid colon, respectively - high suspicion for ischemic colitis, s/p biopsies. Also demonstrated recto/sigmoid hyperplastic polyps and localized rectal scarring s/p biopsies  -f/u pathology  -c/w IV Zosyn 3.375mg q6hrs  -surgery consulted, f/u recs  -NPO, bowel rest  -started IV fluids @ 75cc/hr x12 hrs. Will continue if needed  -f/u AM Abdominal XR. If abdomen worsening get STAT abdomen and f/u with surgery  -lactate negative. Will continue to monitor with AM labs  -NG tube if patient vomiting  -serial abdominal exams

## 2019-04-09 NOTE — PROGRESS NOTE ADULT - SUBJECTIVE AND OBJECTIVE BOX
INTERVAL HPI/OVERNIGHT EVENTS:  Abdominal pain improved; For colonoscopy today; Urine still with blood and will discuss with Dr. Rodriguez      MEDICATIONS  (STANDING):  aspirin enteric coated 81 milliGRAM(s) Oral daily  dexamethasone     Tablet 2 milliGRAM(s) Oral daily  docusate sodium 100 milliGRAM(s) Oral two times a day  escitalopram 10 milliGRAM(s) Oral daily  heparin  Injectable 5000 Unit(s) SubCutaneous every 8 hours  lacosamide 150 milliGRAM(s) Oral two times a day  letrozole 2.5 milliGRAM(s) Oral daily  montelukast 10 milliGRAM(s) Oral daily  nystatin Powder 1 Application(s) Topical two times a day  piperacillin/tazobactam IVPB. 3.375 Gram(s) IV Intermittent every 6 hours  tiotropium 2.5 MICROgram(s)/olodaterol 2.5 MICROgram(s) (STIOLTO) Inhaler 2 Puff(s) Inhalation daily    MEDICATIONS  (PRN):      Allergies    No Known Allergies    Intolerances        Vital Signs Last 24 Hrs  T(C): 36.4 (2019 08:58), Max: 36.8 (2019 15:46)  T(F): 97.6 (2019 08:58), Max: 98.2 (2019 15:46)  HR: 73 (2019 08:58) (63 - 87)  BP: 94/48 (2019 08:58) (94/48 - 129/83)  BP(mean): --  RR: 18 (2019 08:58) (18 - 18)  SpO2: 96% (2019 08:58) (95% - 97%)          Constitutional:  Awake    Eyes: DANAE    ENMT: Negative    Neck: Supple    Back:  no tenderness     Respiratory:  clear    Cardiovascular: S1 S2    Gastrointestinal:  soft    Genitourinary:    Extremities:  no edema    Vascular:    Neurological:    Skin:    Lymph Nodes:             @ 07:01  -  - @ 07:00  --------------------------------------------------------  IN: 100 mL / OUT: 0 mL / NET: 100 mL      LABS:                        13.2   14.72 )-----------( 237      ( 2019 05:54 )             41.5     04-08    138  |  102  |  13  ----------------------------<  101<H>  3.3<L>   |  24  |  1.03    Ca    9.2      2019 05:54  Mg     1.4     04-08    TPro  7.0  /  Alb  4.0  /  TBili  0.4  /  DBili  x   /  AST  17  /  ALT  18  /  AlkPhos  54  04-07    PT/INR - ( 2019 18:28 )   PT: 10.4 sec;   INR: 0.92          PTT - ( 2019 18:28 )  PTT:26.8 sec  Urinalysis Basic - ( 2019 20:11 )    Color: Yellow / Appearance: SL Cloudy / S.025 / pH: x  Gluc: x / Ketone: 40 mg/dL  / Bili: Negative / Urobili: 0.2 E.U./dL   Blood: x / Protein: NEGATIVE mg/dL / Nitrite: NEGATIVE   Leuk Esterase: Trace / RBC: < 5 /HPF / WBC > 10 /HPF   Sq Epi: x / Non Sq Epi: 0-5 /HPF / Bacteria: Present /HPF        RADIOLOGY & ADDITIONAL TESTS:

## 2019-04-09 NOTE — PHYSICAL THERAPY INITIAL EVALUATION ADULT - GAIT DEVIATIONS NOTED, PT EVAL
decreased step length/decreased benito/increased time in double stance/fairly steady gait, no lose of balance, decreased heel strike and hip flexion bilaterally, slight forward flexed posture.

## 2019-04-09 NOTE — CONSULT NOTE ADULT - SUBJECTIVE AND OBJECTIVE BOX
GENERAL SURGERY CONSULT NOTE    HPI:    Ms. Jennings is a 72 yo with extensive surgery for malignancy (see below for surgical history) admitted to medicine for diarrhea and emesis since  (2 days ago). Surgery was consulted for ischemic colitis. CT scan of ab/pelvis showed thickening of descending colon and was followed up with a sigmoidoscopy by GI today which showed ischemic colitis of distal descending/sigmoid colon.    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  Pt unable to qualify abdominal pain currently.  Previously told ED staff that it was predominantly in RLQ.  Pt also reported constipation x 3 days and took a laxative w/ subsequent loose stool.  Pt also reports new onset hematuria that started today.  ROS negative for recent changes in weight, HA, changes in vision, fevers, chills, vomiting, CP, SOB, palpitations, flank pain, dysuria and LE edema.     From outpt records:   ECHO 2018 revealed mild LV hypertrophy. EF of 60-65%. No evidence of valvular disease. Normal right atrial pressure.    MRI brain completed on 18:  - interval decreased size and enhancement and improved edema and mass effect of right frontal radiation necrosis.   - no new abnormal enhancement    PET CT completed on 19:  -new foci of abnormal FDG uptake in hepatic segments II and VII without CT correlate.   -no change in the previously ablated 3.6cm hypoattenuating lesion in the hepatic segment Coleen without abnormal FDG-uptake  -no change in the Right middle lobe nodule since 2018     MRI abdomen completed on 19:  -no interval change in the 7-8 lesions within the Right and left lobe of the liver  -postablation lesion in hepatic segment 4 lesion, unchanged (2019 00:42)      SURGERY ADDENDUM:     PAST MEDICAL & SURGICAL HISTORY:  Brain metastasis  Liver metastasis  H/O hemicolectomy  H/O craniotomy      PAST SURGICAL HISTORY:     REVIEW OF SYSTEMS:   Pertinent positives/negatives noted in HPI.     HOME MEDICATIONS:    ALLERGIES:  Allergies    No Known Allergies    Intolerances        SOCIAL HISTORY:    FAMILY HISTORY:  No pertinent family history in first degree relatives      Vital Signs Last 24 Hrs  T(C): 36.7 (2019 16:30), Max: 36.7 (2019 16:30)  T(F): 98.1 (2019 16:30), Max: 98.1 (2019 16:30)  HR: 75 (2019 16:30) (63 - 78)  BP: 104/56 (2019 16:30) (94/48 - 129/83)  BP(mean): --  RR: 18 (2019 16:30) (18 - 18)  SpO2: 98% (2019 16:30) (95% - 98%)    PHYSICAL EXAM:  General: no acute distress  Cardiovascular: NSR  Pulmonary: nonlabored breathing, no respiratory distress  Abdomen: soft, nondistended, nontender  Extremities: nonedematous    LABS:                        12.7   13.33 )-----------( 242      ( 2019 10:41 )             38.8     04-09    138  |  105  |  11  ----------------------------<  126<H>  3.8   |  21<L>  |  0.91    Ca    9.1      2019 10:41  Phos  2.1     04-09  Mg     2.3     04-09    TPro  6.7  /  Alb  3.4  /  TBili  0.5  /  DBili  x   /  AST  19  /  ALT  27  /  AlkPhos  63  04-09    PT/INR - ( 2019 18:28 )   PT: 10.4 sec;   INR: 0.92          PTT - ( 2019 18:28 )  PTT:26.8 sec  Urinalysis Basic - ( 2019 20:11 )    Color: Yellow / Appearance: SL Cloudy / S.025 / pH: x  Gluc: x / Ketone: 40 mg/dL  / Bili: Negative / Urobili: 0.2 E.U./dL   Blood: x / Protein: NEGATIVE mg/dL / Nitrite: NEGATIVE   Leuk Esterase: Trace / RBC: < 5 /HPF / WBC > 10 /HPF   Sq Epi: x / Non Sq Epi: 0-5 /HPF / Bacteria: Present /HPF GENERAL SURGERY CONSULT NOTE    HPI:    Ms. Jennings is a 72 yo with extensive surgery (brain, lung, breast, colon) for malignancy (see below for surgical history) admitted to medicine for diarrhea and emesis since  (2 days ago). Surgery was consulted for ischemic colitis. CT scan of ab/pelvis showed thickening of descending colon and was followed up with a sigmoidoscopy by GI today which showed ischemic colitis of distal descending/sigmoid colon.    Patient caretaker states that patient has been having diarrhea on and off for the past few weeks. Until , she was constipated and was given colace and miralax by her  which caused her to have multiple episodes of diarrhea. She also reports having 2-3 episodes of NBNB emesis. She denies any associated fevers or chills or abdominal pain.     PSx:    : unilateral (unclear which side) oophorectomy -- negative for malignancy on pathology  [place] [facility] [place]  : chest wall cancer s/p resection with Dr. 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  Pt unable to qualify abdominal pain currently.  Previously told ED staff that it was predominantly in RLQ.  Pt also reported constipation x 3 days and took a laxative w/ subsequent loose stool.  Pt also reports new onset hematuria that started today.  ROS negative for recent changes in weight, HA, changes in vision, fevers, chills, vomiting, CP, SOB, palpitations, flank pain, dysuria and LE edema.     From outpt records:   ECHO 2018 revealed mild LV hypertrophy. EF of 60-65%. No evidence of valvular disease. Normal right atrial pressure.    MRI brain completed on 18:  - interval decreased size and enhancement and improved edema and mass effect of right frontal radiation necrosis.   - no new abnormal enhancement    PET CT completed on 19:  -new foci of abnormal FDG uptake in hepatic segments II and VII without CT correlate.   -no change in the previously ablated 3.6cm hypoattenuating lesion in the hepatic segment oCleen without abnormal FDG-uptake  -no change in the Right middle lobe nodule since 2018     MRI abdomen completed on 19:  -no interval change in the 7-8 lesions within the Right and left lobe of the liver  -postablation lesion in hepatic segment 4 lesion, unchanged (2019 00:42)      SURGERY ADDENDUM:     PAST MEDICAL & SURGICAL HISTORY:  Brain metastasis  Liver metastasis  H/O hemicolectomy  H/O craniotomy      PAST SURGICAL HISTORY:     REVIEW OF SYSTEMS:   Pertinent positives/negatives noted in HPI.     HOME MEDICATIONS:    ALLERGIES:  Allergies    No Known Allergies    Intolerances        SOCIAL HISTORY:    FAMILY HISTORY:  No pertinent family history in first degree relatives      Vital Signs Last 24 Hrs  T(C): 36.7 (2019 16:30), Max: 36.7 (2019 16:30)  T(F): 98.1 (2019 16:30), Max: 98.1 (2019 16:30)  HR: 75 (2019 16:30) (63 - 78)  BP: 104/56 (2019 16:30) (94/48 - 129/83)  BP(mean): --  RR: 18 (2019 16:30) (18 - 18)  SpO2: 98% (2019 16:30) (95% - 98%)    PHYSICAL EXAM:  General: no acute distress  Cardiovascular: NSR  Pulmonary: nonlabored breathing, no respiratory distress  Abdomen: soft, nondistended, nontender  Extremities: nonedematous    LABS:                        12.7   13.33 )-----------( 242      ( 2019 10:41 )             38.8     04-    138  |  105  |  11  ----------------------------<  126<H>  3.8   |  21<L>  |  0.91    Ca    9.1      2019 10:41  Phos  2.1     04-  Mg     2.3     -    TPro  6.7  /  Alb  3.4  /  TBili  0.5  /  DBili  x   /  AST  19  /  ALT  27  /  AlkPhos  63  04-09    PT/INR - ( 2019 18:28 )   PT: 10.4 sec;   INR: 0.92          PTT - ( 2019 18:28 )  PTT:26.8 sec  Urinalysis Basic - ( 2019 20:11 )    Color: Yellow / Appearance: SL Cloudy / S.025 / pH: x  Gluc: x / Ketone: 40 mg/dL  / Bili: Negative / Urobili: 0.2 E.U./dL   Blood: x / Protein: NEGATIVE mg/dL / Nitrite: NEGATIVE   Leuk Esterase: Trace / RBC: < 5 /HPF / WBC > 10 /HPF   Sq Epi: x / Non Sq Epi: 0-5 /HPF / Bacteria: Present /HPF GENERAL SURGERY CONSULT NOTE    HPI:    Ms. Jennings is a 74 yo with extensive surgery (brain, lung, breast, colon) for malignancy (see below for surgical history) admitted to medicine for diarrhea and emesis since  (2 days ago). Surgery was consulted for ischemic colitis. CT scan of ab/pelvis showed thickening of descending colon and was followed up with a sigmoidoscopy by GI today which showed ischemic colitis of distal descending/sigmoid colon.    Patient caretaker states that patient has been having diarrhea on and off for the past few weeks. Until , she was constipated and was given colace and miralax by her  which caused her to have multiple episodes of diarrhea. She also reports having 2-3 episodes of NBNB emesis. She denies any associated fevers or chills or abdominal pain. Patient reports having a bowel movement this morning and denies any episodes of hematochezia or melena.     Patient is afebrile, HR 70-90s, SBP 94 (patient reports this as her baseline), RR 17-18, saturation 96-98%. WBC 13.3 with neutrophilia, lactate 1.1, LFTs wnl.    PSx:    1980s: unilateral (unclear which side) oophorectomy -- negative for malignancy on pathology  : [place] [facility] [place]  : chest wall cancer s/p resection with Dr. Davis - pathology showed recurrence of breast cancer  : lung RUL and RLL resection with Dr. Posey for lung cancer (reportedly 3 different types of lung cancer)  2017: right frontal craniotomy for neuroendocrine tumor (likely mets) with Dr. Muniz  2017: FNA liver biopsy: pathology showed breast cancer ER+/LA+  2017: right hemicolectomy with primary anastomosis with Dr. Davis for carcinoid tumor  5/10/2018: exploration of craniotomy site for recurrence of neuroendocrine tumor with Dr. Muniz        REVIEW OF SYSTEMS:   Pertinent positives/negatives noted in HPI.     HOME MEDICATIONS:  See med rec    ALLERGIES:  NKDA    SOCIAL HISTORY:   30 packyear smoking history, occasional ETOH use    FAMILY HISTORY:  Mother passed away from bladder cancer at age 77 yo      Vital Signs Last 24 Hrs  T(C): 36.7 (2019 16:30), Max: 36.7 (2019 16:30)  T(F): 98.1 (2019 16:30), Max: 98.1 (2019 16:30)  HR: 75 (2019 16:30) (63 - 78)  BP: 104/56 (2019 16:30) (94/48 - 129/83)  BP(mean): --  RR: 18 (2019 16:30) (18 - 18)  SpO2: 98% (2019 16:30) (95% - 98%)    PHYSICAL EXAM:  General: no acute distress  Cardiovascular: NSR  Pulmonary: nonlabored breathing, no respiratory distress  Abdomen: soft, nondistended, nontender  Extremities: nonedematous    LABS:                        12.7   13.33 )-----------( 242      ( 2019 10:41 )             38.8     04-09    138  |  105  |  11  ----------------------------<  126<H>  3.8   |  21<L>  |  0.91    Ca    9.1      2019 10:41  Phos  2.1     04-  Mg     2.3     04-09    TPro  6.7  /  Alb  3.4  /  TBili  0.5  /  DBili  x   /  AST  19  /  ALT  27  /  AlkPhos  63  04-09    PT/INR - ( 2019 18:28 )   PT: 10.4 sec;   INR: 0.92          PTT - ( 2019 18:28 )  PTT:26.8 sec  Urinalysis Basic - ( 2019 20:11 )    Color: Yellow / Appearance: SL Cloudy / S.025 / pH: x  Gluc: x / Ketone: 40 mg/dL  / Bili: Negative / Urobili: 0.2 E.U./dL   Blood: x / Protein: NEGATIVE mg/dL / Nitrite: NEGATIVE   Leuk Esterase: Trace / RBC: < 5 /HPF / WBC > 10 /HPF   Sq Epi: x / Non Sq Epi: 0-5 /HPF / Bacteria: Present /HPF

## 2019-04-09 NOTE — PHYSICAL THERAPY INITIAL EVALUATION ADULT - GENERAL OBSERVATIONS, REHAB EVAL
Pt received supine in bed with +IV, aide and spouse present, Pt left OOB sitting in the chair, call bell in reach, spouse and aide present, RN awares.

## 2019-04-09 NOTE — PROGRESS NOTE ADULT - SUBJECTIVE AND OBJECTIVE BOX
INCOMPLETE NOTE    OVERNIGHT EVENTS: Zofran for nausea, Pepcid for abdominal pain.     SUBJECTIVE / INTERVAL HPI: Patient seen and examined at bedside.     VITAL SIGNS:  Vital Signs Last 24 Hrs  T(C): 36.4 (2019 05:03), Max: 37.6 (2019 08:15)  T(F): 97.5 (2019 05:03), Max: 99.7 (2019 08:15)  HR: 63 (2019 05:03) (63 - 96)  BP: 129/83 (2019 05:03) (106/60 - 137/78)  BP(mean): --  RR: 18 (2019 05:03) (16 - 18)  SpO2: 96% (2019 05:03) (95% - 99%)    PHYSICAL EXAM:    General: WDWN  HEENT: NC/AT; PERRL, anicteric sclera; MMM  Neck: supple  Cardiovascular: +S1/S2, RRR  Respiratory: CTA B/L; no W/R/R  Gastrointestinal: soft, NT/ND; +BSx4  Extremities: WWP; no edema, clubbing or cyanosis  Vascular: 2+ radial, DP/PT pulses B/L  Neurological: AAOx3; no focal deficits    MEDICATIONS:  MEDICATIONS  (STANDING):  aspirin enteric coated 81 milliGRAM(s) Oral daily  dexamethasone     Tablet 2 milliGRAM(s) Oral daily  docusate sodium 100 milliGRAM(s) Oral two times a day  escitalopram 10 milliGRAM(s) Oral daily  heparin  Injectable 5000 Unit(s) SubCutaneous every 8 hours  lacosamide 150 milliGRAM(s) Oral two times a day  letrozole 2.5 milliGRAM(s) Oral daily  montelukast 10 milliGRAM(s) Oral daily  nystatin Powder 1 Application(s) Topical two times a day  piperacillin/tazobactam IVPB. 3.375 Gram(s) IV Intermittent every 6 hours  polyethylene glycol/electrolyte Solution. 2000 milliLiter(s) Oral once  tiotropium 2.5 MICROgram(s)/olodaterol 2.5 MICROgram(s) (STIOLTO) Inhaler 2 Puff(s) Inhalation daily    MEDICATIONS  (PRN):      ALLERGIES:  Allergies    No Known Allergies    Intolerances        LABS:                        13.2   14.72 )-----------( 237      ( 2019 05:54 )             41.5     04-    138  |  102  |  13  ----------------------------<  101<H>  3.3<L>   |  24  |  1.03    Ca    9.2      2019 05:54  Mg     1.4     -    TPro  7.0  /  Alb  4.0  /  TBili  0.4  /  DBili  x   /  AST  17  /  ALT  18  /  AlkPhos  54  -    PT/INR - ( 2019 18:28 )   PT: 10.4 sec;   INR: 0.92          PTT - ( 2019 18:28 )  PTT:26.8 sec  Urinalysis Basic - ( 2019 20:11 )    Color: Yellow / Appearance: SL Cloudy / S.025 / pH: x  Gluc: x / Ketone: 40 mg/dL  / Bili: Negative / Urobili: 0.2 E.U./dL   Blood: x / Protein: NEGATIVE mg/dL / Nitrite: NEGATIVE   Leuk Esterase: Trace / RBC: < 5 /HPF / WBC > 10 /HPF   Sq Epi: x / Non Sq Epi: 0-5 /HPF / Bacteria: Present /HPF      CAPILLARY BLOOD GLUCOSE          RADIOLOGY & ADDITIONAL TESTS: Reviewed.    < from: CT Abdomen and Pelvis w/ Oral Cont and w/ IV Cont (19 @ 20:38) >  IMPRESSION:  1.  Bowel wall thickening of descending and rectosigmoid colon,   consistent with colitis of infectiousor inflammatory or ischemic   etiology. Status post right hemicolectomy.  2.  2 hypodensities in the liver, are indeterminate. Please correlate   with prior imaging since patient has reported history of liver metastasis.  3.  Markedly dilated esophagus distended with oral contrast-superimposed   GE reflux.  4.  Deflated and rim calcified saline implant in the left breast. Please   correlate with patient's past history. OVERNIGHT EVENTS: Zofran for nausea, Pepcid for abdominal pain.     SUBJECTIVE / INTERVAL HPI: Patient seen and examined at bedside. Patient with no complaints. Denied fevers, chills, night sweats, chest pain, SOB, n/v/d/c. Admitted to abdominal in the [facility].     VITAL SIGNS:  Vital Signs Last 24 Hrs  T(C): 36.4 (2019 05:03), Max: 37.6 (2019 08:15)  T(F): 97.5 (2019 05:03), Max: 99.7 (2019 08:15)  HR: 63 (2019 05:03) (63 - 96)  BP: 129/83 (2019 05:03) (106/60 - 137/78)  BP(mean): --  RR: 18 (2019 05:03) (16 - 18)  SpO2: 96% (2019 05:03) (95% - 99%)    PHYSICAL EXAM:    General: WDWN, NAD  HEENT: NC/AT; PERRL, anicteric sclera; dry MM  Neck: supple  Cardiovascular: +S1/S2, RRR  Respiratory: CTA B/L; no W/R/R  Gastrointestinal: soft, nondistended but tender to palpation over Q. BS normoactive x4. No rebound, no guarding.  Extremities: WWP; no edema, clubbing or cyanosis  Vascular: 2+ radial, DP/PT pulses B/L  Neurological: AAOx2 (oriented to person and place), at times inappropriately responds to questions. PERRL, EOMI. Left facial droop. LUE 4-/5. RUE 5/5. LE b/l 4/5. Sensation grossly intact.    MEDICATIONS:  MEDICATIONS  (STANDING):  aspirin enteric coated 81 milliGRAM(s) Oral daily  dexamethasone     Tablet 2 milliGRAM(s) Oral daily  docusate sodium 100 milliGRAM(s) Oral two times a day  escitalopram 10 milliGRAM(s) Oral daily  heparin  Injectable 5000 Unit(s) SubCutaneous every 8 hours  lacosamide 150 milliGRAM(s) Oral two times a day  letrozole 2.5 milliGRAM(s) Oral daily  montelukast 10 milliGRAM(s) Oral daily  nystatin Powder 1 Application(s) Topical two times a day  piperacillin/tazobactam IVPB. 3.375 Gram(s) IV Intermittent every 6 hours  polyethylene glycol/electrolyte Solution. 2000 milliLiter(s) Oral once  tiotropium 2.5 MICROgram(s)/olodaterol 2.5 MICROgram(s) (STIOLTO) Inhaler 2 Puff(s) Inhalation daily    MEDICATIONS  (PRN):      ALLERGIES:  Allergies    No Known Allergies    Intolerances        LABS:                        13.2   14.72 )-----------( 237      ( 2019 05:54 )             41.5     04    138  |  102  |  13  ----------------------------<  101<H>  3.3<L>   |  24  |  1.03    Ca    9.2      2019 05:54  Mg     1.4         TPro  7.0  /  Alb  4.0  /  TBili  0.4  /  DBili  x   /  AST  17  /  ALT  18  /  AlkPhos  54  04-07    PT/INR - ( 2019 18:28 )   PT: 10.4 sec;   INR: 0.92          PTT - ( 2019 18:28 )  PTT:26.8 sec  Urinalysis Basic - ( 2019 20:11 )    Color: Yellow / Appearance: SL Cloudy / S.025 / pH: x  Gluc: x / Ketone: 40 mg/dL  / Bili: Negative / Urobili: 0.2 E.U./dL   Blood: x / Protein: NEGATIVE mg/dL / Nitrite: NEGATIVE   Leuk Esterase: Trace / RBC: < 5 /HPF / WBC > 10 /HPF   Sq Epi: x / Non Sq Epi: 0-5 /HPF / Bacteria: Present /HPF      CAPILLARY BLOOD GLUCOSE          RADIOLOGY & ADDITIONAL TESTS: Reviewed.    < from: CT Abdomen and Pelvis w/ Oral Cont and w/ IV Cont (19 @ 20:38) >  IMPRESSION:  1.  Bowel wall thickening of descending and rectosigmoid colon,   consistent with colitis of infectiousor inflammatory or ischemic   etiology. Status post right hemicolectomy.  2.  2 hypodensities in the liver, are indeterminate. Please correlate   with prior imaging since patient has reported history of liver metastasis.  3.  Markedly dilated esophagus distended with oral contrast-superimposed   GE reflux.  4.  Deflated and rim calcified saline implant in the left breast. Please   correlate with patient's past history.

## 2019-04-10 ENCOUNTER — APPOINTMENT (OUTPATIENT)
Dept: INTERNAL MEDICINE | Facility: CLINIC | Age: 74
End: 2019-04-10

## 2019-04-10 LAB
ANION GAP SERPL CALC-SCNC: 10 MMOL/L — SIGNIFICANT CHANGE UP (ref 5–17)
BUN SERPL-MCNC: 10 MG/DL — SIGNIFICANT CHANGE UP (ref 7–23)
CALCIUM SERPL-MCNC: 8.6 MG/DL — SIGNIFICANT CHANGE UP (ref 8.4–10.5)
CHLORIDE SERPL-SCNC: 108 MMOL/L — SIGNIFICANT CHANGE UP (ref 96–108)
CO2 SERPL-SCNC: 22 MMOL/L — SIGNIFICANT CHANGE UP (ref 22–31)
CREAT SERPL-MCNC: 0.92 MG/DL — SIGNIFICANT CHANGE UP (ref 0.5–1.3)
GLUCOSE SERPL-MCNC: 98 MG/DL — SIGNIFICANT CHANGE UP (ref 70–99)
HCT VFR BLD CALC: 34.1 % — LOW (ref 34.5–45)
HGB BLD-MCNC: 10.7 G/DL — LOW (ref 11.5–15.5)
LACTATE SERPL-SCNC: 0.8 MMOL/L — SIGNIFICANT CHANGE UP (ref 0.5–2)
MAGNESIUM SERPL-MCNC: 1.9 MG/DL — SIGNIFICANT CHANGE UP (ref 1.6–2.6)
MCHC RBC-ENTMCNC: 29.4 PG — SIGNIFICANT CHANGE UP (ref 27–34)
MCHC RBC-ENTMCNC: 31.4 GM/DL — LOW (ref 32–36)
MCV RBC AUTO: 93.7 FL — SIGNIFICANT CHANGE UP (ref 80–100)
NRBC # BLD: 0 /100 WBCS — SIGNIFICANT CHANGE UP (ref 0–0)
PHOSPHATE SERPL-MCNC: 1.7 MG/DL — LOW (ref 2.5–4.5)
PLATELET # BLD AUTO: 209 K/UL — SIGNIFICANT CHANGE UP (ref 150–400)
POTASSIUM SERPL-MCNC: 3.5 MMOL/L — SIGNIFICANT CHANGE UP (ref 3.5–5.3)
POTASSIUM SERPL-SCNC: 3.5 MMOL/L — SIGNIFICANT CHANGE UP (ref 3.5–5.3)
RBC # BLD: 3.64 M/UL — LOW (ref 3.8–5.2)
RBC # FLD: 15.4 % — HIGH (ref 10.3–14.5)
SODIUM SERPL-SCNC: 140 MMOL/L — SIGNIFICANT CHANGE UP (ref 135–145)
SURGICAL PATHOLOGY STUDY: SIGNIFICANT CHANGE UP
WBC # BLD: 10.92 K/UL — HIGH (ref 3.8–10.5)
WBC # FLD AUTO: 10.92 K/UL — HIGH (ref 3.8–10.5)

## 2019-04-10 PROCEDURE — 99232 SBSQ HOSP IP/OBS MODERATE 35: CPT | Mod: GC

## 2019-04-10 PROCEDURE — 99231 SBSQ HOSP IP/OBS SF/LOW 25: CPT

## 2019-04-10 PROCEDURE — 74018 RADEX ABDOMEN 1 VIEW: CPT | Mod: 26

## 2019-04-10 PROCEDURE — 74174 CTA ABD&PLVS W/CONTRAST: CPT | Mod: 26

## 2019-04-10 RX ORDER — POTASSIUM CHLORIDE 20 MEQ
10 PACKET (EA) ORAL
Qty: 0 | Refills: 0 | Status: DISCONTINUED | OUTPATIENT
Start: 2019-04-10 | End: 2019-04-10

## 2019-04-10 RX ORDER — FAMOTIDINE 10 MG/ML
20 INJECTION INTRAVENOUS
Qty: 0 | Refills: 0 | Status: DISCONTINUED | OUTPATIENT
Start: 2019-04-10 | End: 2019-04-11

## 2019-04-10 RX ORDER — ALPRAZOLAM 0.25 MG
0.5 TABLET ORAL ONCE
Qty: 0 | Refills: 0 | Status: DISCONTINUED | OUTPATIENT
Start: 2019-04-10 | End: 2019-04-10

## 2019-04-10 RX ORDER — MAGNESIUM SULFATE 500 MG/ML
2 VIAL (ML) INJECTION ONCE
Qty: 0 | Refills: 0 | Status: COMPLETED | OUTPATIENT
Start: 2019-04-10 | End: 2019-04-10

## 2019-04-10 RX ORDER — POTASSIUM PHOSPHATE, MONOBASIC POTASSIUM PHOSPHATE, DIBASIC 236; 224 MG/ML; MG/ML
30 INJECTION, SOLUTION INTRAVENOUS ONCE
Qty: 0 | Refills: 0 | Status: COMPLETED | OUTPATIENT
Start: 2019-04-10 | End: 2019-04-10

## 2019-04-10 RX ORDER — SUCRALFATE 1 G
1 TABLET ORAL EVERY 6 HOURS
Qty: 0 | Refills: 0 | Status: DISCONTINUED | OUTPATIENT
Start: 2019-04-10 | End: 2019-04-15

## 2019-04-10 RX ORDER — ONDANSETRON 8 MG/1
4 TABLET, FILM COATED ORAL ONCE
Qty: 0 | Refills: 0 | Status: COMPLETED | OUTPATIENT
Start: 2019-04-10 | End: 2019-04-10

## 2019-04-10 RX ADMIN — LACOSAMIDE 150 MILLIGRAM(S): 50 TABLET ORAL at 06:35

## 2019-04-10 RX ADMIN — Medication 50 GRAM(S): at 10:12

## 2019-04-10 RX ADMIN — ESCITALOPRAM OXALATE 10 MILLIGRAM(S): 10 TABLET, FILM COATED ORAL at 12:25

## 2019-04-10 RX ADMIN — Medication 0.5 MILLIGRAM(S): at 22:31

## 2019-04-10 RX ADMIN — PIPERACILLIN AND TAZOBACTAM 200 GRAM(S): 4; .5 INJECTION, POWDER, LYOPHILIZED, FOR SOLUTION INTRAVENOUS at 18:50

## 2019-04-10 RX ADMIN — NYSTATIN CREAM 1 APPLICATION(S): 100000 CREAM TOPICAL at 17:32

## 2019-04-10 RX ADMIN — FAMOTIDINE 20 MILLIGRAM(S): 10 INJECTION INTRAVENOUS at 12:25

## 2019-04-10 RX ADMIN — Medication 1 GRAM(S): at 18:50

## 2019-04-10 RX ADMIN — LACOSAMIDE 150 MILLIGRAM(S): 50 TABLET ORAL at 19:01

## 2019-04-10 RX ADMIN — PIPERACILLIN AND TAZOBACTAM 200 GRAM(S): 4; .5 INJECTION, POWDER, LYOPHILIZED, FOR SOLUTION INTRAVENOUS at 11:43

## 2019-04-10 RX ADMIN — Medication 2 MILLIGRAM(S): at 05:53

## 2019-04-10 RX ADMIN — SODIUM CHLORIDE 120 MILLILITER(S): 9 INJECTION INTRAMUSCULAR; INTRAVENOUS; SUBCUTANEOUS at 22:31

## 2019-04-10 RX ADMIN — PIPERACILLIN AND TAZOBACTAM 200 GRAM(S): 4; .5 INJECTION, POWDER, LYOPHILIZED, FOR SOLUTION INTRAVENOUS at 22:31

## 2019-04-10 RX ADMIN — LETROZOLE 2.5 MILLIGRAM(S): 2.5 TABLET, FILM COATED ORAL at 12:26

## 2019-04-10 RX ADMIN — TIOTROPIUM BROMIDE AND OLODATEROL 2 PUFF(S): 3.124; 2.736 SPRAY, METERED RESPIRATORY (INHALATION) at 12:25

## 2019-04-10 RX ADMIN — MONTELUKAST 10 MILLIGRAM(S): 4 TABLET, CHEWABLE ORAL at 12:25

## 2019-04-10 RX ADMIN — ONDANSETRON 4 MILLIGRAM(S): 8 TABLET, FILM COATED ORAL at 05:44

## 2019-04-10 RX ADMIN — POTASSIUM PHOSPHATE, MONOBASIC POTASSIUM PHOSPHATE, DIBASIC 83.33 MILLIMOLE(S): 236; 224 INJECTION, SOLUTION INTRAVENOUS at 10:12

## 2019-04-10 RX ADMIN — NYSTATIN CREAM 1 APPLICATION(S): 100000 CREAM TOPICAL at 05:45

## 2019-04-10 RX ADMIN — SODIUM CHLORIDE 120 MILLILITER(S): 9 INJECTION INTRAMUSCULAR; INTRAVENOUS; SUBCUTANEOUS at 05:45

## 2019-04-10 RX ADMIN — PIPERACILLIN AND TAZOBACTAM 200 GRAM(S): 4; .5 INJECTION, POWDER, LYOPHILIZED, FOR SOLUTION INTRAVENOUS at 05:44

## 2019-04-10 RX ADMIN — ONDANSETRON 4 MILLIGRAM(S): 8 TABLET, FILM COATED ORAL at 10:12

## 2019-04-10 RX ADMIN — Medication 1 GRAM(S): at 23:46

## 2019-04-10 RX ADMIN — HEPARIN SODIUM 5000 UNIT(S): 5000 INJECTION INTRAVENOUS; SUBCUTANEOUS at 22:31

## 2019-04-10 NOTE — PROGRESS NOTE ADULT - ASSESSMENT
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     Surgery was consulted TRO ischemic colitis. Lactate has been normal (this morning was 0.8) WBC is trending down. Patient is hemodynamically stable.     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     Patient is s/p flex sig on 4/9 :  from 60 to 80 cm from the anal verge, the mucosa appeared dark purple/red, with edema, exudates, ulceration, cobblestoning, with proximal and distal transition points at splenic flexure and sigmoid colon. high suspicion for ischemic colitis.    Lactate has been normal (this morning was 0.8) WBC is trending down. Patient is hemodynamically stable.     Recs :  Continue IVF and IV.Zosyn for now   No urgent surgical intervention for now  Will follow up pathology result.  Team 1C will follow    Plan pending d/w

## 2019-04-10 NOTE — PROGRESS NOTE ADULT - SUBJECTIVE AND OBJECTIVE BOX
OVERNIGHT EVENTS: Patient complained of nausea, received zofran 4mg x1. Serial abdominal exams revealed no acute abdomen    SUBJECTIVE / INTERVAL HPI: Patient seen and examined at bedside. Patient admitted to LLQ, LUQ, and suprapubic tenderness. Pain episodes described as intermittent 5/10 in intensity. Pain described as dull, no aggravating/alleviating factors, pain lasts for minutes and then goes away, only to resume minutes later. Patient denied current nausea and denied vomiting. Patient also described bloody BM but upon inspection appeared to be loose brown stool with concentrated urine. ROS negative for fevers, chills, night sweats, chest pain, SOB, n/v/d/c, lightheadedness, dizziness.      VITAL SIGNS:  Vital Signs Last 24 Hrs  T(C): 36.3 (10 Apr 2019 09:23), Max: 36.8 (10 Apr 2019 05:34)  T(F): 97.3 (10 Apr 2019 09:23), Max: 98.2 (10 Apr 2019 05:34)  HR: 63 (10 Apr 2019 09:23) (63 - 75)  BP: 119/67 (10 Apr 2019 09:23) (104/56 - 138/72)  BP(mean): --  RR: 18 (10 Apr 2019 09:23) (18 - 20)  SpO2: 94% (10 Apr 2019 09:23) (94% - 98%)    PHYSICAL EXAM:    General: WDWN, NAD  HEENT: NC/AT; PERRL, anicteric sclera; dry MM  Neck: supple  Cardiovascular: +S1/S2, RRR  Respiratory: CTA B/L; no W/R/R  Gastrointestinal: soft, nondistended but tender to palpation over LLQ. BS normoactive x4. No rebound, no guarding.  Extremities: WWP; no edema, clubbing or cyanosis  Vascular: 2+ radial, DP/PT pulses B/L  Neurological: AAOx2 (oriented to person and place), at times inappropriately responds to questions. PERRL, EOMI. Left facial droop. LUE 4-/5. RUE 5/5. LE b/l 4/5. Sensation grossly intact.    MEDICATIONS:  MEDICATIONS  (STANDING):  aspirin enteric coated 81 milliGRAM(s) Oral daily  dexamethasone     Tablet 2 milliGRAM(s) Oral daily  escitalopram 10 milliGRAM(s) Oral daily  heparin  Injectable 5000 Unit(s) SubCutaneous every 8 hours  lacosamide 150 milliGRAM(s) Oral two times a day  letrozole 2.5 milliGRAM(s) Oral daily  montelukast 10 milliGRAM(s) Oral daily  nystatin Powder 1 Application(s) Topical two times a day  piperacillin/tazobactam IVPB. 3.375 Gram(s) IV Intermittent every 6 hours  sodium chloride 0.9%. 1000 milliLiter(s) (120 mL/Hr) IV Continuous <Continuous>  tiotropium 2.5 MICROgram(s)/olodaterol 2.5 MICROgram(s) (STIOLTO) Inhaler 2 Puff(s) Inhalation daily    MEDICATIONS  (PRN):  famotidine    Tablet 20 milliGRAM(s) Oral two times a day PRN Indigestion      ALLERGIES:  Allergies    No Known Allergies    Intolerances        LABS:                        10.7   10.92 )-----------( 209      ( 10 Apr 2019 07:48 )             34.1     04-10    140  |  108  |  10  ----------------------------<  98  3.5   |  22  |  0.92    Ca    8.6      10 Apr 2019 07:48  Phos  1.7     04-10  Mg     1.9     04-10    TPro  6.7  /  Alb  3.4  /  TBili  0.5  /  DBili  x   /  AST  19  /  ALT  27  /  AlkPhos  63  04-09        CAPILLARY BLOOD GLUCOSE          RADIOLOGY & ADDITIONAL TESTS: Reviewed.  < from: Xray Abdomen 1 View Portable, IMMEDIATE (04.07.19 @ 18:51) >    IMPRESSION: Single supine view demonstrates what bowel gas, nonspecific   and may be normal but may also be associated with fluid-filled dilated   bowel loops. Follow-up with CT. OVERNIGHT EVENTS: Patient complained of nausea, received zofran 4mg x1. Serial abdominal exams revealed no acute abdomen    SUBJECTIVE / INTERVAL HPI: Patient seen and examined at bedside. Patient admitted to LLQ, LUQ, and suprapubic tenderness. Pain episodes described as intermittent 5/10 in intensity. Pain described as dull, no aggravating/alleviating factors, pain lasts for minutes and then goes away, only to resume minutes later. Patient denied current nausea and denied vomiting. Patient also described bloody BM but upon inspection appeared to be loose brown stool with concentrated urine. ROS negative for fevers, chills, night sweats, chest pain, SOB, n/v/d/c, lightheadedness, dizziness.      VITAL SIGNS:  Vital Signs Last 24 Hrs  T(C): 36.3 (10 Apr 2019 09:23), Max: 36.8 (10 Apr 2019 05:34)  T(F): 97.3 (10 Apr 2019 09:23), Max: 98.2 (10 Apr 2019 05:34)  HR: 63 (10 Apr 2019 09:23) (63 - 75)  BP: 119/67 (10 Apr 2019 09:23) (104/56 - 138/72)  BP(mean): --  RR: 18 (10 Apr 2019 09:23) (18 - 20)  SpO2: 94% (10 Apr 2019 09:23) (94% - 98%)    PHYSICAL EXAM:    General: WDWN, NAD  HEENT: NC/AT; PERRL, anicteric sclera; dry MM  Neck: supple  Cardiovascular: +S1/S2, RRR  Respiratory: CTA B/L; no W/R/R  Gastrointestinal: soft, nondistended but tender to palpation over LLQ. BS normoactive x4. No rebound, no guarding.  Extremities: WWP; no edema, clubbing or cyanosis  Vascular: 2+ radial, DP/PT pulses B/L  Neurological: AAOx2 (oriented to person and place), at times inappropriately responds to questions. PERRL, EOMI. Left facial droop. LUE 4-/5. RUE 5/5. LE b/l 4/5. Sensation grossly intact.    MEDICATIONS:  MEDICATIONS  (STANDING):  aspirin enteric coated 81 milliGRAM(s) Oral daily  dexamethasone     Tablet 2 milliGRAM(s) Oral daily  escitalopram 10 milliGRAM(s) Oral daily  heparin  Injectable 5000 Unit(s) SubCutaneous every 8 hours  lacosamide 150 milliGRAM(s) Oral two times a day  letrozole 2.5 milliGRAM(s) Oral daily  montelukast 10 milliGRAM(s) Oral daily  nystatin Powder 1 Application(s) Topical two times a day  piperacillin/tazobactam IVPB. 3.375 Gram(s) IV Intermittent every 6 hours  sodium chloride 0.9%. 1000 milliLiter(s) (120 mL/Hr) IV Continuous <Continuous>  tiotropium 2.5 MICROgram(s)/olodaterol 2.5 MICROgram(s) (STIOLTO) Inhaler 2 Puff(s) Inhalation daily    MEDICATIONS  (PRN):  famotidine    Tablet 20 milliGRAM(s) Oral two times a day PRN Indigestion      ALLERGIES:  Allergies    No Known Allergies    Intolerances        LABS:                        10.7   10.92 )-----------( 209      ( 10 Apr 2019 07:48 )             34.1     04-10    140  |  108  |  10  ----------------------------<  98  3.5   |  22  |  0.92    Ca    8.6      10 Apr 2019 07:48  Phos  1.7     04-10  Mg     1.9     04-10    TPro  6.7  /  Alb  3.4  /  TBili  0.5  /  DBili  x   /  AST  19  /  ALT  27  /  AlkPhos  63  04-09        CAPILLARY BLOOD GLUCOSE          RADIOLOGY & ADDITIONAL TESTS: Reviewed.    < from: Xray Abdomen 1 View PORTABLE -Routine (04.10.19 @ 07:30) >    IMPRESSION: 2 supine views demonstrate mildly distended small bowel and   transverse colon favoring ileus. No gross pneumoperitoneum.

## 2019-04-10 NOTE — PROGRESS NOTE ADULT - SUBJECTIVE AND OBJECTIVE BOX
S: Patient seen at bedside and she has no complaints at this time.  Denies CP, SOB, DAMON, calf tenderness.  Denies nausea, vomiting since arriving on the floor. She did however, state slight nausea before getting antinausea meds.  She is passing flatus and her last bowel movement was on Tuesday.                    12.7   13.33 )-----------( 242      ( 09 Apr 2019 10:41 )             38.8     04-09    138  |  105  |  11  ----------------------------<  126<H>  3.8   |  21<L>  |  0.91    Ca    9.1      09 Apr 2019 10:41  Phos  2.1     04-09  Mg     2.3     04-09    TPro  6.7  /  Alb  3.4  /  TBili  0.5  /  DBili  x   /  AST  19  /  ALT  27  /  AlkPhos  63  04-09    Gen: NAD, resting comfortably in bed  C/V: NSR  Pulm: Nonlabored breathing, no respiratory distress  Abd: soft, NT/ND  Extrem: WWP,    A/P: 74 yo with ischemic colitis.  Diet: NPO with ice chips  IVF  IV Zosyn  Care as per primary team  Surgery team will continue to monitor

## 2019-04-10 NOTE — DIETITIAN INITIAL EVALUATION ADULT. - ENERGY NEEDS
Ideal body weight used for calculations as pt >120% of IBW.   ABW 79.8kg, IBW 50kg, 158% IBW, ht 62", BMI 32.2   Nutrient needs based on [facility] standards of care for repletion in older adults, adjusted per oncology guidelines

## 2019-04-10 NOTE — PROGRESS NOTE ADULT - SUBJECTIVE AND OBJECTIVE BOX
Pt seen and examined at bedside.    PERTINENT REVIEW OF SYSTEMS:  CONSTITUTIONAL: No weakness, fevers or chills  HEENT: No visual changes; No vertigo or throat pain   GASTROINTESTINAL: No abdominal or epigastric pain. No nausea, vomiting, or hematemesis; No diarrhea or constipation. No melena or hematochezia.  NEUROLOGICAL: No numbness or weakness  SKIN: No itching, burning, rashes, or lesions     Allergies    No Known Allergies    Intolerances      MEDICATIONS:  MEDICATIONS  (STANDING):  aspirin enteric coated 81 milliGRAM(s) Oral daily  dexamethasone     Tablet 2 milliGRAM(s) Oral daily  escitalopram 10 milliGRAM(s) Oral daily  heparin  Injectable 5000 Unit(s) SubCutaneous every 8 hours  lacosamide 150 milliGRAM(s) Oral two times a day  letrozole 2.5 milliGRAM(s) Oral daily  montelukast 10 milliGRAM(s) Oral daily  nystatin Powder 1 Application(s) Topical two times a day  piperacillin/tazobactam IVPB. 3.375 Gram(s) IV Intermittent every 6 hours  sodium chloride 0.9%. 1000 milliLiter(s) (120 mL/Hr) IV Continuous <Continuous>  tiotropium 2.5 MICROgram(s)/olodaterol 2.5 MICROgram(s) (STIOLTO) Inhaler 2 Puff(s) Inhalation daily    MEDICATIONS  (PRN):    Vital Signs Last 24 Hrs  T(C): 36.8 (10 Apr 2019 05:40), Max: 36.8 (10 Apr 2019 05:34)  T(F): 98.2 (10 Apr 2019 05:40), Max: 98.2 (10 Apr 2019 05:34)  HR: 70 (10 Apr 2019 05:40) (65 - 75)  BP: 138/72 (10 Apr 2019 05:40) (94/48 - 138/72)  BP(mean): --  RR: 18 (10 Apr 2019 05:40) (18 - 20)  SpO2: 97% (10 Apr 2019 05:40) (95% - 98%)    04-09 @ 07:01  -  04-10 @ 07:00  --------------------------------------------------------  IN: 600 mL / OUT: 0 mL / NET: 600 mL      PHYSICAL EXAM:    General: Well developed; well nourished; in no acute distress  HEENT: MMM, conjunctiva and sclera clear  Gastrointestinal: Soft non-tender non-distended; Normal bowel sounds; No hepatosplenomegaly. No rebound or guarding  Skin: Warm and dry. No obvious rash    LABS:                        12.7   13.33 )-----------( 242      ( 09 Apr 2019 10:41 )             38.8     04-09    138  |  105  |  11  ----------------------------<  126<H>  3.8   |  21<L>  |  0.91    Ca    9.1      09 Apr 2019 10:41  Phos  2.1     04-09  Mg     2.3     04-09    TPro  6.7  /  Alb  3.4  /  TBili  0.5  /  DBili  x   /  AST  19  /  ALT  27  /  AlkPhos  63  04-09                      Culture - Blood (collected 07 Apr 2019 20:40)  Source: .Blood Blood  Preliminary Report (09 Apr 2019 21:00):    No growth at 2 days.    Culture - Blood (collected 07 Apr 2019 20:40)  Source: .Blood Blood  Preliminary Report (09 Apr 2019 21:00):    No growth at 2 days.    Culture - Urine (collected 07 Apr 2019 20:37)  Source: .Urine Clean Catch (Midstream)  Final Report (09 Apr 2019 09:26):    Insignificant amount of mixed growth.      RADIOLOGY & ADDITIONAL STUDIES: Pt seen and examined at bedside. pt denies abd pain/nausea/vomiting. Reports some heartburn. Last BM was yesterday     PERTINENT REVIEW OF SYSTEMS:  CONSTITUTIONAL: No weakness, fevers or chills  HEENT: No visual changes; No vertigo or throat pain   GASTROINTESTINAL: No abdominal or epigastric pain. No nausea, vomiting, or hematemesis; No diarrhea or constipation. No melena or hematochezia.  NEUROLOGICAL: No numbness or weakness  SKIN: No itching, burning, rashes, or lesions     Allergies    No Known Allergies    Intolerances      MEDICATIONS:  MEDICATIONS  (STANDING):  aspirin enteric coated 81 milliGRAM(s) Oral daily  dexamethasone     Tablet 2 milliGRAM(s) Oral daily  escitalopram 10 milliGRAM(s) Oral daily  heparin  Injectable 5000 Unit(s) SubCutaneous every 8 hours  lacosamide 150 milliGRAM(s) Oral two times a day  letrozole 2.5 milliGRAM(s) Oral daily  montelukast 10 milliGRAM(s) Oral daily  nystatin Powder 1 Application(s) Topical two times a day  piperacillin/tazobactam IVPB. 3.375 Gram(s) IV Intermittent every 6 hours  sodium chloride 0.9%. 1000 milliLiter(s) (120 mL/Hr) IV Continuous <Continuous>  tiotropium 2.5 MICROgram(s)/olodaterol 2.5 MICROgram(s) (STIOLTO) Inhaler 2 Puff(s) Inhalation daily    MEDICATIONS  (PRN):    Vital Signs Last 24 Hrs  T(C): 36.8 (10 Apr 2019 05:40), Max: 36.8 (10 Apr 2019 05:34)  T(F): 98.2 (10 Apr 2019 05:40), Max: 98.2 (10 Apr 2019 05:34)  HR: 70 (10 Apr 2019 05:40) (65 - 75)  BP: 138/72 (10 Apr 2019 05:40) (94/48 - 138/72)  BP(mean): --  RR: 18 (10 Apr 2019 05:40) (18 - 20)  SpO2: 97% (10 Apr 2019 05:40) (95% - 98%)    04-09 @ 07:01  -  04-10 @ 07:00  --------------------------------------------------------  IN: 600 mL / OUT: 0 mL / NET: 600 mL      PHYSICAL EXAM:    General: Well developed; well nourished; in no acute distress  HEENT: MMM, conjunctiva and sclera clear  Gastrointestinal: Soft non-tender non-distended; Normal bowel sounds; No hepatosplenomegaly. No rebound or guarding  Skin: Warm and dry. No obvious rash    LABS:                        12.7   13.33 )-----------( 242      ( 09 Apr 2019 10:41 )             38.8     04-09    138  |  105  |  11  ----------------------------<  126<H>  3.8   |  21<L>  |  0.91    Ca    9.1      09 Apr 2019 10:41  Phos  2.1     04-09  Mg     2.3     04-09    TPro  6.7  /  Alb  3.4  /  TBili  0.5  /  DBili  x   /  AST  19  /  ALT  27  /  AlkPhos  63  04-09                      Culture - Blood (collected 07 Apr 2019 20:40)  Source: .Blood Blood  Preliminary Report (09 Apr 2019 21:00):    No growth at 2 days.    Culture - Blood (collected 07 Apr 2019 20:40)  Source: .Blood Blood  Preliminary Report (09 Apr 2019 21:00):    No growth at 2 days.    Culture - Urine (collected 07 Apr 2019 20:37)  Source: .Urine Clean Catch (Midstream)  Final Report (09 Apr 2019 09:26):    Insignificant amount of mixed growth.      RADIOLOGY & ADDITIONAL STUDIES:

## 2019-04-10 NOTE — PROGRESS NOTE ADULT - PROBLEM SELECTOR PLAN 1
Pt w/ abdominal pain w/ associated constipation.  Had nausea without vomiting.  WBC elevated. No associated fevers.  Lactate WNL.  Pt had BM on day of admission that was loose after taking laxative and continued to be loose throughout the day. CT A/P with contrast on admission demonstrated, bowel wall thickening of descending and rectosigmoid colon, consistent with colitis. Sigmoidoscopy 4/9/10 biopsies demonstrated mild active inflammation and edema in the transverse colon with ulceration and reactive findings consistent with ischemia in the descending colon. Rectual mucosa biopsies demonstrated mild active inflammation and lamina propria fibrosis.  -GI recs appreciated  -c/w IV Zosyn 3.375mg q6hrs  -surgery recs appreciated,  f/u recs  -NPO, bowel rest for now  -c/w IV NS @125cc/hr for now   -Abdominal XR 4/10 with mildly distended small bowel and transverse colon favoring ileus. No gross pneumoperitoneum  -lactate negative. Will continue to monitor with AM labs  -NG tube if patient vomiting  -serial abdominal exams Pt w/ abdominal pain w/ associated constipation.  Had nausea without vomiting.  WBC elevated. No associated fevers.  Lactate WNL.  Pt had BM on day of admission that was loose after taking laxative and continued to be loose throughout the day. CT A/P with contrast on admission demonstrated, bowel wall thickening of descending and rectosigmoid colon, consistent with colitis. Sigmoidoscopy 4/9/10 biopsies demonstrated mild active inflammation and edema in the transverse colon with ulceration and reactive findings consistent with ischemia in the descending colon. Rectual mucosa biopsies demonstrated mild active inflammation and lamina propria fibrosis.  -f/u CTA abdomen and pelvis for further evaluation of ischemic colitis  -GI recs appreciated  -c/w IV Zosyn 3.375mg q6hrs  -surgery recs appreciated,  f/u recs  -NPO, bowel rest for now  -c/w IV NS @125cc/hr for now   -Abdominal XR 4/10 with mildly distended small bowel and transverse colon favoring ileus. No gross pneumoperitoneum  -lactate negative. Will continue to monitor with AM labs  -NG tube if patient vomiting  -serial abdominal exams

## 2019-04-10 NOTE — PROGRESS NOTE ADULT - ASSESSMENT
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

## 2019-04-10 NOTE — PROGRESS NOTE ADULT - ASSESSMENT
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 ED on 4/7/19 with complaints of abdominal pain and nausea/vomiting x 2 days, CT findings c/w colitis, also revealing markedly dilated esophagus, GI consulted for recommendations for colitis.     #Colitis  -CT A/P revealing bowel thickening of the descending/rectosigmoid colon consistent with colitis   -s/p flex sig on 4/9- from 60 to 80 cm from the anal verge, the mucosa appeared dark purple/red, with edema, exudates, ulceration, cobblestoning, with proximal and distal transition points at splenic flexure and sigmoid colon. high suspicion for ischemic colitis. follow up pathology  -clinically, pt is stable, abd soft, VSS, lactate normal  -surgery following  -NPO, IV abx, supportive care    #Dilated esophagus  CT A/P revealing markedly dilated esophagus, prior imaging upon record review demonstrating moderate hiatal hernia, unchanged, currently with no complaints of dysphagia, odynophagia. 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 ED on 4/7/19 with complaints of abdominal pain and nausea/vomiting x 2 days, CT findings c/w colitis, also revealing markedly dilated esophagus, GI consulted for recommendations for colitis.     #Colitis  -CT A/P revealing bowel thickening of the descending/rectosigmoid colon consistent with colitis   -s/p flex sig on 4/9- from 60 to 80 cm from the anal verge, the mucosa appeared dark purple/red, with edema, exudates, ulceration, cobblestoning, with proximal and distal transition points at splenic flexure and sigmoid colon. high suspicion for ischemic colitis. follow up pathology  -clinically, pt is stable, abd soft, VSS, lactate normal  -surgery following  -NPO, IV abx, supportive care    #Dilated esophagus  CT A/P revealing markedly dilated esophagus, prior imaging upon record review demonstrating moderate hiatal hernia, unchanged, currently with no complaints of dysphagia, odynophagia. 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     #Colitis  -CT A/P revealing bowel thickening of the descending/rectosigmoid colon consistent with colitis   -s/p flex sig on 4/9- from 60 to 80 cm from the anal verge, the mucosa appeared dark purple/red, with edema, exudates, ulceration, cobblestoning, with proximal and distal transition points at splenic flexure and sigmoid colon. high suspicion for ischemic colitis. follow up pathology  -clinically, pt is stable, abd soft, VSS, lactate normal  -surgery following  -NPO, IV abx, supportive care  - please get CTA abdomen/pelvis to evaluate the vasculature    #Dilated esophagus  CT A/P revealing markedly dilated esophagus, prior imaging upon record review demonstrating moderate hiatal hernia, unchanged, currently with no complaints of dysphagia, odynophagia. 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     #Colitis  -CT A/P revealing bowel thickening of the descending/rectosigmoid colon consistent with colitis   -s/p flex sig on 4/9- from 60 to 80 cm from the anal verge, the mucosa appeared dark purple/red, with edema, exudates, ulceration, cobblestoning, with proximal and distal transition points at splenic flexure and sigmoid colon. high suspicion for ischemic colitis. follow up pathology  Possibly secondary to bevacizumab based on lit review with case reports. Pt also gave permission to use her images for write up/publication of the case for scientific journal   -Will recommend CTA to eval vasculature  -clinically, pt is stable, abd soft, VSS, lactate normal  -surgery following  -can start clears today, advance as tolerated, IV abx, supportive care      #Dilated esophagus  CT A/P revealing markedly dilated esophagus, prior imaging upon record review demonstrating moderate hiatal hernia, unchanged, currently with no complaints of dysphagia, odynophagia. 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     #Colitis  -CT A/P revealing bowel thickening of the descending/rectosigmoid colon consistent with colitis   -s/p flex sig on 4/9- from 60 to 80 cm from the anal verge, the mucosa appeared dark purple/red, with edema, exudates, ulceration, cobblestoning, with proximal and distal transition points at splenic flexure and sigmoid colon. pathology confirms ischemic colitis.  Possibly secondary to bevacizumab based on lit review with case reports. Pt also gave permission to use her images for write up/publication of the case for scientific journal   -Will recommend CTA to eval vasculature  -clinically, pt is stable, abd soft, VSS, lactate normal  -surgery following  -can start clears today, advance as tolerated, IV abx, supportive care      #Dilated esophagus  CT A/P revealing markedly dilated esophagus, prior imaging upon record review demonstrating moderate hiatal hernia, unchanged, currently with no complaints of dysphagia, odynophagia. Last EGD 11/2015, revealing 3 cm hiatal hernia, LA grade 3 esophagitis, mild antral erythema (negative for H pylori)  -recommend Pepcid prn heartburn  -No intervention at this time  -Can continue to follow up outpatient    Case discussed and reviewed with attending, Dr Wolff

## 2019-04-10 NOTE — PROGRESS NOTE ADULT - SUBJECTIVE AND OBJECTIVE BOX
SUBJECTIVE: Patient seen and examined bedside, had 1 episode of spotting blood this morning, no abdominal pain, feeling nauseous, no vomiting    aspirin enteric coated 81 milliGRAM(s) Oral daily  heparin  Injectable 5000 Unit(s) SubCutaneous every 8 hours  piperacillin/tazobactam IVPB. 3.375 Gram(s) IV Intermittent every 6 hours      Vital Signs Last 24 Hrs  T(C): 36.8 (10 Apr 2019 05:40), Max: 36.8 (10 Apr 2019 05:34)  T(F): 98.2 (10 Apr 2019 05:40), Max: 98.2 (10 Apr 2019 05:34)  HR: 70 (10 Apr 2019 05:40) (65 - 75)  BP: 138/72 (10 Apr 2019 05:40) (94/48 - 138/72)  BP(mean): --  RR: 18 (10 Apr 2019 05:40) (18 - 20)  SpO2: 97% (10 Apr 2019 05:40) (95% - 98%)  I&O's Detail    09 Apr 2019 07:01  -  10 Apr 2019 07:00  --------------------------------------------------------  IN:    sodium chloride 0.9%.: 600 mL  Total IN: 600 mL    OUT:  Total OUT: 0 mL    Total NET: 600 mL          General: NAD, resting comfortably in bed  C/V: NSR  Pulm: Nonlabored breathing, no respiratory distress  Abd: soft, NT/ND. not peritoneal, no guarding  Extrem: WWP, no edema.        LABS:                        10.7   10.92 )-----------( 209      ( 10 Apr 2019 07:48 )             34.1     04-09    138  |  105  |  11  ----------------------------<  126<H>  3.8   |  21<L>  |  0.91    Ca    9.1      09 Apr 2019 10:41  Phos  2.1     04-09  Mg     2.3     04-09    TPro  6.7  /  Alb  3.4  /  TBili  0.5  /  DBili  x   /  AST  19  /  ALT  27  /  AlkPhos  63  04-09          RADIOLOGY & ADDITIONAL STUDIES:

## 2019-04-10 NOTE — DIETITIAN INITIAL EVALUATION ADULT. - OTHER INFO
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 Pt reports continued nausea however aided by medication, states she is hungry and thirsty, eager to eat again. Denies c/v/d, chewing/ swallowing issues, skin with tear. NKFA reported. Pt reports at home she eats out often and has been trying to diet and cut back on "bad" food, increasing water consumption, states she has lost ~10#, UBW 82kg. CT findings colitis, being managed at this time, NPO. Discussed magnolialey stages of diet advancement, provided outpatient card for follow up as pt is interested in starting a healthier lifestyle. Will continue to follow per protocol.

## 2019-04-11 LAB
ANION GAP SERPL CALC-SCNC: 10 MMOL/L — SIGNIFICANT CHANGE UP (ref 5–17)
BUN SERPL-MCNC: 6 MG/DL — LOW (ref 7–23)
CALCIUM SERPL-MCNC: 8.4 MG/DL — SIGNIFICANT CHANGE UP (ref 8.4–10.5)
CHLORIDE SERPL-SCNC: 110 MMOL/L — HIGH (ref 96–108)
CO2 SERPL-SCNC: 21 MMOL/L — LOW (ref 22–31)
CREAT SERPL-MCNC: 0.86 MG/DL — SIGNIFICANT CHANGE UP (ref 0.5–1.3)
GLUCOSE SERPL-MCNC: 82 MG/DL — SIGNIFICANT CHANGE UP (ref 70–99)
HCT VFR BLD CALC: 36 % — SIGNIFICANT CHANGE UP (ref 34.5–45)
HGB BLD-MCNC: 11.3 G/DL — LOW (ref 11.5–15.5)
LACTATE SERPL-SCNC: 0.8 MMOL/L — SIGNIFICANT CHANGE UP (ref 0.5–2)
MAGNESIUM SERPL-MCNC: 1.9 MG/DL — SIGNIFICANT CHANGE UP (ref 1.6–2.6)
MCHC RBC-ENTMCNC: 29.4 PG — SIGNIFICANT CHANGE UP (ref 27–34)
MCHC RBC-ENTMCNC: 31.4 GM/DL — LOW (ref 32–36)
MCV RBC AUTO: 93.8 FL — SIGNIFICANT CHANGE UP (ref 80–100)
NRBC # BLD: 0 /100 WBCS — SIGNIFICANT CHANGE UP (ref 0–0)
PHOSPHATE SERPL-MCNC: 2.1 MG/DL — LOW (ref 2.5–4.5)
PLATELET # BLD AUTO: 225 K/UL — SIGNIFICANT CHANGE UP (ref 150–400)
POTASSIUM SERPL-MCNC: 3.6 MMOL/L — SIGNIFICANT CHANGE UP (ref 3.5–5.3)
POTASSIUM SERPL-SCNC: 3.6 MMOL/L — SIGNIFICANT CHANGE UP (ref 3.5–5.3)
RBC # BLD: 3.84 M/UL — SIGNIFICANT CHANGE UP (ref 3.8–5.2)
RBC # FLD: 15.1 % — HIGH (ref 10.3–14.5)
SODIUM SERPL-SCNC: 141 MMOL/L — SIGNIFICANT CHANGE UP (ref 135–145)
WBC # BLD: 7 K/UL — SIGNIFICANT CHANGE UP (ref 3.8–10.5)
WBC # FLD AUTO: 7 K/UL — SIGNIFICANT CHANGE UP (ref 3.8–10.5)

## 2019-04-11 PROCEDURE — 99232 SBSQ HOSP IP/OBS MODERATE 35: CPT | Mod: GC

## 2019-04-11 PROCEDURE — 99231 SBSQ HOSP IP/OBS SF/LOW 25: CPT

## 2019-04-11 RX ORDER — PANTOPRAZOLE SODIUM 20 MG/1
40 TABLET, DELAYED RELEASE ORAL
Qty: 0 | Refills: 0 | Status: DISCONTINUED | OUTPATIENT
Start: 2019-04-11 | End: 2019-04-15

## 2019-04-11 RX ORDER — POTASSIUM CHLORIDE 20 MEQ
10 PACKET (EA) ORAL
Qty: 0 | Refills: 0 | Status: DISCONTINUED | OUTPATIENT
Start: 2019-04-11 | End: 2019-04-11

## 2019-04-11 RX ORDER — MAGNESIUM SULFATE 500 MG/ML
2 VIAL (ML) INJECTION ONCE
Qty: 0 | Refills: 0 | Status: COMPLETED | OUTPATIENT
Start: 2019-04-11 | End: 2019-04-11

## 2019-04-11 RX ORDER — ALPRAZOLAM 0.25 MG
0.5 TABLET ORAL ONCE
Qty: 0 | Refills: 0 | Status: DISCONTINUED | OUTPATIENT
Start: 2019-04-11 | End: 2019-04-11

## 2019-04-11 RX ORDER — POTASSIUM PHOSPHATE, MONOBASIC POTASSIUM PHOSPHATE, DIBASIC 236; 224 MG/ML; MG/ML
15 INJECTION, SOLUTION INTRAVENOUS ONCE
Qty: 0 | Refills: 0 | Status: COMPLETED | OUTPATIENT
Start: 2019-04-11 | End: 2019-04-11

## 2019-04-11 RX ADMIN — SODIUM CHLORIDE 120 MILLILITER(S): 9 INJECTION INTRAMUSCULAR; INTRAVENOUS; SUBCUTANEOUS at 06:51

## 2019-04-11 RX ADMIN — SODIUM CHLORIDE 60 MILLILITER(S): 9 INJECTION INTRAMUSCULAR; INTRAVENOUS; SUBCUTANEOUS at 22:32

## 2019-04-11 RX ADMIN — Medication 30 MILLILITER(S): at 23:15

## 2019-04-11 RX ADMIN — POTASSIUM PHOSPHATE, MONOBASIC POTASSIUM PHOSPHATE, DIBASIC 62.5 MILLIMOLE(S): 236; 224 INJECTION, SOLUTION INTRAVENOUS at 12:34

## 2019-04-11 RX ADMIN — Medication 0.5 MILLIGRAM(S): at 23:31

## 2019-04-11 RX ADMIN — Medication 2 MILLIGRAM(S): at 06:51

## 2019-04-11 RX ADMIN — Medication 81 MILLIGRAM(S): at 12:35

## 2019-04-11 RX ADMIN — Medication 100 MILLIEQUIVALENT(S): at 09:01

## 2019-04-11 RX ADMIN — Medication 100 MILLIEQUIVALENT(S): at 10:03

## 2019-04-11 RX ADMIN — Medication 1 GRAM(S): at 12:35

## 2019-04-11 RX ADMIN — ESCITALOPRAM OXALATE 10 MILLIGRAM(S): 10 TABLET, FILM COATED ORAL at 12:35

## 2019-04-11 RX ADMIN — Medication 1 GRAM(S): at 17:55

## 2019-04-11 RX ADMIN — Medication 50 GRAM(S): at 09:00

## 2019-04-11 RX ADMIN — PIPERACILLIN AND TAZOBACTAM 200 GRAM(S): 4; .5 INJECTION, POWDER, LYOPHILIZED, FOR SOLUTION INTRAVENOUS at 06:51

## 2019-04-11 RX ADMIN — LACOSAMIDE 150 MILLIGRAM(S): 50 TABLET ORAL at 06:51

## 2019-04-11 RX ADMIN — LACOSAMIDE 150 MILLIGRAM(S): 50 TABLET ORAL at 17:55

## 2019-04-11 RX ADMIN — Medication 1 GRAM(S): at 22:30

## 2019-04-11 RX ADMIN — NYSTATIN CREAM 1 APPLICATION(S): 100000 CREAM TOPICAL at 17:09

## 2019-04-11 RX ADMIN — SODIUM CHLORIDE 60 MILLILITER(S): 9 INJECTION INTRAMUSCULAR; INTRAVENOUS; SUBCUTANEOUS at 15:25

## 2019-04-11 RX ADMIN — Medication 30 MILLILITER(S): at 02:10

## 2019-04-11 RX ADMIN — LETROZOLE 2.5 MILLIGRAM(S): 2.5 TABLET, FILM COATED ORAL at 15:47

## 2019-04-11 RX ADMIN — PIPERACILLIN AND TAZOBACTAM 200 GRAM(S): 4; .5 INJECTION, POWDER, LYOPHILIZED, FOR SOLUTION INTRAVENOUS at 17:55

## 2019-04-11 RX ADMIN — MONTELUKAST 10 MILLIGRAM(S): 4 TABLET, CHEWABLE ORAL at 12:35

## 2019-04-11 RX ADMIN — PIPERACILLIN AND TAZOBACTAM 200 GRAM(S): 4; .5 INJECTION, POWDER, LYOPHILIZED, FOR SOLUTION INTRAVENOUS at 11:15

## 2019-04-11 RX ADMIN — PIPERACILLIN AND TAZOBACTAM 200 GRAM(S): 4; .5 INJECTION, POWDER, LYOPHILIZED, FOR SOLUTION INTRAVENOUS at 22:33

## 2019-04-11 RX ADMIN — NYSTATIN CREAM 1 APPLICATION(S): 100000 CREAM TOPICAL at 06:52

## 2019-04-11 RX ADMIN — Medication 1 GRAM(S): at 06:51

## 2019-04-11 RX ADMIN — HEPARIN SODIUM 5000 UNIT(S): 5000 INJECTION INTRAVENOUS; SUBCUTANEOUS at 22:34

## 2019-04-11 RX ADMIN — TIOTROPIUM BROMIDE AND OLODATEROL 2 PUFF(S): 3.124; 2.736 SPRAY, METERED RESPIRATORY (INHALATION) at 15:25

## 2019-04-11 RX ADMIN — FAMOTIDINE 20 MILLIGRAM(S): 10 INJECTION INTRAVENOUS at 01:44

## 2019-04-11 NOTE — PROGRESS NOTE ADULT - ASSESSMENT
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 ED on 4/7/19 with complaints of abdominal pain and nausea/vomiting x 2 days, CT findings c/w colitis, also revealing markedly dilated esophagus, GI consulted for recommendations for colitis.     # Ischemic Colitis  -CT A/P revealing bowel thickening of the descending/rectosigmoid colon consistent with colitis   -s/p flex sig on 4/9- from 60 to 80 cm from the anal verge, the mucosa appeared dark purple/red, with edema, exudates, ulceration, cobblestoning, with proximal and distal transition points at splenic flexure and sigmoid colon. pathology confirms ischemic colitis. Unclear etiology. No reports of hypotension. Possibly secondary to bevacizumab based on lit review with case reports  - CTA neg for significant stenosis of vasculature  -clinically, pt is stable, abd soft, VSS, lactate normal, wbc normalized  -surgery following  -can start clears today, advance as tolerated, IV abx, supportive care      #Dilated esophagus  CT A/P revealing markedly dilated esophagus, prior imaging upon record review demonstrating moderate hiatal hernia, unchanged, currently with no complaints of dysphagia, odynophagia. 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     # Ischemic Colitis  -CT A/P revealing bowel thickening of the descending/rectosigmoid colon consistent with colitis   -s/p flex sig on 4/9- from 60 to 80 cm from the anal verge, the mucosa appeared dark purple/red, with edema, exudates, ulceration, cobblestoning, with proximal and distal transition points at splenic flexure and sigmoid colon. pathology confirms ischemic colitis. Unclear etiology. No reports of hypotension. Possibly secondary to bevacizumab based on lit review with case reports- through multidisciplinary discussion with her surgeon PCP, oncologist the decisions has been made to hold medication- will follow up with oncologist as outpt to determine appropriate/alternative treatment  - CTA neg for significant stenosis of vasculature  -clinically, pt is stable, abd soft, VSS, lactate normal, wbc normalized  -surgery following  -can start clears today, advance as tolerated, IV abx, supportive care      #Dilated esophagus  CT A/P revealing markedly dilated esophagus, prior imaging upon record review demonstrating moderate hiatal hernia, unchanged, currently with no complaints of dysphagia, odynophagia. Last EGD 11/2015, revealing 3 cm hiatal hernia, LA grade 3 esophagitis, mild antral erythema (negative for H pylori)  -pt now with complaints of heartburn and esophagitis also seen on imaging  -recommend PPI 40 mg po daily for 2 months  -Can continue to follow up outpatient to consider EGD    # Liver lesions  -seen on CTA  -patients oncologist made aware  -can follow up as outpt with mri     Case discussed and reviewed with attending, Dr Wolff  GI will sign off. Please reconsult as needed

## 2019-04-11 NOTE — PROGRESS NOTE ADULT - SUBJECTIVE AND OBJECTIVE BOX
SUBJECTIVE: Patient seen and examined bedside, c/o heart burn, no nausea/vomiting, denies abdominal pain, no episodes fo BRBPR.    aspirin enteric coated 81 milliGRAM(s) Oral daily  heparin  Injectable 5000 Unit(s) SubCutaneous every 8 hours  piperacillin/tazobactam IVPB. 3.375 Gram(s) IV Intermittent every 6 hours      Vital Signs Last 24 Hrs  T(C): 36.5 (11 Apr 2019 05:13), Max: 36.8 (10 Apr 2019 15:41)  T(F): 97.7 (11 Apr 2019 05:13), Max: 98.3 (10 Apr 2019 15:41)  HR: 61 (11 Apr 2019 05:13) (58 - 63)  BP: 145/67 (11 Apr 2019 05:13) (119/67 - 163/77)  BP(mean): --  RR: 18 (11 Apr 2019 05:13) (17 - 18)  SpO2: 97% (11 Apr 2019 05:13) (94% - 97%)  I&O's Detail    09 Apr 2019 07:01  -  10 Apr 2019 07:00  --------------------------------------------------------  IN:    sodium chloride 0.9%.: 1200 mL  Total IN: 1200 mL    OUT:  Total OUT: 0 mL    Total NET: 1200 mL      10 Apr 2019 07:01  -  11 Apr 2019 06:22  --------------------------------------------------------  IN:    sodium chloride 0.9%.: 120 mL  Total IN: 120 mL    OUT:  Total OUT: 0 mL    Total NET: 120 mL          General: NAD, resting comfortably in bed  C/V: NSR  Pulm: Nonlabored breathing, no respiratory distress  Abd: soft, completely non tender, non distended.  Extrem: WWP, no edema.        LABS:                        10.7   10.92 )-----------( 209      ( 10 Apr 2019 07:48 )             34.1     04-10    140  |  108  |  10  ----------------------------<  98  3.5   |  22  |  0.92    Ca    8.6      10 Apr 2019 07:48  Phos  1.7     04-10  Mg     1.9     04-10    TPro  6.7  /  Alb  3.4  /  TBili  0.5  /  DBili  x   /  AST  19  /  ALT  27  /  AlkPhos  63  04-09      RADIOLOGY & ADDITIONAL STUDIES:  FINDINGS:    Lungs: Linear scarring or atelectasis both lung bases.  Pleura: Small right pleural effusion.  Mediastinum: Small hiatal hernia. Mild-to-moderate distal esophageal wall   thickening, suggestive of  esophagitis. Correlate clinically.    VASCULATURE:  Aorta: No aortic aneurysm. No aortic dissection.  Celiac trunk and mesenteric arteries: Mild stenosis origin celiac trunk.   No significant stenosis  SMA.  Renal arteries: No occlusion or significant stenosis.  Right iliac arteries: No occlusion or significant stenosis.  Left iliac arteries: No occlusion or significant stenosis.    ABDOMEN:  Liver: Stable 3.4 cm hepatic cyst. Multiple indeterminate low attenuation   liver lesions largest 1.3 cm.  Metastatic disease cannot be excluded.  Gallbladder and bile ducts: Unremarkable. No calcified stones. No ductal   dilation.  Pancreas: Unremarkable. No mass. No ductal dilation.  Spleen: Unremarkable. No splenomegaly.  Adrenals: Unremarkable. No mass.  Kidneys and ureters: Unremarkable. No solid mass. No hydronephrosis.  Stomach and bowel: Previous right colectomy. Wall thickening with   surrounding inflammatory  change at the left and proximal sigmoid colon consistent with a   nonspecific colitis, worse than  previous exam. Which  Appendix: No evidence of appendicitis.    PELVIS:  Bladder: Unremarkable. No mass.  Reproductive: Unremarkable as visualized.  ABDOMEN and PELVIS:  Intraperitoneal space: Unremarkable. No free air. No significant fluid   collection.  Bones/joints: See Liver Finding.  Soft tissues: Unremarkable.  Lymph nodes: Unremarkable. No enlarged lymph nodes.    IMPRESSION:  1. Small hiatal hernia. Mild-to-moderate distal esophageal wall   thickening, suggestive of esophagitis.  Correlate clinically.  2. Small right pleural effusion.  3. Stable 3.4 cm hepatic cyst. Multiple indeterminate low attenuation   liver lesions largest 1.3 cm.  Metastatic disease cannot be excluded.  4. Wall thickening with surrounding inflammatory change at the left and   proximal sigmoid colon  consistent with a nonspecific colitis, worse than previous exam.    Final Diagnosis    1. Colon, transverse, biopsy:  - Colonic mucosa with mild active inflammation and edema.    2. Colon, descending, biopsy:  - Colonic mucosa with ulceration, lamina propria fibrosis  and reactive epithelium consistent with ischemic colitis.    3. Rectum, biopsy:  - Superficial fragments of rectal mucosa with focal mild  active inflammation and lamina propria fibrosis.    4. Rectosigmoid polyp:  - Polypoid fragment of colonic mucosa with mucous gland  hyperplasia.

## 2019-04-11 NOTE — PROGRESS NOTE ADULT - SUBJECTIVE AND OBJECTIVE BOX
Pt seen and examined at bedside.    PERTINENT REVIEW OF SYSTEMS:  CONSTITUTIONAL: No weakness, fevers or chills  HEENT: No visual changes; No vertigo or throat pain   GASTROINTESTINAL: No abdominal or epigastric pain. No nausea, vomiting, or hematemesis; No diarrhea or constipation. No melena or hematochezia.  NEUROLOGICAL: No numbness or weakness  SKIN: No itching, burning, rashes, or lesions     Allergies    No Known Allergies    Intolerances      MEDICATIONS:  MEDICATIONS  (STANDING):  aspirin enteric coated 81 milliGRAM(s) Oral daily  dexamethasone     Tablet 2 milliGRAM(s) Oral daily  escitalopram 10 milliGRAM(s) Oral daily  heparin  Injectable 5000 Unit(s) SubCutaneous every 8 hours  lacosamide 150 milliGRAM(s) Oral two times a day  letrozole 2.5 milliGRAM(s) Oral daily  montelukast 10 milliGRAM(s) Oral daily  nystatin Powder 1 Application(s) Topical two times a day  piperacillin/tazobactam IVPB. 3.375 Gram(s) IV Intermittent every 6 hours  sodium chloride 0.9%. 1000 milliLiter(s) (120 mL/Hr) IV Continuous <Continuous>  sucralfate suspension 1 Gram(s) Oral every 6 hours  tiotropium 2.5 MICROgram(s)/olodaterol 2.5 MICROgram(s) (STIOLTO) Inhaler 2 Puff(s) Inhalation daily    MEDICATIONS  (PRN):  aluminum hydroxide/magnesium hydroxide/simethicone Suspension 30 milliLiter(s) Oral every 4 hours PRN Dyspepsia  famotidine    Tablet 20 milliGRAM(s) Oral two times a day PRN Indigestion    Vital Signs Last 24 Hrs  T(C): 36.5 (11 Apr 2019 05:13), Max: 36.8 (10 Apr 2019 15:41)  T(F): 97.7 (11 Apr 2019 05:13), Max: 98.3 (10 Apr 2019 15:41)  HR: 61 (11 Apr 2019 05:13) (58 - 63)  BP: 145/67 (11 Apr 2019 05:13) (119/67 - 163/77)  BP(mean): --  RR: 18 (11 Apr 2019 05:13) (17 - 18)  SpO2: 97% (11 Apr 2019 05:13) (94% - 97%)    04-10 @ 07:01  -  04-11 @ 07:00  --------------------------------------------------------  IN: 120 mL / OUT: 0 mL / NET: 120 mL      PHYSICAL EXAM:    General: Well developed; well nourished; in no acute distress  HEENT: MMM, conjunctiva and sclera clear  Gastrointestinal: Soft non-tender non-distended; Normal bowel sounds; No hepatosplenomegaly. No rebound or guarding  Skin: Warm and dry. No obvious rash    LABS:                        11.3   7.00  )-----------( 225      ( 11 Apr 2019 07:08 )             36.0     04-10    140  |  108  |  10  ----------------------------<  98  3.5   |  22  |  0.92    Ca    8.6      10 Apr 2019 07:48  Phos  1.7     04-10  Mg     1.9     04-10    TPro  6.7  /  Alb  3.4  /  TBili  0.5  /  DBili  x   /  AST  19  /  ALT  27  /  AlkPhos  63  04-09                      RADIOLOGY & ADDITIONAL STUDIES: Pt seen and examined at bedside. Pt has no complaints and feels well.    PERTINENT REVIEW OF SYSTEMS:  CONSTITUTIONAL: No weakness, fevers or chills  HEENT: No visual changes; No vertigo or throat pain   GASTROINTESTINAL: No abdominal or epigastric pain. No nausea, vomiting, or hematemesis; No diarrhea or constipation. No melena or hematochezia.  NEUROLOGICAL: No numbness or weakness  SKIN: No itching, burning, rashes, or lesions     Allergies    No Known Allergies    Intolerances      MEDICATIONS:  MEDICATIONS  (STANDING):  aspirin enteric coated 81 milliGRAM(s) Oral daily  dexamethasone     Tablet 2 milliGRAM(s) Oral daily  escitalopram 10 milliGRAM(s) Oral daily  heparin  Injectable 5000 Unit(s) SubCutaneous every 8 hours  lacosamide 150 milliGRAM(s) Oral two times a day  letrozole 2.5 milliGRAM(s) Oral daily  montelukast 10 milliGRAM(s) Oral daily  nystatin Powder 1 Application(s) Topical two times a day  piperacillin/tazobactam IVPB. 3.375 Gram(s) IV Intermittent every 6 hours  sodium chloride 0.9%. 1000 milliLiter(s) (120 mL/Hr) IV Continuous <Continuous>  sucralfate suspension 1 Gram(s) Oral every 6 hours  tiotropium 2.5 MICROgram(s)/olodaterol 2.5 MICROgram(s) (STIOLTO) Inhaler 2 Puff(s) Inhalation daily    MEDICATIONS  (PRN):  aluminum hydroxide/magnesium hydroxide/simethicone Suspension 30 milliLiter(s) Oral every 4 hours PRN Dyspepsia  famotidine    Tablet 20 milliGRAM(s) Oral two times a day PRN Indigestion    Vital Signs Last 24 Hrs  T(C): 36.5 (11 Apr 2019 05:13), Max: 36.8 (10 Apr 2019 15:41)  T(F): 97.7 (11 Apr 2019 05:13), Max: 98.3 (10 Apr 2019 15:41)  HR: 61 (11 Apr 2019 05:13) (58 - 63)  BP: 145/67 (11 Apr 2019 05:13) (119/67 - 163/77)  BP(mean): --  RR: 18 (11 Apr 2019 05:13) (17 - 18)  SpO2: 97% (11 Apr 2019 05:13) (94% - 97%)    04-10 @ 07:01  -  04-11 @ 07:00  --------------------------------------------------------  IN: 120 mL / OUT: 0 mL / NET: 120 mL      PHYSICAL EXAM:    General: Well developed; well nourished; in no acute distress  HEENT: MMM, conjunctiva and sclera clear  Gastrointestinal: Soft non-tender non-distended; Normal bowel sounds; No hepatosplenomegaly. No rebound or guarding  Skin: Warm and dry. No obvious rash    LABS:                        11.3   7.00  )-----------( 225      ( 11 Apr 2019 07:08 )             36.0     04-10    140  |  108  |  10  ----------------------------<  98  3.5   |  22  |  0.92    Ca    8.6      10 Apr 2019 07:48  Phos  1.7     04-10  Mg     1.9     04-10    TPro  6.7  /  Alb  3.4  /  TBili  0.5  /  DBili  x   /  AST  19  /  ALT  27  /  AlkPhos  63  04-09                      RADIOLOGY & ADDITIONAL STUDIES:

## 2019-04-11 NOTE — PROGRESS NOTE ADULT - SUBJECTIVE AND OBJECTIVE BOX
OVERNIGHT EVENTS: Abdominal exams negative for acute abdomen.    SUBJECTIVE / INTERVAL HPI: Patient seen and examined at bedside. Patient denied fevers, chills, night sweats, chest pain, SOB, n/v/d/c, abdominal pain, melena, BRBPR    VITAL SIGNS:  Vital Signs Last 24 Hrs  T(C): 36.1 (11 Apr 2019 09:45), Max: 36.8 (10 Apr 2019 15:41)  T(F): 97 (11 Apr 2019 09:45), Max: 98.3 (10 Apr 2019 15:41)  HR: 64 (11 Apr 2019 09:45) (58 - 64)  BP: 142/61 (11 Apr 2019 09:45) (134/62 - 163/77)  BP(mean): --  RR: 18 (11 Apr 2019 09:45) (17 - 18)  SpO2: 99% (11 Apr 2019 09:45) (96% - 99%)    PHYSICAL EXAM:    General: WDWN, NAD   HEENT: NC/AT; PERRL, anicteric sclera; dry MM  Neck: supple  Cardiovascular: +S1/S2, RRR  Respiratory: CTA B/L; no W/R/R  Gastrointestinal: soft, NT/ND; hyperactive bowel sounds x4  Extremities: Trace pitting edema b/l lower extremities. No erythema, no cyanosis b/l. Extremities warm.   Vascular: 2+ radial, DP/PT pulses B/L  Neurological: AAOx2 (oritented to person and place).     MEDICATIONS:  MEDICATIONS  (STANDING):  aspirin enteric coated 81 milliGRAM(s) Oral daily  dexamethasone     Tablet 2 milliGRAM(s) Oral daily  escitalopram 10 milliGRAM(s) Oral daily  heparin  Injectable 5000 Unit(s) SubCutaneous every 8 hours  lacosamide 150 milliGRAM(s) Oral two times a day  letrozole 2.5 milliGRAM(s) Oral daily  montelukast 10 milliGRAM(s) Oral daily  nystatin Powder 1 Application(s) Topical two times a day  pantoprazole    Tablet 40 milliGRAM(s) Oral before breakfast  piperacillin/tazobactam IVPB. 3.375 Gram(s) IV Intermittent every 6 hours  sodium chloride 0.9%. 1000 milliLiter(s) (60 mL/Hr) IV Continuous <Continuous>  sucralfate suspension 1 Gram(s) Oral every 6 hours  tiotropium 2.5 MICROgram(s)/olodaterol 2.5 MICROgram(s) (STIOLTO) Inhaler 2 Puff(s) Inhalation daily    MEDICATIONS  (PRN):  aluminum hydroxide/magnesium hydroxide/simethicone Suspension 30 milliLiter(s) Oral every 4 hours PRN Dyspepsia      ALLERGIES:  Allergies    No Known Allergies    Intolerances        LABS:                        11.3   7.00  )-----------( 225      ( 11 Apr 2019 07:08 )             36.0     04-11    141  |  110<H>  |  6<L>  ----------------------------<  82  3.6   |  21<L>  |  0.86    Ca    8.4      11 Apr 2019 07:08  Phos  2.1     04-11  Mg     1.9     04-11          CAPILLARY BLOOD GLUCOSE          RADIOLOGY & ADDITIONAL TESTS: Reviewed.  < from: CT Angio Abdomen and Pelvis w/ IV Cont (04.10.19 @ 17:17) >  IMPRESSION:  1. Small hiatal hernia. Mild-to-moderate distal esophageal wall   thickening, suggestive of esophagitis.  Correlate clinically.  2. Small right pleural effusion.  3. Stable 3.4 cm hepatic cyst. Multiple indeterminate lowattenuation   liver lesions largest 1.3 cm.  Metastatic disease cannot be excluded.  4. Wall thickening with surrounding inflammatory change at the left and   proximal sigmoid colon  consistent with a nonspecific colitis, worse than previous exam.

## 2019-04-11 NOTE — PROGRESS NOTE ADULT - PROBLEM SELECTOR PLAN 1
Pt w/ abdominal pain w/ associated constipation.  Had nausea without vomiting.  WBC elevated. No associated fevers.  Lactate WNL.  Pt had BM on day of admission that was loose after taking laxative and continued to be loose throughout the day. CT A/P with contrast on admission demonstrated, bowel wall thickening of descending and rectosigmoid colon, consistent with colitis. Sigmoidoscopy 4/9/10 biopsies demonstrated mild active inflammation and edema in the transverse colon with ulceration and reactive findings consistent with ischemia in the descending colon. Rectual mucosa biopsies demonstrated mild active inflammation and lamina propria fibrosis. CTA abdomen/pelvis 4/10 demonstrated wall thickening with surrounding inflammatory change at the left and proximal sigmoid colon consistent with a nonspecific colitis. Etiology of ischemic colitis possibly due to chemotherapy regimen   -GI recs appreciated  -surgery recs appreciated  -c/w IV Zosyn 3.375mg q6hrs, considering transition to PO   -clear liquid diet   -decreased rate of IV NS to 60cc/hr in the setting of increasing PO regimen  -AM lactate negative. Will continue to monitor with AM labs  -NG tube if patient vomiting  -serial abdominal exams Pt w/ abdominal pain w/ associated constipation.  Had nausea without vomiting.  WBC elevated. No associated fevers.  Lactate WNL.  Pt had BM on day of admission that was loose after taking laxative and continued to be loose throughout the day. CT A/P with contrast on admission demonstrated, bowel wall thickening of descending and rectosigmoid colon, consistent with colitis. Sigmoidoscopy 4/9/10 biopsies demonstrated mild active inflammation and edema in the transverse colon with ulceration and reactive findings consistent with ischemia in the descending colon. Rectual mucosa biopsies demonstrated mild active inflammation and lamina propria fibrosis. CTA abdomen/pelvis 4/10 demonstrated wall thickening with surrounding inflammatory change at the left and proximal sigmoid colon consistent with a nonspecific colitis. Etiology of ischemic colitis possibly due to chemotherapy regimen   -GI recs appreciated  -surgery recs appreciated  -c/w IV Zosyn 3.375mg q6hrs, considering transition to PO   -clear liquid diet   -decreased rate of IV NS to 60cc/hr in the setting of increasing PO regimen  -AM lactate negative. Will continue to monitor with AM labs  -NG tube if patient vomiting  -serial abdominal exams    #Dilated Esophagus: Dilated esophagus seen on CT A/P. Patient also complaining of heartburn and esophagitis seen on imaging  -c/w protonix PO 40mg daily for 2 months

## 2019-04-11 NOTE — PROGRESS NOTE ADULT - ASSESSMENT
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

## 2019-04-11 NOTE — PROGRESS NOTE ADULT - SUBJECTIVE AND OBJECTIVE BOX
INTERVAL HPI/OVERNIGHT EVENTS:  Feels better today; All noted read; Abdomen benign with normal lactate;  The colitis could have been seondary to the IV Avastatin since no other cause is identified; Will start clears      MEDICATIONS  (STANDING):  aspirin enteric coated 81 milliGRAM(s) Oral daily  dexamethasone     Tablet 2 milliGRAM(s) Oral daily  escitalopram 10 milliGRAM(s) Oral daily  heparin  Injectable 5000 Unit(s) SubCutaneous every 8 hours  lacosamide 150 milliGRAM(s) Oral two times a day  letrozole 2.5 milliGRAM(s) Oral daily  montelukast 10 milliGRAM(s) Oral daily  nystatin Powder 1 Application(s) Topical two times a day  piperacillin/tazobactam IVPB. 3.375 Gram(s) IV Intermittent every 6 hours  potassium phosphate IVPB 15 milliMole(s) IV Intermittent once  sodium chloride 0.9%. 1000 milliLiter(s) (120 mL/Hr) IV Continuous <Continuous>  sucralfate suspension 1 Gram(s) Oral every 6 hours  tiotropium 2.5 MICROgram(s)/olodaterol 2.5 MICROgram(s) (STIOLTO) Inhaler 2 Puff(s) Inhalation daily    MEDICATIONS  (PRN):  aluminum hydroxide/magnesium hydroxide/simethicone Suspension 30 milliLiter(s) Oral every 4 hours PRN Dyspepsia  famotidine    Tablet 20 milliGRAM(s) Oral two times a day PRN Indigestion      Allergies    No Known Allergies    Intolerances        Vital Signs Last 24 Hrs  T(C): 36.1 (11 Apr 2019 09:45), Max: 36.8 (10 Apr 2019 15:41)  T(F): 97 (11 Apr 2019 09:45), Max: 98.3 (10 Apr 2019 15:41)  HR: 64 (11 Apr 2019 09:45) (58 - 64)  BP: 155/61 (11 Apr 2019 09:45) (134/62 - 163/77)  BP(mean): --  RR: 18 (11 Apr 2019 09:45) (17 - 18)  SpO2: 99% (11 Apr 2019 09:45) (96% - 99%)          Constitutional:  Awake    Eyes: DANAE    ENMT: Negative    Neck: Supple    Back:  no tenderness     Respiratory:  clear    Cardiovascular: S1 S2    Gastrointestinal:  soft     Genitourinary:    Extremities:  no edema    Vascular:    Neurological:    Skin:    Lymph Nodes:            04-10 @ 07:01  -  04-11 @ 07:00  --------------------------------------------------------  IN: 120 mL / OUT: 0 mL / NET: 120 mL      LABS:                        11.3   7.00  )-----------( 225      ( 11 Apr 2019 07:08 )             36.0     04-11    141  |  110<H>  |  6<L>  ----------------------------<  82  3.6   |  21<L>  |  0.86    Ca    8.4      11 Apr 2019 07:08  Phos  2.1     04-11  Mg     1.9     04-11            RADIOLOGY & ADDITIONAL TESTS:

## 2019-04-11 NOTE — PROGRESS NOTE ADULT - ASSESSMENT
72 y/o F s/p flex sig with ischemic colitis and biopsy was consistent with ischemic colitis.     Underwent CT Angio overnight and suspicious of worsening colitis over the prox sigmoid colon. Labs are pending. Otherwise, asymptomatic and hemodynamically stable.     Will follow up labs and lactate  Keep patient NPO with IVF (except meds) for now  Continue IV Zosyn    Pending discuss with     Team 1C surgery will follow. Please call for any acute changes. 72 y/o F s/p flex sig with ischemic colitis and biopsy was consistent with ischemic colitis.     Underwent CT Angio overnight and suspicious of worsening colitis over the prox sigmoid colon. Labs are pending. Otherwise, asymptomatic and hemodynamically stable.     Labs reviewed, WBC normalized, lactate 0.8, similar as yesterday.    Recommendations.   Continue IV Zosyn  Can have PO trial of CLD, do not advance beyond CLD  Continue serial abdominal exam   Discuss with     Team 1C surgery will follow. Please call for any acute changes.

## 2019-04-12 ENCOUNTER — TRANSCRIPTION ENCOUNTER (OUTPATIENT)
Age: 74
End: 2019-04-12

## 2019-04-12 ENCOUNTER — APPOINTMENT (OUTPATIENT)
Dept: INTERNAL MEDICINE | Facility: CLINIC | Age: 74
End: 2019-04-12

## 2019-04-12 LAB
ANION GAP SERPL CALC-SCNC: 12 MMOL/L — SIGNIFICANT CHANGE UP (ref 5–17)
BUN SERPL-MCNC: 4 MG/DL — LOW (ref 7–23)
CALCIUM SERPL-MCNC: 10 MG/DL — SIGNIFICANT CHANGE UP (ref 8.4–10.5)
CHLORIDE SERPL-SCNC: 108 MMOL/L — SIGNIFICANT CHANGE UP (ref 96–108)
CO2 SERPL-SCNC: 22 MMOL/L — SIGNIFICANT CHANGE UP (ref 22–31)
CREAT SERPL-MCNC: 0.78 MG/DL — SIGNIFICANT CHANGE UP (ref 0.5–1.3)
GLUCOSE BLDC GLUCOMTR-MCNC: 106 MG/DL — HIGH (ref 70–99)
GLUCOSE BLDC GLUCOMTR-MCNC: 80 MG/DL — SIGNIFICANT CHANGE UP (ref 70–99)
GLUCOSE BLDC GLUCOMTR-MCNC: 94 MG/DL — SIGNIFICANT CHANGE UP (ref 70–99)
GLUCOSE BLDC GLUCOMTR-MCNC: 96 MG/DL — SIGNIFICANT CHANGE UP (ref 70–99)
GLUCOSE SERPL-MCNC: 98 MG/DL — SIGNIFICANT CHANGE UP (ref 70–99)
HCT VFR BLD CALC: 42.5 % — SIGNIFICANT CHANGE UP (ref 34.5–45)
HGB BLD-MCNC: 13.6 G/DL — SIGNIFICANT CHANGE UP (ref 11.5–15.5)
LACTATE SERPL-SCNC: 1.8 MMOL/L — SIGNIFICANT CHANGE UP (ref 0.5–2)
MAGNESIUM SERPL-MCNC: 2 MG/DL — SIGNIFICANT CHANGE UP (ref 1.6–2.6)
MCHC RBC-ENTMCNC: 30.1 PG — SIGNIFICANT CHANGE UP (ref 27–34)
MCHC RBC-ENTMCNC: 32 GM/DL — SIGNIFICANT CHANGE UP (ref 32–36)
MCV RBC AUTO: 94 FL — SIGNIFICANT CHANGE UP (ref 80–100)
NRBC # BLD: 0 /100 WBCS — SIGNIFICANT CHANGE UP (ref 0–0)
PHOSPHATE SERPL-MCNC: 2.9 MG/DL — SIGNIFICANT CHANGE UP (ref 2.5–4.5)
PLATELET # BLD AUTO: 254 K/UL — SIGNIFICANT CHANGE UP (ref 150–400)
POTASSIUM SERPL-MCNC: 4.1 MMOL/L — SIGNIFICANT CHANGE UP (ref 3.5–5.3)
POTASSIUM SERPL-SCNC: 4.1 MMOL/L — SIGNIFICANT CHANGE UP (ref 3.5–5.3)
RBC # BLD: 4.52 M/UL — SIGNIFICANT CHANGE UP (ref 3.8–5.2)
RBC # FLD: 14.9 % — HIGH (ref 10.3–14.5)
SODIUM SERPL-SCNC: 142 MMOL/L — SIGNIFICANT CHANGE UP (ref 135–145)
WBC # BLD: 7.84 K/UL — SIGNIFICANT CHANGE UP (ref 3.8–10.5)
WBC # FLD AUTO: 7.84 K/UL — SIGNIFICANT CHANGE UP (ref 3.8–10.5)

## 2019-04-12 PROCEDURE — 99233 SBSQ HOSP IP/OBS HIGH 50: CPT | Mod: GC

## 2019-04-12 RX ORDER — ALPRAZOLAM 0.25 MG
0.5 TABLET ORAL ONCE
Qty: 0 | Refills: 0 | Status: DISCONTINUED | OUTPATIENT
Start: 2019-04-12 | End: 2019-04-12

## 2019-04-12 RX ORDER — ONDANSETRON 8 MG/1
4 TABLET, FILM COATED ORAL ONCE
Qty: 0 | Refills: 0 | Status: COMPLETED | OUTPATIENT
Start: 2019-04-12 | End: 2019-04-12

## 2019-04-12 RX ADMIN — ONDANSETRON 4 MILLIGRAM(S): 8 TABLET, FILM COATED ORAL at 10:34

## 2019-04-12 RX ADMIN — Medication 1 GRAM(S): at 18:24

## 2019-04-12 RX ADMIN — SODIUM CHLORIDE 60 MILLILITER(S): 9 INJECTION INTRAMUSCULAR; INTRAVENOUS; SUBCUTANEOUS at 06:25

## 2019-04-12 RX ADMIN — Medication 1 GRAM(S): at 11:45

## 2019-04-12 RX ADMIN — LETROZOLE 2.5 MILLIGRAM(S): 2.5 TABLET, FILM COATED ORAL at 12:11

## 2019-04-12 RX ADMIN — LACOSAMIDE 150 MILLIGRAM(S): 50 TABLET ORAL at 18:24

## 2019-04-12 RX ADMIN — NYSTATIN CREAM 1 APPLICATION(S): 100000 CREAM TOPICAL at 18:23

## 2019-04-12 RX ADMIN — Medication 0.5 MILLIGRAM(S): at 22:31

## 2019-04-12 RX ADMIN — Medication 2 MILLIGRAM(S): at 06:24

## 2019-04-12 RX ADMIN — Medication 1 GRAM(S): at 06:25

## 2019-04-12 RX ADMIN — ESCITALOPRAM OXALATE 10 MILLIGRAM(S): 10 TABLET, FILM COATED ORAL at 11:45

## 2019-04-12 RX ADMIN — PIPERACILLIN AND TAZOBACTAM 200 GRAM(S): 4; .5 INJECTION, POWDER, LYOPHILIZED, FOR SOLUTION INTRAVENOUS at 18:23

## 2019-04-12 RX ADMIN — NYSTATIN CREAM 1 APPLICATION(S): 100000 CREAM TOPICAL at 06:27

## 2019-04-12 RX ADMIN — PANTOPRAZOLE SODIUM 40 MILLIGRAM(S): 20 TABLET, DELAYED RELEASE ORAL at 06:25

## 2019-04-12 RX ADMIN — MONTELUKAST 10 MILLIGRAM(S): 4 TABLET, CHEWABLE ORAL at 11:45

## 2019-04-12 RX ADMIN — PIPERACILLIN AND TAZOBACTAM 200 GRAM(S): 4; .5 INJECTION, POWDER, LYOPHILIZED, FOR SOLUTION INTRAVENOUS at 23:45

## 2019-04-12 RX ADMIN — PIPERACILLIN AND TAZOBACTAM 200 GRAM(S): 4; .5 INJECTION, POWDER, LYOPHILIZED, FOR SOLUTION INTRAVENOUS at 11:48

## 2019-04-12 RX ADMIN — Medication 1 GRAM(S): at 23:45

## 2019-04-12 RX ADMIN — PIPERACILLIN AND TAZOBACTAM 200 GRAM(S): 4; .5 INJECTION, POWDER, LYOPHILIZED, FOR SOLUTION INTRAVENOUS at 06:25

## 2019-04-12 RX ADMIN — SODIUM CHLORIDE 60 MILLILITER(S): 9 INJECTION INTRAMUSCULAR; INTRAVENOUS; SUBCUTANEOUS at 22:35

## 2019-04-12 RX ADMIN — LACOSAMIDE 150 MILLIGRAM(S): 50 TABLET ORAL at 06:25

## 2019-04-12 NOTE — DISCHARGE NOTE NURSING/CASE MANAGEMENT/SOCIAL WORK - NSSCTYPOFSERV_GEN_ALL_CORE
Nursing, home PT, and evaluation for additional services in the home. Skill Nursing, home PT, and evaluation for additional [facility]HA  services

## 2019-04-12 NOTE — PROGRESS NOTE ADULT - SUBJECTIVE AND OBJECTIVE BOX
OVERNIGHT EVENTS:    SUBJECTIVE / INTERVAL HPI: Patient seen and examined at bedside.     VITAL SIGNS:  Vital Signs Last 24 Hrs  T(C): 36.6 (12 Apr 2019 15:39), Max: 36.6 (12 Apr 2019 15:39)  T(F): 97.8 (12 Apr 2019 15:39), Max: 97.8 (12 Apr 2019 15:39)  HR: 68 (12 Apr 2019 15:39) (60 - 77)  BP: 125/60 (12 Apr 2019 15:39) (104/65 - 125/62)  BP(mean): --  RR: 18 (12 Apr 2019 15:39) (16 - 20)  SpO2: 98% (12 Apr 2019 15:39) (95% - 99%)    PHYSICAL EXAM:    General: WDWN  HEENT: NC/AT; PERRL, anicteric sclera; MMM  Neck: supple  Cardiovascular: +S1/S2, RRR  Respiratory: CTA B/L; no W/R/R  Gastrointestinal: soft, NT/ND; +BSx4  Extremities: WWP; no edema, clubbing or cyanosis  Vascular: 2+ radial, DP/PT pulses B/L  Neurological: AAOx3; no focal deficits    MEDICATIONS:  MEDICATIONS  (STANDING):  aspirin enteric coated 81 milliGRAM(s) Oral daily  dexamethasone     Tablet 2 milliGRAM(s) Oral daily  escitalopram 10 milliGRAM(s) Oral daily  heparin  Injectable 5000 Unit(s) SubCutaneous every 8 hours  lacosamide 150 milliGRAM(s) Oral two times a day  letrozole 2.5 milliGRAM(s) Oral daily  montelukast 10 milliGRAM(s) Oral daily  nystatin Powder 1 Application(s) Topical two times a day  pantoprazole    Tablet 40 milliGRAM(s) Oral before breakfast  piperacillin/tazobactam IVPB. 3.375 Gram(s) IV Intermittent every 6 hours  sodium chloride 0.9%. 1000 milliLiter(s) (60 mL/Hr) IV Continuous <Continuous>  sucralfate suspension 1 Gram(s) Oral every 6 hours  tiotropium 2.5 MICROgram(s)/olodaterol 2.5 MICROgram(s) (STIOLTO) Inhaler 2 Puff(s) Inhalation daily    MEDICATIONS  (PRN):  aluminum hydroxide/magnesium hydroxide/simethicone Suspension 30 milliLiter(s) Oral every 4 hours PRN Dyspepsia      ALLERGIES:  Allergies    No Known Allergies    Intolerances        LABS:                        13.6   7.84  )-----------( 254      ( 12 Apr 2019 07:41 )             42.5     04-12    142  |  108  |  4<L>  ----------------------------<  98  4.1   |  22  |  0.78    Ca    10.0      12 Apr 2019 07:41  Phos  2.9     04-12  Mg     2.0     04-12          CAPILLARY BLOOD GLUCOSE      POCT Blood Glucose.: 96 mg/dL (12 Apr 2019 17:15)      RADIOLOGY & ADDITIONAL TESTS: Reviewed. OVERNIGHT EVENTS: FRANK    SUBJECTIVE / INTERVAL HPI: Patient seen and examined at bedside. Patient with no complaints. Denies fevers, chills, night sweats, n/v/d/c, abdominal pain.    VITAL SIGNS:  Vital Signs Last 24 Hrs  T(C): 36.6 (12 Apr 2019 15:39), Max: 36.6 (12 Apr 2019 15:39)  T(F): 97.8 (12 Apr 2019 15:39), Max: 97.8 (12 Apr 2019 15:39)  HR: 68 (12 Apr 2019 15:39) (60 - 77)  BP: 125/60 (12 Apr 2019 15:39) (104/65 - 125/62)  BP(mean): --  RR: 18 (12 Apr 2019 15:39) (16 - 20)  SpO2: 98% (12 Apr 2019 15:39) (95% - 99%)    PHYSICAL EXAM:    General: WDWN, NAD   HEENT: NC/AT; PERRL, anicteric sclera; dry MM  Neck: supple  Cardiovascular: +S1/S2, RRR  Respiratory: CTA B/L; no W/R/R  Gastrointestinal: soft, NT/ND; hyperactive bowel sounds x4  Extremities: Trace pitting edema b/l lower extremities. No erythema, no cyanosis b/l. Extremities warm.   Vascular: 2+ radial, DP/PT pulses B/L  Neurological: AAOx2 (oritented to person and place).     MEDICATIONS:  MEDICATIONS  (STANDING):  aspirin enteric coated 81 milliGRAM(s) Oral daily  dexamethasone     Tablet 2 milliGRAM(s) Oral daily  escitalopram 10 milliGRAM(s) Oral daily  heparin  Injectable 5000 Unit(s) SubCutaneous every 8 hours  lacosamide 150 milliGRAM(s) Oral two times a day  letrozole 2.5 milliGRAM(s) Oral daily  montelukast 10 milliGRAM(s) Oral daily  nystatin Powder 1 Application(s) Topical two times a day  pantoprazole    Tablet 40 milliGRAM(s) Oral before breakfast  piperacillin/tazobactam IVPB. 3.375 Gram(s) IV Intermittent every 6 hours  sodium chloride 0.9%. 1000 milliLiter(s) (60 mL/Hr) IV Continuous <Continuous>  sucralfate suspension 1 Gram(s) Oral every 6 hours  tiotropium 2.5 MICROgram(s)/olodaterol 2.5 MICROgram(s) (STIOLTO) Inhaler 2 Puff(s) Inhalation daily    MEDICATIONS  (PRN):  aluminum hydroxide/magnesium hydroxide/simethicone Suspension 30 milliLiter(s) Oral every 4 hours PRN Dyspepsia      ALLERGIES:  Allergies    No Known Allergies    Intolerances        LABS:                        13.6   7.84  )-----------( 254      ( 12 Apr 2019 07:41 )             42.5     04-12    142  |  108  |  4<L>  ----------------------------<  98  4.1   |  22  |  0.78    Ca    10.0      12 Apr 2019 07:41  Phos  2.9     04-12  Mg     2.0     04-12          CAPILLARY BLOOD GLUCOSE      POCT Blood Glucose.: 96 mg/dL (12 Apr 2019 17:15)      RADIOLOGY & ADDITIONAL TESTS: Reviewed.

## 2019-04-12 NOTE — PROGRESS NOTE ADULT - SUBJECTIVE AND OBJECTIVE BOX
ID: 73F s/p flex sig with ischemic colitis and biopsy consistent with ischemic colitis      SUBJECTIVE: Having nonbloody bowel movement and flatus this morning; tolerating CLD      OBJECTIVE:  73F s/p flex sig with ischemic colitis and biopsy consistent with ischemic colitisVital Signs:  Vital Signs Last 24 Hrs  T(C): 35.9 (12 Apr 2019 08:35), Max: 36.5 (11 Apr 2019 15:12)  T(F): 96.6 (12 Apr 2019 08:35), Max: 97.7 (11 Apr 2019 15:12)  HR: 60 (12 Apr 2019 08:35) (60 - 77)  BP: 125/62 (12 Apr 2019 08:35) (104/65 - 132/71)  BP(mean): --  RR: 20 (12 Apr 2019 08:35) (16 - 20)  SpO2: 99% (12 Apr 2019 08:35) (95% - 99%)    Physical Exam:  General: NAD  Abdominal: Soft, NT/ND, obese    Lines/Drains/Tubes:    Ins and Outs:  I&O's Summary    11 Apr 2019 07:01  -  12 Apr 2019 07:00  --------------------------------------------------------  IN: 720 mL / OUT: 0 mL / NET: 720 mL        Labs:                        13.6   7.84  )-----------( 254      ( 12 Apr 2019 07:41 )             42.5     04-12    142  |  108  |  4<L>  ----------------------------<  98  4.1   |  22  |  0.78    Ca    10.0      12 Apr 2019 07:41  Phos  2.9     04-12  Mg     2.0     04-12          CAPILLARY BLOOD GLUCOSE      POCT Blood Glucose.: 80 mg/dL (12 Apr 2019 08:30)        Radiology & Additional Studies:    CT ANGIO OF A/P  Impression:  1. Small hiatal hernia. Mild-to-moderate distal esophageal wall thickening,   suggestive of esophagitis.   Correlate clinically.   2. Small right pleural effusion.   3. Stable 3.4 cm hepatic cyst. Multiple indeterminate low attenuation liver   lesions largest 1.3 cm.   Metastatic disease cannot be excluded.   4. Wall thickening with surrounding inflammatory change at the left and   proximal sigmoid colon   consistent with a nonspecific colitis, worse than previous exam.

## 2019-04-12 NOTE — PROGRESS NOTE ADULT - PROBLEM SELECTOR PLAN 1
Pt w/ abdominal pain w/ associated constipation.  Had nausea without vomiting.  WBC elevated. No associated fevers.  Lactate WNL.  Pt had BM on day of admission that was loose after taking laxative and continued to be loose throughout the day. CT A/P with contrast on admission demonstrated, bowel wall thickening of descending and rectosigmoid colon, consistent with colitis. Sigmoidoscopy 4/9/10 biopsies demonstrated mild active inflammation and edema in the transverse colon with ulceration and reactive findings consistent with ischemia in the descending colon. Rectual mucosa biopsies demonstrated mild active inflammation and lamina propria fibrosis. CTA abdomen/pelvis 4/10 demonstrated wall thickening with surrounding inflammatory change at the left and proximal sigmoid colon consistent with a nonspecific colitis. Etiology of ischemic colitis possibly due to chemotherapy regimen   -GI recs appreciated  -surgery recs appreciated  -c/w IV Zosyn 3.375mg q6hrs, considering transition to PO   -clear liquid diet   -c/w IV NS to 60cc/hr in the setting of increasing PO regimen  -AM lactate negative. Will continue to monitor with AM labs  -NG tube if patient vomiting  -serial abdominal exams    #Dilated Esophagus: Dilated esophagus seen on CT A/P. Patient also complaining of heartburn and esophagitis seen on imaging  -c/w protonix PO 40mg daily for 2 months

## 2019-04-12 NOTE — DISCHARGE NOTE NURSING/CASE MANAGEMENT/SOCIAL WORK - NSDCDPATPORTLINK_GEN_ALL_CORE
You can access the [facility] Patient Portal, offered by [facility], by registering with the following website: [email or web address]

## 2019-04-12 NOTE — PROGRESS NOTE ADULT - ASSESSMENT
A/P: 73F s/p flex sig with ischemic colitis and biopsy consistent with ischemic colitis.     Recommendations.   Continue serial abdominal exam   Continue care per primary team  No general surgery intervention at this time  Surgery Team 1C will sign off  Case discussed with chief resident A/P: 73F s/p flex sig with ischemic colitis and biopsy consistent with ischemic colitis.     Recommendations  Clear liquid diet with Ensure shakes  Continue serial abdominal exam   Continue care per primary team  No general surgery intervention at this time  Surgery Team 1C will continue to follow  Case discussed with chief resident and Dr. Davis

## 2019-04-12 NOTE — PROGRESS NOTE ADULT - SUBJECTIVE AND OBJECTIVE BOX
INTERVAL HPI/OVERNIGHT EVENTS:  Had diarrhea this morning otherwise without complaint;  Surgery follow up noted; Will advance diet today;       MEDICATIONS  (STANDING):  aspirin enteric coated 81 milliGRAM(s) Oral daily  dexamethasone     Tablet 2 milliGRAM(s) Oral daily  escitalopram 10 milliGRAM(s) Oral daily  heparin  Injectable 5000 Unit(s) SubCutaneous every 8 hours  lacosamide 150 milliGRAM(s) Oral two times a day  letrozole 2.5 milliGRAM(s) Oral daily  montelukast 10 milliGRAM(s) Oral daily  nystatin Powder 1 Application(s) Topical two times a day  ondansetron Injectable 4 milliGRAM(s) IV Push once  pantoprazole    Tablet 40 milliGRAM(s) Oral before breakfast  piperacillin/tazobactam IVPB. 3.375 Gram(s) IV Intermittent every 6 hours  sodium chloride 0.9%. 1000 milliLiter(s) (60 mL/Hr) IV Continuous <Continuous>  sucralfate suspension 1 Gram(s) Oral every 6 hours  tiotropium 2.5 MICROgram(s)/olodaterol 2.5 MICROgram(s) (STIOLTO) Inhaler 2 Puff(s) Inhalation daily    MEDICATIONS  (PRN):  aluminum hydroxide/magnesium hydroxide/simethicone Suspension 30 milliLiter(s) Oral every 4 hours PRN Dyspepsia      Allergies    No Known Allergies    Intolerances        Vital Signs Last 24 Hrs  T(C): 35.9 (12 Apr 2019 08:35), Max: 36.5 (11 Apr 2019 15:12)  T(F): 96.6 (12 Apr 2019 08:35), Max: 97.7 (11 Apr 2019 15:12)  HR: 60 (12 Apr 2019 08:35) (60 - 77)  BP: 125/62 (12 Apr 2019 08:35) (104/65 - 132/71)  BP(mean): --  RR: 20 (12 Apr 2019 08:35) (16 - 20)  SpO2: 99% (12 Apr 2019 08:35) (95% - 99%)          Constitutional:  Awake    Eyes: DANAE    ENMT: Negative    Neck: Supple    Back:  no tenderness     Respiratory:  clear    Cardiovascular: S1 S2    Gastrointestinal:  soft non tender    Genitourinary:    Extremities:  no edema    Vascular:    Neurological:    Skin:    Lymph Nodes:            04-11 @ 07:01  -  04-12 @ 07:00  --------------------------------------------------------  IN: 720 mL / OUT: 0 mL / NET: 720 mL      LABS:                        13.6   7.84  )-----------( 254      ( 12 Apr 2019 07:41 )             42.5     04-12    142  |  108  |  4<L>  ----------------------------<  98  4.1   |  22  |  0.78    Ca    10.0      12 Apr 2019 07:41  Phos  2.9     04-12  Mg     2.0     04-12            RADIOLOGY & ADDITIONAL TESTS:

## 2019-04-13 LAB
ANION GAP SERPL CALC-SCNC: 10 MMOL/L — SIGNIFICANT CHANGE UP (ref 5–17)
BUN SERPL-MCNC: 5 MG/DL — LOW (ref 7–23)
CALCIUM SERPL-MCNC: 9.6 MG/DL — SIGNIFICANT CHANGE UP (ref 8.4–10.5)
CHLORIDE SERPL-SCNC: 109 MMOL/L — HIGH (ref 96–108)
CO2 SERPL-SCNC: 23 MMOL/L — SIGNIFICANT CHANGE UP (ref 22–31)
CREAT SERPL-MCNC: 0.95 MG/DL — SIGNIFICANT CHANGE UP (ref 0.5–1.3)
GLUCOSE BLDC GLUCOMTR-MCNC: 101 MG/DL — HIGH (ref 70–99)
GLUCOSE BLDC GLUCOMTR-MCNC: 116 MG/DL — HIGH (ref 70–99)
GLUCOSE BLDC GLUCOMTR-MCNC: 133 MG/DL — HIGH (ref 70–99)
GLUCOSE BLDC GLUCOMTR-MCNC: 66 MG/DL — LOW (ref 70–99)
GLUCOSE BLDC GLUCOMTR-MCNC: 68 MG/DL — LOW (ref 70–99)
GLUCOSE BLDC GLUCOMTR-MCNC: 92 MG/DL — SIGNIFICANT CHANGE UP (ref 70–99)
GLUCOSE SERPL-MCNC: 80 MG/DL — SIGNIFICANT CHANGE UP (ref 70–99)
HCT VFR BLD CALC: 35.6 % — SIGNIFICANT CHANGE UP (ref 34.5–45)
HGB BLD-MCNC: 11.4 G/DL — LOW (ref 11.5–15.5)
LACTATE SERPL-SCNC: 3.2 MMOL/L — HIGH (ref 0.5–2)
MAGNESIUM SERPL-MCNC: 1.7 MG/DL — SIGNIFICANT CHANGE UP (ref 1.6–2.6)
MCHC RBC-ENTMCNC: 29.8 PG — SIGNIFICANT CHANGE UP (ref 27–34)
MCHC RBC-ENTMCNC: 32 GM/DL — SIGNIFICANT CHANGE UP (ref 32–36)
MCV RBC AUTO: 93 FL — SIGNIFICANT CHANGE UP (ref 80–100)
NRBC # BLD: 0 /100 WBCS — SIGNIFICANT CHANGE UP (ref 0–0)
PLATELET # BLD AUTO: 256 K/UL — SIGNIFICANT CHANGE UP (ref 150–400)
POTASSIUM SERPL-MCNC: 3.8 MMOL/L — SIGNIFICANT CHANGE UP (ref 3.5–5.3)
POTASSIUM SERPL-SCNC: 3.8 MMOL/L — SIGNIFICANT CHANGE UP (ref 3.5–5.3)
RBC # BLD: 3.83 M/UL — SIGNIFICANT CHANGE UP (ref 3.8–5.2)
RBC # FLD: 14.7 % — HIGH (ref 10.3–14.5)
SODIUM SERPL-SCNC: 142 MMOL/L — SIGNIFICANT CHANGE UP (ref 135–145)
WBC # BLD: 8.83 K/UL — SIGNIFICANT CHANGE UP (ref 3.8–10.5)
WBC # FLD AUTO: 8.83 K/UL — SIGNIFICANT CHANGE UP (ref 3.8–10.5)

## 2019-04-13 PROCEDURE — 99232 SBSQ HOSP IP/OBS MODERATE 35: CPT | Mod: GC

## 2019-04-13 RX ORDER — ALPRAZOLAM 0.25 MG
0.5 TABLET ORAL ONCE
Qty: 0 | Refills: 0 | Status: DISCONTINUED | OUTPATIENT
Start: 2019-04-13 | End: 2019-04-13

## 2019-04-13 RX ORDER — LANOLIN ALCOHOL/MO/W.PET/CERES
5 CREAM (GRAM) TOPICAL AT BEDTIME
Qty: 0 | Refills: 0 | Status: DISCONTINUED | OUTPATIENT
Start: 2019-04-13 | End: 2019-04-15

## 2019-04-13 RX ORDER — DEXAMETHASONE 0.5 MG/5ML
1 ELIXIR ORAL ONCE
Qty: 0 | Refills: 0 | Status: DISCONTINUED | OUTPATIENT
Start: 2019-04-13 | End: 2019-04-15

## 2019-04-13 RX ORDER — DEXAMETHASONE 0.5 MG/5ML
1 ELIXIR ORAL ONCE
Qty: 0 | Refills: 0 | Status: COMPLETED | OUTPATIENT
Start: 2019-04-13 | End: 2019-04-13

## 2019-04-13 RX ORDER — DEXTROSE 50 % IN WATER 50 %
50 SYRINGE (ML) INTRAVENOUS ONCE
Qty: 0 | Refills: 0 | Status: COMPLETED | OUTPATIENT
Start: 2019-04-13 | End: 2019-04-13

## 2019-04-13 RX ADMIN — PIPERACILLIN AND TAZOBACTAM 200 GRAM(S): 4; .5 INJECTION, POWDER, LYOPHILIZED, FOR SOLUTION INTRAVENOUS at 23:34

## 2019-04-13 RX ADMIN — Medication 50 MILLILITER(S): at 09:10

## 2019-04-13 RX ADMIN — PIPERACILLIN AND TAZOBACTAM 200 GRAM(S): 4; .5 INJECTION, POWDER, LYOPHILIZED, FOR SOLUTION INTRAVENOUS at 12:54

## 2019-04-13 RX ADMIN — Medication 1 GRAM(S): at 06:45

## 2019-04-13 RX ADMIN — Medication 5 MILLIGRAM(S): at 22:34

## 2019-04-13 RX ADMIN — LETROZOLE 2.5 MILLIGRAM(S): 2.5 TABLET, FILM COATED ORAL at 13:02

## 2019-04-13 RX ADMIN — ESCITALOPRAM OXALATE 10 MILLIGRAM(S): 10 TABLET, FILM COATED ORAL at 12:54

## 2019-04-13 RX ADMIN — NYSTATIN CREAM 1 APPLICATION(S): 100000 CREAM TOPICAL at 18:04

## 2019-04-13 RX ADMIN — LACOSAMIDE 150 MILLIGRAM(S): 50 TABLET ORAL at 06:45

## 2019-04-13 RX ADMIN — PIPERACILLIN AND TAZOBACTAM 200 GRAM(S): 4; .5 INJECTION, POWDER, LYOPHILIZED, FOR SOLUTION INTRAVENOUS at 06:45

## 2019-04-13 RX ADMIN — NYSTATIN CREAM 1 APPLICATION(S): 100000 CREAM TOPICAL at 06:46

## 2019-04-13 RX ADMIN — PANTOPRAZOLE SODIUM 40 MILLIGRAM(S): 20 TABLET, DELAYED RELEASE ORAL at 06:46

## 2019-04-13 RX ADMIN — Medication 30 MILLILITER(S): at 17:18

## 2019-04-13 RX ADMIN — Medication 0.5 MILLIGRAM(S): at 23:48

## 2019-04-13 RX ADMIN — PIPERACILLIN AND TAZOBACTAM 200 GRAM(S): 4; .5 INJECTION, POWDER, LYOPHILIZED, FOR SOLUTION INTRAVENOUS at 18:03

## 2019-04-13 RX ADMIN — MONTELUKAST 10 MILLIGRAM(S): 4 TABLET, CHEWABLE ORAL at 12:55

## 2019-04-13 RX ADMIN — LACOSAMIDE 150 MILLIGRAM(S): 50 TABLET ORAL at 18:03

## 2019-04-13 RX ADMIN — Medication 1 MILLIGRAM(S): at 12:53

## 2019-04-13 RX ADMIN — Medication 1 GRAM(S): at 12:53

## 2019-04-13 RX ADMIN — Medication 1 GRAM(S): at 18:03

## 2019-04-13 RX ADMIN — TIOTROPIUM BROMIDE AND OLODATEROL 2 PUFF(S): 3.124; 2.736 SPRAY, METERED RESPIRATORY (INHALATION) at 12:55

## 2019-04-13 RX ADMIN — Medication 1 GRAM(S): at 23:35

## 2019-04-13 NOTE — PROGRESS NOTE ADULT - SUBJECTIVE AND OBJECTIVE BOX
INTERVAL HPI/OVERNIGHT EVENTS:  Again had massive diarrhea; Diet to be changed today' Otherwise abdominal exam totally benign. Try a low residue diet today; OOB and needs physical therapy      MEDICATIONS  (STANDING):  aspirin enteric coated 81 milliGRAM(s) Oral daily  dexamethasone     Tablet 2 milliGRAM(s) Oral daily  dexamethasone     Tablet 1 milliGRAM(s) Oral once  dexamethasone     Tablet 1 milliGRAM(s) Oral once  escitalopram 10 milliGRAM(s) Oral daily  heparin  Injectable 5000 Unit(s) SubCutaneous every 8 hours  lacosamide 150 milliGRAM(s) Oral two times a day  letrozole 2.5 milliGRAM(s) Oral daily  melatonin 5 milliGRAM(s) Oral at bedtime  montelukast 10 milliGRAM(s) Oral daily  nystatin Powder 1 Application(s) Topical two times a day  pantoprazole    Tablet 40 milliGRAM(s) Oral before breakfast  piperacillin/tazobactam IVPB. 3.375 Gram(s) IV Intermittent every 6 hours  sodium chloride 0.9%. 1000 milliLiter(s) (60 mL/Hr) IV Continuous <Continuous>  sucralfate suspension 1 Gram(s) Oral every 6 hours  tiotropium 2.5 MICROgram(s)/olodaterol 2.5 MICROgram(s) (STIOLTO) Inhaler 2 Puff(s) Inhalation daily    MEDICATIONS  (PRN):  aluminum hydroxide/magnesium hydroxide/simethicone Suspension 30 milliLiter(s) Oral every 4 hours PRN Dyspepsia  aluminum hydroxide/magnesium hydroxide/simethicone Suspension 30 milliLiter(s) Oral once PRN Dyspepsia      Allergies    No Known Allergies    Intolerances        Vital Signs Last 24 Hrs  T(C): 36.6 (13 Apr 2019 09:17), Max: 36.6 (12 Apr 2019 15:39)  T(F): 97.8 (13 Apr 2019 09:17), Max: 97.8 (12 Apr 2019 15:39)  HR: 73 (13 Apr 2019 09:17) (68 - 82)  BP: 158/80 (13 Apr 2019 09:17) (109/65 - 158/80)  BP(mean): --  RR: 18 (13 Apr 2019 09:17) (18 - 18)  SpO2: 96% (13 Apr 2019 09:17) (96% - 98%)          Constitutional: Awake    Eyes: DANAE    ENMT: Negative    Neck: Supple    Back:  no tenderness     Respiratory:  clear    Cardiovascular: S1 S2     Gastrointestinal:  soft    Genitourinary:    Extremities:  no edema    Vascular:    Neurological:    Skin:    Lymph Nodes:            04-12 @ 07:01  -  04-13 @ 07:00  --------------------------------------------------------  IN: 1020 mL / OUT: 1 mL / NET: 1019 mL      LABS:                        11.4   8.83  )-----------( 256      ( 13 Apr 2019 05:53 )             35.6     04-13    142  |  109<H>  |  5<L>  ----------------------------<  80  3.8   |  23  |  0.95    Ca    9.6      13 Apr 2019 05:53  Phos  2.9     04-12  Mg     1.7     04-13            RADIOLOGY & ADDITIONAL TESTS:

## 2019-04-13 NOTE — PROGRESS NOTE ADULT - ASSESSMENT
73F s/p flexible sigmoidoscopy showing ischemic colitis, biopsy consistent with same; tolerating low fiber diet with flatus and nonbloody BMs.     Care per medicine primary.  Please instruct her to follow up with Dr. Davis in 1-2 weeks after discharge. 73F s/p flexible sigmoidoscopy showing ischemic colitis, biopsy consistent with same; tolerating low fiber diet with flatus and nonbloody BMs.     Care per medicine primary.  Please instruct her to follow up with Dr. Davis in 1-2 weeks after discharge.  Surgery will sign off now, please reconsult with any questions or concerns.  Case discussed with chief resident, attending.

## 2019-04-13 NOTE — PROGRESS NOTE ADULT - SUBJECTIVE AND OBJECTIVE BOX
SUBJECTIVE: This evening, she feels well; her pain is well-controlled. No nausea or vomiting. Passing flatus and having nonbloody BMs. No acute complaints. Tolerating low fiber diet.    aspirin enteric coated 81 milliGRAM(s) Oral daily  heparin  Injectable 5000 Unit(s) SubCutaneous every 8 hours  piperacillin/tazobactam IVPB. 3.375 Gram(s) IV Intermittent every 6 hours      Vital Signs Last 24 Hrs  T(C): 36.7 (13 Apr 2019 16:07), Max: 36.7 (13 Apr 2019 16:07)  T(F): 98 (13 Apr 2019 16:07), Max: 98 (13 Apr 2019 16:07)  HR: 77 (13 Apr 2019 16:07) (73 - 82)  BP: 114/44 (13 Apr 2019 16:07) (109/65 - 158/80)  BP(mean): 67 (13 Apr 2019 16:07) (67 - 67)  RR: 17 (13 Apr 2019 16:07) (17 - 18)  SpO2: 96% (13 Apr 2019 16:07) (96% - 96%)  I&O's Detail    12 Apr 2019 07:01  -  13 Apr 2019 07:00  --------------------------------------------------------  IN:    sodium chloride 0.9%.: 1020 mL  Total IN: 1020 mL    OUT:    Voided: 1 mL  Total OUT: 1 mL    Total NET: 1019 mL          General: NAD, resting comfortably in bed  Pulm: Nonlabored breathing, no respiratory distress  Abd: soft, NT/ND.  Extrem: WWP, no edema        LABS:                        11.4   8.83  )-----------( 256      ( 13 Apr 2019 05:53 )             35.6     04-13    142  |  109<H>  |  5<L>  ----------------------------<  80  3.8   |  23  |  0.95    Ca    9.6      13 Apr 2019 05:53  Phos  2.9     04-12  Mg     1.7     04-13

## 2019-04-14 ENCOUNTER — TRANSCRIPTION ENCOUNTER (OUTPATIENT)
Age: 74
End: 2019-04-14

## 2019-04-14 LAB
ANION GAP SERPL CALC-SCNC: 15 MMOL/L — SIGNIFICANT CHANGE UP (ref 5–17)
BASOPHILS # BLD AUTO: 0.05 K/UL — SIGNIFICANT CHANGE UP (ref 0–0.2)
BASOPHILS NFR BLD AUTO: 0.5 % — SIGNIFICANT CHANGE UP (ref 0–2)
BUN SERPL-MCNC: 5 MG/DL — LOW (ref 7–23)
CALCIUM SERPL-MCNC: 9 MG/DL — SIGNIFICANT CHANGE UP (ref 8.4–10.5)
CHLORIDE SERPL-SCNC: 106 MMOL/L — SIGNIFICANT CHANGE UP (ref 96–108)
CO2 SERPL-SCNC: 21 MMOL/L — LOW (ref 22–31)
CREAT SERPL-MCNC: 0.88 MG/DL — SIGNIFICANT CHANGE UP (ref 0.5–1.3)
EOSINOPHIL # BLD AUTO: 0.36 K/UL — SIGNIFICANT CHANGE UP (ref 0–0.5)
EOSINOPHIL NFR BLD AUTO: 3.9 % — SIGNIFICANT CHANGE UP (ref 0–6)
GLUCOSE SERPL-MCNC: 83 MG/DL — SIGNIFICANT CHANGE UP (ref 70–99)
HCT VFR BLD CALC: 39.3 % — SIGNIFICANT CHANGE UP (ref 34.5–45)
HGB BLD-MCNC: 12.5 G/DL — SIGNIFICANT CHANGE UP (ref 11.5–15.5)
IMM GRANULOCYTES NFR BLD AUTO: 1.3 % — SIGNIFICANT CHANGE UP (ref 0–1.5)
LYMPHOCYTES # BLD AUTO: 1.28 K/UL — SIGNIFICANT CHANGE UP (ref 1–3.3)
LYMPHOCYTES # BLD AUTO: 13.8 % — SIGNIFICANT CHANGE UP (ref 13–44)
MAGNESIUM SERPL-MCNC: 1.6 MG/DL — SIGNIFICANT CHANGE UP (ref 1.6–2.6)
MCHC RBC-ENTMCNC: 29.4 PG — SIGNIFICANT CHANGE UP (ref 27–34)
MCHC RBC-ENTMCNC: 31.8 GM/DL — LOW (ref 32–36)
MCV RBC AUTO: 92.5 FL — SIGNIFICANT CHANGE UP (ref 80–100)
MONOCYTES # BLD AUTO: 0.64 K/UL — SIGNIFICANT CHANGE UP (ref 0–0.9)
MONOCYTES NFR BLD AUTO: 6.9 % — SIGNIFICANT CHANGE UP (ref 2–14)
NEUTROPHILS # BLD AUTO: 6.82 K/UL — SIGNIFICANT CHANGE UP (ref 1.8–7.4)
NEUTROPHILS NFR BLD AUTO: 73.6 % — SIGNIFICANT CHANGE UP (ref 43–77)
NRBC # BLD: 0 /100 WBCS — SIGNIFICANT CHANGE UP (ref 0–0)
PLATELET # BLD AUTO: 263 K/UL — SIGNIFICANT CHANGE UP (ref 150–400)
POTASSIUM SERPL-MCNC: 3.5 MMOL/L — SIGNIFICANT CHANGE UP (ref 3.5–5.3)
POTASSIUM SERPL-SCNC: 3.5 MMOL/L — SIGNIFICANT CHANGE UP (ref 3.5–5.3)
RBC # BLD: 4.25 M/UL — SIGNIFICANT CHANGE UP (ref 3.8–5.2)
RBC # FLD: 15.2 % — HIGH (ref 10.3–14.5)
SODIUM SERPL-SCNC: 142 MMOL/L — SIGNIFICANT CHANGE UP (ref 135–145)
WBC # BLD: 9.27 K/UL — SIGNIFICANT CHANGE UP (ref 3.8–10.5)
WBC # FLD AUTO: 9.27 K/UL — SIGNIFICANT CHANGE UP (ref 3.8–10.5)

## 2019-04-14 RX ORDER — ALPRAZOLAM 0.25 MG
0.5 TABLET ORAL AT BEDTIME
Qty: 0 | Refills: 0 | Status: DISCONTINUED | OUTPATIENT
Start: 2019-04-14 | End: 2019-04-15

## 2019-04-14 RX ORDER — MAGNESIUM SULFATE 500 MG/ML
2 VIAL (ML) INJECTION ONCE
Qty: 0 | Refills: 0 | Status: COMPLETED | OUTPATIENT
Start: 2019-04-14 | End: 2019-04-14

## 2019-04-14 RX ORDER — POTASSIUM CHLORIDE 20 MEQ
40 PACKET (EA) ORAL ONCE
Qty: 0 | Refills: 0 | Status: COMPLETED | OUTPATIENT
Start: 2019-04-14 | End: 2019-04-14

## 2019-04-14 RX ADMIN — Medication 0.5 MILLIGRAM(S): at 23:39

## 2019-04-14 RX ADMIN — Medication 1 GRAM(S): at 23:39

## 2019-04-14 RX ADMIN — NYSTATIN CREAM 1 APPLICATION(S): 100000 CREAM TOPICAL at 06:03

## 2019-04-14 RX ADMIN — LACOSAMIDE 150 MILLIGRAM(S): 50 TABLET ORAL at 18:01

## 2019-04-14 RX ADMIN — HEPARIN SODIUM 5000 UNIT(S): 5000 INJECTION INTRAVENOUS; SUBCUTANEOUS at 22:07

## 2019-04-14 RX ADMIN — PANTOPRAZOLE SODIUM 40 MILLIGRAM(S): 20 TABLET, DELAYED RELEASE ORAL at 06:01

## 2019-04-14 RX ADMIN — Medication 1 GRAM(S): at 12:59

## 2019-04-14 RX ADMIN — Medication 50 GRAM(S): at 10:24

## 2019-04-14 RX ADMIN — TIOTROPIUM BROMIDE AND OLODATEROL 2 PUFF(S): 3.124; 2.736 SPRAY, METERED RESPIRATORY (INHALATION) at 12:59

## 2019-04-14 RX ADMIN — ESCITALOPRAM OXALATE 10 MILLIGRAM(S): 10 TABLET, FILM COATED ORAL at 12:58

## 2019-04-14 RX ADMIN — MONTELUKAST 10 MILLIGRAM(S): 4 TABLET, CHEWABLE ORAL at 12:58

## 2019-04-14 RX ADMIN — Medication 1 GRAM(S): at 06:01

## 2019-04-14 RX ADMIN — PIPERACILLIN AND TAZOBACTAM 200 GRAM(S): 4; .5 INJECTION, POWDER, LYOPHILIZED, FOR SOLUTION INTRAVENOUS at 12:58

## 2019-04-14 RX ADMIN — LACOSAMIDE 150 MILLIGRAM(S): 50 TABLET ORAL at 07:09

## 2019-04-14 RX ADMIN — Medication 2 MILLIGRAM(S): at 06:01

## 2019-04-14 RX ADMIN — NYSTATIN CREAM 1 APPLICATION(S): 100000 CREAM TOPICAL at 18:01

## 2019-04-14 RX ADMIN — LETROZOLE 2.5 MILLIGRAM(S): 2.5 TABLET, FILM COATED ORAL at 12:59

## 2019-04-14 RX ADMIN — Medication 40 MILLIEQUIVALENT(S): at 10:24

## 2019-04-14 RX ADMIN — PIPERACILLIN AND TAZOBACTAM 200 GRAM(S): 4; .5 INJECTION, POWDER, LYOPHILIZED, FOR SOLUTION INTRAVENOUS at 06:02

## 2019-04-14 RX ADMIN — Medication 1 GRAM(S): at 18:01

## 2019-04-14 RX ADMIN — PIPERACILLIN AND TAZOBACTAM 200 GRAM(S): 4; .5 INJECTION, POWDER, LYOPHILIZED, FOR SOLUTION INTRAVENOUS at 18:01

## 2019-04-14 NOTE — PROGRESS NOTE ADULT - PROBLEM SELECTOR PLAN 8
On home Lexapro 10mg daily  -continue home lexapro    #COPD: Per patient's history. No wheezing on exam. Not in exacerbation. Breathing and saturating well on RA.  -c/w montekulast 10mg daily  -c/w stiolto 2 puffs daily

## 2019-04-14 NOTE — DISCHARGE NOTE PROVIDER - NSDCFUADDAPPT_GEN_ALL_CORE_FT
1. Dr. Garcia Davis ([phone]): 4/16 12:45 AM  2. Dr. Hercules ([phone]) : 4/16 2 PM. Call [facility] if you are having a difficulty getting to this appointment on time.  	- [address]  3. Dr Tellez ([phone]): 4/22 1:40PM

## 2019-04-14 NOTE — PROGRESS NOTE ADULT - PROBLEM SELECTOR PLAN 2
UA weakly positive. Reports hematuria x 1 day which is possibly due to UTI.   -c/w Zosyn
UA weakly positive. Reports hematuria x 1 day which is possibly due to UTI.   -c/w Zosyn for now
UA weakly positive. Reports hematuria x 1 day which is possibly due to UTI.   -c/w Zosyn
UA weakly positive. Reports hematuria x 1 day which is possibly due to UTI.   -c/w Zosyn for now
UA weakly positive. Reports hematuria x 1 day which is possibly due to UTI.   -c/w Zosyn for now
Stable and no change

## 2019-04-14 NOTE — DISCHARGE NOTE PROVIDER - CARE PROVIDER_API CALL
Chantel Tellez)  Critical Care Medicine; Internal Medicine  [address], [address]  [place] [address]  Phone: [phone]  Fax: [phone]  Follow Up Time:     Frank Davis)  Surgery  [address], [address]  [place] [address]  Phone: [phone]  Fax: [phone]  Follow Up Time:     James Hercules)  Med Specialties at [address] [place] [address]  [address] [place] [address]  Phone: [phone]  Fax: [phone]  Follow Up Time:

## 2019-04-14 NOTE — PROGRESS NOTE ADULT - SUBJECTIVE AND OBJECTIVE BOX
INCOMPLETE NOTE    OVERNIGHT EVENTS:    SUBJECTIVE / INTERVAL HPI: Patient seen and examined at bedside.     VITAL SIGNS:  Vital Signs Last 24 Hrs  T(C): 36.7 (14 Apr 2019 08:46), Max: 36.7 (13 Apr 2019 22:37)  T(F): 98 (14 Apr 2019 08:46), Max: 98.1 (13 Apr 2019 22:37)  HR: 85 (14 Apr 2019 08:46) (75 - 85)  BP: 149/78 (14 Apr 2019 08:46) (149/78 - 153/81)  BP(mean): --  RR: 18 (14 Apr 2019 08:46) (18 - 18)  SpO2: 97% (14 Apr 2019 08:46) (96% - 97%)    PHYSICAL EXAM:    General: WDWN  HEENT: NC/AT; PERRL, anicteric sclera; MMM  Neck: supple  Cardiovascular: +S1/S2, RRR  Respiratory: CTA B/L; no W/R/R  Gastrointestinal: soft, NT/ND; +BSx4  Extremities: WWP; no edema, clubbing or cyanosis  Vascular: 2+ radial, DP/PT pulses B/L  Neurological: AAOx3; no focal deficits    MEDICATIONS:  MEDICATIONS  (STANDING):  aspirin enteric coated 81 milliGRAM(s) Oral daily  dexamethasone     Tablet 2 milliGRAM(s) Oral daily  dexamethasone     Tablet 1 milliGRAM(s) Oral once  escitalopram 10 milliGRAM(s) Oral daily  heparin  Injectable 5000 Unit(s) SubCutaneous every 8 hours  lacosamide 150 milliGRAM(s) Oral two times a day  letrozole 2.5 milliGRAM(s) Oral daily  melatonin 5 milliGRAM(s) Oral at bedtime  montelukast 10 milliGRAM(s) Oral daily  nystatin Powder 1 Application(s) Topical two times a day  pantoprazole    Tablet 40 milliGRAM(s) Oral before breakfast  piperacillin/tazobactam IVPB. 3.375 Gram(s) IV Intermittent every 6 hours  sucralfate suspension 1 Gram(s) Oral every 6 hours  tiotropium 2.5 MICROgram(s)/olodaterol 2.5 MICROgram(s) (STIOLTO) Inhaler 2 Puff(s) Inhalation daily    MEDICATIONS  (PRN):      ALLERGIES:  Allergies    No Known Allergies    Intolerances        LABS:                        12.5   9.27  )-----------( 263      ( 14 Apr 2019 08:17 )             39.3     04-14    142  |  106  |  5<L>  ----------------------------<  83  3.5   |  21<L>  |  0.88    Ca    9.0      14 Apr 2019 08:17  Mg     1.6     04-14          CAPILLARY BLOOD GLUCOSE      POCT Blood Glucose.: 101 mg/dL (13 Apr 2019 21:46)      RADIOLOGY & ADDITIONAL TESTS: Reviewed. OVERNIGHT EVENTS: FRANK    SUBJECTIVE / INTERVAL HPI: Patient seen and examined at bedside. Patient denied fevers, chills, night sweats, chest pain, SOB, n/v/d/c, abdominal pain, BRBPR, hematochezia, melena.     VITAL SIGNS:  Vital Signs Last 24 Hrs  T(C): 36.7 (14 Apr 2019 08:46), Max: 36.7 (13 Apr 2019 22:37)  T(F): 98 (14 Apr 2019 08:46), Max: 98.1 (13 Apr 2019 22:37)  HR: 85 (14 Apr 2019 08:46) (75 - 85)  BP: 149/78 (14 Apr 2019 08:46) (149/78 - 153/81)  BP(mean): --  RR: 18 (14 Apr 2019 08:46) (18 - 18)  SpO2: 97% (14 Apr 2019 08:46) (96% - 97%)    PHYSICAL EXAM:    General: WDWN, NAD   HEENT: NC/AT; PERRL, anicteric sclera; dry MM  Neck: supple  Cardiovascular: +S1/S2, RRR  Respiratory: CTA B/L; no W/R/R  Gastrointestinal: soft, NT/ND; hyperactive bowel sounds x4  Extremities: 1+ pitting edema b/l lower extremities. No erythema, no cyanosis b/l. Extremities warm.   Vascular: 2+ radial, DP/PT pulses B/L  Neurological: AAOx3, no focal deficits    MEDICATIONS:  MEDICATIONS  (STANDING):  aspirin enteric coated 81 milliGRAM(s) Oral daily  dexamethasone     Tablet 2 milliGRAM(s) Oral daily  dexamethasone     Tablet 1 milliGRAM(s) Oral once  escitalopram 10 milliGRAM(s) Oral daily  heparin  Injectable 5000 Unit(s) SubCutaneous every 8 hours  lacosamide 150 milliGRAM(s) Oral two times a day  letrozole 2.5 milliGRAM(s) Oral daily  melatonin 5 milliGRAM(s) Oral at bedtime  montelukast 10 milliGRAM(s) Oral daily  nystatin Powder 1 Application(s) Topical two times a day  pantoprazole    Tablet 40 milliGRAM(s) Oral before breakfast  piperacillin/tazobactam IVPB. 3.375 Gram(s) IV Intermittent every 6 hours  sucralfate suspension 1 Gram(s) Oral every 6 hours  tiotropium 2.5 MICROgram(s)/olodaterol 2.5 MICROgram(s) (STIOLTO) Inhaler 2 Puff(s) Inhalation daily    MEDICATIONS  (PRN):      ALLERGIES:  Allergies    No Known Allergies    Intolerances        LABS:                        12.5   9.27  )-----------( 263      ( 14 Apr 2019 08:17 )             39.3     04-14    142  |  106  |  5<L>  ----------------------------<  83  3.5   |  21<L>  |  0.88    Ca    9.0      14 Apr 2019 08:17  Mg     1.6     04-14          CAPILLARY BLOOD GLUCOSE      POCT Blood Glucose.: 101 mg/dL (13 Apr 2019 21:46)      RADIOLOGY & ADDITIONAL TESTS: Reviewed.

## 2019-04-14 NOTE — DISCHARGE NOTE PROVIDER - NSDCFUADDINST_GEN_ALL_CORE_FT
Please continue your home medications.    Please take one Protonix 40mg tablet daily. Please take this for 2 months    Please take one Augmentin 875mg tablet  3 times daily for another 5 days.

## 2019-04-14 NOTE — DISCHARGE NOTE PROVIDER - HOSPITAL COURSE
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  Pt unable to qualify abdominal pain currently.  Previously told ED staff that it was predominantly in RLQ.  Pt also reported constipation x 3 days and took a laxative w/ subsequent loose stool.  Pt also reports new onset hematuria that started today.  ROS negative for recent changes in weight, HA, changes in vision, fevers, chills, vomiting, CP, SOB, palpitations, flank pain, dysuria and LE edema. 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  Pt unable to qualify abdominal pain currently.  Previously told ED staff that it was predominantly in RLQ. Pt also reported constipation x 3 days and took a laxative w/ subsequent loose stool.  Pt also reports new onset hematuria that started on day of presentation.  ROS negative for recent changes in weight, HA, changes in vision, fevers, chills, vomiting, CP, SOB, palpitations, flank pain, dysuria and LE edema. UA positive for mild UTI, started IV Zosyn. CT A/P with contrast on admission demonstrated, bowel wall thickening of descending and rectosigmoid colon, consistent with colitis. Also showed hypodensities in the liver, consistent with patient's known metastatic disease. GI team consulted for possibility of colonoscopy but patient could not tolerate full prep and was scheduled for sigmoidoscopy. Sigmoidoscopy 4/9/10 biopsies demonstrated mild active inflammation and edema in the transverse colon with ulceration and reactive findings consistent with ischemia in the descending colon. Rectal mucosa biopsies demonstrated mild active inflammation and lamina propria fibrosis. Surgery consulted, patient initially treated with NPO, IVF and IV Zosyn with daily lactate and serial abdominal exam. Lactate negative, no acute abdomen over several days. Unclear etiology of ischemic colitis, possibly due to chemotherapy. Patient's diet advanced and tolerated low residual PO diet. Patient stable for discharge home with PO Augmentin for 5 more days and follow up with Dr. Tellez, Dr. Lee (Heme/Onc), and Dr. Davis. 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  Pt unable to qualify abdominal pain currently.  Previously told ED staff that it was predominantly in RLQ. Pt also reported constipation x 3 days and took a laxative w/ subsequent loose stool.  Pt also reports new onset hematuria that started on day of presentation.  ROS negative for recent changes in weight, HA, changes in vision, fevers, chills, vomiting, CP, SOB, palpitations, flank pain, dysuria and LE edema. UA positive for mild UTI, started IV Zosyn. CT A/P with contrast on admission demonstrated, bowel wall thickening of descending and rectosigmoid colon, consistent with colitis. Also showed hypodensities in the liver, consistent with patient's known metastatic disease. GI team consulted for possibility of colonoscopy but patient could not tolerate full prep and was scheduled for sigmoidoscopy. PPI started for symptomatic esophagitis with plan for 2 month treatment. Sigmoidoscopy 4/9/10 biopsies demonstrated mild active inflammation and edema in the transverse colon with ulceration and reactive findings consistent with ischemia in the descending colon. Rectal mucosa biopsies demonstrated mild active inflammation and lamina propria fibrosis. Surgery consulted, patient initially treated with NPO, IVF and IV Zosyn with daily lactate and serial abdominal exam. Lactate negative, no acute abdomen over several days. Unclear etiology of ischemic colitis, possibly due to chemotherapy. Patient's diet advanced and tolerated low residual PO diet. Patient stable for discharge home with PO Augmentin for 5 more days and follow up with Dr. Tellez, Dr. Lee (Heme/Onc), and Dr. Davis, and Dr. Hercules.

## 2019-04-14 NOTE — DISCHARGE NOTE PROVIDER - NSDCCPCAREPLAN_GEN_ALL_CORE_FT
PRINCIPAL DISCHARGE DIAGNOSIS  Diagnosis: Ischemic colitis  Assessment and Plan of Treatment: Your CT scan of the abdomen showed that your had findings consistent with colitis that was of an unclear cause. We had our GI team evaluate you and you had a sigmoidoscopy which revealed that your had ischemic colitis, which is when      SECONDARY DISCHARGE DIAGNOSES  Diagnosis: Colitis  Assessment and Plan of Treatment:     Diagnosis: Abdominal pain  Assessment and Plan of Treatment: PRINCIPAL DISCHARGE DIAGNOSIS  Diagnosis: Ischemic colitis  Assessment and Plan of Treatment: Your CT scan of the abdomen showed that your had findings consistent with colitis that was of an unclear cause. We had our GI team evaluate you and you had a sigmoidoscopy which revealed that your had ischemic colitis, which is when the colon does not have enough blood supply. For this reason the surgery team was following you. You were kept without a diet, and were treated with IV fluids and IV antibiotics. Your diet was progressed to a low residual diet and we stopped the IV fluids. The cause of this ischemia is not certain but is possibly due to chemotherapy. Please finish your course of antibiotics with Augmentin 875mg three times per day. Please follow up with your appointments with Dr. Davis and your GI doctor.      SECONDARY DISCHARGE DIAGNOSES  Diagnosis: Acute UTI  Assessment and Plan of Treatment: You were found to have a urinary tract infection after you had complaints of blood in the urine and your urine studies showed signs of an infection. For this issue, you were given IV antibiotics. Your course of antibiotics is finished but you should continue Augmentin 875mg three times per day in order to finish your treatment for ishcemic colitis.    Diagnosis: Metastatic cancer  Assessment and Plan of Treatment: You have known metastatic disease to the liver, among other areas of the body. Please continue your home medications. Your CT scan revealed that there are known liver lesions that are consistent with metastatic disease, which is known from your recent abdominal MRI. Please follow up with your appointment with Dr. Lee.    Diagnosis: Transition of care performed with sharing of clinical summary  Assessment and Plan of Treatment: PRINCIPAL DISCHARGE DIAGNOSIS  Diagnosis: Ischemic colitis  Assessment and Plan of Treatment: Your CT scan of the abdomen showed that your had findings consistent with colitis that was of an unclear cause. We had our GI team evaluate you and you had a sigmoidoscopy which revealed that your had ischemic colitis, which is when the colon does not have enough blood supply. For this reason the surgery team was following you. You were kept without a diet, and were treated with IV fluids and IV antibiotics. Your diet was progressed to a low residual diet and we stopped the IV fluids. The cause of this ischemia is not certain but is possibly due to chemotherapy. Please finish your course of antibiotics with Augmentin 875mg three times per day. Please follow up with your appointments with Dr. Davis and your GI doctor.      SECONDARY DISCHARGE DIAGNOSES  Diagnosis: Acute UTI  Assessment and Plan of Treatment: You were found to have a urinary tract infection after you had complaints of blood in the urine and your urine studies showed signs of an infection. For this issue, you were given IV antibiotics. Your course of antibiotics is finished but you should continue Augmentin 875mg three times per day in order to finish your treatment for ishcemic colitis.    Diagnosis: Metastatic cancer  Assessment and Plan of Treatment: You have known metastatic disease to the liver, among other areas of the body. Please continue your home medications. Your CT scan revealed that there are known liver lesions that are consistent with metastatic disease, which is known from your recent abdominal MRI. Please follow up with your appointment with Dr. Lee.    Diagnosis: Esophagitis, reflux  Assessment and Plan of Treatment: Your CT scan also showed signs of esophagitis, which is inflammation of the esophagus. You were also having symptoms of heartburn so you were placed on a PPI called protonix. Please take Protonix 40mg daily for 2 months and follow up regularly with your GI doctor, Dr. Hercules.    Diagnosis: Transition of care performed with sharing of clinical summary  Assessment and Plan of Treatment: 1) PCP Contacted on Admission: (Y/N) --> Name & Phone #: Dr. Tellez following fady throughout hospitalization.   2) Date of Contact with PCP:  3) PCP Contacted at Discharge: (Y/N, N/A)  4) Summary of Handoff Given to PCP:   5) Post-Discharge Appointment Date and Location:   1. Dr. Garcia [place] ([phone]): 4/15 10:15 AM  2. Dr. Hercules ([phone]) : 4/16 2 PM  	- [address] [place] [address]  3. Dr Tellez ([phone]): 4/22 1:40PM

## 2019-04-14 NOTE — PROGRESS NOTE ADULT - PROBLEM SELECTOR PLAN 7
Follows with Dr. Lee. s/p R hemicolectomy with Dr. Davis.   -outpatient follow up

## 2019-04-14 NOTE — PROGRESS NOTE ADULT - PROBLEM SELECTOR PLAN 6
Improved and advance diet;
Improved and advance diet; Still having massive diarrhea;
S/p mastectomy. Per patient, do not use left arm for blood draws, BP readings  -C/w letrozole 2.5mg daily
Colonoscopy today
S/p mastectomy. Per patient, do not use left arm for blood draws, BP readings  -C/w letrozole 2.5mg daily

## 2019-04-14 NOTE — PROGRESS NOTE ADULT - PROBLEM SELECTOR PLAN 3
Patient reports hematuria since day of admission. Unclear etiology, possibly due to UTI vs. malignancy in the setting of patient's extensive history of cancer  -f/u repeat UA  -f/u urine cytology  -uro consulted, waiting for recs  -Hb stable, continue to monitor  -c/w Zosyn
Patient reports hematuria since day of admission. Unclear etiology, possibly due to UTI vs. malignancy in the setting of patient's extensive history of cancer  -f/u urine cytology  -uro consulted, waiting for recs  -Hb stable, continue to monitor  -c/w Zosyn
Patient reports hematuria since day of admission. Unclear etiology, possibly due to UTI vs. malignancy in the setting of patient's extensive history of cancer  -f/u repeat UA  -f/u urine cytology  -uro consulted, waiting for recs  -Hb stable, continue to monitor  -c/w Zosyn
Patient reports hematuria since day of admission. Unclear etiology, possibly due to UTI. No longer having hematuria  -Hb stable, continue to monitor  -c/w Zosyn
Patient reports hematuria since day of admission. Unclear etiology, possibly due to UTI. No longer having hematuria  -Hb stable, continue to monitor  -c/w Zosyn
Pip/Tazo  For colonoscopy today

## 2019-04-14 NOTE — PROGRESS NOTE ADULT - PROBLEM SELECTOR PROBLEM 6
Abdominal pain
Breast cancer
Abdominal pain

## 2019-04-14 NOTE — PROGRESS NOTE ADULT - PROBLEM SELECTOR PLAN 10
1) PCP Contacted on Admission: (Y/N) --> Name & Phone #: Dr. Tellez is following case   2) Date of Contact with PCP:  3) PCP Contacted at Discharge: (Y/N, N/A)  4) Summary of Handoff Given to PCP:   5) Post-Discharge Appointment Date and Location:

## 2019-04-14 NOTE — PROGRESS NOTE ADULT - PROBLEM SELECTOR PLAN 1
Pt w/ abdominal pain w/ associated constipation.  Had nausea without vomiting.  WBC elevated. No associated fevers.  Lactate WNL.  Pt had BM on day of admission that was loose after taking laxative and continued to be loose throughout the day. CT A/P with contrast on admission demonstrated, bowel wall thickening of descending and rectosigmoid colon, consistent with colitis. Sigmoidoscopy 4/9/10 biopsies demonstrated mild active inflammation and edema in the transverse colon with ulceration and reactive findings consistent with ischemia in the descending colon. Rectual mucosa biopsies demonstrated mild active inflammation and lamina propria fibrosis. CTA abdomen/pelvis 4/10 demonstrated wall thickening with surrounding inflammatory change at the left and proximal sigmoid colon consistent with a nonspecific colitis. Etiology of ischemic colitis possibly due to chemotherapy regimen   -GI recs appreciated  -surgery recs appreciated  -c/w IV Zosyn 3.375mg q6hrs, considering transition to PO   -advanced diet to low fiber diet    -d/kiley IVF in the setting of worsening signs of fluid overload with b/l 1+ pitting edema  -NG tube if patient vomiting  -serial abdominal exams    #Dilated Esophagus: Dilated esophagus seen on CT A/P. Patient also complaining of heartburn and esophagitis seen on imaging  -c/w protonix PO 40mg daily for 2 months

## 2019-04-14 NOTE — PROGRESS NOTE ADULT - PROBLEM SELECTOR PLAN 5
Follows with Dr. Muniz. S/p craniotomy with residual left sided weakness according to patient  -c/w decadron 2mg daily  -c/w Vimpat 150mg BID
Will discuss with urology
Follows with Dr. Muniz. S/p craniotomy with residual left sided weakness according to patient  -c/w decadron 2mg daily  -c/w Vimpat 150mg BID

## 2019-04-14 NOTE — DISCHARGE NOTE PROVIDER - CARE PROVIDERS DIRECT ADDRESSES
[email or web address]

## 2019-04-15 ENCOUNTER — APPOINTMENT (OUTPATIENT)
Dept: MAMMOGRAPHY | Facility: CLINIC | Age: 74
End: 2019-04-15

## 2019-04-15 ENCOUNTER — APPOINTMENT (OUTPATIENT)
Dept: ULTRASOUND IMAGING | Facility: CLINIC | Age: 74
End: 2019-04-15

## 2019-04-15 ENCOUNTER — INBOUND DOCUMENT (OUTPATIENT)
Age: 74
End: 2019-04-15

## 2019-04-15 VITALS
HEART RATE: 80 BPM | OXYGEN SATURATION: 97 % | TEMPERATURE: 97 F | DIASTOLIC BLOOD PRESSURE: 79 MMHG | SYSTOLIC BLOOD PRESSURE: 145 MMHG | RESPIRATION RATE: 18 BRPM

## 2019-04-15 LAB
ANION GAP SERPL CALC-SCNC: 13 MMOL/L — SIGNIFICANT CHANGE UP (ref 5–17)
BUN SERPL-MCNC: 13 MG/DL — SIGNIFICANT CHANGE UP (ref 7–23)
CALCIUM SERPL-MCNC: 9.3 MG/DL — SIGNIFICANT CHANGE UP (ref 8.4–10.5)
CHLORIDE SERPL-SCNC: 107 MMOL/L — SIGNIFICANT CHANGE UP (ref 96–108)
CO2 SERPL-SCNC: 23 MMOL/L — SIGNIFICANT CHANGE UP (ref 22–31)
CREAT SERPL-MCNC: 0.88 MG/DL — SIGNIFICANT CHANGE UP (ref 0.5–1.3)
GLUCOSE SERPL-MCNC: 101 MG/DL — HIGH (ref 70–99)
HCT VFR BLD CALC: 38.8 % — SIGNIFICANT CHANGE UP (ref 34.5–45)
HGB BLD-MCNC: 12.2 G/DL — SIGNIFICANT CHANGE UP (ref 11.5–15.5)
MAGNESIUM SERPL-MCNC: 2.1 MG/DL — SIGNIFICANT CHANGE UP (ref 1.6–2.6)
MCHC RBC-ENTMCNC: 28.9 PG — SIGNIFICANT CHANGE UP (ref 27–34)
MCHC RBC-ENTMCNC: 31.4 GM/DL — LOW (ref 32–36)
MCV RBC AUTO: 91.9 FL — SIGNIFICANT CHANGE UP (ref 80–100)
NRBC # BLD: 0 /100 WBCS — SIGNIFICANT CHANGE UP (ref 0–0)
PLATELET # BLD AUTO: 304 K/UL — SIGNIFICANT CHANGE UP (ref 150–400)
POTASSIUM SERPL-MCNC: 3.4 MMOL/L — LOW (ref 3.5–5.3)
POTASSIUM SERPL-SCNC: 3.4 MMOL/L — LOW (ref 3.5–5.3)
RBC # BLD: 4.22 M/UL — SIGNIFICANT CHANGE UP (ref 3.8–5.2)
RBC # FLD: 15.3 % — HIGH (ref 10.3–14.5)
SODIUM SERPL-SCNC: 143 MMOL/L — SIGNIFICANT CHANGE UP (ref 135–145)
WBC # BLD: 8.27 K/UL — SIGNIFICANT CHANGE UP (ref 3.8–10.5)
WBC # FLD AUTO: 8.27 K/UL — SIGNIFICANT CHANGE UP (ref 3.8–10.5)

## 2019-04-15 PROCEDURE — 81001 URINALYSIS AUTO W/SCOPE: CPT

## 2019-04-15 PROCEDURE — 82550 ASSAY OF CK (CPK): CPT

## 2019-04-15 PROCEDURE — 80053 COMPREHEN METABOLIC PANEL: CPT

## 2019-04-15 PROCEDURE — 74177 CT ABD & PELVIS W/CONTRAST: CPT

## 2019-04-15 PROCEDURE — 71045 X-RAY EXAM CHEST 1 VIEW: CPT

## 2019-04-15 PROCEDURE — 94640 AIRWAY INHALATION TREATMENT: CPT

## 2019-04-15 PROCEDURE — 85027 COMPLETE CBC AUTOMATED: CPT

## 2019-04-15 PROCEDURE — 87086 URINE CULTURE/COLONY COUNT: CPT

## 2019-04-15 PROCEDURE — 86901 BLOOD TYPING SEROLOGIC RH(D): CPT

## 2019-04-15 PROCEDURE — 97116 GAIT TRAINING THERAPY: CPT

## 2019-04-15 PROCEDURE — 83735 ASSAY OF MAGNESIUM: CPT

## 2019-04-15 PROCEDURE — 74174 CTA ABD&PLVS W/CONTRAST: CPT

## 2019-04-15 PROCEDURE — 83605 ASSAY OF LACTIC ACID: CPT

## 2019-04-15 PROCEDURE — 74018 RADEX ABDOMEN 1 VIEW: CPT

## 2019-04-15 PROCEDURE — 80048 BASIC METABOLIC PNL TOTAL CA: CPT

## 2019-04-15 PROCEDURE — 86850 RBC ANTIBODY SCREEN: CPT

## 2019-04-15 PROCEDURE — 36415 COLL VENOUS BLD VENIPUNCTURE: CPT

## 2019-04-15 PROCEDURE — 97162 PT EVAL MOD COMPLEX 30 MIN: CPT

## 2019-04-15 PROCEDURE — 96375 TX/PRO/DX INJ NEW DRUG ADDON: CPT

## 2019-04-15 PROCEDURE — 85730 THROMBOPLASTIN TIME PARTIAL: CPT

## 2019-04-15 PROCEDURE — 93005 ELECTROCARDIOGRAM TRACING: CPT

## 2019-04-15 PROCEDURE — 86900 BLOOD TYPING SEROLOGIC ABO: CPT

## 2019-04-15 PROCEDURE — 83690 ASSAY OF LIPASE: CPT

## 2019-04-15 PROCEDURE — 82553 CREATINE MB FRACTION: CPT

## 2019-04-15 PROCEDURE — 84484 ASSAY OF TROPONIN QUANT: CPT

## 2019-04-15 PROCEDURE — 96374 THER/PROPH/DIAG INJ IV PUSH: CPT | Mod: XU

## 2019-04-15 PROCEDURE — 85610 PROTHROMBIN TIME: CPT

## 2019-04-15 PROCEDURE — 88305 TISSUE EXAM BY PATHOLOGIST: CPT

## 2019-04-15 PROCEDURE — 99285 EMERGENCY DEPT VISIT HI MDM: CPT | Mod: 25

## 2019-04-15 PROCEDURE — 87040 BLOOD CULTURE FOR BACTERIA: CPT

## 2019-04-15 PROCEDURE — 84100 ASSAY OF PHOSPHORUS: CPT

## 2019-04-15 PROCEDURE — 99232 SBSQ HOSP IP/OBS MODERATE 35: CPT | Mod: GC

## 2019-04-15 PROCEDURE — 97530 THERAPEUTIC ACTIVITIES: CPT

## 2019-04-15 PROCEDURE — 85025 COMPLETE CBC W/AUTO DIFF WBC: CPT

## 2019-04-15 PROCEDURE — 82962 GLUCOSE BLOOD TEST: CPT

## 2019-04-15 RX ORDER — LACOSAMIDE 50 MG/1
150 TABLET ORAL
Qty: 0 | Refills: 0 | Status: DISCONTINUED | OUTPATIENT
Start: 2019-04-15 | End: 2019-04-15

## 2019-04-15 RX ORDER — OMEPRAZOLE 10 MG/1
1 CAPSULE, DELAYED RELEASE ORAL
Qty: 60 | Refills: 0
Start: 2019-04-15 | End: 2019-06-13

## 2019-04-15 RX ORDER — POTASSIUM CHLORIDE 20 MEQ
40 PACKET (EA) ORAL EVERY 4 HOURS
Qty: 0 | Refills: 0 | Status: COMPLETED | OUTPATIENT
Start: 2019-04-15 | End: 2019-04-15

## 2019-04-15 RX ADMIN — LACOSAMIDE 150 MILLIGRAM(S): 50 TABLET ORAL at 06:32

## 2019-04-15 RX ADMIN — Medication 40 MILLIEQUIVALENT(S): at 09:31

## 2019-04-15 RX ADMIN — NYSTATIN CREAM 1 APPLICATION(S): 100000 CREAM TOPICAL at 06:33

## 2019-04-15 RX ADMIN — Medication 2 MILLIGRAM(S): at 06:31

## 2019-04-15 RX ADMIN — PIPERACILLIN AND TAZOBACTAM 200 GRAM(S): 4; .5 INJECTION, POWDER, LYOPHILIZED, FOR SOLUTION INTRAVENOUS at 06:32

## 2019-04-15 RX ADMIN — Medication 1 GRAM(S): at 06:34

## 2019-04-15 RX ADMIN — TIOTROPIUM BROMIDE AND OLODATEROL 2 PUFF(S): 3.124; 2.736 SPRAY, METERED RESPIRATORY (INHALATION) at 12:33

## 2019-04-15 RX ADMIN — PIPERACILLIN AND TAZOBACTAM 200 GRAM(S): 4; .5 INJECTION, POWDER, LYOPHILIZED, FOR SOLUTION INTRAVENOUS at 00:54

## 2019-04-15 RX ADMIN — Medication 1 GRAM(S): at 12:27

## 2019-04-15 RX ADMIN — PIPERACILLIN AND TAZOBACTAM 200 GRAM(S): 4; .5 INJECTION, POWDER, LYOPHILIZED, FOR SOLUTION INTRAVENOUS at 12:28

## 2019-04-15 RX ADMIN — ESCITALOPRAM OXALATE 10 MILLIGRAM(S): 10 TABLET, FILM COATED ORAL at 12:28

## 2019-04-15 RX ADMIN — HEPARIN SODIUM 5000 UNIT(S): 5000 INJECTION INTRAVENOUS; SUBCUTANEOUS at 06:33

## 2019-04-15 RX ADMIN — PANTOPRAZOLE SODIUM 40 MILLIGRAM(S): 20 TABLET, DELAYED RELEASE ORAL at 06:32

## 2019-04-15 RX ADMIN — LETROZOLE 2.5 MILLIGRAM(S): 2.5 TABLET, FILM COATED ORAL at 13:31

## 2019-04-15 RX ADMIN — Medication 40 MILLIEQUIVALENT(S): at 13:34

## 2019-04-15 RX ADMIN — MONTELUKAST 10 MILLIGRAM(S): 4 TABLET, CHEWABLE ORAL at 12:28

## 2019-04-15 NOTE — PROGRESS NOTE ADULT - ATTENDING COMMENTS
Patient seen and examined with house-staff during bedside rounds  Resident note read, including vitals, physical findings, laboratory data, and radiological reports.   Revisions included below.  Case discussed with House staff  Direct personal management at bedside  and extensive interpretation of data. Decision making of high complexity.
as above  pt seen examnd by me personally  stable   NAD  Abdom nonTender, soft    Agree CLEARS as [place] ---- Would not advance for couple days--- then would introduce Ensure aas [place]
as above  totally stable,  afeb  NAD                           Pt c/o "heartburn"  Abdom  totally NonTender    WBC 10,  Hb 11    Phos-LOW   K-LOW           --------------> Need Replete    Rec Cont NPO  x  ice chips/necess po meds,  IV ABX,  serial exams/labs/lactate          Also rec give PO CARAFATE Suspension for c/o Heartburn    Close observation.    No Operative Indication at this Time.      Will Cont Follow with You
pt seen examnd by me personally  as above  rev'd w/res  Stable  afeb    NAD   NON Tender abdom  labs ok    Rec as above;  when advance to solids would rec LOW RES DIET:::::::::::::::::::::::::::    PLAN per primary Team
Pt feels well, no symptoms at this time. F/u with PMD and with oncologist. Also f/u with Dr Hercules (GI) for consideration of outpatient EGD and heartburn.   Abd pain is resolved, passing gas, tolerating PO. Advance diet as tolerated. Hold bevacizumab, as likely etiology of colon ischemia. We communicated with primary oncologist and hematologist who are in agreement.
L colon ischemia, unclear etiology. Discussed history with patient,  and HHA at bedside again. No eposide of dizziness, vomiting or dehydration prior to onset of symptoms. On Bevacizumab, gets monthly infusions, last one was ~2 weeks ago. She has received 6-7 infusions so far.     Supportive care, serial abd exams.   CTA to evaluate abd vasculature.   Suspect colon ischemia secondary to bevacizumab. It is reported to cause colon perforation and there are few case reports of colon ischemia with this medication (anti-VEGF-A Ab). Will discuss with oncologist and PMD re: stopping it.   Can start clear liquids, advance as tolerated.   F/u pathology  Verbal consent obtained for scientific publication of this case and colonoscopy findings.
Stable:  Okay to discharge on PO Augmentin for 5days;
Improved; Start clear liquid diet; OOB and physical therapy ;  Serial abdominal examination
Patient seen and examined; Improved; No change in current regimen
Patient seen and examined; Needs physical therapy,  OOB;
Patient seen and examined with house-staff during bedside rounds  Resident note read, including vitals, physical findings, laboratory data, and radiological reports.   Revisions included below.  Case discussed with House staff  Direct personal management at bedside  and extensive interpretation of data. Decision making of high complexity.

## 2019-04-15 NOTE — PROGRESS NOTE ADULT - PROBLEM SELECTOR PROBLEM 1
Brain metastasis
Colitis
Depression
Colitis
Brain metastasis

## 2019-04-15 NOTE — PROGRESS NOTE ADULT - PROBLEM SELECTOR PROBLEM 5
Abdominal pain
Hematuria
Brain metastasis
Hematuria

## 2019-04-15 NOTE — PROGRESS NOTE ADULT - SUBJECTIVE AND OBJECTIVE BOX
INTERVAL HPI/OVERNIGHT EVENTS:  No complaint except diarrhea; Tolerating diet; Okay to discharge;          MEDICATIONS  (STANDING):  ALPRAZolam 0.5 milliGRAM(s) Oral at bedtime  aspirin enteric coated 81 milliGRAM(s) Oral daily  dexamethasone     Tablet 2 milliGRAM(s) Oral daily  dexamethasone     Tablet 1 milliGRAM(s) Oral once  escitalopram 10 milliGRAM(s) Oral daily  heparin  Injectable 5000 Unit(s) SubCutaneous every 8 hours  lacosamide 150 milliGRAM(s) Oral two times a day  letrozole 2.5 milliGRAM(s) Oral daily  melatonin 5 milliGRAM(s) Oral at bedtime  montelukast 10 milliGRAM(s) Oral daily  nystatin Powder 1 Application(s) Topical two times a day  pantoprazole    Tablet 40 milliGRAM(s) Oral before breakfast  piperacillin/tazobactam IVPB. 3.375 Gram(s) IV Intermittent every 6 hours  potassium chloride    Tablet ER 40 milliEquivalent(s) Oral every 4 hours  sucralfate suspension 1 Gram(s) Oral every 6 hours  tiotropium 2.5 MICROgram(s)/olodaterol 2.5 MICROgram(s) (STIOLTO) Inhaler 2 Puff(s) Inhalation daily    MEDICATIONS  (PRN):      Allergies    No Known Allergies    Intolerances        Vital Signs Last 24 Hrs  T(C): 36.3 (15 Apr 2019 08:36), Max: 36.6 (14 Apr 2019 20:30)  T(F): 97.3 (15 Apr 2019 08:36), Max: 97.9 (14 Apr 2019 20:30)  HR: 80 (15 Apr 2019 08:36) (74 - 90)  BP: 145/79 (15 Apr 2019 08:36) (128/75 - 149/71)  BP(mean): --  RR: 18 (15 Apr 2019 08:36) (18 - 22)  SpO2: 97% (15 Apr 2019 08:36) (97% - 100%)          Constitutional:  Awake     Eyes: DANAE    ENMT: Negative    Neck: Supple    Back:  no tenderness     Respiratory:  clear    Cardiovascular: S1 S2    Gastrointestinal:  soft    Genitourinary:    Extremities:  no edema    Vascular:    Neurological:    Skin:    Lymph Nodes:            LABS:                        12.2   8.27  )-----------( 304      ( 15 Apr 2019 08:44 )             38.8     04-15    143  |  107  |  13  ----------------------------<  101<H>  3.4<L>   |  23  |  0.88    Ca    9.3      15 Apr 2019 07:18  Mg     2.1     04-15            RADIOLOGY & ADDITIONAL TESTS:

## 2019-04-15 NOTE — PROGRESS NOTE ADULT - PROVIDER SPECIALTY LIST ADULT
Gastroenterology
Gastroenterology
Horse fly  Swelling left hand  Happened Saturday Benadryl little change.     Pt with swelling left hand able to move  Joint pain    A/p  Insect bite  prednieonse
Internal Medicine
Surgery
Internal Medicine

## 2019-04-15 NOTE — PROGRESS NOTE ADULT - PROBLEM SELECTOR PROBLEM 2
Brain metastasis
Liver metastasis
UTI (urinary tract infection)
Liver metastasis

## 2019-04-15 NOTE — PROGRESS NOTE ADULT - PROBLEM SELECTOR PROBLEM 4
Breast cancer
Colitis
Liver metastasis
Breast cancer

## 2019-04-16 RX ORDER — OMEPRAZOLE 10 MG/1
1 CAPSULE, DELAYED RELEASE ORAL
Qty: 0 | Refills: 0 | COMMUNITY

## 2019-04-16 RX ORDER — TRAZODONE HCL 50 MG
1 TABLET ORAL
Qty: 0 | Refills: 0 | COMMUNITY

## 2019-04-16 RX ORDER — OXYBUTYNIN CHLORIDE 5 MG
1 TABLET ORAL
Qty: 0 | Refills: 0 | COMMUNITY

## 2019-04-17 ENCOUNTER — APPOINTMENT (OUTPATIENT)
Dept: INFUSION THERAPY | Facility: HOSPITAL | Age: 74
End: 2019-04-17

## 2019-04-18 PROBLEM — C79.31 SECONDARY MALIGNANT NEOPLASM OF BRAIN: Chronic | Status: ACTIVE | Noted: 2019-04-07

## 2019-04-18 PROBLEM — C78.7 SECONDARY MALIGNANT NEOPLASM OF LIVER AND INTRAHEPATIC BILE DUCT: Chronic | Status: ACTIVE | Noted: 2019-04-07

## 2019-04-22 ENCOUNTER — APPOINTMENT (OUTPATIENT)
Dept: INTERNAL MEDICINE | Facility: CLINIC | Age: 74
End: 2019-04-22
Payer: MEDICARE

## 2019-04-22 VITALS
WEIGHT: 172 LBS | OXYGEN SATURATION: 94 % | HEIGHT: 65 IN | HEART RATE: 91 BPM | TEMPERATURE: 98.8 F | BODY MASS INDEX: 28.66 KG/M2 | DIASTOLIC BLOOD PRESSURE: 63 MMHG | SYSTOLIC BLOOD PRESSURE: 107 MMHG

## 2019-04-22 PROCEDURE — 99213 OFFICE O/P EST LOW 20 MIN: CPT

## 2019-04-22 NOTE — HEALTH RISK ASSESSMENT
[No falls in past year] : Patient reported no falls in the past year [0] : 2) Feeling down, depressed, or hopeless: Not at all (0) [] : No [[id number]] : 0

## 2019-04-22 NOTE — HISTORY OF PRESENT ILLNESS
[FreeTextEntry1] : Recent hospitalization for ischemic colitis  It may be secondary to Avastatin. No other cause;  [de-identified] : All other consultants aware. Will see Dr. Muniz next week;   Also taking Imodium for diarrhea;  Has nausea most of the day;

## 2019-04-22 NOTE — ASSESSMENT
[FreeTextEntry1] : Needs ti follow low residue diet; Would continue Carafate; Stop H2 Blocker;   Will follow up with multiple consultants; .

## 2019-04-22 NOTE — PHYSICAL EXAM
[Well Nourished] : well nourished [No Acute Distress] : no acute distress [Well Developed] : well developed [Normal Sclera/Conjunctiva] : normal sclera/conjunctiva [Well-Appearing] : well-appearing [EOMI] : extraocular movements intact [PERRL] : pupils equal round and reactive to light [Normal Outer Ear/Nose] : the outer ears and nose were normal in appearance [Normal Oropharynx] : the oropharynx was normal [No JVD] : no jugular venous distention [Supple] : supple [No Lymphadenopathy] : no lymphadenopathy [No Respiratory Distress] : no respiratory distress  [Thyroid Normal, No Nodules] : the thyroid was normal and there were no nodules present [No Accessory Muscle Use] : no accessory muscle use [Clear to Auscultation] : lungs were clear to auscultation bilaterally [Normal Rate] : normal rate  [Regular Rhythm] : with a regular rhythm [Normal S1, S2] : normal S1 and S2 [No Murmur] : no murmur heard [No Carotid Bruits] : no carotid bruits [No Varicosities] : no varicosities [No Abdominal Bruit] : a ~M bruit was not heard ~T in the abdomen [Pedal Pulses Present] : the pedal pulses are present [No Edema] : there was no peripheral edema [No Extremity Clubbing/Cyanosis] : no extremity clubbing/cyanosis [No Palpable Aorta] : no palpable aorta [Soft] : abdomen soft [No Masses] : no abdominal mass palpated [Non-distended] : non-distended [Non Tender] : non-tender [No HSM] : no HSM [Normal Bowel Sounds] : normal bowel sounds [Normal Posterior Cervical Nodes] : no posterior cervical lymphadenopathy [Normal Anterior Cervical Nodes] : no anterior cervical lymphadenopathy [No Spinal Tenderness] : no spinal tenderness [No CVA Tenderness] : no CVA  tenderness [No Joint Swelling] : no joint swelling [Grossly Normal Strength/Tone] : grossly normal strength/tone [Normal Gait] : normal gait [No Rash] : no rash [No Focal Deficits] : no focal deficits [Coordination Grossly Intact] : coordination grossly intact [Deep Tendon Reflexes (DTR)] : deep tendon reflexes were 2+ and symmetric [Normal Insight/Judgement] : insight and judgment were intact [Normal Affect] : the affect was normal

## 2019-04-23 ENCOUNTER — APPOINTMENT (OUTPATIENT)
Dept: MRI IMAGING | Facility: HOSPITAL | Age: 74
End: 2019-04-23

## 2019-04-23 ENCOUNTER — OUTPATIENT (OUTPATIENT)
Dept: OUTPATIENT SERVICES | Facility: HOSPITAL | Age: 74
LOS: 1 days | End: 2019-04-23
Payer: MEDICARE

## 2019-04-23 DIAGNOSIS — Z98.890 OTHER SPECIFIED POSTPROCEDURAL STATES: Chronic | ICD-10-CM

## 2019-04-23 DIAGNOSIS — Z90.49 ACQUIRED ABSENCE OF OTHER SPECIFIED PARTS OF DIGESTIVE TRACT: ICD-10-CM

## 2019-04-23 DIAGNOSIS — K20.9 ESOPHAGITIS, UNSPECIFIED: ICD-10-CM

## 2019-04-23 DIAGNOSIS — R31.9 HEMATURIA, UNSPECIFIED: ICD-10-CM

## 2019-04-23 DIAGNOSIS — C50.919 MALIGNANT NEOPLASM OF UNSPECIFIED SITE OF UNSPECIFIED FEMALE BREAST: Chronic | ICD-10-CM

## 2019-04-23 DIAGNOSIS — Z41.9 ENCOUNTER FOR PROCEDURE FOR PURPOSES OTHER THAN REMEDYING HEALTH STATE, UNSPECIFIED: Chronic | ICD-10-CM

## 2019-04-23 DIAGNOSIS — Z90.49 ACQUIRED ABSENCE OF OTHER SPECIFIED PARTS OF DIGESTIVE TRACT: Chronic | ICD-10-CM

## 2019-04-23 DIAGNOSIS — C78.7 SECONDARY MALIGNANT NEOPLASM OF LIVER AND INTRAHEPATIC BILE DUCT: ICD-10-CM

## 2019-04-23 DIAGNOSIS — N39.0 URINARY TRACT INFECTION, SITE NOT SPECIFIED: ICD-10-CM

## 2019-04-23 DIAGNOSIS — F17.210 NICOTINE DEPENDENCE, CIGARETTES, UNCOMPLICATED: ICD-10-CM

## 2019-04-23 DIAGNOSIS — J44.9 CHRONIC OBSTRUCTIVE PULMONARY DISEASE, UNSPECIFIED: ICD-10-CM

## 2019-04-23 DIAGNOSIS — K44.9 DIAPHRAGMATIC HERNIA WITHOUT OBSTRUCTION OR GANGRENE: ICD-10-CM

## 2019-04-23 DIAGNOSIS — K21.9 GASTRO-ESOPHAGEAL REFLUX DISEASE WITHOUT ESOPHAGITIS: ICD-10-CM

## 2019-04-23 DIAGNOSIS — F32.9 MAJOR DEPRESSIVE DISORDER, SINGLE EPISODE, UNSPECIFIED: ICD-10-CM

## 2019-04-23 DIAGNOSIS — C50.919 MALIGNANT NEOPLASM OF UNSPECIFIED SITE OF UNSPECIFIED FEMALE BREAST: ICD-10-CM

## 2019-04-23 DIAGNOSIS — C79.31 SECONDARY MALIGNANT NEOPLASM OF BRAIN: ICD-10-CM

## 2019-04-23 DIAGNOSIS — K52.9 NONINFECTIVE GASTROENTERITIS AND COLITIS, UNSPECIFIED: ICD-10-CM

## 2019-04-23 DIAGNOSIS — Z87.891 PERSONAL HISTORY OF NICOTINE DEPENDENCE: ICD-10-CM

## 2019-04-23 PROCEDURE — A9585: CPT

## 2019-04-23 PROCEDURE — 70553 MRI BRAIN STEM W/O & W/DYE: CPT

## 2019-04-23 PROCEDURE — 70553 MRI BRAIN STEM W/O & W/DYE: CPT | Mod: 26

## 2019-04-26 ENCOUNTER — APPOINTMENT (OUTPATIENT)
Dept: NEUROLOGY | Facility: CLINIC | Age: 74
End: 2019-04-26
Payer: MEDICARE

## 2019-04-26 ENCOUNTER — APPOINTMENT (OUTPATIENT)
Dept: GASTROENTEROLOGY | Facility: CLINIC | Age: 74
End: 2019-04-26
Payer: MEDICARE

## 2019-04-26 VITALS
RESPIRATION RATE: 14 BRPM | DIASTOLIC BLOOD PRESSURE: 90 MMHG | HEART RATE: 89 BPM | BODY MASS INDEX: 27.49 KG/M2 | HEIGHT: 65 IN | OXYGEN SATURATION: 95 % | SYSTOLIC BLOOD PRESSURE: 126 MMHG | WEIGHT: 165 LBS

## 2019-04-26 DIAGNOSIS — R12 HEARTBURN: ICD-10-CM

## 2019-04-26 PROCEDURE — 96133 NRPSYC TST EVAL PHYS/QHP EA: CPT

## 2019-04-26 PROCEDURE — 96139 PSYCL/NRPSYC TST TECH EA: CPT

## 2019-04-26 PROCEDURE — 96132 NRPSYC TST EVAL PHYS/QHP 1ST: CPT

## 2019-04-26 PROCEDURE — 96138 PSYCL/NRPSYC TECH 1ST: CPT

## 2019-04-26 PROCEDURE — 90791 PSYCH DIAGNOSTIC EVALUATION: CPT

## 2019-04-26 PROCEDURE — 99215 OFFICE O/P EST HI 40 MIN: CPT

## 2019-04-26 NOTE — HISTORY OF PRESENT ILLNESS
[FreeTextEntry1] : Ms. Rivers returns to the GI clinic in follow up. Over the last month, she has been experiencing profound heartburn. This is different form her baseline heartburn because of its intensity. Her symptoms have not been responsive to Zegerid. A recent change to Carafate did not bring any relief. These symptoms are worse after eating and are significantly disturbing her quality of life. She was recently admitted to [facility] with abdominal pain. Her evaluation at the time demonstrated ischemic colitis. She has been feeling well form that standpoint without recurrence of abdominal pain. Ms. Rivers presents today to discuss these bothersome symptoms.

## 2019-04-26 NOTE — ASSESSMENT
[FreeTextEntry1] : Ms. Rivers's symptoms are most likely the result of esophageal hyperalgesia rather than refractory reflux. It is important to discern between these two phenomena to restore her HRQOL. Will proceed with wireless pH testing ASAP. THe pH quantification and symptom diary will be helpful diagnostically. In the interim, she will stop Sucralfate and re-initiate a combination of PPI in the Am and H2Ra in the PM. This combination will exclude nocturnal breakthrough. She will stop these meds prior to pH testing. \par \par She appears to be stable following the episode of ischemic colitis. The precipitating factors are not clear. Her Avastin was stopped, she will need ongoing expectant management to exclude-forestall a recurrence. \par \par Plan:\par 1. Adjust meds as above. EGD with [facility]. Will initiate definitive therapy after this. \par \par 1.

## 2019-04-26 NOTE — PHYSICAL EXAM
[General Appearance - Alert] : alert [General Appearance - In No Acute Distress] : in no acute distress [Sclera] : the sclera and conjunctiva were normal [Hearing Threshold Finger Rub Not [place]] : hearing was normal [Bowel Sounds] : normal bowel sounds [Neck Appearance] : the appearance of the neck was normal [Abdomen Soft] : soft [Abdomen Tenderness] : non-tender [] : no hepato-splenomegaly [Abdomen Mass (___ Cm)] : no abdominal mass palpated [Abnormal Walk] : normal gait [Oriented To Time, Place, And Person] : oriented to person, place, and time [Impaired Insight] : insight and judgment were intact [Affect] : the affect was normal

## 2019-04-29 ENCOUNTER — APPOINTMENT (OUTPATIENT)
Dept: NEUROSURGERY | Facility: CLINIC | Age: 74
End: 2019-04-29
Payer: MEDICARE

## 2019-04-29 VITALS
RESPIRATION RATE: 14 BRPM | BODY MASS INDEX: 27.49 KG/M2 | OXYGEN SATURATION: 98 % | DIASTOLIC BLOOD PRESSURE: 68 MMHG | WEIGHT: 165 LBS | SYSTOLIC BLOOD PRESSURE: 114 MMHG | HEIGHT: 65 IN | HEART RATE: 88 BPM | TEMPERATURE: 97.8 F

## 2019-04-29 PROCEDURE — 99214 OFFICE O/P EST MOD 30 MIN: CPT

## 2019-04-29 NOTE — ASSESSMENT
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   The patient was extensively educated about the nature of the disease process. Therapeutic and diagnostic tests include serial MRI and follow-up.  The patient should see me in 3-6 months. Her neurocognitive symptoms may be treated with a stimulant such as aricept but she should see her neuro-psych physician. I have explained the alternatives, risks and benefits to the patient and patient understands and agrees to proceed.  This risk of the procedure including but not limited to pain, infection, seizure, stroke, recurrence, residual disease, neurovascular injury, heart attack, pulmonary embolism, blindness, weakness, paralysis and death have been carefully explained to the patient who clearly understands and agrees to proceed.   \par \par

## 2019-04-29 NOTE — HISTORY OF PRESENT ILLNESS
[FreeTextEntry1] : 73yr old female with PMH of metastatic breast CA and neuroendocrine tumor. She is s/p right frontal resection of scalp mass on 5/10/18. She did not want to undergo any treatment following resection but agreed to SBRT, which completed on 7/30/18 with Dr. Cam. She does not want any chemotherapy. She follows with Dr. Hampton.\par \par Radiologist stated liver lesions were too small to bx at this time, recommendation was to do another abd U/S in March and evaluate the findings at that time for possible surgery per Dr. Givens. \par \par She was seen by Dr. Castillo 2/13/19 and started on IV Avastin 2/15/19, she is getting IV Avastin Q4 weeks\par \par She was recently hospitalized for ischemic colitis. Avastin was discontinued. She presents today with a new MRI. She is being managed conservatively for her colitis.

## 2019-05-02 ENCOUNTER — OUTPATIENT (OUTPATIENT)
Dept: OUTPATIENT SERVICES | Facility: HOSPITAL | Age: 74
LOS: 1 days | Discharge: HOME | End: 2019-05-02
Payer: MEDICARE

## 2019-05-02 ENCOUNTER — APPOINTMENT (OUTPATIENT)
Dept: GASTROENTEROLOGY | Facility: HOSPITAL | Age: 74
End: 2019-05-02

## 2019-05-02 DIAGNOSIS — Z90.49 ACQUIRED ABSENCE OF OTHER SPECIFIED PARTS OF DIGESTIVE TRACT: Chronic | ICD-10-CM

## 2019-05-02 DIAGNOSIS — Z41.9 ENCOUNTER FOR PROCEDURE FOR PURPOSES OTHER THAN REMEDYING HEALTH STATE, UNSPECIFIED: Chronic | ICD-10-CM

## 2019-05-02 DIAGNOSIS — Z98.890 OTHER SPECIFIED POSTPROCEDURAL STATES: Chronic | ICD-10-CM

## 2019-05-02 DIAGNOSIS — C50.919 MALIGNANT NEOPLASM OF UNSPECIFIED SITE OF UNSPECIFIED FEMALE BREAST: Chronic | ICD-10-CM

## 2019-05-02 PROCEDURE — 43235 EGD DIAGNOSTIC BRUSH WASH: CPT

## 2019-05-02 PROCEDURE — 43235 EGD DIAGNOSTIC BRUSH WASH: CPT | Mod: 59

## 2019-05-02 PROCEDURE — C1889: CPT

## 2019-05-02 PROCEDURE — 91035 G-ESOPH REFLX TST W/ELECTROD: CPT | Mod: 26

## 2019-05-08 ENCOUNTER — APPOINTMENT (OUTPATIENT)
Dept: INTERNAL MEDICINE | Facility: CLINIC | Age: 74
End: 2019-05-08
Payer: MEDICARE

## 2019-05-08 VITALS
WEIGHT: 170 LBS | BODY MASS INDEX: 28.32 KG/M2 | OXYGEN SATURATION: 97 % | HEART RATE: 88 BPM | DIASTOLIC BLOOD PRESSURE: 57 MMHG | TEMPERATURE: 98.8 F | HEIGHT: 65 IN | SYSTOLIC BLOOD PRESSURE: 88 MMHG

## 2019-05-08 VITALS — SYSTOLIC BLOOD PRESSURE: 100 MMHG | DIASTOLIC BLOOD PRESSURE: 70 MMHG

## 2019-05-08 PROCEDURE — 99213 OFFICE O/P EST LOW 20 MIN: CPT

## 2019-05-08 NOTE — HISTORY OF PRESENT ILLNESS
[FreeTextEntry1] : All noted read; Off Decadron; Also Avastatin; Ischemic colitis likely related to this medication [de-identified] : In addition awaiting for Dr. Hercules to follow up regarding the reflux;  BP on the low side;  Still taking the Vimpat;  Appetite good;

## 2019-05-08 NOTE — PHYSICAL EXAM
[No Acute Distress] : no acute distress [Well Nourished] : well nourished [Well Developed] : well developed [Well-Appearing] : well-appearing [Normal Sclera/Conjunctiva] : normal sclera/conjunctiva [PERRL] : pupils equal round and reactive to light [EOMI] : extraocular movements intact [Normal Outer Ear/Nose] : the outer ears and nose were normal in appearance [Normal Oropharynx] : the oropharynx was normal [Supple] : supple [No JVD] : no jugular venous distention [No Lymphadenopathy] : no lymphadenopathy [Thyroid Normal, No Nodules] : the thyroid was normal and there were no nodules present [Clear to Auscultation] : lungs were clear to auscultation bilaterally [No Respiratory Distress] : no respiratory distress  [Normal Rate] : normal rate  [No Accessory Muscle Use] : no accessory muscle use [Regular Rhythm] : with a regular rhythm [Normal S1, S2] : normal S1 and S2 [No Murmur] : no murmur heard [No Carotid Bruits] : no carotid bruits [No Abdominal Bruit] : a ~M bruit was not heard ~T in the abdomen [No Varicosities] : no varicosities [Pedal Pulses Present] : the pedal pulses are present [No Extremity Clubbing/Cyanosis] : no extremity clubbing/cyanosis [No Edema] : there was no peripheral edema [No Palpable Aorta] : no palpable aorta [Soft] : abdomen soft [Non-distended] : non-distended [Non Tender] : non-tender [No Masses] : no abdominal mass palpated [No HSM] : no HSM [Normal Posterior Cervical Nodes] : no posterior cervical lymphadenopathy [Normal Bowel Sounds] : normal bowel sounds [No Spinal Tenderness] : no spinal tenderness [No CVA Tenderness] : no CVA  tenderness [Normal Anterior Cervical Nodes] : no anterior cervical lymphadenopathy [Grossly Normal Strength/Tone] : grossly normal strength/tone [No Joint Swelling] : no joint swelling [No Rash] : no rash [Normal Gait] : normal gait [Coordination Grossly Intact] : coordination grossly intact [No Focal Deficits] : no focal deficits [Deep Tendon Reflexes (DTR)] : deep tendon reflexes were 2+ and symmetric [Normal Affect] : the affect was normal [Normal Insight/Judgement] : insight and judgment were intact

## 2019-05-10 ENCOUNTER — CLINICAL ADVICE (OUTPATIENT)
Age: 74
End: 2019-05-10

## 2019-05-10 DIAGNOSIS — K22.10 ULCER OF ESOPHAGUS W/OUT BLEEDING: ICD-10-CM

## 2019-05-17 VITALS
OXYGEN SATURATION: 96 % | HEART RATE: 100 BPM | SYSTOLIC BLOOD PRESSURE: 125 MMHG | TEMPERATURE: 99 F | RESPIRATION RATE: 20 BRPM | DIASTOLIC BLOOD PRESSURE: 78 MMHG

## 2019-05-17 PROCEDURE — 71045 X-RAY EXAM CHEST 1 VIEW: CPT | Mod: 26

## 2019-05-17 RX ORDER — VANCOMYCIN HCL 1 G
1000 VIAL (EA) INTRAVENOUS ONCE
Refills: 0 | Status: COMPLETED | OUTPATIENT
Start: 2019-05-17 | End: 2019-05-17

## 2019-05-17 RX ORDER — SODIUM CHLORIDE 9 MG/ML
2400 INJECTION INTRAMUSCULAR; INTRAVENOUS; SUBCUTANEOUS ONCE
Refills: 0 | Status: COMPLETED | OUTPATIENT
Start: 2019-05-17 | End: 2019-05-17

## 2019-05-17 RX ORDER — PIPERACILLIN AND TAZOBACTAM 4; .5 G/20ML; G/20ML
3.38 INJECTION, POWDER, LYOPHILIZED, FOR SOLUTION INTRAVENOUS ONCE
Refills: 0 | Status: COMPLETED | OUTPATIENT
Start: 2019-05-17 | End: 2019-05-17

## 2019-05-17 RX ORDER — ACETAMINOPHEN 500 MG
1000 TABLET ORAL ONCE
Refills: 0 | Status: COMPLETED | OUTPATIENT
Start: 2019-05-17 | End: 2019-05-17

## 2019-05-17 RX ADMIN — SODIUM CHLORIDE 2400 MILLILITER(S): 9 INJECTION INTRAMUSCULAR; INTRAVENOUS; SUBCUTANEOUS at 23:22

## 2019-05-17 RX ADMIN — Medication 250 MILLIGRAM(S): at 23:22

## 2019-05-17 RX ADMIN — Medication 1000 MILLIGRAM(S): at 23:21

## 2019-05-17 RX ADMIN — PIPERACILLIN AND TAZOBACTAM 200 GRAM(S): 4; .5 INJECTION, POWDER, LYOPHILIZED, FOR SOLUTION INTRAVENOUS at 23:22

## 2019-05-17 NOTE — ED PROVIDER NOTE - CLINICAL SUMMARY MEDICAL DECISION MAKING FREE TEXT BOX
Pt p/w gen weakness, worsening cough, SOB. Pt meets SIRS criteria. R/o sepsis, PNA, other RVI. IVF, broad spectrum abx, antipyretics. D/w PCP Dr Tellez. Anticipate admission

## 2019-05-17 NOTE — ED PROVIDER NOTE - OBJECTIVE STATEMENT
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 No noted f/c at home. + rhinorrhea. No abd pain, n/v/d, dysuria, or rash.

## 2019-05-17 NOTE — ED ADULT TRIAGE NOTE - CHIEF COMPLAINT QUOTE
weakness with cough for a few days, Hx brain/colon/breast/liver CA not currently undergoing treatment

## 2019-05-17 NOTE — ED PROVIDER NOTE - PHYSICAL EXAMINATION
Constitutional: Well appearing, well nourished, awake, alert, oriented to person, place, time/situation and in no apparent distress.  ENMT: Airway patent. Normal MM  Eyes: Clear bilaterally  Cardiac: Normal rate, regular rhythm.  Heart sounds S1, S2.  No murmurs, rubs or gallops.  Respiratory: Breaths sounds equal and clear b/l. No increased WOB, tachypnea, hypoxia, or accessory mm use. Pt speaks in full sentences.   Gastrointestinal: Abd soft, NT, ND, NABS. No guarding, rebound, or rigidity. No pulsatile abdominal masses. No organomegaly appreciated. No CVAT   Musculoskeletal: Range of motion is not limited  Neuro: Alert and oriented x 3, face symmetric and speech fluent. Strength 5/5 x 4 ext and symmetric, nml gross motor movement, nml gait. No focal deficits noted.  Skin: Skin normal color for race, warm, dry and intact. No evidence of rash.  Psych: Alert and oriented to person, place, time/situation. normal mood and affect. no apparent risk to self or others.

## 2019-05-17 NOTE — ED ADULT NURSE NOTE - NSIMPLEMENTINTERV_GEN_ALL_ED
Implemented All Universal Safety Interventions:  [place] to call system. Call bell, personal items and telephone within reach. Instruct patient to call for assistance. Room bathroom lighting operational. Non-slip footwear when patient is off stretcher. Physically safe environment: no spills, clutter or unnecessary equipment. Stretcher in lowest position, wheels locked, appropriate side rails in place.

## 2019-05-17 NOTE — ED PROVIDER NOTE - CARE PLAN
Principal Discharge DX:	Pneumonia due to infectious organism, unspecified laterality, unspecified part of lung  Secondary Diagnosis:	Enterovirus infection

## 2019-05-18 ENCOUNTER — INPATIENT (INPATIENT)
Facility: HOSPITAL | Age: 74
LOS: 1 days | Discharge: HOME | DRG: 872 | End: 2019-05-20
Attending: INTERNAL MEDICINE | Admitting: INTERNAL MEDICINE
Payer: MEDICARE

## 2019-05-18 DIAGNOSIS — Z41.9 ENCOUNTER FOR PROCEDURE FOR PURPOSES OTHER THAN REMEDYING HEALTH STATE, UNSPECIFIED: Chronic | ICD-10-CM

## 2019-05-18 DIAGNOSIS — Z29.9 ENCOUNTER FOR PROPHYLACTIC MEASURES, UNSPECIFIED: ICD-10-CM

## 2019-05-18 DIAGNOSIS — Z98.890 OTHER SPECIFIED POSTPROCEDURAL STATES: Chronic | ICD-10-CM

## 2019-05-18 DIAGNOSIS — C50.919 MALIGNANT NEOPLASM OF UNSPECIFIED SITE OF UNSPECIFIED FEMALE BREAST: ICD-10-CM

## 2019-05-18 DIAGNOSIS — C50.919 MALIGNANT NEOPLASM OF UNSPECIFIED SITE OF UNSPECIFIED FEMALE BREAST: Chronic | ICD-10-CM

## 2019-05-18 DIAGNOSIS — D49.6 NEOPLASM OF UNSPECIFIED BEHAVIOR OF BRAIN: ICD-10-CM

## 2019-05-18 DIAGNOSIS — Z90.49 ACQUIRED ABSENCE OF OTHER SPECIFIED PARTS OF DIGESTIVE TRACT: Chronic | ICD-10-CM

## 2019-05-18 DIAGNOSIS — R56.9 UNSPECIFIED CONVULSIONS: ICD-10-CM

## 2019-05-18 DIAGNOSIS — Z87.19 PERSONAL HISTORY OF OTHER DISEASES OF THE DIGESTIVE SYSTEM: ICD-10-CM

## 2019-05-18 DIAGNOSIS — J44.9 CHRONIC OBSTRUCTIVE PULMONARY DISEASE, UNSPECIFIED: ICD-10-CM

## 2019-05-18 DIAGNOSIS — R63.8 OTHER SYMPTOMS AND SIGNS CONCERNING FOOD AND FLUID INTAKE: ICD-10-CM

## 2019-05-18 DIAGNOSIS — B34.1 ENTEROVIRUS INFECTION, UNSPECIFIED: ICD-10-CM

## 2019-05-18 DIAGNOSIS — Z90.2 ACQUIRED ABSENCE OF LUNG [PART OF]: Chronic | ICD-10-CM

## 2019-05-18 DIAGNOSIS — J18.9 PNEUMONIA, UNSPECIFIED ORGANISM: ICD-10-CM

## 2019-05-18 DIAGNOSIS — C80.1 MALIGNANT (PRIMARY) NEOPLASM, UNSPECIFIED: ICD-10-CM

## 2019-05-18 DIAGNOSIS — E43 UNSPECIFIED SEVERE PROTEIN-CALORIE MALNUTRITION: ICD-10-CM

## 2019-05-18 DIAGNOSIS — Z91.89 OTHER SPECIFIED PERSONAL RISK FACTORS, NOT ELSEWHERE CLASSIFIED: ICD-10-CM

## 2019-05-18 DIAGNOSIS — A41.9 SEPSIS, UNSPECIFIED ORGANISM: ICD-10-CM

## 2019-05-18 LAB
ANION GAP SERPL CALC-SCNC: 14 MMOL/L — SIGNIFICANT CHANGE UP (ref 5–17)
BUN SERPL-MCNC: 7 MG/DL — SIGNIFICANT CHANGE UP (ref 7–23)
CALCIUM SERPL-MCNC: 9.4 MG/DL — SIGNIFICANT CHANGE UP (ref 8.4–10.5)
CHLORIDE SERPL-SCNC: 109 MMOL/L — HIGH (ref 96–108)
CO2 SERPL-SCNC: 22 MMOL/L — SIGNIFICANT CHANGE UP (ref 22–31)
CREAT SERPL-MCNC: 0.84 MG/DL — SIGNIFICANT CHANGE UP (ref 0.5–1.3)
GLUCOSE SERPL-MCNC: 110 MG/DL — HIGH (ref 70–99)
HCT VFR BLD CALC: 35 % — SIGNIFICANT CHANGE UP (ref 34.5–45)
HGB BLD-MCNC: 10.9 G/DL — LOW (ref 11.5–15.5)
MAGNESIUM SERPL-MCNC: 1.8 MG/DL — SIGNIFICANT CHANGE UP (ref 1.6–2.6)
MCHC RBC-ENTMCNC: 28.4 PG — SIGNIFICANT CHANGE UP (ref 27–34)
MCHC RBC-ENTMCNC: 31.1 GM/DL — LOW (ref 32–36)
MCV RBC AUTO: 91.1 FL — SIGNIFICANT CHANGE UP (ref 80–100)
NRBC # BLD: 0 /100 WBCS — SIGNIFICANT CHANGE UP (ref 0–0)
NT-PROBNP SERPL-SCNC: 241 PG/ML — SIGNIFICANT CHANGE UP (ref 0–300)
PLATELET # BLD AUTO: 267 K/UL — SIGNIFICANT CHANGE UP (ref 150–400)
POTASSIUM SERPL-MCNC: 3.8 MMOL/L — SIGNIFICANT CHANGE UP (ref 3.5–5.3)
POTASSIUM SERPL-SCNC: 3.8 MMOL/L — SIGNIFICANT CHANGE UP (ref 3.5–5.3)
RBC # BLD: 3.84 M/UL — SIGNIFICANT CHANGE UP (ref 3.8–5.2)
RBC # FLD: 15.1 % — HIGH (ref 10.3–14.5)
SODIUM SERPL-SCNC: 145 MMOL/L — SIGNIFICANT CHANGE UP (ref 135–145)
WBC # BLD: 6.36 K/UL — SIGNIFICANT CHANGE UP (ref 3.8–10.5)
WBC # FLD AUTO: 6.36 K/UL — SIGNIFICANT CHANGE UP (ref 3.8–10.5)

## 2019-05-18 PROCEDURE — 99285 EMERGENCY DEPT VISIT HI MDM: CPT | Mod: 25

## 2019-05-18 PROCEDURE — 71250 CT THORAX DX C-: CPT | Mod: 26

## 2019-05-18 PROCEDURE — 93010 ELECTROCARDIOGRAM REPORT: CPT

## 2019-05-18 RX ORDER — TIOTROPIUM BROMIDE AND OLODATEROL 3.124; 2.736 UG/1; UG/1
2 SPRAY, METERED RESPIRATORY (INHALATION) DAILY
Refills: 0 | Status: DISCONTINUED | OUTPATIENT
Start: 2019-05-18 | End: 2019-05-20

## 2019-05-18 RX ORDER — POTASSIUM CHLORIDE 20 MEQ
20 PACKET (EA) ORAL
Refills: 0 | Status: COMPLETED | OUTPATIENT
Start: 2019-05-18 | End: 2019-05-18

## 2019-05-18 RX ORDER — MAGNESIUM SULFATE 500 MG/ML
1 VIAL (ML) INJECTION ONCE
Refills: 0 | Status: COMPLETED | OUTPATIENT
Start: 2019-05-18 | End: 2019-05-18

## 2019-05-18 RX ORDER — IPRATROPIUM/ALBUTEROL SULFATE 18-103MCG
3 AEROSOL WITH ADAPTER (GRAM) INHALATION EVERY 4 HOURS
Refills: 0 | Status: DISCONTINUED | OUTPATIENT
Start: 2019-05-18 | End: 2019-05-20

## 2019-05-18 RX ORDER — LACOSAMIDE 50 MG/1
150 TABLET ORAL
Refills: 0 | Status: DISCONTINUED | OUTPATIENT
Start: 2019-05-18 | End: 2019-05-20

## 2019-05-18 RX ORDER — VANCOMYCIN HCL 1 G
1250 VIAL (EA) INTRAVENOUS EVERY 12 HOURS
Refills: 0 | Status: DISCONTINUED | OUTPATIENT
Start: 2019-05-18 | End: 2019-05-19

## 2019-05-18 RX ORDER — PANTOPRAZOLE SODIUM 20 MG/1
40 TABLET, DELAYED RELEASE ORAL
Refills: 0 | Status: DISCONTINUED | OUTPATIENT
Start: 2019-05-18 | End: 2019-05-20

## 2019-05-18 RX ORDER — MONTELUKAST 4 MG/1
10 TABLET, CHEWABLE ORAL DAILY
Refills: 0 | Status: DISCONTINUED | OUTPATIENT
Start: 2019-05-18 | End: 2019-05-20

## 2019-05-18 RX ORDER — PIPERACILLIN AND TAZOBACTAM 4; .5 G/20ML; G/20ML
3.38 INJECTION, POWDER, LYOPHILIZED, FOR SOLUTION INTRAVENOUS EVERY 6 HOURS
Refills: 0 | Status: DISCONTINUED | OUTPATIENT
Start: 2019-05-18 | End: 2019-05-19

## 2019-05-18 RX ORDER — HEPARIN SODIUM 5000 [USP'U]/ML
5000 INJECTION INTRAVENOUS; SUBCUTANEOUS EVERY 8 HOURS
Refills: 0 | Status: DISCONTINUED | OUTPATIENT
Start: 2019-05-18 | End: 2019-05-20

## 2019-05-18 RX ORDER — LANOLIN ALCOHOL/MO/W.PET/CERES
3 CREAM (GRAM) TOPICAL AT BEDTIME
Refills: 0 | Status: DISCONTINUED | OUTPATIENT
Start: 2019-05-18 | End: 2019-05-20

## 2019-05-18 RX ORDER — ESCITALOPRAM OXALATE 10 MG/1
10 TABLET, FILM COATED ORAL DAILY
Refills: 0 | Status: DISCONTINUED | OUTPATIENT
Start: 2019-05-18 | End: 2019-05-20

## 2019-05-18 RX ORDER — ASPIRIN/CALCIUM CARB/MAGNESIUM 324 MG
81 TABLET ORAL DAILY
Refills: 0 | Status: DISCONTINUED | OUTPATIENT
Start: 2019-05-18 | End: 2019-05-20

## 2019-05-18 RX ORDER — LETROZOLE 2.5 MG/1
2.5 TABLET, FILM COATED ORAL DAILY
Refills: 0 | Status: DISCONTINUED | OUTPATIENT
Start: 2019-05-18 | End: 2019-05-20

## 2019-05-18 RX ADMIN — Medication 100 GRAM(S): at 11:51

## 2019-05-18 RX ADMIN — Medication 20 MILLIEQUIVALENT(S): at 11:51

## 2019-05-18 RX ADMIN — MONTELUKAST 10 MILLIGRAM(S): 4 TABLET, CHEWABLE ORAL at 11:51

## 2019-05-18 RX ADMIN — Medication 3 MILLILITER(S): at 21:29

## 2019-05-18 RX ADMIN — PANTOPRAZOLE SODIUM 40 MILLIGRAM(S): 20 TABLET, DELAYED RELEASE ORAL at 06:24

## 2019-05-18 RX ADMIN — PIPERACILLIN AND TAZOBACTAM 200 GRAM(S): 4; .5 INJECTION, POWDER, LYOPHILIZED, FOR SOLUTION INTRAVENOUS at 05:24

## 2019-05-18 RX ADMIN — LETROZOLE 2.5 MILLIGRAM(S): 2.5 TABLET, FILM COATED ORAL at 14:26

## 2019-05-18 RX ADMIN — Medication 166.67 MILLIGRAM(S): at 18:13

## 2019-05-18 RX ADMIN — Medication 3 MILLIGRAM(S): at 21:30

## 2019-05-18 RX ADMIN — LACOSAMIDE 150 MILLIGRAM(S): 50 TABLET ORAL at 06:23

## 2019-05-18 RX ADMIN — Medication 81 MILLIGRAM(S): at 11:51

## 2019-05-18 RX ADMIN — PIPERACILLIN AND TAZOBACTAM 200 GRAM(S): 4; .5 INJECTION, POWDER, LYOPHILIZED, FOR SOLUTION INTRAVENOUS at 19:58

## 2019-05-18 RX ADMIN — PIPERACILLIN AND TAZOBACTAM 200 GRAM(S): 4; .5 INJECTION, POWDER, LYOPHILIZED, FOR SOLUTION INTRAVENOUS at 14:25

## 2019-05-18 RX ADMIN — Medication 3 MILLILITER(S): at 06:23

## 2019-05-18 RX ADMIN — Medication 200 MILLIGRAM(S): at 06:23

## 2019-05-18 RX ADMIN — TIOTROPIUM BROMIDE AND OLODATEROL 2 PUFF(S): 3.124; 2.736 SPRAY, METERED RESPIRATORY (INHALATION) at 11:50

## 2019-05-18 RX ADMIN — Medication 20 MILLIEQUIVALENT(S): at 18:13

## 2019-05-18 RX ADMIN — LACOSAMIDE 150 MILLIGRAM(S): 50 TABLET ORAL at 18:13

## 2019-05-18 RX ADMIN — Medication 3 MILLILITER(S): at 18:13

## 2019-05-18 RX ADMIN — ESCITALOPRAM OXALATE 10 MILLIGRAM(S): 10 TABLET, FILM COATED ORAL at 11:51

## 2019-05-18 NOTE — PHYSICAL THERAPY INITIAL EVALUATION ADULT - GAIT DEVIATIONS NOTED, PT EVAL
increased lateral sway; increased postural sway/increased time in double stance/decreased benito/decreased step length

## 2019-05-18 NOTE — H&P ADULT - PROBLEM SELECTOR PLAN 7
1) PCP Contacted on Admission: (Y/N) --> Name & Phone #: Dr. Tellez   2) Date of Contact with PCP:  3) PCP Contacted at Discharge: (Y/N)  4) Summary of Handoff Given to PCP:   5) Post-Discharge Appointment Date and Location:

## 2019-05-18 NOTE — PHYSICAL THERAPY INITIAL EVALUATION ADULT - PLANNED THERAPY INTERVENTIONS, PT EVAL
bed mobility training/balance training/strengthening/transfer training/gait training/postural re-education

## 2019-05-18 NOTE — H&P ADULT - NSICDXPASTMEDICALHX_GEN_ALL_CORE_FT
PAST MEDICAL HISTORY:  Carcinoid tumor s/p ileal resection    COPD, severe     Esophagitis EGD 4/2019    Former smoker     History of ischemic colitis flex sig 4/2019    Metastatic breast cancer

## 2019-05-18 NOTE — PROGRESS NOTE ADULT - PROBLEM SELECTOR PLAN 7
1) PCP Contacted on Admission: (Y/N) --> Name & Phone #: Dr. Tellez   2) Date of Contact with PCP:  3) PCP Contacted at Discharge: (Y/N)  4) Summary of Handoff Given to PCP:   5) Post-Discharge Appointment Date and Location: 1) PCP Contacted on Admission: (Y) --> Name & Phone #: Dr. Tellez   2) Date of Contact with PCP: 5/18/19  3) PCP Contacted at Discharge: N/A  4) Summary of Handoff Given to PCP: N/A  5) Post-Discharge Appointment Date and Location: N/A

## 2019-05-18 NOTE — PROGRESS NOTE ADULT - PROBLEM SELECTOR PLAN 1
Pt febrile and tachycardic w/cough. Likely due to Enterovirus, however cannot rule out HAP as pt with new w/LLL infiltrate on CXR, UA w/LE and nitrates, but no WBC and pt asymptomatic.   if pt develops diarrhea would consider c. diff, as she was in hospital for ischemic colitis and on abx in April (currently abd nontender, no diarrhea)   -C/w Vancomycin and Zosyn-- would deescalate to CAP treatment if pt improves clinically   -f/u CXR in AM to assess RLL after hydration and antibiotics   -f/u blood cultures   -RVP w/Enterovirus  -Tylenol for temp >100.4 Pt febrile and tachycardic w/cough. Likely due to Enterovirus, however cannot rule out HAP as pt with new w/LLL infiltrate on CXR, UA w/LE and nitrates, but no WBC and pt asymptomatic.   if pt develops diarrhea would consider c. diff, as she was in hospital for ischemic colitis and on abx in April (currently abd nontender, no diarrhea)   -C/w Vancomycin and Zosyn-- can deescalate to CAP treatment if pt improves clinically   -f/u CXR in AM   -f/u CT chest non-con as per Dr. Tellez for better characterization of the CXR findings.   -f/u blood cultures   -RVP w/Enterovirus  -Tylenol for temp >100.4

## 2019-05-18 NOTE — PROGRESS NOTE ADULT - SUBJECTIVE AND OBJECTIVE BOX
INTERVAL HPI/OVERNIGHT EVENTS:  All notes reviewed; Patient with cough; Chest film difficult to interpret; Will need Chest CT      MEDICATIONS  (STANDING):  ALBUTerol/ipratropium for Nebulization 3 milliLiter(s) Nebulizer every 4 hours  aspirin enteric coated 81 milliGRAM(s) Oral daily  escitalopram 10 milliGRAM(s) Oral daily  heparin  Injectable 5000 Unit(s) SubCutaneous every 8 hours  lacosamide 150 milliGRAM(s) Oral two times a day  letrozole 2.5 milliGRAM(s) Oral daily  magnesium sulfate  IVPB 1 Gram(s) IV Intermittent once  melatonin 3 milliGRAM(s) Oral at bedtime  montelukast 10 milliGRAM(s) Oral daily  pantoprazole    Tablet 40 milliGRAM(s) Oral before breakfast  piperacillin/tazobactam IVPB. 3.375 Gram(s) IV Intermittent every 6 hours  potassium chloride    Tablet ER 20 milliEquivalent(s) Oral every 2 hours  tiotropium 2.5 MICROgram(s)/olodaterol 2.5 MICROgram(s) (STIOLTO) Inhaler 2 Puff(s) Inhalation daily  vancomycin  IVPB 1250 milliGRAM(s) IV Intermittent every 12 hours    MEDICATIONS  (PRN):  guaiFENesin   Syrup  (Sugar-Free) 200 milliGRAM(s) Oral every 6 hours PRN Cough      Allergies    No Known Allergies    Intolerances        Vital Signs Last 24 Hrs  T(C): 36.9 (18 May 2019 03:11), Max: 38.6 (17 May 2019 22:02)  T(F): 98.4 (18 May 2019 03:11), Max: 101.4 (17 May 2019 22:02)  HR: 88 (18 May 2019 09:00) (81 - 103)  BP: 119/75 (18 May 2019 09:00) (96/57 - 147/65)  BP(mean): --  RR: 16 (18 May 2019 09:00) (16 - 20)  SpO2: 97% (18 May 2019 09:00) (96% - 99%)          Constitutional:  Awake  confused    Eyes: DANAE    ENMT: Negative    Neck: Supple    Back:  no tenderness     Respiratory:  decreased    Cardiovascular: S1 S2     Gastrointestinal:  soft    Genitourinary:    Extremities:  no edema    Vascular:    Neurological:    Skin:    Lymph Nodes:            LABS:                        11.1   7.04  )-----------( 300      ( 17 May 2019 21:58 )             34.6     -    145  |  109<H>  |  7   ----------------------------<  110<H>  3.8   |  22  |  0.84    Ca    9.4      18 May 2019 07:40  Mg     1.8         TPro  6.7  /  Alb  3.4  /  TBili  0.2  /  DBili  x   /  AST  17  /  ALT  14  /  AlkPhos  72      PT/INR - ( 17 May 2019 21:59 )   PT: 11.2 sec;   INR: 0.99          PTT - ( 17 May 2019 21:59 )  PTT:31.2 sec  Urinalysis Basic - ( 18 May 2019 01:05 )    Color: Yellow / Appearance: Clear / S.010 / pH: x  Gluc: x / Ketone: NEGATIVE  / Bili: Negative / Urobili: 0.2 E.U./dL   Blood: x / Protein: NEGATIVE mg/dL / Nitrite: POSITIVE   Leuk Esterase: Small / RBC: 5-10 /HPF / WBC < 5 /HPF   Sq Epi: x / Non Sq Epi: 0-5 /HPF / Bacteria: Present /HPF        RADIOLOGY & ADDITIONAL TESTS:

## 2019-05-18 NOTE — H&P ADULT - PROBLEM SELECTOR PLAN 1
CXR w/LLL infiltrate UA w/LE and nitrates, but no WBC  -C/w Vancomycin and Zosyn  -f/u blood cultures   -Consider chemoport removal Pt febrile and tachycardic w/cough. Likely due to Enterovirus, however cannot rule out HAP as pt with new w/LLL infiltrate on CXR, UA w/LE and nitrates, but no WBC and pt asymptomatic.   if pt develops diarrhea would consider c. diff, as she was in hospital for ischemic colitis and on abx in April (currently abd nontender, no diarrhea)   -C/w Vancomycin and Zosyn-- would deescalate to CAP treatment if pt improves clinically   -f/u CXR in AM to assess RLL after hydration and antibiotics   -f/u blood cultures   -RVP w/Enterovirus  -Tylenol for temp >100.4

## 2019-05-18 NOTE — H&P ADULT - NSHPSOCIALHISTORY_GEN_ALL_CORE
Lives at home with , elevator building, HHA 8H/every day   Former heavy smoker, no drug use, occasional ETOH

## 2019-05-18 NOTE — PHYSICAL THERAPY INITIAL EVALUATION ADULT - DIAGNOSIS, PT EVAL
Practice Pattern 6C: Impaired Ventilation, Respiration/Gas Exchange, and Aerobic Capacity/Endurance Associated with Airway Clearance Dysfunction; Practice Pattern 6B: Impaired Aerobic Capacity/Endurance Associated with Deconditioning

## 2019-05-18 NOTE — H&P ADULT - NSHPLABSRESULTS_GEN_ALL_CORE
.  LABS:                         11.1   7.04  )-----------( 300      ( 17 May 2019 21:58 )             34.6         140  |  104  |  8   ----------------------------<  105<H>  3.9   |  26  |  0.88    Ca    9.5      17 May 2019 21:59    TPro  6.7  /  Alb  3.4  /  TBili  0.2  /  DBili  x   /  AST  17  /  ALT  14  /  AlkPhos  72      PT/INR - ( 17 May 2019 21:59 )   PT: 11.2 sec;   INR: 0.99          PTT - ( 17 May 2019 21:59 )  PTT:31.2 sec  Urinalysis Basic - ( 18 May 2019 01:05 )    Color: Yellow / Appearance: Clear / S.010 / pH: x  Gluc: x / Ketone: NEGATIVE  / Bili: Negative / Urobili: 0.2 E.U./dL   Blood: x / Protein: NEGATIVE mg/dL / Nitrite: POSITIVE   Leuk Esterase: Small / RBC: 5-10 /HPF / WBC < 5 /HPF   Sq Epi: x / Non Sq Epi: 0-5 /HPF / Bacteria: Present /HPF      CARDIAC MARKERS ( 17 May 2019 21:59 )  x     / <0.01 ng/mL / 40 U/L / x     / <1.0 ng/mL        Lactate, Blood: 0.9 mmoL/L ( @ 21:57)      RADIOLOGY, EKG & ADDITIONAL TESTS: Reviewed.

## 2019-05-18 NOTE — PHYSICAL THERAPY INITIAL EVALUATION ADULT - CRITERIA FOR SKILLED THERAPEUTIC INTERVENTIONS
functional limitations in following categories/anticipated discharge recommendation/impairments found/risk reduction/prevention/therapy frequency/rehab potential

## 2019-05-18 NOTE — H&P ADULT - PROBLEM SELECTOR PLAN 3
Not in exacerbation, saturating well on room air   -C/w Stialto   -C/w Duoneb q4   -Not on any supplemental O2 Not in exacerbation, saturating well on room air   -C/w Stialto   -C/w Singulair 10mg  -C/w Duoneb q4   -Not on any supplemental O2

## 2019-05-18 NOTE — PROGRESS NOTE ADULT - PROBLEM SELECTOR PLAN 3
Not in exacerbation, saturating well on room air   -C/w Stialto   -C/w Singulair 10mg  -C/w Duoneb q4   -Not on any supplemental O2 Not in exacerbation, saturating well on room air   -C/w Stialto   -C/w Singulair 10mg  -C/w Duoneb q4   -Not on any supplemental O2, continue to monitor need for this

## 2019-05-18 NOTE — PROGRESS NOTE ADULT - SUBJECTIVE AND OBJECTIVE BOX
INTERVAL HPI/OVERNIGHT EVENTS:    SUBJECTIVE: Patient seen and examined at bedside.    OBJECTIVE:    VITAL SIGNS:  ICU Vital Signs Last 24 Hrs  T(C): 36.9 (18 May 2019 03:11), Max: 38.6 (17 May 2019 22:02)  T(F): 98.4 (18 May 2019 03:11), Max: 101.4 (17 May 2019 22:02)  HR: 86 (18 May 2019 03:11) (81 - 103)  BP: 125/76 (18 May 2019 03:11) (96/57 - 147/65)  BP(mean): --  ABP: --  ABP(mean): --  RR: 16 (18 May 2019 03:11) (16 - 20)  SpO2: 98% (18 May 2019 03:11) (96% - 99%)        CAPILLARY BLOOD GLUCOSE          PHYSICAL EXAM:    General: NAD  HEENT: NC/AT; PERRL, clear conjunctiva  Neck: supple  Respiratory: lungs CTA b/l, no wheezes or rhonchi  Cardiovascular: +S1/S2; RRR, no murmurs, rubs, or gallops  Abdomen: soft, non-tender, non-distended; +BS in all 4 quadrants  Extremities: WWP, 2+ peripheral pulses b/l; no LE edema  Skin: normal color and turgor; no rash  Neurological: AOx3, no focal deficits noted    MEDICATIONS:  MEDICATIONS  (STANDING):  ALBUTerol/ipratropium for Nebulization 3 milliLiter(s) Nebulizer every 4 hours  aspirin enteric coated 81 milliGRAM(s) Oral daily  escitalopram 10 milliGRAM(s) Oral daily  heparin  Injectable 5000 Unit(s) SubCutaneous every 8 hours  lacosamide 150 milliGRAM(s) Oral two times a day  letrozole 2.5 milliGRAM(s) Oral daily  magnesium sulfate  IVPB 1 Gram(s) IV Intermittent once  melatonin 3 milliGRAM(s) Oral at bedtime  montelukast 10 milliGRAM(s) Oral daily  pantoprazole    Tablet 40 milliGRAM(s) Oral before breakfast  piperacillin/tazobactam IVPB. 3.375 Gram(s) IV Intermittent every 6 hours  potassium chloride    Tablet ER 20 milliEquivalent(s) Oral every 2 hours  tiotropium 2.5 MICROgram(s)/olodaterol 2.5 MICROgram(s) (STIOLTO) Inhaler 2 Puff(s) Inhalation daily  vancomycin  IVPB 1250 milliGRAM(s) IV Intermittent every 12 hours    MEDICATIONS  (PRN):  guaiFENesin   Syrup  (Sugar-Free) 200 milliGRAM(s) Oral every 6 hours PRN Cough      ALLERGIES:  Allergies    No Known Allergies    Intolerances        LABS:                        11.1   7.04  )-----------( 300      ( 17 May 2019 21:58 )             34.6         145  |  109<H>  |  7   ----------------------------<  110<H>  3.8   |  22  |  0.84    Ca    9.4      18 May 2019 07:40  Mg     1.8         TPro  6.7  /  Alb  3.4  /  TBili  0.2  /  DBili  x   /  AST  17  /  ALT  14  /  AlkPhos  72      PT/INR - ( 17 May 2019 21:59 )   PT: 11.2 sec;   INR: 0.99          PTT - ( 17 May 2019 21:59 )  PTT:31.2 sec  Urinalysis Basic - ( 18 May 2019 01:05 )    Color: Yellow / Appearance: Clear / S.010 / pH: x  Gluc: x / Ketone: NEGATIVE  / Bili: Negative / Urobili: 0.2 E.U./dL   Blood: x / Protein: NEGATIVE mg/dL / Nitrite: POSITIVE   Leuk Esterase: Small / RBC: 5-10 /HPF / WBC < 5 /HPF   Sq Epi: x / Non Sq Epi: 0-5 /HPF / Bacteria: Present /HPF        RADIOLOGY & ADDITIONAL TESTS: INTERVAL HPI/OVERNIGHT EVENTS: patient was admitted to the hospital overnight. Otherwise, no acute events.     SUBJECTIVE: Patient seen and examined at bedside. This morning the patient reports that she is feeling better. She says that she is not currently having any cough or shortness of breath but when she tries to walk around she is having some shortness of breath. Additionally, the patient seems to be mildly confused this morning. Denies HA, cp, abdominal pain, n/v/d.     OBJECTIVE:    VITAL SIGNS:  ICU Vital Signs Last 24 Hrs  T(C): 36.9 (18 May 2019 03:11), Max: 38.6 (17 May 2019 22:02)  T(F): 98.4 (18 May 2019 03:11), Max: 101.4 (17 May 2019 22:02)  HR: 86 (18 May 2019 03:11) (81 - 103)  BP: 125/76 (18 May 2019 03:11) (96/57 - 147/65)  BP(mean): --  ABP: --  ABP(mean): --  RR: 16 (18 May 2019 03:11) (16 - 20)  SpO2: 98% (18 May 2019 03:11) (96% - 99%)      PHYSICAL EXAM:    General: elderly woman laying in bed, in NAD at this time, confused.   HEENT: NC/AT; PERRL, clear conjunctiva  Neck: supple, no JVD, no LAD  Respiratory: lungs with some b/l course breath sounds at the bases and some decreased air entry at the b/l bases  Cardiovascular: +S1/S2; RRR, no murmurs, rubs, or gallops  Abdomen: soft, non-tender, non-distended; +BS in all 4 quadrants  Extremities: WWP, 2+ peripheral pulses b/l; no LE edema  Skin: normal color and turgor; no rash  Neurological: AOx1-2, no focal deficits noted    MEDICATIONS:  MEDICATIONS  (STANDING):  ALBUTerol/ipratropium for Nebulization 3 milliLiter(s) Nebulizer every 4 hours  aspirin enteric coated 81 milliGRAM(s) Oral daily  escitalopram 10 milliGRAM(s) Oral daily  heparin  Injectable 5000 Unit(s) SubCutaneous every 8 hours  lacosamide 150 milliGRAM(s) Oral two times a day  letrozole 2.5 milliGRAM(s) Oral daily  magnesium sulfate  IVPB 1 Gram(s) IV Intermittent once  melatonin 3 milliGRAM(s) Oral at bedtime  montelukast 10 milliGRAM(s) Oral daily  pantoprazole    Tablet 40 milliGRAM(s) Oral before breakfast  piperacillin/tazobactam IVPB. 3.375 Gram(s) IV Intermittent every 6 hours  potassium chloride    Tablet ER 20 milliEquivalent(s) Oral every 2 hours  tiotropium 2.5 MICROgram(s)/olodaterol 2.5 MICROgram(s) (STIOLTO) Inhaler 2 Puff(s) Inhalation daily  vancomycin  IVPB 1250 milliGRAM(s) IV Intermittent every 12 hours    MEDICATIONS  (PRN):  guaiFENesin   Syrup  (Sugar-Free) 200 milliGRAM(s) Oral every 6 hours PRN Cough    ALLERGIES:  Allergies    No Known Allergies      LABS:                        11.1   7.04  )-----------( 300      ( 17 May 2019 21:58 )             34.6     05-18    145  |  109<H>  |  7   ----------------------------<  110<H>  3.8   |  22  |  0.84    Ca    9.4      18 May 2019 07:40  Mg     1.8         TPro  6.7  /  Alb  3.4  /  TBili  0.2  /  DBili  x   /  AST  17  /  ALT  14  /  AlkPhos  72  05-17    PT/INR - ( 17 May 2019 21:59 )   PT: 11.2 sec;   INR: 0.99          PTT - ( 17 May 2019 21:59 )  PTT:31.2 sec  Urinalysis Basic - ( 18 May 2019 01:05 )    Color: Yellow / Appearance: Clear / S.010 / pH: x  Gluc: x / Ketone: NEGATIVE  / Bili: Negative / Urobili: 0.2 E.U./dL   Blood: x / Protein: NEGATIVE mg/dL / Nitrite: POSITIVE   Leuk Esterase: Small / RBC: 5-10 /HPF / WBC < 5 /HPF   Sq Epi: x / Non Sq Epi: 0-5 /HPF / Bacteria: Present /HPF      RADIOLOGY & ADDITIONAL TESTS: Reviewed.

## 2019-05-18 NOTE — PHYSICAL THERAPY INITIAL EVALUATION ADULT - IMPAIRMENTS FOUND, PT EVAL
ventilation and respiration/gas exchange/gross motor/muscle strength/gait, locomotion, and balance/aerobic capacity/endurance

## 2019-05-18 NOTE — H&P ADULT - HISTORY OF PRESENT ILLNESS
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 Patient was discharged to home to complete remaining course of PO Augmentin.   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 Patient was discharged to home to complete remaining course of PO Augmentin.   Pt presents today w/fever, chills, nonproductive cough since discharge from hospital in April. She reports her cough has been chronic and now she gets winded walking 2 blocks. Admits to overall fatigue, poor appetite. She never noticed feeling sob in the past. She is not on home O2.   Denies abdominal pain, diarrhea, melena, hematochezia.   Chemotherapy was started Feb 2019 by Dr. Castillo and discontinued prior to last admission in April due to ischemic colitis. Chemoport still in place. 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 Patient was discharged to home to complete remaining course of PO Augmentin.   Pt presents today w/fever, chills, nonproductive cough since discharge from hospital in April. She reports her cough has been chronic and now she gets winded walking 2 blocks. Admits to overall fatigue, poor appetite. She never noticed feeling sob in the past. She is not on home O2.   Denies abdominal pain, diarrhea, melena, hematochezia.   Chemotherapy was started Feb 2019 by Dr. Castillo and discontinued prior to last admission in April due to ischemic colitis. Chemoport still in place.   Pt reports seeing Dr. Yuan, Dr. Davis, and Dr. Tellez since discharge.

## 2019-05-18 NOTE — H&P ADULT - NSICDXPASTSURGICALHX_GEN_ALL_CORE_FT
PAST SURGICAL HISTORY:  H/O craniotomy metastaic brain lesion resection    H/O hemicolectomy 2/2 carcinoid tumor resection 7/2017    S/P partial lobectomy of lung Right upper lobe 2014

## 2019-05-18 NOTE — PROGRESS NOTE ADULT - PROBLEM SELECTOR PLAN 6
HSQ  Lexapro for depression    DNR/DNI: MOLST filled out and placed in chart DVT ppx; HSQ  Lexapro for depression    DNR/DNI: MOLST filled out and placed in chart with Dr. Geoff piper

## 2019-05-18 NOTE — H&P ADULT - PROBLEM SELECTOR PLAN 6
HSQ  Lexapro HSQ  Lexapro for depression HSQ  Lexapro for depression    DNR/DNI: MOLST filled out and placed in chart

## 2019-05-18 NOTE — H&P ADULT - NSHPPHYSICALEXAM_GEN_ALL_CORE
.  VITAL SIGNS:  T(C): 36.9 (05-18-19 @ 00:54), Max: 38.6 (05-17-19 @ 22:02)  T(F): 98.4 (05-18-19 @ 00:54), Max: 101.4 (05-17-19 @ 22:02)  HR: 87 (05-18-19 @ 00:54) (87 - 103)  BP: 96/57 (05-18-19 @ 00:54) (96/57 - 147/65)  BP(mean): --  RR: 17 (05-18-19 @ 00:54) (17 - 20)  SpO2: 98% (05-18-19 @ 00:54) (96% - 98%)  Wt(kg): --    PHYSICAL EXAM:    Constitutional: WDWN resting comfortably in bed; NAD  Head: NC/AT  Eyes: PERRL, EOMI, clear conjunctiva  ENT: no nasal discharge; uvula midline, no oropharyngeal erythema or exudates; MMM  Neck: supple; no JVD or thyromegaly  Respiratory: CTA B/L; no W/R/R, no retractions  Cardiac: +S1/S2; RRR; no M/R/G; PMI non-displaced  Gastrointestinal: soft, NT/ND; no rebound or guarding; +BSx4  Genitourinary: normal external genitalia  Back: spine midline, no bony tenderness or step-offs; no CVAT B/L  Extremities: WWP, no clubbing or cyanosis; no peripheral edema  Musculoskeletal: NROM x4; no joint swelling, tenderness or erythema  Vascular: 2+ radial, femoral, DP/PT pulses B/L  Dermatologic: skin warm, dry and intact; no rashes, wounds, or scars  Lymphatic: no submandibular or cervical LAD  Neurologic: AAOx3; CNII-XII grossly intact; no focal deficits  Psychiatric: affect and characteristics of appearance, verbalizations, behaviors are appropriate .  VITAL SIGNS:  T(C): 36.9 (05-18-19 @ 00:54), Max: 38.6 (05-17-19 @ 22:02)  T(F): 98.4 (05-18-19 @ 00:54), Max: 101.4 (05-17-19 @ 22:02)  HR: 87 (05-18-19 @ 00:54) (87 - 103)  BP: 96/57 (05-18-19 @ 00:54) (96/57 - 147/65)  BP(mean): --  RR: 17 (05-18-19 @ 00:54) (17 - 20)  SpO2: 98% (05-18-19 @ 00:54) (96% - 98%)  Wt(kg): --    PHYSICAL EXAM:    Constitutional: WDWN resting comfortably in bed; NAD, slightly labored when moving around   Head: NC/AT  Eyes: PERRL, EOMI, clear conjunctiva  ENT: no nasal discharge; uvula midline, no oropharyngeal erythema or exudates; MMM  Neck: supple; no JVD or thyromegaly  Respiratory: CTA B/L; no W/R/R, no retractions  Cardiac: +S1/S2; RRR; no M/R/G; PMI non-displaced  Gastrointestinal: soft, NT/ND; no rebound or guarding; +BSx4  Genitourinary: normal external genitalia  Back: spine midline, no bony tenderness or step-offs; no CVAT B/L  Extremities: WWP, no clubbing or cyanosis; no peripheral edema  Musculoskeletal: NROM x4; no joint swelling, tenderness or erythema  Vascular: 2+ radial, femoral, DP/PT pulses B/L  Dermatologic: skin warm, dry, thin facial skin 2/2 RT, area of resection on R temporal area w/hair loss   Lymphatic: no submandibular or cervical LAD  Neurologic: AAOx3; CNII-XII grossly intact; no focal deficits .  VITAL SIGNS:  T(C): 36.9 (05-18-19 @ 00:54), Max: 38.6 (05-17-19 @ 22:02)  T(F): 98.4 (05-18-19 @ 00:54), Max: 101.4 (05-17-19 @ 22:02)  HR: 87 (05-18-19 @ 00:54) (87 - 103)  BP: 96/57 (05-18-19 @ 00:54) (96/57 - 147/65)  BP(mean): --  RR: 17 (05-18-19 @ 00:54) (17 - 20)  SpO2: 98% (05-18-19 @ 00:54) (96% - 98%)  Wt(kg): --    PHYSICAL EXAM:    Constitutional: elderly female, WDWN resting comfortably in bed; NAD, slightly labored when moving around   Head: NC/AT  Eyes: PERRL, EOMI, clear conjunctiva  ENT: no nasal discharge; uvula midline, no oropharyngeal erythema or exudates; MMM  Neck: supple; no JVD or thyromegaly  Respiratory: decreased breath sounds L base, no wheezing, rhonchi   Cardiac: +S1/S2; RRR; no M/R/G; PMI non-displaced  Chest: b/l mastectomy, +chemoport right upper chest, nontender, site c/d/i  Back: spine midline, no bony tenderness or step-offs; no CVAT B/L  Extremities: WWP, no clubbing or cyanosis; no peripheral edema  Musculoskeletal: NROM x4; no joint swelling, tenderness or erythema  Vascular: 2+ radial, femoral, DP/PT pulses B/L  Dermatologic: skin warm, dry, thin facial skin 2/2 RT, area of resection on R temporal area w/hair loss   Neurologic: AAOx3; CNII-XII grossly intact; no focal deficits

## 2019-05-18 NOTE — PHYSICAL THERAPY INITIAL EVALUATION ADULT - LEVEL OF INDEPENDENCE: SIT/SUPINE, REHAB EVAL
not observed, patient left sitting at edge of bed with +bed alarm, home health attendant (Barbi) present

## 2019-05-18 NOTE — H&P ADULT - PROBLEM SELECTOR PLAN 4
In remission per PCP. Avastin discontinued in April   -C/w Letrazole 2.5mg (may need oncology approval)  -C/w Vimpat for seizure ppx  -Obtain collateral from Dr. Castillo or Lynda in AM In remission per PCP. Avastin discontinued in April   -C/w Letrazole 2.5mg (may need oncology approval)  -C/w Vimpat for seizure ppx  -Obtain collateral from Dr. Castillo or Lynda in AM    #Carcinoid tumor: resected by Dr. Davis #Breast cancer: In remission per PCP. Avastin discontinued in April   -C/w Letrazole 2.5mg (may need oncology approval)  -No blood draws on L arm     #Metastatic brain cancer: resolved, resected by Dr. Yuan and pt underwent RT   -C/w Vimpat for seizure ppx    #Carcinoid tumor: resolved, resected by Dr. Davis

## 2019-05-18 NOTE — PHYSICAL THERAPY INITIAL EVALUATION ADULT - PERTINENT HX OF CURRENT PROBLEM, REHAB EVAL
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

## 2019-05-18 NOTE — H&P ADULT - ASSESSMENT
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

## 2019-05-18 NOTE — PHYSICAL THERAPY INITIAL EVALUATION ADULT - GENERAL OBSERVATIONS, REHAB EVAL
Received semi-Peterson's position in bed with +bed alarm, +hep lock, on room air, in no apparent distress. Patient appears to be resting comfortably in bed

## 2019-05-19 DIAGNOSIS — J18.9 PNEUMONIA, UNSPECIFIED ORGANISM: ICD-10-CM

## 2019-05-19 LAB
ANION GAP SERPL CALC-SCNC: 11 MMOL/L — SIGNIFICANT CHANGE UP (ref 5–17)
BUN SERPL-MCNC: 6 MG/DL — LOW (ref 7–23)
CALCIUM SERPL-MCNC: 9.2 MG/DL — SIGNIFICANT CHANGE UP (ref 8.4–10.5)
CHLORIDE SERPL-SCNC: 108 MMOL/L — SIGNIFICANT CHANGE UP (ref 96–108)
CO2 SERPL-SCNC: 23 MMOL/L — SIGNIFICANT CHANGE UP (ref 22–31)
CREAT SERPL-MCNC: 0.86 MG/DL — SIGNIFICANT CHANGE UP (ref 0.5–1.3)
GLUCOSE SERPL-MCNC: 118 MG/DL — HIGH (ref 70–99)
HCT VFR BLD CALC: 35.8 % — SIGNIFICANT CHANGE UP (ref 34.5–45)
HGB BLD-MCNC: 11.1 G/DL — LOW (ref 11.5–15.5)
MAGNESIUM SERPL-MCNC: 2 MG/DL — SIGNIFICANT CHANGE UP (ref 1.6–2.6)
MCHC RBC-ENTMCNC: 28.8 PG — SIGNIFICANT CHANGE UP (ref 27–34)
MCHC RBC-ENTMCNC: 31 GM/DL — LOW (ref 32–36)
MCV RBC AUTO: 92.7 FL — SIGNIFICANT CHANGE UP (ref 80–100)
NRBC # BLD: 0 /100 WBCS — SIGNIFICANT CHANGE UP (ref 0–0)
PLATELET # BLD AUTO: 289 K/UL — SIGNIFICANT CHANGE UP (ref 150–400)
POTASSIUM SERPL-MCNC: 3.9 MMOL/L — SIGNIFICANT CHANGE UP (ref 3.5–5.3)
POTASSIUM SERPL-SCNC: 3.9 MMOL/L — SIGNIFICANT CHANGE UP (ref 3.5–5.3)
RBC # BLD: 3.86 M/UL — SIGNIFICANT CHANGE UP (ref 3.8–5.2)
RBC # FLD: 15.1 % — HIGH (ref 10.3–14.5)
SODIUM SERPL-SCNC: 142 MMOL/L — SIGNIFICANT CHANGE UP (ref 135–145)
WBC # BLD: 5.37 K/UL — SIGNIFICANT CHANGE UP (ref 3.8–10.5)
WBC # FLD AUTO: 5.37 K/UL — SIGNIFICANT CHANGE UP (ref 3.8–10.5)

## 2019-05-19 PROCEDURE — 71045 X-RAY EXAM CHEST 1 VIEW: CPT | Mod: 26

## 2019-05-19 PROCEDURE — 99232 SBSQ HOSP IP/OBS MODERATE 35: CPT

## 2019-05-19 RX ORDER — POTASSIUM CHLORIDE 20 MEQ
20 PACKET (EA) ORAL ONCE
Refills: 0 | Status: COMPLETED | OUTPATIENT
Start: 2019-05-19 | End: 2019-05-19

## 2019-05-19 RX ORDER — ACETAMINOPHEN 500 MG
650 TABLET ORAL ONCE
Refills: 0 | Status: COMPLETED | OUTPATIENT
Start: 2019-05-19 | End: 2019-05-19

## 2019-05-19 RX ORDER — ALPRAZOLAM 0.25 MG
1 TABLET ORAL ONCE
Refills: 0 | Status: DISCONTINUED | OUTPATIENT
Start: 2019-05-19 | End: 2019-05-19

## 2019-05-19 RX ADMIN — Medication 3 MILLILITER(S): at 01:48

## 2019-05-19 RX ADMIN — Medication 650 MILLIGRAM(S): at 10:21

## 2019-05-19 RX ADMIN — LETROZOLE 2.5 MILLIGRAM(S): 2.5 TABLET, FILM COATED ORAL at 11:49

## 2019-05-19 RX ADMIN — LACOSAMIDE 150 MILLIGRAM(S): 50 TABLET ORAL at 17:59

## 2019-05-19 RX ADMIN — Medication 1 MILLIGRAM(S): at 22:20

## 2019-05-19 RX ADMIN — PIPERACILLIN AND TAZOBACTAM 200 GRAM(S): 4; .5 INJECTION, POWDER, LYOPHILIZED, FOR SOLUTION INTRAVENOUS at 06:17

## 2019-05-19 RX ADMIN — Medication 20 MILLIEQUIVALENT(S): at 07:55

## 2019-05-19 RX ADMIN — Medication 3 MILLILITER(S): at 06:18

## 2019-05-19 RX ADMIN — Medication 650 MILLIGRAM(S): at 10:51

## 2019-05-19 RX ADMIN — LACOSAMIDE 150 MILLIGRAM(S): 50 TABLET ORAL at 06:18

## 2019-05-19 RX ADMIN — Medication 3 MILLILITER(S): at 17:58

## 2019-05-19 RX ADMIN — Medication 200 MILLIGRAM(S): at 09:35

## 2019-05-19 RX ADMIN — TIOTROPIUM BROMIDE AND OLODATEROL 2 PUFF(S): 3.124; 2.736 SPRAY, METERED RESPIRATORY (INHALATION) at 11:48

## 2019-05-19 RX ADMIN — PANTOPRAZOLE SODIUM 40 MILLIGRAM(S): 20 TABLET, DELAYED RELEASE ORAL at 06:18

## 2019-05-19 RX ADMIN — ESCITALOPRAM OXALATE 10 MILLIGRAM(S): 10 TABLET, FILM COATED ORAL at 11:47

## 2019-05-19 RX ADMIN — Medication 3 MILLILITER(S): at 13:25

## 2019-05-19 RX ADMIN — PIPERACILLIN AND TAZOBACTAM 200 GRAM(S): 4; .5 INJECTION, POWDER, LYOPHILIZED, FOR SOLUTION INTRAVENOUS at 01:48

## 2019-05-19 RX ADMIN — Medication 3 MILLIGRAM(S): at 21:11

## 2019-05-19 RX ADMIN — Medication 81 MILLIGRAM(S): at 11:47

## 2019-05-19 RX ADMIN — Medication 3 MILLILITER(S): at 21:11

## 2019-05-19 RX ADMIN — Medication 166.67 MILLIGRAM(S): at 07:44

## 2019-05-19 RX ADMIN — MONTELUKAST 10 MILLIGRAM(S): 4 TABLET, CHEWABLE ORAL at 13:26

## 2019-05-19 RX ADMIN — Medication 3 MILLILITER(S): at 09:34

## 2019-05-19 NOTE — PROGRESS NOTE ADULT - SUBJECTIVE AND OBJECTIVE BOX
Interval Events: Reviewed  Patient seen and examined at bedside.    73y old Female with COPD, brain mets s/p craniotomy & resection, carcinoid tumor of ileum s/p resection, metastatic breast ca, was on chemo recently stopped 2/2 esophagitis and ischemic colitis, presented to ED c/o DAMON, cough, fever, chills, nausea, admitted for sepsis 2/2 PNA. pt states symptoms have improved, no complaints currently aside from decreased appetite from bad hospital food.        PAST MEDICAL & SURGICAL HISTORY:  Esophagitis: EGD 2019  History of ischemic colitis: flex sig 2019  Carcinoid tumor: s/p ileal resection  Metastatic breast cancer  Former smoker  COPD, severe  H/O craniotomy: metastaic brain lesion resection  H/O hemicolectomy: / carcinoid tumor resection 2017  S/P partial lobectomy of lung: Right upper lobe       MEDICATIONS:  Pulmonary:  ALBUTerol/ipratropium for Nebulization 3 milliLiter(s) Nebulizer every 4 hours  guaiFENesin   Syrup  (Sugar-Free) 200 milliGRAM(s) Oral every 6 hours PRN  montelukast 10 milliGRAM(s) Oral daily  tiotropium 2.5 MICROgram(s)/olodaterol 2.5 MICROgram(s) (STIOLTO) Inhaler 2 Puff(s) Inhalation daily    Antimicrobials:    Anticoagulants:  aspirin enteric coated 81 milliGRAM(s) Oral daily  heparin  Injectable 5000 Unit(s) SubCutaneous every 8 hours    Cardiac:      Allergies    No Known Allergies    Intolerances        Vital Signs Last 24 Hrs  T(C): 36.6 (19 May 2019 08:45), Max: 36.9 (18 May 2019 16:42)  T(F): 97.8 (19 May 2019 08:45), Max: 98.4 (18 May 2019 16:42)  HR: 98 (19 May 2019 08:45) (91 - 98)  BP: 118/73 (19 May 2019 08:45) (104/52 - 127/77)  BP(mean): --  RR: 17 (19 May 2019 08:45) (16 - 17)  SpO2: 95% (19 May 2019 08:45) (95% - 99%)    05-18 @ 07:01  -  05-19 @ 07:00  --------------------------------------------------------  IN: 350 mL / OUT: 550 mL / NET: -200 mL          LABS:      CBC Full  -  ( 19 May 2019 06:57 )  WBC Count : 5.37 K/uL  RBC Count : 3.86 M/uL  Hemoglobin : 11.1 g/dL  Hematocrit : 35.8 %  Platelet Count - Automated : 289 K/uL  Mean Cell Volume : 92.7 fl  Mean Cell Hemoglobin : 28.8 pg  Mean Cell Hemoglobin Concentration : 31.0 gm/dL  Auto Neutrophil # : x  Auto Lymphocyte # : x  Auto Monocyte # : x  Auto Eosinophil # : x  Auto Basophil # : x  Auto Neutrophil % : x  Auto Lymphocyte % : x  Auto Monocyte % : x  Auto Eosinophil % : x  Auto Basophil % : x        142  |  108  |  6<L>  ----------------------------<  118<H>  3.9   |  23  |  0.86    Ca    9.2      19 May 2019 06:56  Mg     2.0         TPro  6.7  /  Alb  3.4  /  TBili  0.2  /  DBili  x   /  AST  17  /  ALT  14  /  AlkPhos  72      PT/INR - ( 17 May 2019 21:59 )   PT: 11.2 sec;   INR: 0.99          PTT - ( 17 May 2019 21:59 )  PTT:31.2 sec      Urinalysis Basic - ( 18 May 2019 01:05 )    Color: Yellow / Appearance: Clear / S.010 / pH: x  Gluc: x / Ketone: NEGATIVE  / Bili: Negative / Urobili: 0.2 E.U./dL   Blood: x / Protein: NEGATIVE mg/dL / Nitrite: POSITIVE   Leuk Esterase: Small / RBC: 5-10 /HPF / WBC < 5 /HPF   Sq Epi: x / Non Sq Epi: 0-5 /HPF / Bacteria: Present /HPF              Culture Results:   Less than 10,000 cols/cc; Insignificant amount of growth. ( @ 10:09)  Culture Results:   No growth at 1 day. ( @ 02:00)  Culture Results:   No growth at 1 day. ( @ 02:00)      RADIOLOGY & ADDITIONAL STUDIES (The following images were personally reviewed):  Salcedo:                                     No  Urine output:                       adequate  DVT prophylaxis:                 Yes  Flattus:                                  Yes  Bowel movement:              No Interval Events: Reviewed  Patient seen and examined at bedside.    73y old Female with COPD, brain mets s/p craniotomy & resection, carcinoid tumor of ileum s/p resection, metastatic breast ca, was on chemo recently stopped 2/2 esophagitis and ischemic colitis, presented to ED c/o DAMON, cough, fever, chills, fatigue, admitted for sepsis 2/2 PNA. pt states symptoms have improved, no complaints currently aside from decreased appetite from bad hospital food.        PAST MEDICAL & SURGICAL HISTORY:  Esophagitis: EGD 2019  History of ischemic colitis: flex sig 2019  Carcinoid tumor: s/p ileal resection  Metastatic breast cancer  Former smoker  COPD, severe  H/O craniotomy: metastaic brain lesion resection  H/O hemicolectomy:  carcinoid tumor resection 2017  S/P partial lobectomy of lung: Right upper lobe       MEDICATIONS:  Pulmonary:  ALBUTerol/ipratropium for Nebulization 3 milliLiter(s) Nebulizer every 4 hours  guaiFENesin   Syrup  (Sugar-Free) 200 milliGRAM(s) Oral every 6 hours PRN  montelukast 10 milliGRAM(s) Oral daily  tiotropium 2.5 MICROgram(s)/olodaterol 2.5 MICROgram(s) (STIOLTO) Inhaler 2 Puff(s) Inhalation daily    Antimicrobials:    Anticoagulants:  aspirin enteric coated 81 milliGRAM(s) Oral daily  heparin  Injectable 5000 Unit(s) SubCutaneous every 8 hours    Cardiac:      Allergies    No Known Allergies    Intolerances        Vital Signs Last 24 Hrs  T(C): 36.6 (19 May 2019 08:45), Max: 36.9 (18 May 2019 16:42)  T(F): 97.8 (19 May 2019 08:45), Max: 98.4 (18 May 2019 16:42)  HR: 98 (19 May 2019 08:45) (91 - 98)  BP: 118/73 (19 May 2019 08:45) (104/52 - 127/77)  BP(mean): --  RR: 17 (19 May 2019 08:45) (16 - 17)  SpO2: 95% (19 May 2019 08:45) (95% - 99%)    05-18 @ 07:01  -  05-19 @ 07:00  --------------------------------------------------------  IN: 350 mL / OUT: 550 mL / NET: -200 mL          LABS:      CBC Full  -  ( 19 May 2019 06:57 )  WBC Count : 5.37 K/uL  RBC Count : 3.86 M/uL  Hemoglobin : 11.1 g/dL  Hematocrit : 35.8 %  Platelet Count - Automated : 289 K/uL  Mean Cell Volume : 92.7 fl  Mean Cell Hemoglobin : 28.8 pg  Mean Cell Hemoglobin Concentration : 31.0 gm/dL  Auto Neutrophil # : x  Auto Lymphocyte # : x  Auto Monocyte # : x  Auto Eosinophil # : x  Auto Basophil # : x  Auto Neutrophil % : x  Auto Lymphocyte % : x  Auto Monocyte % : x  Auto Eosinophil % : x  Auto Basophil % : x        142  |  108  |  6<L>  ----------------------------<  118<H>  3.9   |  23  |  0.86    Ca    9.2      19 May 2019 06:56  Mg     2.0         TPro  6.7  /  Alb  3.4  /  TBili  0.2  /  DBili  x   /  AST  17  /  ALT  14  /  AlkPhos  72      PT/INR - ( 17 May 2019 21:59 )   PT: 11.2 sec;   INR: 0.99          PTT - ( 17 May 2019 21:59 )  PTT:31.2 sec      Urinalysis Basic - ( 18 May 2019 01:05 )    Color: Yellow / Appearance: Clear / S.010 / pH: x  Gluc: x / Ketone: NEGATIVE  / Bili: Negative / Urobili: 0.2 E.U./dL   Blood: x / Protein: NEGATIVE mg/dL / Nitrite: POSITIVE   Leuk Esterase: Small / RBC: 5-10 /HPF / WBC < 5 /HPF   Sq Epi: x / Non Sq Epi: 0-5 /HPF / Bacteria: Present /HPF              Culture Results:   Less than 10,000 cols/cc; Insignificant amount of growth. ( @ 10:09)  Culture Results:   No growth at 1 day. ( @ 02:00)  Culture Results:   No growth at 1 day. ( @ 02:00)      RADIOLOGY & ADDITIONAL STUDIES (The following images were personally reviewed):  Salcedo:                                     No  Urine output:                       adequate  DVT prophylaxis:                 Yes  Flattus:                                  Yes  Bowel movement:              No

## 2019-05-19 NOTE — PROGRESS NOTE ADULT - PROBLEM SELECTOR PLAN 3
symptomatic treatment  tylenol prn fever, guaifenesin prn cough, increase PO fluid intake  continue monitoring

## 2019-05-20 ENCOUNTER — TRANSCRIPTION ENCOUNTER (OUTPATIENT)
Age: 74
End: 2019-05-20

## 2019-05-20 VITALS
HEART RATE: 105 BPM | RESPIRATION RATE: 18 BRPM | SYSTOLIC BLOOD PRESSURE: 112 MMHG | TEMPERATURE: 98 F | DIASTOLIC BLOOD PRESSURE: 61 MMHG

## 2019-05-20 DIAGNOSIS — J18.9 PNEUMONIA, UNSPECIFIED ORGANISM: ICD-10-CM

## 2019-05-20 PROCEDURE — 99232 SBSQ HOSP IP/OBS MODERATE 35: CPT | Mod: GC

## 2019-05-20 RX ORDER — ALPRAZOLAM 0.25 MG
0.5 TABLET ORAL ONCE
Refills: 0 | Status: DISCONTINUED | OUTPATIENT
Start: 2019-05-20 | End: 2019-05-20

## 2019-05-20 RX ADMIN — Medication 0.5 MILLIGRAM(S): at 05:42

## 2019-05-20 RX ADMIN — LETROZOLE 2.5 MILLIGRAM(S): 2.5 TABLET, FILM COATED ORAL at 12:19

## 2019-05-20 RX ADMIN — MONTELUKAST 10 MILLIGRAM(S): 4 TABLET, CHEWABLE ORAL at 12:19

## 2019-05-20 RX ADMIN — Medication 3 MILLILITER(S): at 06:05

## 2019-05-20 RX ADMIN — ESCITALOPRAM OXALATE 10 MILLIGRAM(S): 10 TABLET, FILM COATED ORAL at 12:19

## 2019-05-20 RX ADMIN — LACOSAMIDE 150 MILLIGRAM(S): 50 TABLET ORAL at 06:05

## 2019-05-20 RX ADMIN — Medication 3 MILLILITER(S): at 11:17

## 2019-05-20 RX ADMIN — PANTOPRAZOLE SODIUM 40 MILLIGRAM(S): 20 TABLET, DELAYED RELEASE ORAL at 06:05

## 2019-05-20 NOTE — DISCHARGE NOTE NURSING/CASE MANAGEMENT/SOCIAL WORK - NSDCDPATPORTLINK_GEN_ALL_CORE
You can access the [facility] Patient Portal, offered by [facility], by registering with the following website: [email or web address]

## 2019-05-20 NOTE — PROGRESS NOTE ADULT - PROBLEM SELECTOR PROBLEM 3
COPD (chronic obstructive pulmonary disease)
Enterovirus infection
History of ischemic colitis
Enterovirus infection

## 2019-05-20 NOTE — PROGRESS NOTE ADULT - SUBJECTIVE AND OBJECTIVE BOX
INTERVAL HPI/OVERNIGHT EVENTS:  Awake and no complaint      MEDICATIONS  (STANDING):  ALBUTerol/ipratropium for Nebulization 3 milliLiter(s) Nebulizer every 4 hours  aspirin enteric coated 81 milliGRAM(s) Oral daily  escitalopram 10 milliGRAM(s) Oral daily  heparin  Injectable 5000 Unit(s) SubCutaneous every 8 hours  lacosamide 150 milliGRAM(s) Oral two times a day  letrozole 2.5 milliGRAM(s) Oral daily  melatonin 3 milliGRAM(s) Oral at bedtime  montelukast 10 milliGRAM(s) Oral daily  pantoprazole    Tablet 40 milliGRAM(s) Oral before breakfast  tiotropium 2.5 MICROgram(s)/olodaterol 2.5 MICROgram(s) (STIOLTO) Inhaler 2 Puff(s) Inhalation daily    MEDICATIONS  (PRN):  guaiFENesin   Syrup  (Sugar-Free) 200 milliGRAM(s) Oral every 6 hours PRN Cough      Allergies    No Known Allergies    Intolerances        Vital Signs Last 24 Hrs  T(C): 36.6 (20 May 2019 09:11), Max: 36.9 (19 May 2019 16:00)  T(F): 97.8 (20 May 2019 09:11), Max: 98.4 (19 May 2019 16:00)  HR: 105 (20 May 2019 09:11) (86 - 105)  BP: 112/61 (20 May 2019 09:11) (112/61 - 127/73)  BP(mean): --  RR: 18 (20 May 2019 09:11) (17 - 18)  SpO2: 95% (20 May 2019 04:59) (95% - 100%)          Constitutional:  Awake and more alert    Eyes: DANAE    ENMT: Negative    Neck: Supple    Back:  no tenderness     Respiratory:  clear    Cardiovascular: S1 S2    Gastrointestinal:  soft    Genitourinary:    Extremities:  no edema    Vascular:    Neurological:    Skin:    Lymph Nodes:            LABS:                        11.1   5.37  )-----------( 289      ( 19 May 2019 06:57 )             35.8     05-19    142  |  108  |  6<L>  ----------------------------<  118<H>  3.9   |  23  |  0.86    Ca    9.2      19 May 2019 06:56  Mg     2.0     05-19            RADIOLOGY & ADDITIONAL TESTS:

## 2019-05-20 NOTE — PROGRESS NOTE ADULT - ASSESSMENT
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
73y old Female with COPD, brain mets s/p craniotomy & resection, carcinoid tumor of ileum s/p resection, metastatic breast ca, was on chemo recently stopped 2/2 esophagitis and ischemic colitis, presented to ED c/o DAMON, cough, fever, chills, nausea, admitted for sepsis 2/2 PNA, also dx'd with enterovirus, doing well on vanco and zosyn for PNA.
73y old Female with COPD, brain mets s/p craniotomy & resection, carcinoid tumor of ileum s/p resection, metastatic breast ca, was on chemo recently stopped 2/2 esophagitis and ischemic colitis, presented to ED c/o DAMON, cough, fever, chills, nausea, admitted for sepsis 2/2 PNA, also dx'd with enterovirus, doing well on vanco and zosyn for PNA.

## 2019-05-20 NOTE — DISCHARGE NOTE PROVIDER - NSDCFUADDAPPT_GEN_ALL_CORE_FT
Please follow-up with Dr. Tellez on 6/10/19. If you feel that your condition is worsening and you need a sooner appointment, please call Dr. Tellez's office at the number above and speak to his assistant.

## 2019-05-20 NOTE — PROGRESS NOTE ADULT - ATTENDING COMMENTS
As outlined above; Would obtain CT of chest ; Continue antibiotics;  She is confused today
Patient seen and examined; Chest CT negative; No antibiotics; Would discharge to home;

## 2019-05-20 NOTE — PROGRESS NOTE ADULT - PROBLEM SELECTOR PLAN 2
Plan as above
continue vanco and zosyn  continue monitoring
continue vanco and zosyn  continue monitoring

## 2019-05-20 NOTE — DISCHARGE NOTE PROVIDER - HOSPITAL COURSE
73F w/PMHx of smoking, COPD, metastatic breast cancer (liver, chest wall), s/p right upper lobectomy (2014), metastatic brain lesion s/p craniotomy with tumor resection 4/2017, carcinoid tumor of ileum s/p R hemicolectomy 7/2017, s/p excision of scalp mass 5/10/18 with SBRT, recent admission 4/7-4/15 for UTI, espohagitis on EGD and ischemic colitis on flex sig thought to be due to chemotherapy admitted for sepsis 2/2 Enterovirus. Patient initially treated with vancomycin and zosyn which was later discontinued and desescalated as clinical suspicion for HAP ruled out. She was treated symptomatically on RMF.

## 2019-05-20 NOTE — DISCHARGE NOTE PROVIDER - NSDCCPCAREPLAN_GEN_ALL_CORE_FT
PRINCIPAL DISCHARGE DIAGNOSIS  Diagnosis: Enterovirus infection  Assessment and Plan of Treatment: You were admitted into the hospital for treatment of a viral pneumonia.      SECONDARY DISCHARGE DIAGNOSES  Diagnosis: Brain tumor  Assessment and Plan of Treatment: Brain tumor PRINCIPAL DISCHARGE DIAGNOSIS  Diagnosis: Enterovirus infection  Assessment and Plan of Treatment: You were admitted to the hospital after cough and malaise for multiple days. You were initally thought to have a pneumonia and were treated with antibiotics. You had a CT scan of the chest done which showed no sign of pneumonia, so antibiotics were stopped. You have been symptomatically treated with nebulizer treatments and fluids with improvment in your symptoms. Please continue all of your home medications and follow-up with Dr. Tellez on the date provided in this document.      SECONDARY DISCHARGE DIAGNOSES  Diagnosis: Seizures  Assessment and Plan of Treatment: You have a past medical history of seizures. We continued you on all of your home medications. Please continue these medications and follow up with Dr. Tellez and other outpatient doctors for continued care of this condition.    Diagnosis: COPD (chronic obstructive pulmonary disease)  Assessment and Plan of Treatment: COPD is a chronic condition for you. We continued your home medications. Please continue your home medications as an outpatient and continue to follow up with your doctors as needed.

## 2019-05-20 NOTE — PROGRESS NOTE ADULT - PROBLEM SELECTOR PROBLEM 4
COPD (chronic obstructive pulmonary disease)
Malignancy
Metastatic breast cancer
COPD (chronic obstructive pulmonary disease)

## 2019-05-20 NOTE — DISCHARGE NOTE PROVIDER - CARE PROVIDER_API CALL
Chantel Tellez)  Critical Care Medicine; Internal Medicine  [address], [address]  [place], [place] [address]  Phone: [phone]  Fax: [phone]  Follow Up Time:

## 2019-05-20 NOTE — PROGRESS NOTE ADULT - PROBLEM SELECTOR PROBLEM 2
Enterovirus infection
Pneumonia due to infectious organism, unspecified laterality, unspecified part of lung
Enterovirus infection
Pneumonia due to infectious organism, unspecified laterality, unspecified part of lung

## 2019-05-20 NOTE — PROGRESS NOTE ADULT - PROBLEM SELECTOR PLAN 4
#Breast cancer: In remission per PCP. Avastin discontinued in April   -C/w Letrazole 2.5mg (may need oncology approval)  -No blood draws on L arm     #Metastatic brain cancer: resolved, resected by Dr. Yuan and pt underwent RT   -C/w Vimpat for seizure ppx    #Carcinoid tumor: resolved, resected by Dr. Davis
continue stiolot, singulair, nebs prn, and O2 prn  continue O2 monitoring
continue stiolot, singulair, nebs prn, and O2 prn  continue O2 monitoring

## 2019-05-21 ENCOUNTER — INBOUND DOCUMENT (OUTPATIENT)
Age: 74
End: 2019-05-21

## 2019-05-23 DIAGNOSIS — Z66 DO NOT RESUSCITATE: ICD-10-CM

## 2019-05-23 DIAGNOSIS — C79.89 SECONDARY MALIGNANT NEOPLASM OF OTHER SPECIFIED SITES: ICD-10-CM

## 2019-05-23 DIAGNOSIS — C78.7 SECONDARY MALIGNANT NEOPLASM OF LIVER AND INTRAHEPATIC BILE DUCT: ICD-10-CM

## 2019-05-23 DIAGNOSIS — F32.9 MAJOR DEPRESSIVE DISORDER, SINGLE EPISODE, UNSPECIFIED: ICD-10-CM

## 2019-05-23 DIAGNOSIS — Z90.2 ACQUIRED ABSENCE OF LUNG [PART OF]: ICD-10-CM

## 2019-05-23 DIAGNOSIS — Z85.060 PERSONAL HISTORY OF MALIGNANT CARCINOID TUMOR OF SMALL INTESTINE: ICD-10-CM

## 2019-05-23 DIAGNOSIS — Z85.3 PERSONAL HISTORY OF MALIGNANT NEOPLASM OF BREAST: ICD-10-CM

## 2019-05-23 DIAGNOSIS — Z87.891 PERSONAL HISTORY OF NICOTINE DEPENDENCE: ICD-10-CM

## 2019-05-23 DIAGNOSIS — Z90.49 ACQUIRED ABSENCE OF OTHER SPECIFIED PARTS OF DIGESTIVE TRACT: ICD-10-CM

## 2019-05-23 DIAGNOSIS — B97.10 UNSPECIFIED ENTEROVIRUS AS THE CAUSE OF DISEASES CLASSIFIED ELSEWHERE: ICD-10-CM

## 2019-05-23 DIAGNOSIS — Z92.3 PERSONAL HISTORY OF IRRADIATION: ICD-10-CM

## 2019-05-23 DIAGNOSIS — A41.89 OTHER SPECIFIED SEPSIS: ICD-10-CM

## 2019-05-23 DIAGNOSIS — G40.909 EPILEPSY, UNSPECIFIED, NOT INTRACTABLE, WITHOUT STATUS EPILEPTICUS: ICD-10-CM

## 2019-05-23 DIAGNOSIS — J44.9 CHRONIC OBSTRUCTIVE PULMONARY DISEASE, UNSPECIFIED: ICD-10-CM

## 2019-05-23 LAB
DESMETHYL LACOSAMIDE: <0.5 UG/ML — SIGNIFICANT CHANGE UP
LACOSAMIDE (VIMPAT) RESULT: 5.5 UG/ML — SIGNIFICANT CHANGE UP (ref 1–10)

## 2019-05-24 ENCOUNTER — APPOINTMENT (OUTPATIENT)
Dept: NEUROSURGERY | Facility: CLINIC | Age: 74
End: 2019-05-24
Payer: MEDICARE

## 2019-05-24 VITALS
RESPIRATION RATE: 14 BRPM | WEIGHT: 170 LBS | HEIGHT: 65 IN | HEART RATE: 94 BPM | DIASTOLIC BLOOD PRESSURE: 63 MMHG | BODY MASS INDEX: 28.32 KG/M2 | OXYGEN SATURATION: 98 % | TEMPERATURE: 98.7 F | SYSTOLIC BLOOD PRESSURE: 102 MMHG

## 2019-05-24 PROCEDURE — 99214 OFFICE O/P EST MOD 30 MIN: CPT

## 2019-05-24 NOTE — HISTORY OF PRESENT ILLNESS
[FreeTextEntry1] : 73yr old female with PMH of metastatic breast CA and neuroendocrine tumor. She is s/p right frontal resection of scalp mass on 5/10/18. She did not want to undergo any treatment following resection but agreed to SBRT, which completed on 7/30/18 with Dr. Cam. She does not want any chemotherapy. She follows with Dr. Hampton.\par \par Radiologist stated liver lesions were too small to bx at this time, recommendation was to do another abd U/S in March and evaluate the findings at that time for possible surgery per Dr. Givens. \par \par She was seen by Dr. Castillo 2/13/19 and started on IV Avastin 2/15/19, she is getting IV Avastin Q4 weeks\par \par She was recently hospitalized for ischemic colitis. Avastin was discontinued. She presents today with a new MRI. She is being managed conservatively for her colitis.\par \par She was recently hospitalized after an argument with her  for cough, fatigue/malaise and speech difficulty.

## 2019-05-24 NOTE — ASSESSMENT
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   The patient was extensively educated about the nature of the disease process. Therapeutic and diagnostic tests include serial MRI and follow-up.  The patient should see me in 3-6 months. She has plans to see Dr. Taylor with neuropsychology for cognitive behavioral therapy. We also recommend she and her  undergo behavioral therapy together to establish better coping strategies for times of stress. I have explained the alternatives, risks and benefits to the patient and patient understands and agrees to proceed. \par

## 2019-05-25 PROBLEM — N39.0 RECURRENT UTI: Status: ACTIVE | Noted: 2019-05-25

## 2019-05-28 ENCOUNTER — APPOINTMENT (OUTPATIENT)
Dept: GASTROENTEROLOGY | Facility: CLINIC | Age: 74
End: 2019-05-28

## 2019-05-29 ENCOUNTER — INBOUND DOCUMENT (OUTPATIENT)
Age: 74
End: 2019-05-29

## 2019-05-29 ENCOUNTER — APPOINTMENT (OUTPATIENT)
Dept: PULMONOLOGY | Facility: CLINIC | Age: 74
End: 2019-05-29
Payer: MEDICARE

## 2019-05-29 VITALS
DIASTOLIC BLOOD PRESSURE: 70 MMHG | WEIGHT: 169 LBS | TEMPERATURE: 98.7 F | BODY MASS INDEX: 28.16 KG/M2 | SYSTOLIC BLOOD PRESSURE: 110 MMHG | RESPIRATION RATE: 12 BRPM | HEART RATE: 92 BPM | HEIGHT: 65 IN | OXYGEN SATURATION: 97 %

## 2019-05-29 DIAGNOSIS — N39.0 URINARY TRACT INFECTION, SITE NOT SPECIFIED: ICD-10-CM

## 2019-05-29 DIAGNOSIS — R05 COUGH: ICD-10-CM

## 2019-05-29 PROCEDURE — 99213 OFFICE O/P EST LOW 20 MIN: CPT

## 2019-05-29 NOTE — HISTORY OF PRESENT ILLNESS
[FreeTextEntry1] : 3/27/19:  Very pleasant 73-year-old woman last seen in October. She has a long and complicated history including breast cancer with mastectomy recurrence consisting of a chest wall mass which has been resected and liver metastasis first diagnosed in 2014. She has had 2 thoracotomies for  primary right-sided and left-sided lung cancer. In March of 2017 she had a brain metastasis resected.\par \par Remarkably with all of this her lung function has been quite good. She has been complaining of cough for the past few weeks, generally nonproductive and not really associated with shortness of breath. Chest x-ray done 2 days ago showed no infiltrate, effusion, or nodule and normal heart size. MRI of the liver done recently showed no change in the liver masses, and MRI of the brain recently showed no recurrence.\par \par She has had recent epistaxis and had ENT evaluation for this. She does not complain of postnasal drip. She does have chronic reflux symptoms. Her cough may be worsened when she lies down. She does not hear herself wheeze. She has been given a course of antibiotics recently.\par \par 5/29/19:  Returns for f/u with chief complaint of cough once again. No real dyspnea, wheeze, sputum.  Recently hospitalized for enterovirus.  Using OTC antitussive.  Lots of nasal congestion and discharge, on flonase in past.

## 2019-05-29 NOTE — DISCUSSION/SUMMARY
[FreeTextEntry1] : recent hospitalization with no new chest findings.  Cough may be multifactorial with extensive pulmonary pathology but seems to be element of upper airway etiology- nasal discharge and congestion.  Continue symptomatic rx w OTC antitussive, suggest trial of fluticasone 2 sprays each nostril \par Return few months

## 2019-05-29 NOTE — PHYSICAL EXAM
[General Appearance - Well Developed] : well developed [Normal Appearance] : normal appearance [Well Groomed] : well groomed [General Appearance - Well Nourished] : well nourished [No Deformities] : no deformities [General Appearance - In No Acute Distress] : no acute distress [Normal Oropharynx] : normal oropharynx [Erythema] : no erythema of the pharynx [Heart Rate And Rhythm] : heart rate and rhythm were normal [Heart Sounds] : normal S1 and S2 [Murmurs] : no murmurs present [] : no respiratory distress [Exaggerated Use Of Accessory Muscles For Inspiration] : no accessory muscle use [Respiration, Rhythm And Depth] : normal respiratory rhythm and effort [FreeTextEntry1] : clear [Auscultation Breath Sounds / Voice Sounds] : lungs were clear to auscultation bilaterally

## 2019-05-30 RX ORDER — IPRATROPIUM/ALBUTEROL SULFATE 18-103MCG
3 AEROSOL WITH ADAPTER (GRAM) INHALATION
Qty: 0 | Refills: 0 | DISCHARGE

## 2019-05-30 RX ORDER — ALBUTEROL 90 UG/1
2 AEROSOL, METERED ORAL
Qty: 0 | Refills: 0 | DISCHARGE

## 2019-05-30 RX ORDER — LETROZOLE 2.5 MG/1
1 TABLET, FILM COATED ORAL
Qty: 0 | Refills: 0 | DISCHARGE

## 2019-06-10 ENCOUNTER — APPOINTMENT (OUTPATIENT)
Dept: INTERNAL MEDICINE | Facility: CLINIC | Age: 74
End: 2019-06-10
Payer: MEDICARE

## 2019-06-10 VITALS
WEIGHT: 165 LBS | DIASTOLIC BLOOD PRESSURE: 58 MMHG | SYSTOLIC BLOOD PRESSURE: 93 MMHG | HEIGHT: 65 IN | OXYGEN SATURATION: 99 % | HEART RATE: 87 BPM | TEMPERATURE: 98.9 F | BODY MASS INDEX: 27.49 KG/M2

## 2019-06-10 DIAGNOSIS — D3A.00 BENIGN CARCINOID TUMOR OF UNSPECIFIED SITE: ICD-10-CM

## 2019-06-10 DIAGNOSIS — R06.02 SHORTNESS OF BREATH: ICD-10-CM

## 2019-06-10 PROBLEM — J44.9 CHRONIC OBSTRUCTIVE PULMONARY DISEASE, UNSPECIFIED: Chronic | Status: ACTIVE | Noted: 2019-05-18

## 2019-06-10 PROBLEM — K20.9 ESOPHAGITIS, UNSPECIFIED: Chronic | Status: ACTIVE | Noted: 2019-05-18

## 2019-06-10 PROBLEM — Z87.19 PERSONAL HISTORY OF OTHER DISEASES OF THE DIGESTIVE SYSTEM: Chronic | Status: ACTIVE | Noted: 2019-05-18

## 2019-06-10 PROBLEM — Z87.891 PERSONAL HISTORY OF NICOTINE DEPENDENCE: Chronic | Status: ACTIVE | Noted: 2019-05-18

## 2019-06-10 PROBLEM — C50.919 MALIGNANT NEOPLASM OF UNSPECIFIED SITE OF UNSPECIFIED FEMALE BREAST: Chronic | Status: ACTIVE | Noted: 2019-05-18

## 2019-06-10 PROCEDURE — 99213 OFFICE O/P EST LOW 20 MIN: CPT

## 2019-06-10 NOTE — HISTORY OF PRESENT ILLNESS
[FreeTextEntry1] : No chief complaint; Follow up on her multiple medical problems;  [de-identified] : In addition to above had cognitive testing ;  Still gets confused at times; No Decadron; Only Vimpat;\par MRI is quite stable of her brain;

## 2019-06-10 NOTE — PLAN
[FreeTextEntry1] : Appears to have stable examination;  Still some diarrhea at times; Cognitive problems with no change . Return visit\par one in half months;

## 2019-06-10 NOTE — PHYSICAL EXAM
[No Acute Distress] : no acute distress [Well Nourished] : well nourished [Well Developed] : well developed [Well-Appearing] : well-appearing [Normal Sclera/Conjunctiva] : normal sclera/conjunctiva [PERRL] : pupils equal round and reactive to light [EOMI] : extraocular movements intact [Normal Outer Ear/Nose] : the outer ears and nose were normal in appearance [Normal Oropharynx] : the oropharynx was normal [No JVD] : no jugular venous distention [Supple] : supple [Thyroid Normal, No Nodules] : the thyroid was normal and there were no nodules present [No Lymphadenopathy] : no lymphadenopathy [No Respiratory Distress] : no respiratory distress  [No Accessory Muscle Use] : no accessory muscle use [Clear to Auscultation] : lungs were clear to auscultation bilaterally [Normal S1, S2] : normal S1 and S2 [Normal Rate] : normal rate  [Regular Rhythm] : with a regular rhythm [No Abdominal Bruit] : a ~M bruit was not heard ~T in the abdomen [No Carotid Bruits] : no carotid bruits [No Murmur] : no murmur heard [Pedal Pulses Present] : the pedal pulses are present [No Edema] : there was no peripheral edema [No Varicosities] : no varicosities [No Palpable Aorta] : no palpable aorta [No Extremity Clubbing/Cyanosis] : no extremity clubbing/cyanosis [Non Tender] : non-tender [Soft] : abdomen soft [No Masses] : no abdominal mass palpated [Non-distended] : non-distended [No HSM] : no HSM [Normal Posterior Cervical Nodes] : no posterior cervical lymphadenopathy [Normal Anterior Cervical Nodes] : no anterior cervical lymphadenopathy [Normal Bowel Sounds] : normal bowel sounds [No Spinal Tenderness] : no spinal tenderness [No CVA Tenderness] : no CVA  tenderness [No Rash] : no rash [No Joint Swelling] : no joint swelling [Grossly Normal Strength/Tone] : grossly normal strength/tone [Coordination Grossly Intact] : coordination grossly intact [Normal Gait] : normal gait [Deep Tendon Reflexes (DTR)] : deep tendon reflexes were 2+ and symmetric [No Focal Deficits] : no focal deficits [Normal Affect] : the affect was normal [Normal Insight/Judgement] : insight and judgment were intact

## 2019-06-18 ENCOUNTER — EMERGENCY (EMERGENCY)
Facility: HOSPITAL | Age: 74
LOS: 1 days | Discharge: HOME | End: 2019-06-18
Attending: EMERGENCY MEDICINE | Admitting: EMERGENCY MEDICINE
Payer: MEDICARE

## 2019-06-18 VITALS
TEMPERATURE: 98 F | DIASTOLIC BLOOD PRESSURE: 76 MMHG | HEIGHT: 62 IN | RESPIRATION RATE: 18 BRPM | WEIGHT: 164.91 LBS | OXYGEN SATURATION: 97 % | HEART RATE: 71 BPM | SYSTOLIC BLOOD PRESSURE: 130 MMHG

## 2019-06-18 VITALS
TEMPERATURE: 98 F | HEART RATE: 87 BPM | RESPIRATION RATE: 18 BRPM | SYSTOLIC BLOOD PRESSURE: 110 MMHG | OXYGEN SATURATION: 99 % | DIASTOLIC BLOOD PRESSURE: 69 MMHG

## 2019-06-18 DIAGNOSIS — Z90.49 ACQUIRED ABSENCE OF OTHER SPECIFIED PARTS OF DIGESTIVE TRACT: Chronic | ICD-10-CM

## 2019-06-18 DIAGNOSIS — Z98.890 OTHER SPECIFIED POSTPROCEDURAL STATES: Chronic | ICD-10-CM

## 2019-06-18 DIAGNOSIS — R07.89 OTHER CHEST PAIN: ICD-10-CM

## 2019-06-18 DIAGNOSIS — Z41.9 ENCOUNTER FOR PROCEDURE FOR PURPOSES OTHER THAN REMEDYING HEALTH STATE, UNSPECIFIED: Chronic | ICD-10-CM

## 2019-06-18 DIAGNOSIS — C50.919 MALIGNANT NEOPLASM OF UNSPECIFIED SITE OF UNSPECIFIED FEMALE BREAST: Chronic | ICD-10-CM

## 2019-06-18 DIAGNOSIS — Z90.2 ACQUIRED ABSENCE OF LUNG [PART OF]: Chronic | ICD-10-CM

## 2019-06-18 LAB
ALBUMIN SERPL ELPH-MCNC: 3.8 G/DL — SIGNIFICANT CHANGE UP (ref 3.3–5)
ALP SERPL-CCNC: 69 U/L — SIGNIFICANT CHANGE UP (ref 40–120)
ALT FLD-CCNC: 11 U/L — SIGNIFICANT CHANGE UP (ref 10–45)
ANION GAP SERPL CALC-SCNC: 10 MMOL/L — SIGNIFICANT CHANGE UP (ref 5–17)
APTT BLD: 29.3 SEC — SIGNIFICANT CHANGE UP (ref 27.5–36.3)
AST SERPL-CCNC: 15 U/L — SIGNIFICANT CHANGE UP (ref 10–40)
BASOPHILS # BLD AUTO: 0.02 K/UL — SIGNIFICANT CHANGE UP (ref 0–0.2)
BASOPHILS NFR BLD AUTO: 0.3 % — SIGNIFICANT CHANGE UP (ref 0–2)
BILIRUB SERPL-MCNC: 0.4 MG/DL — SIGNIFICANT CHANGE UP (ref 0.2–1.2)
BUN SERPL-MCNC: 14 MG/DL — SIGNIFICANT CHANGE UP (ref 7–23)
CALCIUM SERPL-MCNC: 9.7 MG/DL — SIGNIFICANT CHANGE UP (ref 8.4–10.5)
CHLORIDE SERPL-SCNC: 106 MMOL/L — SIGNIFICANT CHANGE UP (ref 96–108)
CO2 SERPL-SCNC: 25 MMOL/L — SIGNIFICANT CHANGE UP (ref 22–31)
CREAT SERPL-MCNC: 0.99 MG/DL — SIGNIFICANT CHANGE UP (ref 0.5–1.3)
EOSINOPHIL # BLD AUTO: 0.34 K/UL — SIGNIFICANT CHANGE UP (ref 0–0.5)
EOSINOPHIL NFR BLD AUTO: 4.5 % — SIGNIFICANT CHANGE UP (ref 0–6)
GLUCOSE SERPL-MCNC: 102 MG/DL — HIGH (ref 70–99)
HCT VFR BLD CALC: 35.1 % — SIGNIFICANT CHANGE UP (ref 34.5–45)
HGB BLD-MCNC: 10.9 G/DL — LOW (ref 11.5–15.5)
IMM GRANULOCYTES NFR BLD AUTO: 0.3 % — SIGNIFICANT CHANGE UP (ref 0–1.5)
INR BLD: 0.95 — SIGNIFICANT CHANGE UP (ref 0.88–1.16)
LYMPHOCYTES # BLD AUTO: 1.48 K/UL — SIGNIFICANT CHANGE UP (ref 1–3.3)
LYMPHOCYTES # BLD AUTO: 19.8 % — SIGNIFICANT CHANGE UP (ref 13–44)
MCHC RBC-ENTMCNC: 27.9 PG — SIGNIFICANT CHANGE UP (ref 27–34)
MCHC RBC-ENTMCNC: 31.1 GM/DL — LOW (ref 32–36)
MCV RBC AUTO: 90 FL — SIGNIFICANT CHANGE UP (ref 80–100)
MONOCYTES # BLD AUTO: 0.59 K/UL — SIGNIFICANT CHANGE UP (ref 0–0.9)
MONOCYTES NFR BLD AUTO: 7.9 % — SIGNIFICANT CHANGE UP (ref 2–14)
NEUTROPHILS # BLD AUTO: 5.03 K/UL — SIGNIFICANT CHANGE UP (ref 1.8–7.4)
NEUTROPHILS NFR BLD AUTO: 67.2 % — SIGNIFICANT CHANGE UP (ref 43–77)
NRBC # BLD: 0 /100 WBCS — SIGNIFICANT CHANGE UP (ref 0–0)
PLATELET # BLD AUTO: 290 K/UL — SIGNIFICANT CHANGE UP (ref 150–400)
POTASSIUM SERPL-MCNC: 4.2 MMOL/L — SIGNIFICANT CHANGE UP (ref 3.5–5.3)
POTASSIUM SERPL-SCNC: 4.2 MMOL/L — SIGNIFICANT CHANGE UP (ref 3.5–5.3)
PROT SERPL-MCNC: 6.8 G/DL — SIGNIFICANT CHANGE UP (ref 6–8.3)
PROTHROM AB SERPL-ACNC: 10.7 SEC — SIGNIFICANT CHANGE UP (ref 10–12.9)
RBC # BLD: 3.9 M/UL — SIGNIFICANT CHANGE UP (ref 3.8–5.2)
RBC # FLD: 15.4 % — HIGH (ref 10.3–14.5)
SODIUM SERPL-SCNC: 141 MMOL/L — SIGNIFICANT CHANGE UP (ref 135–145)
WBC # BLD: 7.48 K/UL — SIGNIFICANT CHANGE UP (ref 3.8–10.5)
WBC # FLD AUTO: 7.48 K/UL — SIGNIFICANT CHANGE UP (ref 3.8–10.5)

## 2019-06-18 PROCEDURE — 82553 CREATINE MB FRACTION: CPT

## 2019-06-18 PROCEDURE — 85730 THROMBOPLASTIN TIME PARTIAL: CPT

## 2019-06-18 PROCEDURE — 36415 COLL VENOUS BLD VENIPUNCTURE: CPT

## 2019-06-18 PROCEDURE — 99284 EMERGENCY DEPT VISIT MOD MDM: CPT | Mod: 25

## 2019-06-18 PROCEDURE — 71046 X-RAY EXAM CHEST 2 VIEWS: CPT | Mod: 26

## 2019-06-18 PROCEDURE — 84484 ASSAY OF TROPONIN QUANT: CPT

## 2019-06-18 PROCEDURE — 85025 COMPLETE CBC W/AUTO DIFF WBC: CPT

## 2019-06-18 PROCEDURE — 71046 X-RAY EXAM CHEST 2 VIEWS: CPT

## 2019-06-18 PROCEDURE — 99283 EMERGENCY DEPT VISIT LOW MDM: CPT | Mod: 25

## 2019-06-18 PROCEDURE — 93010 ELECTROCARDIOGRAM REPORT: CPT

## 2019-06-18 PROCEDURE — 93005 ELECTROCARDIOGRAM TRACING: CPT

## 2019-06-18 PROCEDURE — 82550 ASSAY OF CK (CPK): CPT

## 2019-06-18 PROCEDURE — 80053 COMPREHEN METABOLIC PANEL: CPT

## 2019-06-18 PROCEDURE — 85610 PROTHROMBIN TIME: CPT

## 2019-06-18 NOTE — ED ADULT NURSE NOTE - CHPI ED NUR SYMPTOMS NEG
no dizziness/no fever/no vomiting/no diaphoresis/no nausea/no syncope/no shortness of breath/no back pain/no chills

## 2019-06-18 NOTE — ED ADULT TRIAGE NOTE - CHIEF COMPLAINT QUOTE
Patient complaining of intermittent non-radiating midsternal chest pain since this morning.  Patient denies any SOB, dizziness, N/V, abominal pain or any other complaints at this time.  EKG in progress.

## 2019-06-18 NOTE — ED PROVIDER NOTE - ATTENDING CONTRIBUTION TO CARE
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 Pressure lasted less than 1 hour and then improved. No pain now. Denies sob, palpitations, sweats, dizziness, n/v, abd pain, LE swelling, cough. No pain currently. Pt AAO, NAD, RRR, CTA b/l, abd: soft and NT. EKG NSR @ 73, no ischemia. Labs, cxr unremarkable. No SOB with normal O2 sat and HR no suspicion for PE. Pt does not want stay and is trying to leave ED before bloodwork. Trop negative. Case discussed with pt's PCP - Dr. Tellez- and pt to f/up outpt.

## 2019-06-18 NOTE — ED PROVIDER NOTE - CARE PROVIDER_API CALL
Chantel Tellez)  Critical Care Medicine; Internal Medicine  [address], [address]  [place], [place] [address]  Phone: [phone]  Fax: [phone]  Follow Up Time:

## 2019-06-18 NOTE — ED ADULT NURSE NOTE - OBJECTIVE STATEMENT
pt received sitting up in bed, A&O x 3. hx Brain CA, Lung CA, Breast CA. pt in remission for CA, follows up with outpatient oncology. pt arrived to ED today with c/o midsternal chest pain that started this morning at approx 7am. pt currently denies pain. Denies n/v/d,fever. Endorses non-productive cough for unspecified time. NAD noted at this time, EKG done at triage. Will continue to monitor.

## 2019-06-18 NOTE — ED PROVIDER NOTE - DIAGNOSTIC INTERPRETATION
ER PA: Citlali Garcia, PAC  CHEST XRAY INTERPRETATION: lungs clear, heart shadow normal, bony structures intact

## 2019-06-18 NOTE — ED PROVIDER NOTE - CLINICAL SUMMARY MEDICAL DECISION MAKING FREE TEXT BOX
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 No sob. No pain now. EKG NSR @ 73, no ischemia. 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 No sob. No pain now. EKG NSR @ 73, no ischemia. Labs, cxr unremarkable. NO sob with normal O2 sat and HR no suspicion for PE

## 2019-06-18 NOTE — ED PROVIDER NOTE - OBJECTIVE STATEMENT
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 Pressure lasted less than 1 hour and then improved. No pain now. Denies sob, palpitations, sweats, dizziness, n/v, abd pain, LE swelling, cough.

## 2019-06-21 ENCOUNTER — INBOUND DOCUMENT (OUTPATIENT)
Age: 74
End: 2019-06-21

## 2019-06-21 ENCOUNTER — APPOINTMENT (OUTPATIENT)
Dept: INTERNAL MEDICINE | Facility: CLINIC | Age: 74
End: 2019-06-21
Payer: MEDICARE

## 2019-06-21 VITALS
TEMPERATURE: 98.7 F | SYSTOLIC BLOOD PRESSURE: 104 MMHG | BODY MASS INDEX: 27.49 KG/M2 | WEIGHT: 165 LBS | OXYGEN SATURATION: 97 % | DIASTOLIC BLOOD PRESSURE: 67 MMHG | HEIGHT: 65 IN | HEART RATE: 74 BPM

## 2019-06-21 DIAGNOSIS — R07.9 CHEST PAIN, UNSPECIFIED: ICD-10-CM

## 2019-06-21 PROCEDURE — 99213 OFFICE O/P EST LOW 20 MIN: CPT

## 2019-06-21 NOTE — PHYSICAL EXAM

## 2019-06-21 NOTE — HEALTH RISK ASSESSMENT
[] : No [No] : No [No falls in past year] : Patient reported no falls in the past year [0] : 2) Feeling down, depressed, or hopeless: Not at all (0) [[id number]] : 0

## 2019-06-21 NOTE — HISTORY OF PRESENT ILLNESS
[FreeTextEntry1] : Recent ED visit for chest pain; all studies negative and discharged [de-identified] : As above no reoccurrence of chest pain.

## 2019-06-24 ENCOUNTER — APPOINTMENT (OUTPATIENT)
Dept: NEUROSURGERY | Facility: CLINIC | Age: 74
End: 2019-06-24
Payer: MEDICARE

## 2019-06-24 VITALS
BODY MASS INDEX: 27.49 KG/M2 | DIASTOLIC BLOOD PRESSURE: 79 MMHG | HEIGHT: 65 IN | OXYGEN SATURATION: 96 % | RESPIRATION RATE: 16 BRPM | WEIGHT: 165 LBS | SYSTOLIC BLOOD PRESSURE: 117 MMHG | HEART RATE: 83 BPM

## 2019-06-24 PROCEDURE — 99214 OFFICE O/P EST MOD 30 MIN: CPT

## 2019-06-24 NOTE — HISTORY OF PRESENT ILLNESS
[FreeTextEntry1] : 73yr old female with PMH of metastatic breast CA and neuroendocrine tumor. She is s/p right frontal resection of scalp mass on 5/10/18. She did not want to undergo any treatment following resection but agreed to SBRT, which completed on 7/30/18 with Dr. Cam. She does not want any chemotherapy. She follows with Dr. Hampton.\par \par Radiologist stated liver lesions were too small to bx at this time, recommendation was to do another abd U/S in March and evaluate the findings at that time for possible surgery per Dr. Givens. \par \par She was seen by Dr. Catsillo 2/13/19 and started on IV Avastin 2/15/19, she is getting IV Avastin Q4 weeks\par \par She was recently hospitalized for ischemic colitis. Avastin was discontinued. She presents today with a new MRI. She is being managed conservatively for her colitis.\par \par She was recently hospitalized after an argument with her  for cough, fatigue/malaise and speech difficulty. \par \par Currently is doing well, no acute issues. Would like to know when she should get her new MRI Brain.

## 2019-06-24 NOTE — ASSESSMENT
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 The patient was extensively educated about the nature of the disease process. Therapeutic and diagnostic tests include serial MRI and follow-up.  The patient should see me in 3-6 months. She has plans to see Dr. Taylor with neuropsychology for cognitive behavioral therapy. We also recommend she and her  undergo behavioral therapy together to establish better coping strategies for times of stress. I have explained the alternatives, risks and benefits to the patient and patient understands and agrees to proceed.

## 2019-06-26 ENCOUNTER — APPOINTMENT (OUTPATIENT)
Dept: PULMONOLOGY | Facility: CLINIC | Age: 74
End: 2019-06-26

## 2019-07-01 ENCOUNTER — CLINICAL ADVICE (OUTPATIENT)
Age: 74
End: 2019-07-01

## 2019-07-02 ENCOUNTER — INBOUND DOCUMENT (OUTPATIENT)
Age: 74
End: 2019-07-02

## 2019-07-09 ENCOUNTER — OUTPATIENT (OUTPATIENT)
Dept: OUTPATIENT SERVICES | Facility: HOSPITAL | Age: 74
LOS: 1 days | End: 2019-07-09

## 2019-07-09 ENCOUNTER — APPOINTMENT (OUTPATIENT)
Dept: RADIOLOGY | Facility: CLINIC | Age: 74
End: 2019-07-09
Payer: MEDICARE

## 2019-07-09 DIAGNOSIS — Z90.49 ACQUIRED ABSENCE OF OTHER SPECIFIED PARTS OF DIGESTIVE TRACT: Chronic | ICD-10-CM

## 2019-07-09 DIAGNOSIS — Z98.890 OTHER SPECIFIED POSTPROCEDURAL STATES: Chronic | ICD-10-CM

## 2019-07-09 DIAGNOSIS — Z41.9 ENCOUNTER FOR PROCEDURE FOR PURPOSES OTHER THAN REMEDYING HEALTH STATE, UNSPECIFIED: Chronic | ICD-10-CM

## 2019-07-09 DIAGNOSIS — C50.919 MALIGNANT NEOPLASM OF UNSPECIFIED SITE OF UNSPECIFIED FEMALE BREAST: Chronic | ICD-10-CM

## 2019-07-09 DIAGNOSIS — Z90.2 ACQUIRED ABSENCE OF LUNG [PART OF]: Chronic | ICD-10-CM

## 2019-07-09 PROCEDURE — 77080 DXA BONE DENSITY AXIAL: CPT | Mod: 26

## 2019-07-12 ENCOUNTER — RX RENEWAL (OUTPATIENT)
Age: 74
End: 2019-07-12

## 2019-07-15 ENCOUNTER — APPOINTMENT (OUTPATIENT)
Dept: MRI IMAGING | Facility: CLINIC | Age: 74
End: 2019-07-15
Payer: MEDICARE

## 2019-07-15 ENCOUNTER — APPOINTMENT (OUTPATIENT)
Dept: INTERNAL MEDICINE | Facility: CLINIC | Age: 74
End: 2019-07-15
Payer: MEDICARE

## 2019-07-15 ENCOUNTER — OUTPATIENT (OUTPATIENT)
Dept: OUTPATIENT SERVICES | Facility: HOSPITAL | Age: 74
LOS: 1 days | End: 2019-07-15

## 2019-07-15 VITALS
HEIGHT: 65 IN | WEIGHT: 162 LBS | BODY MASS INDEX: 26.99 KG/M2 | OXYGEN SATURATION: 96 % | SYSTOLIC BLOOD PRESSURE: 60 MMHG | DIASTOLIC BLOOD PRESSURE: 35 MMHG | HEART RATE: 88 BPM | TEMPERATURE: 98.7 F

## 2019-07-15 VITALS — SYSTOLIC BLOOD PRESSURE: 100 MMHG | DIASTOLIC BLOOD PRESSURE: 70 MMHG

## 2019-07-15 DIAGNOSIS — Z41.9 ENCOUNTER FOR PROCEDURE FOR PURPOSES OTHER THAN REMEDYING HEALTH STATE, UNSPECIFIED: Chronic | ICD-10-CM

## 2019-07-15 DIAGNOSIS — Z90.2 ACQUIRED ABSENCE OF LUNG [PART OF]: Chronic | ICD-10-CM

## 2019-07-15 DIAGNOSIS — C50.919 MALIGNANT NEOPLASM OF UNSPECIFIED SITE OF UNSPECIFIED FEMALE BREAST: Chronic | ICD-10-CM

## 2019-07-15 DIAGNOSIS — Z98.890 OTHER SPECIFIED POSTPROCEDURAL STATES: Chronic | ICD-10-CM

## 2019-07-15 DIAGNOSIS — Z90.49 ACQUIRED ABSENCE OF OTHER SPECIFIED PARTS OF DIGESTIVE TRACT: Chronic | ICD-10-CM

## 2019-07-15 DIAGNOSIS — J45.909 UNSPECIFIED ASTHMA, UNCOMPLICATED: ICD-10-CM

## 2019-07-15 PROCEDURE — 74183 MRI ABD W/O CNTR FLWD CNTR: CPT | Mod: 26

## 2019-07-15 PROCEDURE — 99213 OFFICE O/P EST LOW 20 MIN: CPT

## 2019-07-15 NOTE — ASSESSMENT
[FreeTextEntry1] : BP actually stable; No change in current regimen. Follow up  on various consultants;  Mental status excellent

## 2019-07-19 ENCOUNTER — APPOINTMENT (OUTPATIENT)
Dept: MAMMOGRAPHY | Facility: CLINIC | Age: 74
End: 2019-07-19
Payer: MEDICARE

## 2019-07-19 ENCOUNTER — OUTPATIENT (OUTPATIENT)
Dept: OUTPATIENT SERVICES | Facility: HOSPITAL | Age: 74
LOS: 1 days | End: 2019-07-19

## 2019-07-19 ENCOUNTER — APPOINTMENT (OUTPATIENT)
Dept: ULTRASOUND IMAGING | Facility: CLINIC | Age: 74
End: 2019-07-19
Payer: MEDICARE

## 2019-07-19 DIAGNOSIS — Z98.890 OTHER SPECIFIED POSTPROCEDURAL STATES: Chronic | ICD-10-CM

## 2019-07-19 DIAGNOSIS — Z41.9 ENCOUNTER FOR PROCEDURE FOR PURPOSES OTHER THAN REMEDYING HEALTH STATE, UNSPECIFIED: Chronic | ICD-10-CM

## 2019-07-19 DIAGNOSIS — C50.919 MALIGNANT NEOPLASM OF UNSPECIFIED SITE OF UNSPECIFIED FEMALE BREAST: Chronic | ICD-10-CM

## 2019-07-19 DIAGNOSIS — Z90.49 ACQUIRED ABSENCE OF OTHER SPECIFIED PARTS OF DIGESTIVE TRACT: Chronic | ICD-10-CM

## 2019-07-19 DIAGNOSIS — Z90.2 ACQUIRED ABSENCE OF LUNG [PART OF]: Chronic | ICD-10-CM

## 2019-07-19 PROCEDURE — 76641 ULTRASOUND BREAST COMPLETE: CPT | Mod: 26,RT

## 2019-07-19 PROCEDURE — 77067 SCR MAMMO BI INCL CAD: CPT | Mod: 26

## 2019-07-19 PROCEDURE — 77063 BREAST TOMOSYNTHESIS BI: CPT | Mod: 26

## 2019-07-22 ENCOUNTER — APPOINTMENT (OUTPATIENT)
Dept: INTERNAL MEDICINE | Facility: CLINIC | Age: 74
End: 2019-07-22
Payer: MEDICARE

## 2019-07-22 VITALS
HEIGHT: 65 IN | BODY MASS INDEX: 26.99 KG/M2 | DIASTOLIC BLOOD PRESSURE: 65 MMHG | SYSTOLIC BLOOD PRESSURE: 103 MMHG | OXYGEN SATURATION: 97 % | WEIGHT: 162 LBS | HEART RATE: 89 BPM | TEMPERATURE: 98.5 F

## 2019-07-22 PROCEDURE — 99213 OFFICE O/P EST LOW 20 MIN: CPT

## 2019-07-22 NOTE — ASSESSMENT
[FreeTextEntry1] : Discussed xray studies with Ms Rivers; She will follow up with Dr. Michelle tomorrow. Also  will review imaging studies; Further plans after above

## 2019-07-22 NOTE — PHYSICAL EXAM
[No Acute Distress] : no acute distress [Well Nourished] : well nourished [Well Developed] : well developed [Well-Appearing] : well-appearing [PERRL] : pupils equal round and reactive to light [Normal Sclera/Conjunctiva] : normal sclera/conjunctiva [EOMI] : extraocular movements intact [Normal Outer Ear/Nose] : the outer ears and nose were normal in appearance [Normal Oropharynx] : the oropharynx was normal [No JVD] : no jugular venous distention [No Lymphadenopathy] : no lymphadenopathy [Supple] : supple [No Respiratory Distress] : no respiratory distress  [Thyroid Normal, No Nodules] : the thyroid was normal and there were no nodules present [No Accessory Muscle Use] : no accessory muscle use [Normal Rate] : normal rate  [Clear to Auscultation] : lungs were clear to auscultation bilaterally [Normal S1, S2] : normal S1 and S2 [Regular Rhythm] : with a regular rhythm [No Murmur] : no murmur heard [No Abdominal Bruit] : a ~M bruit was not heard ~T in the abdomen [No Carotid Bruits] : no carotid bruits [Pedal Pulses Present] : the pedal pulses are present [No Varicosities] : no varicosities [No Edema] : there was no peripheral edema [No Extremity Clubbing/Cyanosis] : no extremity clubbing/cyanosis [No Palpable Aorta] : no palpable aorta [Soft] : abdomen soft [Non-distended] : non-distended [Non Tender] : non-tender [No Masses] : no abdominal mass palpated [No HSM] : no HSM [Normal Posterior Cervical Nodes] : no posterior cervical lymphadenopathy [Normal Bowel Sounds] : normal bowel sounds [Normal Anterior Cervical Nodes] : no anterior cervical lymphadenopathy [No CVA Tenderness] : no CVA  tenderness [No Spinal Tenderness] : no spinal tenderness [No Joint Swelling] : no joint swelling [Grossly Normal Strength/Tone] : grossly normal strength/tone [Coordination Grossly Intact] : coordination grossly intact [No Rash] : no rash [No Focal Deficits] : no focal deficits [Normal Gait] : normal gait [Deep Tendon Reflexes (DTR)] : deep tendon reflexes were 2+ and symmetric [Normal Affect] : the affect was normal [Normal Insight/Judgement] : insight and judgment were intact

## 2019-07-22 NOTE — HISTORY OF PRESENT ILLNESS
[de-identified] : As above  Progression of Hepatic Metastasis.  Also Lesion in lung;  Lesion of Right Breast; Dr. Marquez to see patient tomorrow; ;Will need biopsy; Dr. Wright to review studies;  \par Will likely need further chemotherapy if patient agrees;  [FreeTextEntry1] : Interim reviewed; All xray studies noted;

## 2019-07-24 ENCOUNTER — APPOINTMENT (OUTPATIENT)
Dept: INTERNAL MEDICINE | Facility: CLINIC | Age: 74
End: 2019-07-24

## 2019-07-25 ENCOUNTER — FORM ENCOUNTER (OUTPATIENT)
Age: 74
End: 2019-07-25

## 2019-07-26 ENCOUNTER — APPOINTMENT (OUTPATIENT)
Dept: THORACIC SURGERY | Facility: CLINIC | Age: 74
End: 2019-07-26
Payer: MEDICARE

## 2019-07-26 ENCOUNTER — APPOINTMENT (OUTPATIENT)
Dept: CT IMAGING | Facility: HOSPITAL | Age: 74
End: 2019-07-26

## 2019-07-26 ENCOUNTER — OUTPATIENT (OUTPATIENT)
Dept: OUTPATIENT SERVICES | Facility: HOSPITAL | Age: 74
LOS: 1 days | End: 2019-07-26
Payer: MEDICARE

## 2019-07-26 VITALS
DIASTOLIC BLOOD PRESSURE: 57 MMHG | HEIGHT: 65 IN | HEART RATE: 75 BPM | WEIGHT: 164 LBS | RESPIRATION RATE: 19 BRPM | BODY MASS INDEX: 27.32 KG/M2 | SYSTOLIC BLOOD PRESSURE: 116 MMHG | OXYGEN SATURATION: 98 %

## 2019-07-26 DIAGNOSIS — Z41.9 ENCOUNTER FOR PROCEDURE FOR PURPOSES OTHER THAN REMEDYING HEALTH STATE, UNSPECIFIED: Chronic | ICD-10-CM

## 2019-07-26 DIAGNOSIS — Z98.890 OTHER SPECIFIED POSTPROCEDURAL STATES: Chronic | ICD-10-CM

## 2019-07-26 DIAGNOSIS — Z90.49 ACQUIRED ABSENCE OF OTHER SPECIFIED PARTS OF DIGESTIVE TRACT: Chronic | ICD-10-CM

## 2019-07-26 DIAGNOSIS — C50.919 MALIGNANT NEOPLASM OF UNSPECIFIED SITE OF UNSPECIFIED FEMALE BREAST: Chronic | ICD-10-CM

## 2019-07-26 DIAGNOSIS — Z90.2 ACQUIRED ABSENCE OF LUNG [PART OF]: Chronic | ICD-10-CM

## 2019-07-26 PROCEDURE — 99214 OFFICE O/P EST MOD 30 MIN: CPT

## 2019-07-26 PROCEDURE — 71250 CT THORAX DX C-: CPT

## 2019-07-26 PROCEDURE — 71250 CT THORAX DX C-: CPT | Mod: 26

## 2019-07-26 NOTE — PHYSICAL EXAM
[Sclera] : the sclera and conjunctiva were normal [PERRL With Normal Accommodation] : pupils were equal in size, round, and reactive to light [Neck Appearance] : the appearance of the neck was normal [] : no respiratory distress [Respiration, Rhythm And Depth] : normal respiratory rhythm and effort [Apical Impulse] : the apical impulse was normal [Examination Of The Chest] : the chest was normal in appearance [Heart Rate And Rhythm] : heart rate was normal and rhythm regular [2+] : right 2+ [Bowel Sounds] : normal bowel sounds [Abdomen Soft] : soft [Abnormal Walk] : normal gait [Nail Clubbing] : no clubbing  or cyanosis of the fingernails [Musculoskeletal - Swelling] : no joint swelling seen [Skin Color & Pigmentation] : normal skin color and pigmentation [Skin Turgor] : normal skin turgor [Oriented To Time, Place, And Person] : oriented to person, place, and time [Impaired Insight] : insight and judgment were intact [Affect] : the affect was normal

## 2019-07-29 NOTE — ASSESSMENT
[FreeTextEntry1] : 74 y/o female nearly five years s/p Right upper lobectomy. 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 \par \par Overall, she generally appears well, she has no pulmonary complaints at this time. PET scan completed on 1/7/19 was reviewed which revealed no evidence of recurrent disease.  Patient has CT guided biopsy of the liver scheduled for 8/1/19 with Dr. Wright. CT chest w/o contrast will be ordered to evaluate the lung. There is also an abnormality in her RIGHT breast, which will be managed by Dr. Davis. Once the CT chest is reviewed and the results of the liver biopsy are back, we will set the patient up for a Dotatate versus FDG PET scan for further evaluation.  She should continue to follow up with Dr. Gonzales. \par \par PLAN:\par 1. CT chest w/o\par 2. Once we review results of CT chest and Liver biopsy, will set patient up for PET scan (Dotatate vs. FDG)\par

## 2019-07-29 NOTE — HISTORY OF PRESENT ILLNESS
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 She presents today for a follow up visit and for coordination of care.\par \par She completed brain RT with Dr. Kenton Owens.\par \par On 6/6/17 she underwent radiofrequency ablation of a metastatic breast cancer lesion in the liver. On 6/29/17 the patient underwent a colonoscopy with Dr. 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 She subsequently underwent laparoscopic Right hemicolectomy on 7/6/17. 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 She continues to be followed by Dr. Li Hampton. \par \par CT chest done 3/6/18 showed no evidence of local recurrence of disease, and no intrathoracic adenopathy. Slightly decrease in size of previously ablated right hepatic lobe lesion.\par \par On 5/10/18 patient underwent exploration of RIGHT cranioplasty with excision of RIGHT scalp mass. 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 \par \par On 5/14/18 she presented to the [facility] ED with complaints of epigastric pain. CT angio was negative for PE, and there was no evidence of disease recurrence; bilateral pulmonary nodules were stable. \par \par On 5/15/18 she was brought to the [facility] ED on the recommendation of her psychiatrist for an emotional and threats of violence against her . She was evaluated by neurosurgery and psychiatry. Head CT was done which showed a slightly greater degree of diminished attenuation within the right frontal subcortical, likely edema. She was started on Abilify, which has since been stopped.\par \par She was seen by Dr. Hampton on 5/24/18, who again recommended systemic chemotherapy, however the patient again refused. She continues to take Femara, however she is no longer taking ribociclib. \par \par Restaging PET CT done on 6/3/18 showed no abnormal hypermetabolic activity in the lungs or mediastinum, nor in the metastatic liver lesion.\par \par ECHO done 6/6/18 to evaluate acute shortness of breath. \par ECHO revealed mild LV hypertrophy. EF of 60-65%. No evidence of valvular disease. Normal right atrial pressure.\par \par MRI brain completed on 12/7/18:\par - interval decreased size and enhancement and improved edema and mass effect of right frontal radiation necrosis. \par - no new abnormal enhancement\par \par PET CT completed on 01/07/19:\par -new foci of abnormal FDG uptake in hepatic segments II and VII without CT correlate. \par -no change in the previously ablated 3.6cm hypoattenuating lesion in the hepatic segment Coleen without abnormal FDG-uptake\par -no change in the Right middle lobe nodule since June 2018 \par \par MRI abdomen completed on 03/19/19:\par -no interval change in the 7-8 lesions within the Right and left lobe of the liver\par -postablation lesion in hepatic segment 4 lesion, unchanged \par \par Thoracic Tumor Board recommendation was to have the liver lesion biopsied, however was unable to biopsied. \par \par

## 2019-08-01 ENCOUNTER — APPOINTMENT (OUTPATIENT)
Dept: INTERVENTIONAL RADIOLOGY/VASCULAR | Facility: HOSPITAL | Age: 74
End: 2019-08-01

## 2019-08-04 ENCOUNTER — FORM ENCOUNTER (OUTPATIENT)
Age: 74
End: 2019-08-04

## 2019-08-05 ENCOUNTER — RESULT REVIEW (OUTPATIENT)
Age: 74
End: 2019-08-05

## 2019-08-05 ENCOUNTER — APPOINTMENT (OUTPATIENT)
Dept: INTERVENTIONAL RADIOLOGY/VASCULAR | Facility: HOSPITAL | Age: 74
End: 2019-08-05

## 2019-08-05 ENCOUNTER — APPOINTMENT (OUTPATIENT)
Dept: INTERVENTIONAL RADIOLOGY/VASCULAR | Facility: HOSPITAL | Age: 74
End: 2019-08-05
Payer: MEDICARE

## 2019-08-05 ENCOUNTER — APPOINTMENT (OUTPATIENT)
Dept: CT IMAGING | Facility: HOSPITAL | Age: 74
End: 2019-08-05
Payer: MEDICARE

## 2019-08-05 ENCOUNTER — OUTPATIENT (OUTPATIENT)
Dept: OUTPATIENT SERVICES | Facility: HOSPITAL | Age: 74
LOS: 1 days | End: 2019-08-05
Payer: MEDICARE

## 2019-08-05 ENCOUNTER — APPOINTMENT (OUTPATIENT)
Dept: CT IMAGING | Facility: HOSPITAL | Age: 74
End: 2019-08-05

## 2019-08-05 DIAGNOSIS — Z98.890 OTHER SPECIFIED POSTPROCEDURAL STATES: Chronic | ICD-10-CM

## 2019-08-05 DIAGNOSIS — Z90.2 ACQUIRED ABSENCE OF LUNG [PART OF]: Chronic | ICD-10-CM

## 2019-08-05 DIAGNOSIS — Z90.49 ACQUIRED ABSENCE OF OTHER SPECIFIED PARTS OF DIGESTIVE TRACT: Chronic | ICD-10-CM

## 2019-08-05 DIAGNOSIS — Z41.9 ENCOUNTER FOR PROCEDURE FOR PURPOSES OTHER THAN REMEDYING HEALTH STATE, UNSPECIFIED: Chronic | ICD-10-CM

## 2019-08-05 DIAGNOSIS — C50.919 MALIGNANT NEOPLASM OF UNSPECIFIED SITE OF UNSPECIFIED FEMALE BREAST: Chronic | ICD-10-CM

## 2019-08-05 PROCEDURE — 47000 NEEDLE BIOPSY OF LIVER PERQ: CPT

## 2019-08-05 PROCEDURE — 88341 IMHCHEM/IMCYTCHM EA ADD ANTB: CPT

## 2019-08-05 PROCEDURE — 77012 CT SCAN FOR NEEDLE BIOPSY: CPT | Mod: 26,76

## 2019-08-05 PROCEDURE — 87075 CULTR BACTERIA EXCEPT BLOOD: CPT

## 2019-08-05 PROCEDURE — 88305 TISSUE EXAM BY PATHOLOGIST: CPT

## 2019-08-05 PROCEDURE — 32405: CPT

## 2019-08-05 PROCEDURE — 88342 IMHCHEM/IMCYTCHM 1ST ANTB: CPT | Mod: XU

## 2019-08-05 PROCEDURE — 87070 CULTURE OTHR SPECIMN AEROBIC: CPT

## 2019-08-05 PROCEDURE — 71045 X-RAY EXAM CHEST 1 VIEW: CPT | Mod: 26

## 2019-08-05 PROCEDURE — 88360 TUMOR IMMUNOHISTOCHEM/MANUAL: CPT

## 2019-08-05 PROCEDURE — 87102 FUNGUS ISOLATION CULTURE: CPT

## 2019-08-05 PROCEDURE — 71045 X-RAY EXAM CHEST 1 VIEW: CPT

## 2019-08-05 PROCEDURE — 77012 CT SCAN FOR NEEDLE BIOPSY: CPT

## 2019-08-05 PROCEDURE — 88307 TISSUE EXAM BY PATHOLOGIST: CPT

## 2019-08-05 PROCEDURE — 88173 CYTOPATH EVAL FNA REPORT: CPT

## 2019-08-06 ENCOUNTER — APPOINTMENT (OUTPATIENT)
Dept: ULTRASOUND IMAGING | Facility: HOSPITAL | Age: 74
End: 2019-08-06

## 2019-08-06 ENCOUNTER — APPOINTMENT (OUTPATIENT)
Dept: CT IMAGING | Facility: HOSPITAL | Age: 74
End: 2019-08-06

## 2019-08-06 ENCOUNTER — APPOINTMENT (OUTPATIENT)
Dept: INTERVENTIONAL RADIOLOGY/VASCULAR | Facility: HOSPITAL | Age: 74
End: 2019-08-06

## 2019-08-06 ENCOUNTER — APPOINTMENT (OUTPATIENT)
Dept: GASTROENTEROLOGY | Facility: CLINIC | Age: 74
End: 2019-08-06
Payer: MEDICARE

## 2019-08-06 VITALS
SYSTOLIC BLOOD PRESSURE: 90 MMHG | WEIGHT: 162 LBS | RESPIRATION RATE: 14 BRPM | OXYGEN SATURATION: 93 % | BODY MASS INDEX: 26.96 KG/M2 | DIASTOLIC BLOOD PRESSURE: 60 MMHG | HEART RATE: 85 BPM

## 2019-08-06 LAB
GRAM STN FLD: SIGNIFICANT CHANGE UP
SPECIMEN SOURCE: SIGNIFICANT CHANGE UP

## 2019-08-06 PROCEDURE — 99214 OFFICE O/P EST MOD 30 MIN: CPT

## 2019-08-06 NOTE — PHYSICAL EXAM
[General Appearance - Alert] : alert [General Appearance - In No Acute Distress] : in no acute distress [Sclera] : the sclera and conjunctiva were normal [Hearing Threshold Finger Rub Not Upson] : hearing was normal [Neck Appearance] : the appearance of the neck was normal [] : no respiratory distress [Abnormal Walk] : normal gait [Skin Color & Pigmentation] : normal skin color and pigmentation [Oriented To Time, Place, And Person] : oriented to person, place, and time [Impaired Insight] : insight and judgment were intact [Affect] : the affect was normal

## 2019-08-06 NOTE — ASSESSMENT
[FreeTextEntry1] : Ms. Rivers is clinically stable and without new symptoms. She appears to be doing quite well on PPI therapy. A review of her imaging and clinical notes form thoracic surgery, do not suggest changes in the appearance of her esophagus. Will continue to follow this as she does get serial axial imaging. Giving the absence of symptoms, in the context of her multiple medical concerns will simply manage expectantly for now. \par \par \par Plan:\par \par 1. Continue oral PPI\par 2. Review axial imaging when available. Will Liaison with thoracic surgery when they will be obtaining next chest imaging. \par

## 2019-08-06 NOTE — HISTORY OF PRESENT ILLNESS
WHAT IS COLONOSCOPY?  Colonoscopy is an effective procedure to diagnose abnormalities of the large intestine and to screen for colorectal cancer and colorectal polyps. A colonoscope is a long, thin flexible instrument that provides magnified views of the colon and rectum. The procedure is frequently performed in an outpatient setting with minimal discomfort and inconvenience. Because colonoscopy allows doctors to identify and remove certain types of colon polyps that may develop into cancer, colonoscopy can be a therapeutic and even life-saving procedure.   WHO SHOULD HAVE A COLONOSCOPY?  Screening refers to the process of examining otherwise healthy patients in an effort to detect previously undiagnosed colon polyps or cancer. The goal of a screening program is to detect disease at its earliest stages to allow for successful treatment. As part of a colorectal cancer screening program, colonoscopy is routinely recommended to adults starting at age 50. Patients who have a family history of colon or rectal cancer or polyps may be recommended for a colonoscopy earlier and more frequently than those without a family history of cancer. Your doctor may also recommend a colonoscopy to evaluate symptoms such as rectal bleeding, a change in bowel habits, or unexplained abdominal pains.  COLONOSCOPY MAY ALSO BE RECOMMENDED FOR:  • Follow-up examinations for patients who have a personal history of colon or rectal polyps or cancer  • Patients with acute or chronic anemia  • Patients with inflammatory bowel disease (e.g., Crohn’s disease or ulcerative colitis)  • Patients with certain familial hereditary conditions such as hereditary nonpolyposis colorectal cancer (also known as Morales syndrome)  WHO CAN PERFORM A COLONOSCOPY?  A colonoscopy is performed by experienced physicians who are specially trained in this type of procedure. Typically, colonoscopy is performed by gastroenterologists, colorectal surgeons, or general  surgeons.  HOW IS COLONOSCOPY PERFORMED?  One or two days prior to the procedure, most patients must complete a bowel “prep”- a prescribed preparation consisting of liquids that will cleanse the bowels of stool and other residue. This allows for complete visualization of the bowel surface during the procedure. Your doctor will most likely give you a list of dietary and medication restrictions to adhere to in the days leading up to the procedure. The most important part of the procedure is your completion of the cleansing process as requested by your physician. If you have any questions at all, do not hesitate to discuss your concerns with your physician before the day of the procedure.  During the colonoscopy, most patients receive intravenous sedation. One or more medications are administered to help patients remain comfortable for the duration of the procedure. The colonoscope is inserted via the rectum and advanced to the first portion of the colon, where it is connected to the end of the small intestine. Any polyps or other abnormalities encountered during the colonoscopy will be removed and/or biopsied and sent for analysis.  For most patients, the entire procedure takes less than an hour. After the colonoscopy is completed some patients may experience slight discomfort in the form of abdominal cramping and “gas pains,” though this quickly resolves by passing any gas/air that was insufflated during the procedure. In many cases, patients do not recall specifics of the procedure itself due to the sedation. It is always important to have the individual who will be taking you home be there to discuss the discharge instructions with the physician and nurse before discharge.  Following a colonoscopy, patients usually resume their regular diet. Resumption of your pre-procedure medications will be determined by your physician. Some restrictions for driving and activity levels apply when intravenous sedation medications  are given to sedate patients immediately prior to colonoscopy. These medications affect judgment and coordination for variable amounts of time following the procedure. Most patients are able to resume normal activity the morning following the colonoscopy.  WHAT ARE THE BENEFITS OF COLONOSCOPY?  Colonoscopy is the recommended means of colorectal cancer screening. The procedure allows for detection and removal of colon polyps that are prone to transform into cancer.  WHAT ARE THE RISKS OF COLONOSCOPY?  Colonoscopy is a very safe procedure with few complications, occurring in less than 1% of patients. Infrequent risks include bleeding, perforation (a tear in the intestine), rare side effects from sedation medicines, and inability to visualize the entire colon for polyps or other conditions. For anatomical reasons your physician may deem it unsafe to complete the colonoscopy and your physician will therefore terminate the examination. In such instances, your physician will discuss with you whether or not additional or alternative examinations are indicated.  DISCLAIMER  The American Society of Colon and Rectal Surgeons is dedicated to ensuring high-quality patient care by advancing the science, prevention and management of disorders and diseases of the colon, rectum and anus. These brochures are inclusive but not prescriptive. Their purpose is to provide information on diseases and processes, rather than dictate a specific form of treatment. They are intended for the use of all practitioners, health care workers and patients who desire information about the management of the conditions addressed. It should be recognized that these brochures should not be deemed inclusive of all proper methods of care or exclusive of methods of care reasonably directed to obtain the same results. The ultimate judgment regarding the propriety of any specific procedure must be made by the physician in light of all the circumstances  [FreeTextEntry1] : Ms. Rivers returns to the GI clinic in follow up. She is asymptomatic from the GI standpoint. She specifically denies dysphagia , odynophagia or heartburn. Her quality of life is relatively preserved. She believes that one of her providers mentioned a enlarged esophagus. Ms. Rivers is not clear where this conclusion was drawn from. She presents today in follow up. presented by the individual patient.     [facility]  What You Need to Know for Your Procedure    We’re happy you and your physician have chosen [facility] for your care. Your procedure has been scheduled through your physician’s office. There are a few things you will need to know prior to arriving for your procedure.    Procedure date and time:     Please arrive at:     ++Please note that your procedure time may change due to adjustments in the schedule. This will also change your arrival time. We will contact you with any changes    If you need to cancel or reschedule your procedure, please call our office within 72 hours of your scheduled procedure day or you may be subject to a cancellation fee. Failure to contact our office to cancel your procedure will result in a no-show fee.     Your Registration  • Please arrive for registration prior to your appointment.  • Check in at the main information desk in the [facility], [address] [place] [address].  • You’ll be directed to [facility] on the 1st floor of the [facility].  • You’ll be asked to complete a few registration forms, as well as provide photo identification, such as a ’s license or state ID, and insurance information.  • Free  parking is available for all patients.    Make Sure You Have an Escort:To ensure your safety, you must have a responsible adult to accompany you home following your procedure. A member of our staff will call to verify your escort home, should they not arrive with you. We take your safety seriously and, if you do not have an appropriate escort, we will make arrangements for transportation or reschedule your appointment. Please be aware that you will not be allowed to drive yourself home, take a cab or take public transportation unescorted following your procedure.  Your friend or relative is welcome to wait during your procedure  in your patient room or in our comfortable waiting area. Visitors are also encouraged to check out our ecos café for a meal, light snack or beverage while they wait.    What to Expect  • You will be asked to change into a hospital gown and your personal belongings will be kept with you. However, please leave your valuables, such as jewelry or personal electronics, at home.  • Prior to your procedure, you will receive an IV for procedural sedation to ensure your comfort.  • Expect your procedure to take approximately 30 to 45 minutes. During the procedure, your physician may take tissue samples, or biopsies, or remove polyps, growths in your colon’s lining.  • Typically, you will be asked to lie on your left side for the procedure; however, that depends on your case.  Recovery Period: Following your procedure, you will be taken to the recovery area, where you will stay for approximately another 30 to 45 minutes. Your visitors will not be able to see you yet. Your colon will have been expanded with air during your procedure, so you will be encouraged to pass gas while in recovery.    Going Home: You will receive instructions and important contact information before you are sent home. You will not be able to drive, operate machinery or return to work until the next day. We’ll ask that you go home, relax and continue to recover. Also, please be aware that you should not drink alcoholic beverages for 24 hours following your procedure.    BOWEL PREPARATION FOR COLONOSCOPY/SURGERY  INSTRUCTIONS    If you are on blood thinning medication, please contact your doctor regarding specific instructions on when to hold the medication prior to your procedure.   It is ok to take aspirin or clopidogrel prior to your procedure.     The day before your scheduled colonoscopy/surgery:  • As soon as you wake up in the morning, you must stop all solid foods and begin a clear liquid diet. A clear liquid diet is any liquid you can see  through. This includes apple juice, lemonade, 7-up or Sprite, vitamin water, water flavor boosters, Gatorade, popsicles, Jell-O, chicken broth, beef broth, vegetable broth, and tea.  • You cannot have dairy, orange juice or any juice with pulp or sediment. Do not drink anything red or purple until after the procedure.   • Continue the clear liquid diet for the rest of the day.   • At 6PM, take the first dose of your SuPrep Bowel Preparation.  • Pour one 6-ounce bottle of SuPrep liquid into the mixing container provided  • Add cool drinking water to the 16-ounce line on the container and mix  • Drink all the liquid in the container  • You MUST drink 2 more 16-ounce containers of water over the next 1 HOUR   • 5 hours prior to your scheduled procedure, take the second dose of your SuPrep Bowel Preparation. Follow the same instructions.   • Continue drinking clear liquids during your prep. Nothing to eat or drink four hours prior to your procedure.    The day of your colonoscopy:  • Do not eat or drink anything for 4 hours prior to your procedure.  • If you are taking medication for your blood pressure or heart, take your morning medications with a sip of water. If you have diabetes, do not take your diabetic medication the morning of your procedure.

## 2019-08-06 NOTE — ED PROVIDER NOTE - CROS ED SKIN ALL NEG
I reviewed the H&P, I examined the patient, and there are no changes in the patient's condition.    Denies Fever, productive cough, abdominal pain, urinary sx  Well appearing male  Lungs CTAB  Heart regular rhythm, normal heart tones   Abd +BSx4    Proceed with Planned procedure     negative...

## 2019-08-07 LAB
NON-GYNECOLOGICAL CYTOLOGY STUDY: SIGNIFICANT CHANGE UP
NON-GYNECOLOGICAL CYTOLOGY STUDY: SIGNIFICANT CHANGE UP
SURGICAL PATHOLOGY STUDY: SIGNIFICANT CHANGE UP

## 2019-08-09 ENCOUNTER — APPOINTMENT (OUTPATIENT)
Dept: INTERVENTIONAL RADIOLOGY/VASCULAR | Facility: HOSPITAL | Age: 74
End: 2019-08-09

## 2019-08-11 LAB
CULTURE RESULTS: NO GROWTH — SIGNIFICANT CHANGE UP
SPECIMEN SOURCE: SIGNIFICANT CHANGE UP

## 2019-08-12 ENCOUNTER — APPOINTMENT (OUTPATIENT)
Dept: HEMATOLOGY ONCOLOGY | Facility: CLINIC | Age: 74
End: 2019-08-12
Payer: MEDICARE

## 2019-08-12 VITALS
HEART RATE: 66 BPM | RESPIRATION RATE: 15 BRPM | DIASTOLIC BLOOD PRESSURE: 71 MMHG | WEIGHT: 160 LBS | HEIGHT: 65 IN | TEMPERATURE: 98.3 F | OXYGEN SATURATION: 97 % | BODY MASS INDEX: 26.66 KG/M2 | SYSTOLIC BLOOD PRESSURE: 114 MMHG

## 2019-08-12 PROCEDURE — 99214 OFFICE O/P EST MOD 30 MIN: CPT

## 2019-08-12 RX ORDER — SUCRALFATE 1 G/1
1 TABLET ORAL
Refills: 0 | Status: DISCONTINUED | COMMUNITY
End: 2019-08-12

## 2019-08-12 RX ORDER — DEXAMETHASONE 2 MG/1
2 TABLET ORAL DAILY
Qty: 30 | Refills: 3 | Status: DISCONTINUED | COMMUNITY
Start: 2018-08-21 | End: 2019-08-12

## 2019-08-12 RX ORDER — METOCLOPRAMIDE 5 MG/1
5 TABLET ORAL 4 TIMES DAILY
Qty: 60 | Refills: 3 | Status: DISCONTINUED | COMMUNITY
Start: 2018-09-10 | End: 2019-08-12

## 2019-08-12 RX ORDER — FAMOTIDINE 20 MG/1
20 TABLET, FILM COATED ORAL
Qty: 30 | Refills: 3 | Status: DISCONTINUED | COMMUNITY
Start: 2019-05-10 | End: 2019-08-12

## 2019-08-12 RX ORDER — AMOXICILLIN AND CLAVULANATE POTASSIUM 500; 125 MG/1; MG/1
500-125 TABLET, FILM COATED ORAL
Qty: 10 | Refills: 1 | Status: DISCONTINUED | COMMUNITY
Start: 2019-04-15 | End: 2019-08-12

## 2019-08-12 RX ORDER — METOCLOPRAMIDE HYDROCHLORIDE 5 MG/1
5 TABLET ORAL
Refills: 0 | Status: DISCONTINUED | COMMUNITY
End: 2019-08-12

## 2019-08-12 RX ORDER — ALPRAZOLAM 0.5 MG/1
0.5 TABLET ORAL
Qty: 90 | Refills: 0 | Status: DISCONTINUED | COMMUNITY
Start: 2019-02-04 | End: 2019-08-12

## 2019-08-12 RX ORDER — NALTREXONE HYDROCHLORIDE 50 MG/1
50 TABLET, FILM COATED ORAL
Refills: 0 | Status: DISCONTINUED | COMMUNITY
End: 2019-08-12

## 2019-08-12 RX ORDER — ASPIRIN 81 MG/1
81 TABLET ORAL
Refills: 0 | Status: DISCONTINUED | COMMUNITY
End: 2019-08-12

## 2019-08-12 RX ORDER — ALPRAZOLAM 0.5 MG/1
0.5 TABLET ORAL
Refills: 0 | Status: ACTIVE | COMMUNITY

## 2019-08-12 RX ORDER — OXYBUTYNIN CHLORIDE 5 MG/1
5 TABLET ORAL
Qty: 60 | Refills: 5 | Status: DISCONTINUED | COMMUNITY
Start: 2018-11-20 | End: 2019-08-12

## 2019-08-12 RX ORDER — CIPROFLOXACIN HYDROCHLORIDE 500 MG/1
500 TABLET, FILM COATED ORAL TWICE DAILY
Qty: 10 | Refills: 1 | Status: DISCONTINUED | COMMUNITY
Start: 2019-05-25 | End: 2019-08-12

## 2019-08-12 RX ORDER — DEXLANSOPRAZOLE 60 MG/1
60 CAPSULE, DELAYED RELEASE ORAL DAILY
Qty: 30 | Refills: 3 | Status: DISCONTINUED | COMMUNITY
Start: 2019-05-10 | End: 2019-08-12

## 2019-08-12 RX ORDER — AMOXICILLIN AND CLAVULANATE POTASSIUM 500; 125 MG/1; MG/1
500-125 TABLET, FILM COATED ORAL TWICE DAILY
Qty: 14 | Refills: 1 | Status: DISCONTINUED | COMMUNITY
Start: 2019-03-19 | End: 2019-08-12

## 2019-08-12 NOTE — ASSESSMENT
[FreeTextEntry1] : \par \par \par 1) Metastatic breast Ca, pt on Femara...f/u with Dr. Burrell.\par

## 2019-08-12 NOTE — ASSESSMENT
[FreeTextEntry1] : \par \par \par Reviewed blood work on okay... we'll discuss with neurosurgery plans for further Avastin.\par

## 2019-08-12 NOTE — HISTORY OF PRESENT ILLNESS
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 Burrell.

## 2019-08-12 NOTE — HISTORY OF PRESENT ILLNESS
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  patient received  Avastin 1 month ago, and has one more treatment planned...

## 2019-08-13 ENCOUNTER — APPOINTMENT (OUTPATIENT)
Dept: INTERNAL MEDICINE | Facility: CLINIC | Age: 74
End: 2019-08-13
Payer: MEDICARE

## 2019-08-13 VITALS
OXYGEN SATURATION: 100 % | TEMPERATURE: 97.9 F | DIASTOLIC BLOOD PRESSURE: 61 MMHG | HEART RATE: 70 BPM | SYSTOLIC BLOOD PRESSURE: 105 MMHG | HEIGHT: 65 IN

## 2019-08-13 DIAGNOSIS — R91.8 OTHER NONSPECIFIC ABNORMAL FINDING OF LUNG FIELD: ICD-10-CM

## 2019-08-13 DIAGNOSIS — K76.9 LIVER DISEASE, UNSPECIFIED: ICD-10-CM

## 2019-08-13 PROCEDURE — 99213 OFFICE O/P EST LOW 20 MIN: CPT

## 2019-08-13 NOTE — HEALTH RISK ASSESSMENT
[No] : No [No falls in past year] : Patient reported no falls in the past year [0] : 1) Little interest or pleasure doing things: Not at all (0) [] : No [[id number]] : 0

## 2019-08-13 NOTE — HISTORY OF PRESENT ILLNESS
[FreeTextEntry1] : Interim reviewed; Biopsy results noted; Liver is positive for cancer;  Lung is negative [de-identified] : As above but patient is feeling well;  Medications without change; Takes a lot of Zofram; No dizziness;  Appetite good. Will see Dr. Antunez on Thursday of this week;

## 2019-09-09 PROBLEM — R32 INCONTINENCE: Status: ACTIVE | Noted: 2018-11-20

## 2019-09-09 NOTE — HISTORY OF PRESENT ILLNESS
[FreeTextEntry1] : Getting injection by Dr. Michelle every other week; No problems with medications;  [de-identified] : Wants to lose weight;

## 2019-09-09 NOTE — PHYSICAL EXAM
[No Acute Distress] : no acute distress [Well Nourished] : well nourished [Well Developed] : well developed [Well-Appearing] : well-appearing [Normal Sclera/Conjunctiva] : normal sclera/conjunctiva [PERRL] : pupils equal round and reactive to light [EOMI] : extraocular movements intact [Normal Outer Ear/Nose] : the outer ears and nose were normal in appearance [Normal Oropharynx] : the oropharynx was normal [No JVD] : no jugular venous distention [No Lymphadenopathy] : no lymphadenopathy [Supple] : supple [Thyroid Normal, No Nodules] : the thyroid was normal and there were no nodules present [No Respiratory Distress] : no respiratory distress  [No Accessory Muscle Use] : no accessory muscle use [Clear to Auscultation] : lungs were clear to auscultation bilaterally [Normal Rate] : normal rate  [Normal S1, S2] : normal S1 and S2 [Regular Rhythm] : with a regular rhythm [No Murmur] : no murmur heard [No Carotid Bruits] : no carotid bruits [No Abdominal Bruit] : a ~M bruit was not heard ~T in the abdomen [No Varicosities] : no varicosities [Pedal Pulses Present] : the pedal pulses are present [No Edema] : there was no peripheral edema [No Palpable Aorta] : no palpable aorta [No Extremity Clubbing/Cyanosis] : no extremity clubbing/cyanosis [Soft] : abdomen soft [Non Tender] : non-tender [No Masses] : no abdominal mass palpated [Non-distended] : non-distended [No HSM] : no HSM [Normal Bowel Sounds] : normal bowel sounds [Normal Posterior Cervical Nodes] : no posterior cervical lymphadenopathy [Normal Anterior Cervical Nodes] : no anterior cervical lymphadenopathy [No CVA Tenderness] : no CVA  tenderness [No Spinal Tenderness] : no spinal tenderness [No Joint Swelling] : no joint swelling [Grossly Normal Strength/Tone] : grossly normal strength/tone [No Rash] : no rash [Coordination Grossly Intact] : coordination grossly intact [No Focal Deficits] : no focal deficits [Normal Gait] : normal gait [Deep Tendon Reflexes (DTR)] : deep tendon reflexes were 2+ and symmetric [Normal Affect] : the affect was normal [Normal Insight/Judgement] : insight and judgment were intact

## 2019-09-09 NOTE — PROGRESS NOTE ADULT - PROBLEM SELECTOR PROBLEM 3
Condition:: Eval and treat
Please Describe Your Condition:: Consult requested by Pretty Dunaway PA-C
COPD (chronic obstructive pulmonary disease)
Malignant neoplasm of female breast, unspecified estrogen receptor status, unspecified laterality, unspecified site of breast
COPD (chronic obstructive pulmonary disease)
Malignant neoplasm of female breast, unspecified estrogen receptor status, unspecified laterality, unspecified site of breast

## 2019-09-21 NOTE — ED ADULT NURSE NOTE - PMH
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

## 2019-09-21 NOTE — H&P ADULT - NSHPPHYSICALEXAM_GEN_ALL_CORE
Constitutional: NAD, well groomed, well nourished  Respiratory: breathing non-labored, symmetrical chest wall movement  Cardiovascuar: RRR, no murmurs  Gastrointestinal: abdomen soft, non tender  Genitourinary: exam deferred  Neurological:  AAOX3. Verbal function intact  Cranial Nerves: II-XII intact  Motor: 5/5 power in b/l UE and LE  Sensation: intact to touch in al extremities  Pronator Drift: LUE drift   Dysmetria: b/l tremors R>L

## 2019-09-21 NOTE — PROGRESS NOTE ADULT - SUBJECTIVE AND OBJECTIVE BOX
INTERVAL HPI/OVERNIGHT EVENTS:  Patient well known to me; Events noted; New left arm weakness that started this morning; Patient with recently discovered new Liver lesion; Being followed by onclogy      MEDICATIONS  (STANDING):  ALPRAZolam 0.5 milliGRAM(s) Oral at bedtime  cetirizine 5 milliGRAM(s) Oral daily  docusate sodium 100 milliGRAM(s) Oral three times a day  famotidine    Tablet 20 milliGRAM(s) Oral two times a day  fluticasone propionate 50 MICROgram(s)/spray Nasal Spray 1 Spray(s) Both Nostrils two times a day  lacosamide 150 milliGRAM(s) Oral two times a day  letrozole 2.5 milliGRAM(s) Oral daily  montelukast 10 milliGRAM(s) Oral daily  oxybutynin 5 milliGRAM(s) Oral three times a day  pantoprazole    Tablet 40 milliGRAM(s) Oral before breakfast  traZODone 100 milliGRAM(s) Oral at bedtime    MEDICATIONS  (PRN):  acetaminophen   Tablet .. 650 milliGRAM(s) Oral every 6 hours PRN Temp greater or equal to 38C (100.4F), Mild Pain (1 - 3)  ALBUTerol    90 MICROgram(s) HFA Inhaler 2 Puff(s) Inhalation every 6 hours PRN Shortness of Breath  senna 2 Tablet(s) Oral at bedtime PRN Constipation      Allergies    adhesives (Rash)  Keflex (Short breath)  Originally Entered as [Unknown] reaction to [Other][Tape] (Unknown)    Intolerances        Vital Signs Last 24 Hrs  T(C): 36.7 (21 Sep 2019 18:13), Max: 36.7 (21 Sep 2019 18:13)  T(F): 98 (21 Sep 2019 18:13), Max: 98 (21 Sep 2019 18:13)  HR: 84 (21 Sep 2019 18:13) (72 - 84)  BP: 121/80 (21 Sep 2019 18:13) (111/66 - 121/80)  BP(mean): --  RR: 18 (21 Sep 2019 18:13) (16 - 18)  SpO2: 99% (21 Sep 2019 18:13) (99% - 100%)          Constitutional:  Presently awake and alert    Eyes: DANAE    ENMT: Negative    Neck: Supple    Back:  no tenderness     Respiratory:  clear    Cardiovascular: S1 S2    Gastrointestinal: soft    Genitourinary:    Extremities: no edema    Vascular:    Neurological:  left arm weakness; Able to move her fingers; Very unsteady on her feet  Oriented times three howver     Skin:    Lymph Nodes:            LABS:                        11.9   9.39  )-----------( 242      ( 21 Sep 2019 15:27 )             37.7     09-21    139  |  102  |  14  ----------------------------<  120<H>  4.6   |  27  |  0.82    Ca    9.8      21 Sep 2019 15:18    TPro  7.3  /  Alb  4.1  /  TBili  0.5  /  DBili  x   /  AST  22  /  ALT  15  /  AlkPhos  74  09-21    PT/INR - ( 21 Sep 2019 15:18 )   PT: 11.3 sec;   INR: 1.00          PTT - ( 21 Sep 2019 15:18 )  PTT:29.2 sec      RADIOLOGY & ADDITIONAL TESTS:

## 2019-09-21 NOTE — ED ADULT NURSE NOTE - NS ED NURSE RECORD ANOTHER VITAL SIGN
Last Written Prescription Date:  7/24/2018  Last Fill Quantity: 120,  # refills: 5   Last office visit: 5/30/2018 with prescribing provider:     Future Office Visit:   Next 5 appointments (look out 90 days)    Dec 24, 2018  7:20 AM CST  PHYSICAL with Dulce Hernandez MD  [facility] [address] [place] [id number]  [phone]   Jan 02, 2019 11:30 AM CST  Return Visit with Rosa Chowdary LP  [facility] [address] [place] [id number]  [phone]   Feb 05, 2019  2:00 PM CST  Return Visit with Rosa Chowdary LP  [facility] [address] [place] [id number]  [phone]         Requested Prescriptions   Pending Prescriptions Disp Refills     metFORMIN (GLUCOPHAGE-XR) 500 MG 24 hr tablet [Pharmacy Med Name: MetFORMIN HCl ER Oral Tablet Extended Release 24 Hour 500 MG] 120 tablet 4     Sig: TAKE FOUR TABLETS BY MOUTH EVERY DAY ( WITH DINNER)    Biguanide Agents Failed - 12/16/2018 11:34 AM       Failed - Blood pressure less than 140/90 in past 6 months    BP Readings from Last 3 Encounters:   05/30/18 104/70   02/21/18 130/89   12/19/17 (!) 151/106                Failed - Patient has documented LDL within the past 12 mos.    Recent Labs   Lab Test 12/13/17  0856   *            Passed - Patient has had a Microalbumin in the past 15 mos.    Recent Labs   Lab Test 12/19/17  1356   MICROL 25   UMALCR 86.25*            Passed - Patient is age 10 or older       Passed - Patient has documented A1c within the specified period of time.    If HgbA1C is 8 or greater, it needs to be on file within the past 3 months.  If less than 8, must be on file within the past 6 months.     Recent Labs   Lab Test 06/28/18  0732   A1C 5.9*            Passed - Patient's CR is NOT>1.4 OR Patient's EGFR is NOT<45 within past 12  "mos.    Recent Labs   Lab Test 02/21/18  0828   GFRESTIMATED >90   GFRESTBLACK >90       Recent Labs   Lab Test 02/21/18  0828   CR 0.63            Passed - Patient does NOT have a diagnosis of CHF.       Passed - Patient is not pregnant       Passed - Patient has not had a positive pregnancy test within the past 12 mos.        Passed - Recent (6 mo) or future (30 days) visit within the authorizing provider's specialty    Patient had office visit in the last 6 months or has a visit in the next 30 days with authorizing provider or within the authorizing provider's specialty.  See \"Patient Info\" tab in inbasket, or \"Choose Columns\" in Meds & Orders section of the refill encounter.              " Yes

## 2019-09-21 NOTE — ED PROVIDER NOTE - PSH
Breast cancer  left mastectomy  H/O craniotomy  metastaic brain lesion resection  H/O craniotomy    H/O hemicolectomy  2/2 carcinoid tumor resection 7/2017  H/O hemicolectomy    H/O right hemicolectomy    History of colon resection    History of craniotomy    S/P partial lobectomy of lung  Right upper lobe 2014  Surgery, elective  Thoractomy with lung resection-  Right upper lobe removed, left  lower lobe wedge, right lower wedge  Surgery, elective  Ovaries removed 1980"s  Surgery, elective  Left wrist

## 2019-09-21 NOTE — ED PROVIDER NOTE - CRITICAL CARE INDICATION, MLM
patient was critically ill... Patient was critically ill with a high probability of imminent or life threatening deterioration.  left arm weakness concerning for cva, stroke code

## 2019-09-21 NOTE — ED ADULT NURSE NOTE - NSIMPLEMENTINTERV_GEN_ALL_ED
Implemented All Universal Safety Interventions:  [place] to call system. Call bell, personal items and telephone within reach. Instruct patient to call for assistance. Room bathroom lighting operational. Non-slip footwear when patient is off stretcher. Physically safe environment: no spills, clutter or unnecessary equipment. Stretcher in lowest position, wheels locked, appropriate side rails in place.

## 2019-09-21 NOTE — ED ADULT NURSE NOTE - OBJECTIVE STATEMENT
Per  patient c/o left arm weakness and unsteady gait that started around 1600 yesterday which has since resolved.  Unknown when left facial droop started.  Fs 99.  speaking clearly and coherently in full sentences.  Hx brain cancer, htn.  Stroke code initiated in triage.  Team at bedside.  Cardiac monitoring in place.  Patient in no acute distress.  Continue to monitor.

## 2019-09-21 NOTE — ED PROVIDER NOTE - NEUROLOGICAL, MLM
Alert and oriented, strength 4/5 left arm, 5/5 else.  sensation symmetric.  slight left sided facial droop otherwise cn intact.  speech normal.  rhomberg neg

## 2019-09-21 NOTE — H&P ADULT - NSICDXPASTMEDICALHX_GEN_ALL_CORE_FT
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

## 2019-09-21 NOTE — CONSULT NOTE ADULT - ATTENDING COMMENTS
I did not personally see this patient however discussed case with acute stroke resident Dr. Durán.    73/F wit hx of metastatic cancer s/p right frontal resection.  Presents with 24h of progressive L arm and face weakness.  CT showing R hypodensity (old since prior imaging about 5 months ago) with new hyperdensity superimposed - potentially hemorrhage however more likely calcifications.  Regardless, symptoms likely attributable to mass effect in that region.  Needs MRI for further workup, will defer to neurosurgery given established relationship. No further stroke workup indicated. I did not personally see this patient in ED however discussed case with acute stroke resident Dr. Durán.    73/F wit hx of metastatic cancer s/p right frontal resection.  Presents with 24h of progressive L arm and face weakness.  CT showing R hypodensity (old since prior imaging about 5 months ago) with new hyperdensity superimposed - potentially hemorrhage however more likely calcifications.  Regardless, symptoms likely attributable to mass effect in that region.  Needs MRI for further workup, will defer to neurosurgery given established relationship. No further stroke workup indicated.

## 2019-09-21 NOTE — H&P ADULT - NSICDXPASTSURGICALHX_GEN_ALL_CORE_FT
PAST SURGICAL HISTORY:  Breast cancer left mastectomy    H/O craniotomy     H/O craniotomy metastaic brain lesion resection    H/O hemicolectomy 2/2 carcinoid tumor resection 7/2017    H/O hemicolectomy     H/O right hemicolectomy     History of colon resection     History of craniotomy     S/P partial lobectomy of lung Right upper lobe 2014    Surgery, elective Left wrist    Surgery, elective Ovaries removed 1980"s    Surgery, elective Thoractomy with lung resection-  Right upper lobe removed, left  lower lobe wedge, right lower wedge

## 2019-09-21 NOTE — ED ADULT NURSE NOTE - CHPI ED NUR SYMPTOMS NEG
no blurred vision/no nausea/no numbness/no fever/no dizziness/no loss of consciousness/no change in level of consciousness/no confusion/no vomiting

## 2019-09-21 NOTE — H&P ADULT - NSHPREVIEWOFSYSTEMS_GEN_ALL_CORE
mild diffuse headache, nausea intermittently, no vomiting, intermittent hiccups, new left upper extremity weakness (proximal > distal)

## 2019-09-21 NOTE — H&P ADULT - NSHPLABSRESULTS_GEN_ALL_CORE
EXAM:  CT BRAIN STROKE PROTOCOL                        PROCEDURE DATE:  09/21/2019    IMPRESSION:   1. Redemonstration of hypodensity in the right frontal lobe white matter   extending to level the basal ganglia with new overlying areas of   hyperdensity which could represent areas of parenchymal hemorrhage.  2. No acute transcortical infarction.  3. Again seen is ventricular prominence, slightly increased from prior   CT.

## 2019-09-21 NOTE — ED ADULT TRIAGE NOTE - CHIEF COMPLAINT QUOTE
L arm weakness last night which has resolved now.  L facial droop noted, as per family "oh yeah her face does look different" - unknown time of facial droop.  Bilateral  equal.  Patient also states "I feel unsteady on my feet". L arm weakness last night which has resolved now.  L facial droop noted, as per family "oh yeah her face does look different" - unknown time of facial droop.  Bilateral  equal.  Patient also states "I feel unsteady on my feet".  FS 99

## 2019-09-21 NOTE — H&P ADULT - PROBLEM SELECTOR PLAN 1
- Admit to neurosurgery regional bed under Dr. Muniz   - Q4 neuro checks and vitals   - Obtain MRI brain with and without contrast   - PT/OT/OOB as tolerated  - Reg diet   - Cont all home meds   - D/w Dr. Muniz

## 2019-09-21 NOTE — ED ADULT NURSE NOTE - CHIEF COMPLAINT QUOTE
L arm weakness last night which has resolved now.  L facial droop noted, as per family "oh yeah her face does look different" - unknown time of facial droop.  Bilateral  equal.  Patient also states "I feel unsteady on my feet".  FS 99

## 2019-09-21 NOTE — CONSULT NOTE ADULT - SUBJECTIVE AND OBJECTIVE BOX
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 Patient is a patient of .     PAST MEDICAL & SURGICAL HISTORY:  Esophagitis: EGD 4/2019  History of ischemic colitis: flex sig 4/2019  Carcinoid tumor: s/p ileal resection  Metastatic breast cancer  Former smoker  COPD, severe  Brain metastasis  Liver metastasis  Cerebral edema  Asthma  GERD (gastroesophageal reflux disease)  Lung collapse: 2x  COPD (chronic obstructive pulmonary disease)  Brain tumor  Liver cancer: left breast cancer spread to liver and chest wall  Carcinoid tumor: abdomen  Malignant neoplasm of female breast: Left breast CA with liver mets  Malignant neoplasm of bronchus and lung, unspecified site: RUL Lobectomy, RLL wedge resection  H/O craniotomy: metastaic brain lesion resection  H/O hemicolectomy: 2/2 carcinoid tumor resection 7/2017  S/P partial lobectomy of lung: Right upper lobe 2014  H/O hemicolectomy  H/O craniotomy  H/O right hemicolectomy  History of craniotomy  History of colon resection  Surgery, elective: Thoractomy with lung resection-  Right upper lobe removed, left  lower lobe wedge, right lower wedge  Surgery, elective: Ovaries removed 1980&quot;s  Surgery, elective: Left wrist  Breast cancer: left mastectomy      FAMILY HISTORY:  No pertinent family history in first degree relatives  No pertinent family history in first degree relatives      SOCIAL HISTORY:  Smoking Cesation: Discussed    ROS:  Constitutional: No fever, weight loss or fatigue  Eyes: No eye pain, visual disturbances, or discharge  ENMT:  No difficulty hearing, tinnitus, vertigo; No sinus or throat pain  Neck: No pain or stiffness  Respiratory: No cough, wheezing, chills or hemoptysis  Cardiovascular: No chest pain, palpitations, shortness of breath, dizziness or leg swelling  Gastrointestinal: No abdominal pain. No nausea, vomiting or hematemesis; No diarrhea or constipation. Nohematochezia.  Genitourinary: No dysuria, frequency, hematuria or incontinence  Neurological: As per HPI  Skin: No itching, burning, rashes or lesions   Endocrine: No heat or cold intolerance; No hair loss  Musculoskeletal: No joint pain or swelling; No muscle, back or extremity pain  Psychiatric: No depression, anxiety, mood swings or difficulty sleeping  Heme/Lymph: No easy bruising or bleeding gums    MEDICATIONS  (STANDING):  ALPRAZolam 0.5 milliGRAM(s) Oral at bedtime  cetirizine Oral Tab/Cap - Peds 5 milliGRAM(s) Oral daily  famotidine    Tablet 20 milliGRAM(s) Oral two times a day  fluticasone propionate 50 MICROgram(s)/spray Nasal Spray 1 Spray(s) Both Nostrils two times a day  lacosamide 150 milliGRAM(s) Oral two times a day  letrozole 2.5 milliGRAM(s) Oral daily  montelukast 10 milliGRAM(s) Oral daily  oxybutynin 5 milliGRAM(s) Oral three times a day  pantoprazole    Tablet 40 milliGRAM(s) Oral before breakfast  traZODone 100 milliGRAM(s) Oral at bedtime    MEDICATIONS  (PRN):  ALBUTerol    90 MICROgram(s) HFA Inhaler 2 Puff(s) Inhalation every 6 hours PRN Shortness of Breath      Allergies    adhesives (Rash)  Keflex (Short breath)  Originally Entered as [Unknown] reaction to [Other][Tape] (Unknown)    Intolerances        Vital Signs Last 24 Hrs  T(C): 36.3 (21 Sep 2019 14:54), Max: 36.3 (21 Sep 2019 14:54)  T(F): 97.4 (21 Sep 2019 14:54), Max: 97.4 (21 Sep 2019 14:54)  HR: 72 (21 Sep 2019 14:54) (72 - 72)  BP: 111/66 (21 Sep 2019 14:54) (111/66 - 111/66)  BP(mean): --  RR: 16 (21 Sep 2019 14:54) (16 - 16)  SpO2: 100% (21 Sep 2019 14:54) (100% - 100%)    PHYSICAL EXAM:  Constitutional: WDWN; NAD  Cardiovascular: RRR, no appreciable murmurs; no carotid bruits  Neurologic:  Mental status: Awake, alert and oriented x3.  Recent and remote memory intact.  Naming, repetition and comprehension intact.  Attention/concentration intact.  No dysarthria, no aphasia.  Fund of knowledge appropriate.    Cranial nerves: Pupils equally round and reactive to light, visual fields full, no nystagmus, extraocular muscles intact, V1 through V3 intact bilaterally and symmetric, face symmetric, hearing intact to finger rub, palate elevation symmetric, tongue was midline, sternocleidomastoid/shoulder shrug strength bilaterally 5/5.    Motor:  Normal bulk and tone, strength 5/5 in bilateral upper and lower extremities.   strength 5/5.  Rapid alternating movements intact and symmetric.   Sensation: Intact to light touch, proprioception, and pinprick.  No neglect.   Coordination: No dysmetria on finger-to-nose and heel-to-shin.  No clumsiness.  Reflexes: 2+ in upper and lower extremities, downgoing toes bilaterally  Gait: Narrow and steady. No ataxia.  Romberg negative    NIHSS:    Fingerstick Blood Glucose: CAPILLARY BLOOD GLUCOSE  99 (21 Sep 2019 16:17)           LABS:                        11.9   9.39  )-----------( 242      ( 21 Sep 2019 15:27 )             37.7     09-21    139  |  102  |  14  ----------------------------<  120<H>  4.6   |  27  |  0.82    Ca    9.8      21 Sep 2019 15:18    TPro  7.3  /  Alb  4.1  /  TBili  0.5  /  DBili  x   /  AST  22  /  ALT  15  /  AlkPhos  74  09-21    PT/INR - ( 21 Sep 2019 15:18 )   PT: 11.3 sec;   INR: 1.00          PTT - ( 21 Sep 2019 15:18 )  PTT:29.2 sec          RADIOLOGY & ADDITIONAL STUDIES:    IV-tPA (Y/N):                                   Bolus time:  Reason IV-tPA not given:    ASSESSMENT/PLAN: **STROKE CODE CONSULT NOTE**    Last known well time/Time of onset of symptoms: 15:00 9/20/19    HPI:  73F PMH of  metastatic breast cancer (liver, chest wall), s/p right upper lobectomy (2014), metastatic brain lesion s/p craniotomy with tumor resection 4/2017, 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 Patient's  states that at 4pm on 9/20 she noted L arm pain and weakness. At 4am on 9/21 she woke up to go to the bathroom and noted that her L arm weakness remained but felt that it had worsened. When her sx had not resolved in the morning, she presented to the ED for evaluation. Patient is a patient of Dr. Yuan and Dr. Tellez. She last received chemotherapy in June 2019. Stroke code called. NIHSS 4. CTH showed redemonstration of hypodensity in the right frontal lobe white matter extending to level the basal ganglia with new overlying areas of hyperdensity which could represent areas of parenchymal hemorrhage. tPA not given. Pt denies HA, CP, SOB, doyle/vom, changes in bowel or urinary habits.     PAST MEDICAL & SURGICAL HISTORY:  Esophagitis: EGD 4/2019  History of ischemic colitis: flex sig 4/2019  Carcinoid tumor: s/p ileal resection  Metastatic breast cancer  Former smoker  COPD, severe  Brain metastasis  Liver metastasis  Cerebral edema  Asthma  GERD (gastroesophageal reflux disease)  Lung collapse: 2x  COPD (chronic obstructive pulmonary disease)  Brain tumor  Liver cancer: left breast cancer spread to liver and chest wall  Carcinoid tumor: abdomen  Malignant neoplasm of female breast: Left breast CA with liver mets  Malignant neoplasm of bronchus and lung, unspecified site: RUL Lobectomy, RLL wedge resection  H/O craniotomy: metastatic brain lesion resection  H/O hemicolectomy: 2/2 carcinoid tumor resection 7/2017  S/P partial lobectomy of lung: Right upper lobe 2014  H/O hemicolectomy  H/O craniotomy  H/O right hemicolectomy  History of craniotomy  History of colon resection  Surgery, elective: Thoractomy with lung resection-  Right upper lobe removed, left  lower lobe wedge, right lower wedge  Surgery, elective: Ovaries removed 1980&quot;s  Surgery, elective: Left wrist  Breast cancer: left mastectomy      FAMILY HISTORY:  No pertinent family history in first degree relatives  No pertinent family history in first degree relatives      SOCIAL HISTORY:  Smoking Cesation: Discussed    ROS:  As noted above in HPI.     MEDICATIONS  (STANDING):  ALPRAZolam 0.5 milliGRAM(s) Oral at bedtime  cetirizine Oral Tab/Cap - Peds 5 milliGRAM(s) Oral daily  famotidine    Tablet 20 milliGRAM(s) Oral two times a day  fluticasone propionate 50 MICROgram(s)/spray Nasal Spray 1 Spray(s) Both Nostrils two times a day  lacosamide 150 milliGRAM(s) Oral two times a day  letrozole 2.5 milliGRAM(s) Oral daily  montelukast 10 milliGRAM(s) Oral daily  oxybutynin 5 milliGRAM(s) Oral three times a day  pantoprazole    Tablet 40 milliGRAM(s) Oral before breakfast  traZODone 100 milliGRAM(s) Oral at bedtime    MEDICATIONS  (PRN):  ALBUTerol    90 MICROgram(s) HFA Inhaler 2 Puff(s) Inhalation every 6 hours PRN Shortness of Breath      Allergies    adhesives (Rash)  Keflex (Short breath)  Originally Entered as [Unknown] reaction to [Other][Tape] (Unknown)    Intolerances        Vital Signs Last 24 Hrs  T(C): 36.3 (21 Sep 2019 14:54), Max: 36.3 (21 Sep 2019 14:54)  T(F): 97.4 (21 Sep 2019 14:54), Max: 97.4 (21 Sep 2019 14:54)  HR: 72 (21 Sep 2019 14:54) (72 - 72)  BP: 111/66 (21 Sep 2019 14:54) (111/66 - 111/66)  BP(mean): --  RR: 16 (21 Sep 2019 14:54) (16 - 16)  SpO2: 100% (21 Sep 2019 14:54) (100% - 100%)    PHYSICAL EXAM:  Constitutional: WDWN; NAD  Cardiovascular: RRR, no appreciable murmurs; no carotid bruits  Neurologic:  Mental status: Awake, alert and oriented x3.  Recent and remote memory intact.  Naming, repetition and comprehension with minor lapses.  Attention/concentration intact.  No dysarthria, no aphasia.  Fund of knowledge appropriate.    Cranial nerves: Pupils equally round and reactive to light, visual fields full, no nystagmus, extraocular muscles intact, V1 through V3 intact bilaterally and symmetric, L sided facial droop, hearing intact to finger rub, palate elevation symmetric, tongue was midline, sternocleidomastoid/shoulder shrug strength bilaterally 5/5.    Motor:  Normal bulk and tone, strength 5/5 in bilateral upper and lower extremities.   strength 5/5.  Rapid alternating movements intact and symmetric.   Sensation: Intact to light touch, proprioception, and pinprick.  No neglect.   Coordination: + on finger-to-nose   Gait: Narrow and unsteady.     NIHSS: 4    Fingerstick Blood Glucose: CAPILLARY BLOOD GLUCOSE  99 (21 Sep 2019 16:17)           LABS:                        11.9   9.39  )-----------( 242      ( 21 Sep 2019 15:27 )             37.7     09-21    139  |  102  |  14  ----------------------------<  120<H>  4.6   |  27  |  0.82    Ca    9.8      21 Sep 2019 15:18    TPro  7.3  /  Alb  4.1  /  TBili  0.5  /  DBili  x   /  AST  22  /  ALT  15  /  AlkPhos  74  09-21    PT/INR - ( 21 Sep 2019 15:18 )   PT: 11.3 sec;   INR: 1.00          PTT - ( 21 Sep 2019 15:18 )  PTT:29.2 sec          RADIOLOGY & ADDITIONAL STUDIES:    INTERPRETATION: IMariela MD, have reviewed the images and the report   and agree with the findings with the additional modification: There is   abnormal low density seen within the right frontal lobe and right operculum   extending into the internal and external capsule and subcutaneous insular   cortex. There has been interval appearance of curvilinear gyriform   hyperdensity that is most consistent with calcification. There has been   increased ex vacuo dilation of the right frontal horn. The temporal horns   are unchanged. Findings are likely consistent with radiation necrosis.   However superimposed tumoral disease cannot be excluded on this study.     Tiny right thalamic chronic lacunar infarct. Tiny right pontine chronic   lacunar infarct is seen. INDICATIONS: Left-sided weakness.     TECHNIQUE: Serial axial images were obtained from the skull base to the   vertex without the use of intravenous contrast. Sagittal and coronal images   were reformatted.     COMPARISON EXAMINATION: CT head from 10/25/2018.     FINDINGS:   VENTRICLES AND SULCI: Parenchymal volume is commensurate with patient age.   There is mild increased ventricular prominence particularly of the right   lateral ventricle.   INTRA-AXIAL: No intracranial mass, midline shift or acute transcortical   infarct is seen. As on prior imaging, there is an area of hypodensity in the   right frontal lobe extending to the level of the right basal ganglia. Which   appears slightly more conspicuous than on prior CT. There is overlying   linear areas of hyperdensity in the right frontal lobe which could be new   areas of hemorrhage or calcifications.   EXTRA-AXIAL: No extra-axial fluid collection is present.   VISUALIZED SINUSES: No air-fluid levels are identified.   VISUALIZED MASTOIDS: Clear.   CALVARIUM: Status post frontal craniotomy.       IMPRESSION:   1. Redemonstration of hypodensity in the right frontal lobe white matter extending to level the basal ganglia with new overlying areas of hyperdensity which could represent areas of parenchymal hemorrhage.     2. No acute transcortical infarction.     3. Again seen is ventricular prominence, slightly increased from prior CT.         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     #r/o stroke  - Pt with hx of metastatic breast cancer with mets to liver, chest wall and brain presents with acute onset L sided weakness  - Stroke code called in ED. NIHSS: 4  - CT head with frontal white lobe matter with evidence parenchymal hemorrhage   - Given pt's hx and findings on CT, presenting sx unlikely due to stroke  - DDx: mass effect vs effect of parenchymal hemorrhage  - f/u  final read of CTA   - no need for further stroke work up  - Neurosurgery consult    Plan discussed with stroke attending Dr. Mian Calvo

## 2019-09-21 NOTE — H&P ADULT - ASSESSMENT
71F Hx of smoking, COPD, metastatic breast cancer (liver, chest wall), metastatic brain lesion s/p craniotomy for tumor resection 4/2017, now presents with LUE weakness x24 hours, stroke code called in ER, negative for ischemic stroke.

## 2019-09-21 NOTE — CHART NOTE - NSCHARTNOTEFT_GEN_A_CORE
Neurosurgical Indications for Screening Dopplers on Admission:       1) Known hypercoagulation disorder (h/o VTE, thrombophilia, HIT, etc.)   2) Admitted from prolonged stay from another institution (straight forward ER transfers not included)  3) Presenting with significant leg immobility   4) Presenting with signs and symptoms of VTE?    5) With significant critical illness, Including "found down" for unknown period of time in HPI  6) With significant neurotrauma (TBI, SCI / TLS spine fractures)   7) Who are comatose   X 8) With known malignancy (e.g. glioblastoma multiforme, meningioma, etc.). Excludes IA chemo 23hr observation stays X  9) On hemodialysis   10) Who have received platelet transfusion or antithrombotic reversal agents recently   11) Who have had recent major orthopedic surgery          Screening dopplers indicated?   Y x   N _    DVT Prophylaxis:  x SCD's   _ chemoprophylaxis - held for possible hemorrhage

## 2019-09-21 NOTE — H&P ADULT - HISTORY OF PRESENT ILLNESS
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 Pt denies numbness/tingling, AMS, difficulty speaking, vision changes or other new symptoms.

## 2019-09-21 NOTE — ED PROVIDER NOTE - CLINICAL SUMMARY MEDICAL DECISION MAKING FREE TEXT BOX
new left arm weakness concerning for cva.  stroke code called on arrival.  initial ct head concerning for worsening brain tumor with some mass effect vs small hemorrhage.  cta head/ neck obtained and neg for vascular injury.  no signs or symptoms of infection clinically.  no chest pain to suggest acs.  case discussed with stroke attending and felt to be secondary to brain mass, not cva.  neurosurgery consulted.  tpa not given as has brain mass.  admit to neurosurgery for further management.  discussed with spouse at bedside

## 2019-09-21 NOTE — ED PROVIDER NOTE - OBJECTIVE STATEMENT
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   Denies trauma, fever/chills, sob. 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   Denies trauma, fever/chills, sob.  Relatively constant.  Per , no change in mental status.

## 2019-09-22 NOTE — PHYSICAL THERAPY INITIAL EVALUATION ADULT - GAIT DEVIATIONS NOTED, PT EVAL
decreased benito/increased time in double stance/increased lateral sway/increased postural sway/decreased step length

## 2019-09-22 NOTE — PHYSICAL THERAPY INITIAL EVALUATION ADULT - MANUAL MUSCLE TESTING RESULTS, REHAB EVAL
R sided strength grossly 5/5 based on formal manual muscle testing with exception of R hip flexion 4+/5; L knee extension 4/5; L knee flexion 4-/5; L hip flexion 4-/5; L hip abduction 4+/5; L ankle dorsiflexion 4/5; L  strength 4+/5; L elbow flexion 4/5; L elbow extension 4-/5; L shoulder flexion 2-/5

## 2019-09-22 NOTE — PROGRESS NOTE ADULT - SUBJECTIVE AND OBJECTIVE BOX
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 Pt denies numbness/tingling, AMS, difficulty speaking, vision changes or other new symptoms. (21 Sep 2019 16:37)    9/22: FRANK overnight    Vital Signs Last 24 Hrs  T(C): 37.1 (21 Sep 2019 22:54), Max: 37.4 (21 Sep 2019 19:18)  T(F): 98.7 (21 Sep 2019 22:54), Max: 99.3 (21 Sep 2019 19:18)  HR: 80 (21 Sep 2019 22:54) (72 - 86)  BP: 103/66 (21 Sep 2019 22:54) (103/66 - 121/80)  BP(mean): --  RR: 16 (21 Sep 2019 22:54) (16 - 18)  SpO2: 94% (21 Sep 2019 22:54) (94% - 100%)    I&O's Summary      PHYSICAL EXAM:  AAOX3. Verbal function intact  Cranial Nerves: II-XII intact  Motor: (+) 0/5 IN LUE, 2/5 in left hand. otherwise 5/5 power in RUE and b/l LE  Sensation: intact to touch in all extremities        DIET:  [] NPO  [x] Mechanical  [] Tube feeds    LABS:  most recent labs to be reviewed on rounds    Allergies    adhesives (Rash)  Keflex (Short breath)  Originally Entered as [Unknown] reaction to [Other][Tape] (Unknown)    Intolerances      MEDICATIONS:  Antibiotics:    Neuro:  acetaminophen   Tablet .. 650 milliGRAM(s) Oral every 6 hours PRN  ALPRAZolam 0.5 milliGRAM(s) Oral at bedtime  lacosamide 150 milliGRAM(s) Oral two times a day  traZODone 100 milliGRAM(s) Oral at bedtime    Anticoagulation:    OTHER:  ALBUTerol    90 MICROgram(s) HFA Inhaler 2 Puff(s) Inhalation every 6 hours PRN  docusate sodium 100 milliGRAM(s) Oral three times a day  fluticasone propionate 50 MICROgram(s)/spray Nasal Spray 1 Spray(s) Both Nostrils two times a day  letrozole 2.5 milliGRAM(s) Oral daily  loratadine 10 milliGRAM(s) Oral daily  montelukast 10 milliGRAM(s) Oral daily  oxybutynin 5 milliGRAM(s) Oral three times a day  pantoprazole    Tablet 40 milliGRAM(s) Oral before breakfast  senna 2 Tablet(s) Oral at bedtime PRN  tiotropium 2.5 MICROgram(s)/olodaterol 2.5 MICROgram(s) (STIOLTO) Inhaler 2 Puff(s) Inhalation daily      RADIOLOGY & ADDITIONAL TESTS: pending MRI        BRAIN MASS  No pertinent family history in first degree relatives  No pertinent family history in first degree relatives  MEWS Score  Esophagitis  History of ischemic colitis  Carcinoid tumor  Metastatic breast cancer  Former smoker  COPD, severe  Brain metastasis  Liver metastasis  Cerebral edema  Asthma  GERD (gastroesophageal reflux disease)  Lung collapse  COPD (chronic obstructive pulmonary disease)  Brain tumor  Liver cancer  Carcinoid tumor  Malignant neoplasm of female breast  Malignant neoplasm of bronchus and lung, unspecified site  Brain mass  Liver cancer  History of ischemic colitis  Metastatic breast cancer  COPD, severe  Left arm weakness  Brain mass  H/O craniotomy  H/O hemicolectomy  S/P partial lobectomy of lung  H/O hemicolectomy  H/O craniotomy  H/O right hemicolectomy  History of craniotomy  History of colon resection  Surgery, elective  Surgery, elective  Surgery, elective  Cancer of liver  Breast cancer  WEAKNESS  90+  Left arm weakness    ASSESSMENT: 74 Y/O FEMALE with history of metastatic breast Ca and prior crani in 2017 for met resection presenting with new onset left sided weakness.  Initial imaging suggest mass in right frontal area.  Pending further imaging.    PLAN:  NEURO:  - MRI ASAP  - evaluation for need for prophylactic AED and decadron after MRI  - pre-op      MEDICINE:  - Dr. Church following for further medical management, particularly of COPD    PROPHYLAXIS:  - Bowel regiman  - PT/OT ordered, encourage mobilization with assistance  - Dvt chemoprophylaxis to be addressed by attending      DVT PROPHYLAXIS:  [x] Venodynes                        [] Heparin/Lovenox    DISPOSITION:    Assessment:  Present when checked    []  GCS  E   V  M     Heart Failure: []Acute, [] acute on chronic , []chronic  Heart Failure:  [] Diastolic (HFpEF), [] Systolic (HFrEF), []Combined (HFpEF and HFrEF), [] RHF, [] Pulm HTN, [] Other    [] STACY, [] ATN, [] AIN, [] other  [] CKD1, [] CKD2, [] CKD 3, [] CKD 4, [] CKD 5, []ESRD    Encephalopathy: [] Metabolic, [] Hepatic, [] toxic, [] Neurological, [] Other    Abnormal Nurtitional Status: [] malnurtition (see nutrition note), [ ]underweight: BMI < 19, [] morbid obesity: BMI >40, [] Cachexia    [] Sepsis  [] hypovolemic shock,[] cardiogenic shock, [] hemorrhagic shock, [] neuogenic shock  [] Acute Respiratory Failure  []Cerebral edema, [] Brain compression/ herniation,   [] Functional quadriplegia  [] Acute blood loss anemia

## 2019-09-22 NOTE — PHYSICAL THERAPY INITIAL EVALUATION ADULT - GENERAL OBSERVATIONS, REHAB EVAL
Received walking out of bathroom with +home health aide, +friend, on room air, +hep lock, in no apparent distress. Patient appears to be walking around comfortably

## 2019-09-22 NOTE — PHYSICAL THERAPY INITIAL EVALUATION ADULT - MODALITIES TREATMENT COMMENTS
FIM locomotion 2; FIM stairs TBA; smile with L sided facial droop; eyes opening/closing symmetric; cheek puff symmetric; visual tracking WNL in all quadrants with no visual field cuts or nystagmus, facial sensation V1-3 intact bilaterally; hearing intact to finger rub bilaterally; EOMI; PERRLA; -dysdiadochokinesia +Stevens's L hand

## 2019-09-22 NOTE — PHYSICAL THERAPY INITIAL EVALUATION ADULT - ACTIVE RANGE OF MOTION EXAMINATION, REHAB EVAL
bilateral  lower extremity Active ROM was WFL (within functional limits)/Right UE Active ROM was WFL (within functional limits)/L shoulder flexion limited to 40 degrees; L shoulder abduction limited to 30 degrees/deficits as listed below

## 2019-09-22 NOTE — PHYSICAL THERAPY INITIAL EVALUATION ADULT - CLONUS, LEFT
----- Message from Isa Parks sent at 3/1/2017 12:46 PM CST -----  Contact: Patient  Patient's wife Jett called regarding changing Rx supply amount from 60 days to 90days (androgel). Please call back to advise at [phone]. Thanks,    absent

## 2019-09-22 NOTE — PHYSICAL THERAPY INITIAL EVALUATION ADULT - PERTINENT HX OF CURRENT PROBLEM, REHAB EVAL
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

## 2019-09-22 NOTE — PHYSICAL THERAPY INITIAL EVALUATION ADULT - IMPAIRMENTS FOUND, PT EVAL
muscle strength/gross motor/neuromotor development and sensory integration/aerobic capacity/endurance/gait, locomotion, and balance/ROM

## 2019-09-22 NOTE — PHYSICAL THERAPY INITIAL EVALUATION ADULT - ADDITIONAL COMMENTS
Patient lives in an elevator apartment building with no steps to enter. Prior to admission, patient was independent for all functional mobility including bed mobility, transfers and ambulation without assistive device. Denies history of falls. Has home health assistance however unable to specify hours or days

## 2019-09-22 NOTE — PROGRESS NOTE ADULT - SUBJECTIVE AND OBJECTIVE BOX
INTERVAL HPI/OVERNIGHT EVENTS:  Awake and alert; Left arm without change;  MRI pending      MEDICATIONS  (STANDING):  ALPRAZolam 0.5 milliGRAM(s) Oral at bedtime  docusate sodium 100 milliGRAM(s) Oral three times a day  fluticasone propionate 50 MICROgram(s)/spray Nasal Spray 1 Spray(s) Both Nostrils two times a day  lacosamide 150 milliGRAM(s) Oral two times a day  letrozole 2.5 milliGRAM(s) Oral daily  loratadine 10 milliGRAM(s) Oral daily  montelukast 10 milliGRAM(s) Oral daily  oxybutynin 5 milliGRAM(s) Oral three times a day  pantoprazole    Tablet 40 milliGRAM(s) Oral before breakfast  tiotropium 2.5 MICROgram(s)/olodaterol 2.5 MICROgram(s) (STIOLTO) Inhaler 2 Puff(s) Inhalation daily  traZODone 100 milliGRAM(s) Oral at bedtime    MEDICATIONS  (PRN):  acetaminophen   Tablet .. 650 milliGRAM(s) Oral every 6 hours PRN Temp greater or equal to 38C (100.4F), Mild Pain (1 - 3)  ALBUTerol    90 MICROgram(s) HFA Inhaler 2 Puff(s) Inhalation every 6 hours PRN Shortness of Breath  senna 2 Tablet(s) Oral at bedtime PRN Constipation      Allergies    adhesives (Rash)  Keflex (Short breath)  Originally Entered as [Unknown] reaction to [Other][Tape] (Unknown)    Intolerances        Vital Signs Last 24 Hrs  T(C): 36.7 (22 Sep 2019 06:50), Max: 37.4 (21 Sep 2019 19:18)  T(F): 98.1 (22 Sep 2019 06:50), Max: 99.3 (21 Sep 2019 19:18)  HR: 71 (22 Sep 2019 06:50) (71 - 86)  BP: 126/72 (22 Sep 2019 06:50) (103/66 - 126/72)  BP(mean): --  RR: 16 (22 Sep 2019 06:50) (16 - 18)  SpO2: 96% (22 Sep 2019 06:50) (94% - 100%)          Constitutional: Awake    Eyes: DANAE    ENMT: Negative    Neck: Supple    Back:  no tenderness     Respiratory:  clear    Cardiovascular: S1 S2    Gastrointestinal: soft    Genitourinary:    Extremities:  no edema    Vascular:    Neurological:    Skin:    Lymph Nodes:            09-22 @ 07:01  -  09-22 @ 12:31  --------------------------------------------------------  IN: 0 mL / OUT: 300 mL / NET: -300 mL      LABS:                        11.8   6.32  )-----------( 217      ( 22 Sep 2019 08:19 )             38.2     09-22    139  |  105  |  11  ----------------------------<  106<H>  4.0   |  25  |  0.82    Ca    9.7      22 Sep 2019 08:19  Phos  3.3     09-22  Mg     1.9     09-22    TPro  7.3  /  Alb  4.1  /  TBili  0.5  /  DBili  x   /  AST  22  /  ALT  15  /  AlkPhos  74  09-21    PT/INR - ( 21 Sep 2019 15:18 )   PT: 11.3 sec;   INR: 1.00          PTT - ( 21 Sep 2019 15:18 )  PTT:29.2 sec      RADIOLOGY & ADDITIONAL TESTS:

## 2019-09-22 NOTE — PHYSICAL THERAPY INITIAL EVALUATION ADULT - LEVEL OF INDEPENDENCE: SIT/SUPINE, REHAB EVAL
not observed, patient left sitting at edge of bed with all lines intact, +call bell, +bed alarm, in no apparent distress, family present, +call bell, instructed to press call bell and await assistance should patient desire to get up or need anything

## 2019-09-22 NOTE — PHYSICAL THERAPY INITIAL EVALUATION ADULT - PLANNED THERAPY INTERVENTIONS, PT EVAL
balance training/bed mobility training/gait training/motor coordination training/neuromuscular re-education/postural re-education/strengthening/transfer training

## 2019-09-23 NOTE — PROGRESS NOTE ADULT - SUBJECTIVE AND OBJECTIVE BOX
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 Pt denies numbness/tingling, AMS, difficulty speaking, vision changes or other new symptoms. (21 Sep 2019 16:37)    9/22: FRANK overnight  9/23: FRANK overnight - awaiting MRI for further neurosurgical planning.      Vital Signs Last 24 Hrs  T(C): 36.2 (22 Sep 2019 21:03), Max: 37 (22 Sep 2019 16:03)  T(F): 97.1 (22 Sep 2019 21:03), Max: 98.6 (22 Sep 2019 16:03)  HR: 71 (22 Sep 2019 21:03) (68 - 78)  BP: 127/72 (22 Sep 2019 21:03) (107/71 - 127/72)  BP(mean): --  RR: 18 (22 Sep 2019 21:03) (16 - 18)  SpO2: 94% (22 Sep 2019 21:03) (94% - 97%)    I&O's Summary    22 Sep 2019 07:01  -  23 Sep 2019 04:20  --------------------------------------------------------  IN: 0 mL / OUT: 625 mL / NET: -625 mL          PHYSICAL EXAM:  AAOX3. Verbal function intact  Cranial Nerves: II-XII intact  Motor: (+) 0/5 IN LUE, 2/5 in left hand. otherwise 5/5 power in RUE and b/l LE  Sensation: intact to touch in all extremities    TUBES/LINES:  none      DIET: Mechanical      LABS:   pending    Allergies    adhesives (Rash)  Keflex (Short breath)  Originally Entered as [Unknown] reaction to [Other][Tape] (Unknown)    Intolerances      MEDICATIONS:  Antibiotics:    Neuro:  acetaminophen   Tablet .. 650 milliGRAM(s) Oral every 6 hours PRN  ALPRAZolam 0.5 milliGRAM(s) Oral at bedtime  lacosamide 150 milliGRAM(s) Oral two times a day  metoclopramide Injectable 10 milliGRAM(s) IV Push every 6 hours PRN  ondansetron Injectable 4 milliGRAM(s) IV Push every 6 hours  scopolamine   Patch 1 Patch Transdermal every 72 hours  traZODone 100 milliGRAM(s) Oral at bedtime    Anticoagulation:    OTHER:  ALBUTerol    90 MICROgram(s) HFA Inhaler 2 Puff(s) Inhalation every 6 hours PRN  docusate sodium 100 milliGRAM(s) Oral three times a day  fluticasone propionate 50 MICROgram(s)/spray Nasal Spray 1 Spray(s) Both Nostrils two times a day  letrozole 2.5 milliGRAM(s) Oral daily  loratadine 10 milliGRAM(s) Oral daily  montelukast 10 milliGRAM(s) Oral daily  oxybutynin 5 milliGRAM(s) Oral three times a day  pantoprazole    Tablet 40 milliGRAM(s) Oral before breakfast  senna 2 Tablet(s) Oral at bedtime PRN  tiotropium 2.5 MICROgram(s)/olodaterol 2.5 MICROgram(s) (STIOLTO) Inhaler 2 Puff(s) Inhalation daily      RADIOLOGY & ADDITIONAL TESTS: MRI pending    BRAIN MASS  No pertinent family history in first degree relatives  No pertinent family history in first degree relatives  Handoff  MEWS Score  Esophagitis  History of ischemic colitis  Carcinoid tumor  Metastatic breast cancer  Former smoker  COPD, severe  Brain metastasis  Liver metastasis  Cerebral edema  Asthma  GERD (gastroesophageal reflux disease)  Lung collapse  COPD (chronic obstructive pulmonary disease)  Brain tumor  Liver cancer  Carcinoid tumor  Malignant neoplasm of female breast  Malignant neoplasm of bronchus and lung, unspecified site  Brain mass  Liver cancer  History of ischemic colitis  Metastatic breast cancer  COPD, severe  Left arm weakness  Brain mass  H/O craniotomy  H/O hemicolectomy  S/P partial lobectomy of lung  H/O hemicolectomy  H/O craniotomy  H/O right hemicolectomy  History of craniotomy  History of colon resection  Surgery, elective  Surgery, elective  Surgery, elective  Cancer of liver  Breast cancer  WEAKNESS  90+  Left arm weakness      ASSESSMENT: 72 Y/O FEMALE with history of metastatic breast Ca and prior crani in 2017 for met resection presenting with new onset left sided weakness.  Initial imaging suggest mass in right frontal area.  Pending further imaging.    PLAN:  NEURO:  - MRI ASAP  - evaluation for need for prophylactic AED and decadron after MRI  - pre-op      MEDICINE:  - Dr. Church following for further medical management, particularly of COPD    PROPHYLAXIS:  - Bowel regiman  - PT/OT ordered, encourage mobilization with assistance  - Dvt chemoprophylaxis to be addressed by attending      DVT PROPHYLAXIS:  [x] Venodynes                        [] Heparin/Lovenox    DISPOSITION:    Assessment:  Present when checked    []  GCS  E   V  M     Heart Failure: []Acute, [] acute on chronic , []chronic  Heart Failure:  [] Diastolic (HFpEF), [] Systolic (HFrEF), []Combined (HFpEF and HFrEF), [] RHF, [] Pulm HTN, [] Other    [] STACY, [] ATN, [] AIN, [] other  [] CKD1, [] CKD2, [] CKD 3, [] CKD 4, [] CKD 5, []ESRD    Encephalopathy: [] Metabolic, [] Hepatic, [] toxic, [] Neurological, [] Other    Abnormal Nurtitional Status: [] malnurtition (see nutrition note), [ ]underweight: BMI < 19, [] morbid obesity: BMI >40, [] Cachexia    [] Sepsis  [] hypovolemic shock,[] cardiogenic shock, [] hemorrhagic shock, [] neuogenic shock  [] Acute Respiratory Failure  []Cerebral edema, [] Brain compression/ herniation,   [] Functional quadriplegia  [] Acute blood loss anemia HPI:  71F PMHx of smoking, COPD, metastatic breast cancer (liver, chest wall), s/p right upper lobectomy (2014), metastatic brain lesion s/p craniotomy with tumor resection 4/2017, 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 Pt denies numbness/tingling, AMS, difficulty speaking, vision changes or other new symptoms. (21 Sep 2019 16:37)    9/22: FRANK overnight  9/23: FRANK overnight - awaiting MRI for further neurosurgical planning.      Vital Signs Last 24 Hrs  T(C): 36.2 (22 Sep 2019 21:03), Max: 37 (22 Sep 2019 16:03)  T(F): 97.1 (22 Sep 2019 21:03), Max: 98.6 (22 Sep 2019 16:03)  HR: 71 (22 Sep 2019 21:03) (68 - 78)  BP: 127/72 (22 Sep 2019 21:03) (107/71 - 127/72)  BP(mean): --  RR: 18 (22 Sep 2019 21:03) (16 - 18)  SpO2: 94% (22 Sep 2019 21:03) (94% - 97%)    I&O's Summary    22 Sep 2019 07:01  -  23 Sep 2019 04:20  --------------------------------------------------------  IN: 0 mL / OUT: 625 mL / NET: -625 mL          PHYSICAL EXAM:  AAOX3. Verbal function intact  Cranial Nerves: II-XII intact  Motor: (+) 0/5 IN LUE, 2/5 in left hand. otherwise 5/5 power in RUE and b/l LE  Sensation: intact to touch in all extremities    TUBES/LINES:  none      DIET: Mechanical      LABS:   pending    Allergies    adhesives (Rash)  Keflex (Short breath)  Originally Entered as [Unknown] reaction to [Other][Tape] (Unknown)    Intolerances      MEDICATIONS:  Antibiotics:    Neuro:  acetaminophen   Tablet .. 650 milliGRAM(s) Oral every 6 hours PRN  ALPRAZolam 0.5 milliGRAM(s) Oral at bedtime  lacosamide 150 milliGRAM(s) Oral two times a day  metoclopramide Injectable 10 milliGRAM(s) IV Push every 6 hours PRN  ondansetron Injectable 4 milliGRAM(s) IV Push every 6 hours  scopolamine   Patch 1 Patch Transdermal every 72 hours  traZODone 100 milliGRAM(s) Oral at bedtime    Anticoagulation:    OTHER:  ALBUTerol    90 MICROgram(s) HFA Inhaler 2 Puff(s) Inhalation every 6 hours PRN  docusate sodium 100 milliGRAM(s) Oral three times a day  fluticasone propionate 50 MICROgram(s)/spray Nasal Spray 1 Spray(s) Both Nostrils two times a day  letrozole 2.5 milliGRAM(s) Oral daily  loratadine 10 milliGRAM(s) Oral daily  montelukast 10 milliGRAM(s) Oral daily  oxybutynin 5 milliGRAM(s) Oral three times a day  pantoprazole    Tablet 40 milliGRAM(s) Oral before breakfast  senna 2 Tablet(s) Oral at bedtime PRN  tiotropium 2.5 MICROgram(s)/olodaterol 2.5 MICROgram(s) (STIOLTO) Inhaler 2 Puff(s) Inhalation daily      RADIOLOGY & ADDITIONAL TESTS: MRI pending    BRAIN MASS  No pertinent family history in first degree relatives  No pertinent family history in first degree relatives  Handoff  MEWS Score  Esophagitis  History of ischemic colitis  Carcinoid tumor  Metastatic breast cancer  Former smoker  COPD, severe  Brain metastasis  Liver metastasis  Cerebral edema  Asthma  GERD (gastroesophageal reflux disease)  Lung collapse  COPD (chronic obstructive pulmonary disease)  Brain tumor  Liver cancer  Carcinoid tumor  Malignant neoplasm of female breast  Malignant neoplasm of bronchus and lung, unspecified site  Brain mass  Liver cancer  History of ischemic colitis  Metastatic breast cancer  COPD, severe  Left arm weakness  Brain mass  H/O craniotomy  H/O hemicolectomy  S/P partial lobectomy of lung  H/O hemicolectomy  H/O craniotomy  H/O right hemicolectomy  History of craniotomy  History of colon resection  Surgery, elective  Surgery, elective  Surgery, elective  Cancer of liver  Breast cancer  WEAKNESS  90+  Left arm weakness      ASSESSMENT: 72 Y/O FEMALE with history of metastatic breast Ca and prior crani in 2017 for met resection presenting with new onset left sided weakness.  Initial imaging suggest mass in right frontal area.  Pending further imaging.    PLAN:  NEURO:  - MRI ASAP  - evaluation for need for prophylactic AED and decadron after MRI  - pre-op      MEDICINE:  - Dr. Church following for further medical management, particularly of COPD    PROPHYLAXIS:  - Bowel regiman  - PT/OT ordered, encourage mobilization with assistance  - Dvt chemoprophylaxis to be addressed by attending      DVT PROPHYLAXIS:  [x] Venodynes                        [] Heparin/Lovenox    DISPOSITION: D/W Dr. Muniz    Assessment:  Present when checked    []  GCS  E   V  M     Heart Failure: []Acute, [] acute on chronic , []chronic  Heart Failure:  [] Diastolic (HFpEF), [] Systolic (HFrEF), []Combined (HFpEF and HFrEF), [] RHF, [] Pulm HTN, [] Other    [] STACY, [] ATN, [] AIN, [] other  [] CKD1, [] CKD2, [] CKD 3, [] CKD 4, [] CKD 5, []ESRD    Encephalopathy: [] Metabolic, [] Hepatic, [] toxic, [] Neurological, [] Other    Abnormal Nurtitional Status: [] malnurtition (see nutrition note), [ ]underweight: BMI < 19, [] morbid obesity: BMI >40, [] Cachexia    [] Sepsis  [] hypovolemic shock,[] cardiogenic shock, [] hemorrhagic shock, [] neuogenic shock  [] Acute Respiratory Failure  []Cerebral edema, [] Brain compression/ herniation,   [] Functional quadriplegia  [] Acute blood loss anemia

## 2019-09-23 NOTE — PROGRESS NOTE ADULT - SUBJECTIVE AND OBJECTIVE BOX
INTERVAL HPI/OVERNIGHT EVents  Left arm weakness without change; Also MRI still pending; Will discuss plans with Neurosurgery    MEDICATIONS  (STANDING):  ALPRAZolam 0.5 milliGRAM(s) Oral at bedtime  docusate sodium 100 milliGRAM(s) Oral three times a day  enoxaparin Injectable 40 milliGRAM(s) SubCutaneous <User Schedule>  fluticasone propionate 50 MICROgram(s)/spray Nasal Spray 1 Spray(s) Both Nostrils two times a day  lacosamide Solution 75 milliGRAM(s) Oral two times a day  letrozole 2.5 milliGRAM(s) Oral daily  loratadine 10 milliGRAM(s) Oral daily  montelukast 10 milliGRAM(s) Oral at bedtime  ondansetron Injectable 4 milliGRAM(s) IV Push every 6 hours  oxybutynin 5 milliGRAM(s) Oral three times a day  pantoprazole    Tablet 40 milliGRAM(s) Oral before breakfast  scopolamine   Patch 1 Patch Transdermal every 72 hours  sodium chloride 0.9%. 1000 milliLiter(s) (75 mL/Hr) IV Continuous <Continuous>  tiotropium 2.5 MICROgram(s)/olodaterol 2.5 MICROgram(s) (STIOLTO) Inhaler 2 Puff(s) Inhalation daily  traZODone 100 milliGRAM(s) Oral at bedtime    MEDICATIONS  (PRN):  acetaminophen   Tablet .. 650 milliGRAM(s) Oral every 6 hours PRN Temp greater or equal to 38C (100.4F), Mild Pain (1 - 3)  ALBUTerol    90 MICROgram(s) HFA Inhaler 2 Puff(s) Inhalation every 6 hours PRN Shortness of Breath  metoclopramide Injectable 10 milliGRAM(s) IV Push every 6 hours PRN nausea/vomiting  senna 2 Tablet(s) Oral at bedtime PRN Constipation      Allergies    adhesives (Rash)  Keflex (Short breath)  Originally Entered as [Unknown] reaction to [Other][Tape] (Unknown)    Intolerances        Vital Signs Last 24 Hrs  T(C): 36.9 (23 Sep 2019 16:54), Max: 36.9 (23 Sep 2019 16:54)  T(F): 98.5 (23 Sep 2019 16:54), Max: 98.5 (23 Sep 2019 16:54)  HR: 71 (23 Sep 2019 16:54) (68 - 81)  BP: 107/66 (23 Sep 2019 16:54) (107/66 - 137/76)  BP(mean): --  RR: 16 (23 Sep 2019 16:54) (16 - 18)  SpO2: 97% (23 Sep 2019 16:54) (94% - 100%)          Constitutional: Awake    Eyes: DANAE    ENMT: Negative    Neck: Supple    Back:  no tenderness     Respiratory:  clear    Cardiovascular: S1 S2    Gastrointestinal: soft    Genitourinary:    Extremities: no edema    Vascular:    Neurological: left arm weakness    Skin:    Lymph Nodes:            09-22 @ 07:01  -  09-23 @ 07:00  --------------------------------------------------------  IN: 0 mL / OUT: 1225 mL / NET: -1225 mL    09-23 @ 07:01  -  09-23 @ 17:06  --------------------------------------------------------  IN: 0 mL / OUT: 250 mL / NET: -250 mL      LABS:                        11.8   6.32  )-----------( 217      ( 22 Sep 2019 08:19 )             38.2     09-22    139  |  105  |  11  ----------------------------<  106<H>  4.0   |  25  |  0.82    Ca    9.7      22 Sep 2019 08:19  Phos  3.3     09-22  Mg     1.9     09-22            RADIOLOGY & ADDITIONAL TESTS:

## 2019-09-23 NOTE — PROGRESS NOTE ADULT - ATTENDING COMMENTS
Patient seen and examined;  Awaiting MRI ; Will likely need Decadron; No change in other medication
Will discuss plans with neurosurgery; MRI still pending
Patient seen and examined; New left sided arm weakness; Will get MRI Question restart Decadron; Other medications as outlined;     Will follow

## 2019-09-23 NOTE — OCCUPATIONAL THERAPY INITIAL EVALUATION ADULT - GENERAL OBSERVATIONS, REHAB EVAL
Patient left hand dominant. Chart reviewed, AMBER Madrigal cleared patient for OT evaluation. Patient received supine in bed, NAD, +IV heplock, aide bedside.

## 2019-09-23 NOTE — OCCUPATIONAL THERAPY INITIAL EVALUATION ADULT - RANGE OF MOTION EXAMINATION, UPPER EXTREMITY
L shoulder AROM 0-40 degrees, L elbow/wrist/digits WFL/Left UE Passive ROM was WNL (within normal limits)/Right UE Active ROM was WNL (within normal limits)

## 2019-09-23 NOTE — OCCUPATIONAL THERAPY INITIAL EVALUATION ADULT - ADDITIONAL COMMENTS
Patient has a home health aide (at bedside) M-F from 10am-6pm who assists with all ADLs/IADLs prn. Patient reports having a SC, rollator, and grab bars in bathroom. Patient uses SC for outdoor use, no AD indoor.

## 2019-09-23 NOTE — H&P ADULT - NSHPSOCIALHISTORY_GEN_ALL_CORE
Tobacco: Denies.  ETOH: Denies  Denies other drugs Impulsivity/History of abuse/trauma Impulsivity/Current mood episode/Agitation/Severe Anxiety/Panic/Unable to engage in safety planning/History of abuse/trauma

## 2019-09-23 NOTE — OCCUPATIONAL THERAPY INITIAL EVALUATION ADULT - MANUAL MUSCLE TESTING RESULTS, REHAB EVAL
RUE 5/5 throughout, L shoulder 3-/5 (able to maintain 90 degrees flexion for isometric hold of ~3sec), L elbow flex/ext 4/5, L  4+/5

## 2019-09-23 NOTE — OCCUPATIONAL THERAPY INITIAL EVALUATION ADULT - LEVEL OF INDEPENDENCE: TOILET, REHAB EVAL
"Stephanie Bradshaw is a 54 y.o. female   Primary provider:  Provider Not In System       Chief Complaint   Patient presents with   • Right Knee - Pain     Xray today,  MRI  4/4/18  [facility]       HISTORY OF PRESENT ILLNESS:    Pain   This is a chronic problem. Episode onset: 2015. The problem occurs constantly. Associated symptoms include coughing, headaches, joint swelling, nausea, a rash and vomiting. Associated symptoms comments: Stabbing, aching, burning, swelling, clicking, popping. The symptoms are aggravated by walking and standing (sitting, driving). She has tried acetaminophen, heat, ice and rest for the symptoms. The treatment provided mild relief.      Has been seen at [facility]  By ROSANNE De Jesus     Main complaint is lateral thigh pain that has been present for the last 9 years but worse over the last 1 1/2f to 2 months,  No specific injury.    Worse with activity  Occasional numbness and tingling.  The symptoms go down the side of her thigh and across the front of her knee but do not go all the way to her foot.  Pain is worse with prolonged standing or walking.    CONCURRENT MEDICAL HISTORY:    Past Medical History:   Diagnosis Date   • Controlled type 2 diabetes mellitus without complication, without long-term current use of insulin    • Hyperlipemia, mixed        Allergies   Allergen Reactions   • Penicillins      Patient states she is allergic to all \"cillins\".         Current Outpatient Prescriptions:   •  cetirizine (zyrTEC) 10 MG tablet, , Disp: , Rfl:   •  cyclobenzaprine (FLEXERIL) 10 MG tablet, TK 1 T PO TID, Disp: , Rfl: 1  •  fluticasone (FLONASE) 50 MCG/ACT nasal spray, INSTILL 2 PUFFS INTO THE NOSTRILS D UTD, Disp: , Rfl: 2  •  gabapentin (NEURONTIN) 300 MG capsule, TK 1 C PO TID PRN, Disp: , Rfl: 1  •  gabapentin (NEURONTIN) 400 MG capsule, Take 400 mg by mouth 3 (Three) Times a Day., Disp: , Rfl:   •  HYDROcodone-acetaminophen (NORCO) 7.5-325 MG per tablet, Take 1 " "tablet by mouth Every 6 (Six) Hours As Needed for Moderate Pain ., Disp: , Rfl:   •  montelukast (SINGULAIR) 10 MG tablet, , Disp: , Rfl:   •  simvastatin (ZOCOR) 20 MG tablet, TK 1 T PO HS, Disp: , Rfl: 0  •  VENTOLIN  (90 BASE) MCG/ACT inhaler, , Disp: , Rfl:   •  vitamin D (ERGOCALCIFEROL) 15199 UNITS capsule capsule, , Disp: , Rfl:   •  meloxicam (MOBIC) 15 MG tablet, 1 PO Daily with food., Disp: 30 tablet, Rfl: 3    History reviewed. No pertinent surgical history.    Family History   Problem Relation Age of Onset   • COPD Other    • Heart disease Other    • Hypertension Other    • Diabetes Other        Social History     Social History   • Marital status: Single     Spouse name: N/A   • Number of children: N/A   • Years of education: N/A     Occupational History   • Not on file.     Social History Main Topics   • Smoking status: Current Every Day Smoker     Types: Cigarettes   • Smokeless tobacco: Never Used   • Alcohol use Yes   • Drug use: Unknown   • Sexual activity: Not on file     Other Topics Concern   • Not on file     Social History Narrative   • No narrative on file        Review of Systems   Constitutional: Positive for unexpected weight change.   HENT: Positive for tinnitus.    Eyes: Negative.    Respiratory: Positive for cough.    Cardiovascular: Negative.    Gastrointestinal: Positive for nausea and vomiting.   Endocrine: Negative.    Genitourinary: Negative.    Musculoskeletal: Positive for joint swelling.   Skin: Positive for rash.   Allergic/Immunologic: Negative.    Neurological: Positive for dizziness and headaches.   Hematological: Negative.    Psychiatric/Behavioral: The patient is nervous/anxious.        PHYSICAL EXAMINATION:       Ht 157.5 cm (62\")   Wt 103 kg (227 lb)   BMI 41.52 kg/m²     Physical Exam   Constitutional: She is oriented to person, place, and time. She appears well-developed and well-nourished. No distress.   Eyes: Conjunctivae are normal. Pupils are equal, round, " and reactive to light.   Neck: Neck supple. No tracheal deviation present. No thyromegaly present.   Cardiovascular: Normal rate and intact distal pulses.    Pulmonary/Chest: Effort normal. She exhibits no tenderness.   Abdominal: Soft. She exhibits no distension and no mass. There is no tenderness.   Neurological: She is alert and oriented to person, place, and time.   Skin: Skin is warm. No rash noted.   Psychiatric: She has a normal mood and affect. Her behavior is normal. Judgment and thought content normal.       GAIT:     [x]  Normal  []  Antalgic    Assistive device: [x]  None  []  Walker     []  Crutches  []  Cane     []  Wheelchair  []  Stretcher    Right Hip Exam     Tenderness   The patient is experiencing no tenderness.         Range of Motion   The patient has normal right hip ROM.    Muscle Strength   Flexion: 4/5     Tests   QUINN: negative  Fadir:  Negative FADIR test    Other   Erythema: absent  Sensation: normal  Pulse: present      Left Hip Exam     Tenderness   The patient is experiencing no tenderness.         Range of Motion   The patient has normal left hip ROM.    Muscle Strength   Flexion: 5/5     Tests   QUINN: negative  Fadir:  Negative FADIR test    Other   Erythema: absent  Sensation: normal  Pulse: present      Back Exam     Comments:  Mild limitation in low back mobility.  Pain and some recreation of symptoms with femoral nerve stretch.  Patellar reflex is less on right than left.                Xr Spine Lumbar 2 Or 3 View    Result Date: 5/3/2018  Narrative: 3 views of the lumbar spine show that she has some mild degenerative scoliosis in the lower lumbar segments.  She has osteophyte formation anteriorly and posteriorly beginning at L2 through S1.  These findings seem to be worse at L2-L3 and L3-L4.  Mild narrowing of the L5-S1 junction is noted as well.  No evidence of acute bony abnormality is noted.  No prior x-rays available for comparison. 05/03/18 at 5:47 PM by Davide Looney  MD Niru      Xr Knee 1 Or 2 Vw Right    Result Date: 5/3/2018  Narrative: AP bilateral standing of the knees with lateral of the right knee shows mild medial joint space narrowing in both knees with no evidence of acute bony abnormality.  No fracture or dislocation is noted.  No significant osteophytes are present on either side of either knee.  The patellofemoral compartment shows minimal changes with maintenance of joint spacing and no spurring.  No posterior osteophytes exist.  No prior films available for comparison. 05/03/18 at 5:45 PM by Davide Marrufo MD      Xr Knee Bilateral Ap Standing    Result Date: 5/3/2018  Narrative: AP bilateral standing of the knees with lateral of the right knee shows mild medial joint space narrowing in both knees with no evidence of acute bony abnormality.  No fracture or dislocation is noted.  No significant osteophytes are present on either side of either knee.  The patellofemoral compartment shows minimal changes with maintenance of joint spacing and no spurring.  No posterior osteophytes exist.  No prior films available for comparison. 05/03/18 at 5:46 PM by Davide Marrufo MD          MRI  [facility]    4/4/18    Impression:    Small joint effusion  The menisci, ligaments, and tendons are normal.  The popliteal vein does not have any thrombus or other abnormality.    Read by:  Nicanor Roberts MD  4/4/18        ASSESSMENT:    Diagnoses and all orders for this visit:    Radiculopathy of leg  -     MRI lumbar spine wo contrast; Future    Right knee pain, unspecified chronicity    Chronic low back pain, unspecified back pain laterality, with sciatica presence unspecified  -     XR Spine Lumbar 2 or 3 View    Neuropathy  -     MRI lumbar spine wo contrast; Future    Lumbar radiculopathy  -     MRI lumbar spine wo contrast; Future    Diet-controlled diabetes mellitus    Other orders  -     gabapentin (NEURONTIN) 400 MG capsule; Take 400 mg by mouth 3  (Three) Times a Day.  -     HYDROcodone-acetaminophen (NORCO) 7.5-325 MG per tablet; Take 1 tablet by mouth Every 6 (Six) Hours As Needed for Moderate Pain .  -     meloxicam (MOBIC) 15 MG tablet; 1 PO Daily with food.          PLAN    She has pain in right thigh for about 9 years that has worsened over the last 2 months.  Numbness and tingling.  Pain with femoral nerve stretch, decreased patellar reflex.  L4 nerve root seems to be involved.  Recommend MRI lumbar spine to assess for impingement and to direct further treatment options.    Start meloxicam for support.    Discussed probable PT once we established the involvement of lumbar spine and know the risk involved for any potential nerve root compression.    Patient's Body mass index is 41.52 kg/m². BMI is above normal parameters. Follow-up plan includes:  exercise counseling and nutrition counseling.    Return for recheck for MRI results.    Davide Marrufo MD     contact guard

## 2019-09-23 NOTE — OCCUPATIONAL THERAPY INITIAL EVALUATION ADULT - RANGE OF MOTION EXAMINATION
CN Testing: b/l frontalis intact; b/l buccinator intact; L facial droop; tongue protrusion to L; b/l eyes open/close intact

## 2019-09-24 NOTE — DISCHARGE NOTE NURSING/CASE MANAGEMENT/SOCIAL WORK - PATIENT PORTAL LINK FT
You can access the FollowMyHealth Patient Portal offered by [facility] by registering at the following website: [email or web address]. By joining [employer]’s FollowMyHealth portal, you will also be able to view your health information using other applications (apps) compatible with our system.

## 2019-09-24 NOTE — DISCHARGE NOTE PROVIDER - NSDCCPCAREPLAN_GEN_ALL_CORE_FT
PRINCIPAL DISCHARGE DIAGNOSIS  Diagnosis: Brain mass  Assessment and Plan of Treatment:       SECONDARY DISCHARGE DIAGNOSES  Diagnosis: Left arm weakness  Assessment and Plan of Treatment:

## 2019-09-24 NOTE — DISCHARGE NOTE NURSING/CASE MANAGEMENT/SOCIAL WORK - NSDCFUADDAPPT_GEN_ALL_CORE_FT
Please continue follow-up with Dr. uMniz (scheduled appointment next week)  Please make appointment with Dr. Chantel Medeiros for primary medical issues.

## 2019-09-24 NOTE — DISCHARGE NOTE PROVIDER - CARE PROVIDER_API CALL
Nishant Muniz)  Neurological Surgery  [address], [address] [place] [address]  Phone: [phone]  Fax: [phone]  Follow Up Time:     Chantel Tellez)  Critical Care Medicine; Internal Medicine  [address], [facility] [address]  [place] [address]  Phone: [phone]  Fax: [phone]  Follow Up Time:

## 2019-09-24 NOTE — DISCHARGE NOTE PROVIDER - NSDCFUADDAPPT_GEN_ALL_CORE_FT
Please continue follow-up with Dr. Muniz (scheduled appointment next week)  Please make appointment with Dr. Chantel Medeiros for primary medical issues.

## 2019-09-24 NOTE — DISCHARGE NOTE PROVIDER - HOSPITAL COURSE
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 Pt denies numbness/tingling, AMS, difficulty speaking, vision changes or other new symptoms.         On admission her left arm was 2/5 proximal and 3/5 distal. Stroke code was called with CT Head, CT Angio Head and CTP performed. Post-surgical site with hyperdensity suspicious for parenchymal hemorrhage. MRI Brain was performed with right frontal lobe enhancement around surgical cavity margins which may represent treatment related changes. Surgical plan cancelled, plan for possible IV Avastin. 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 Pt denies numbness/tingling, AMS, difficulty speaking, vision changes or other new symptoms.         On admission her left arm was 2/5 proximal and 3/5 distal. Stroke code was called with CT Head, CT Angio Head and CTP performed. Post-surgical site with hyperdensity suspicious for parenchymal hemorrhage. MRI Brain was performed with right frontal lobe enhancement around surgical cavity margins which may represent treatment related changes. Surgical plan cancelled. Discussion about IV Avastin was had with Dr. Tellez, Dr. Castillo and Dr. Muniz. No Avastin at this time due to history of colitis.        Plan for discharge home with home PT services for gait ataxia and left sided weakness. PT recommended acute rehab which patient refuses and will go home with personal caretaker.

## 2019-09-24 NOTE — DISCHARGE NOTE PROVIDER - NSDCFUSCHEDAPPT_GEN_ALL_CORE_FT
RAFY LO ; 09/27/2019 ; [facility] PreAdmits  RAFY LO ; 09/27/2019 ; NPP Rad MRI  [address]  RAFY LO ; 10/03/2019 ; NPP Intmed [address]  RAFY LO ; 10/04/2019 ; NPP Neurosurg [address] RAFY LO ; 09/27/2019 ; [facility] PreAdmits  RAFY LO ; 09/27/2019 ; NPP Rad MRI  [address]  RAFY LO ; 10/03/2019 ; NPP Intmed [address]  RAFY LO ; 10/04/2019 ; NPP Neurosurg [address] RAFY LO ; 09/27/2019 ; [facility] PreAdmits  RAFY LO ; 09/27/2019 ; NPP Rad MRI  [address]  RAFY LO ; 10/03/2019 ; NPP Intmed [address]  RAFY LO ; 10/04/2019 ; NPP Neurosurg [address] RAFY LO ; 09/27/2019 ; [facility] PreAdmits  RAFY LO ; 09/27/2019 ; NPP Rad MRI  [address]  RAFY LO ; 10/03/2019 ; NPP Intmed [address]  RAFY LO ; 10/04/2019 ; NPP Neurosurg [address]

## 2019-09-24 NOTE — DISCHARGE NOTE PROVIDER - NSDCACTIVITY_GEN_ALL_CORE
Showering allowed/Return to Work/School allowed/Stairs allowed/Do not drive or operate machinery/Walking - Outdoors allowed/Walking - Indoors allowed/No heavy lifting/straining

## 2019-09-24 NOTE — DISCHARGE NOTE PROVIDER - CARE PROVIDERS DIRECT ADDRESSES
,[email or web address]

## 2019-09-24 NOTE — PROGRESS NOTE ADULT - SUBJECTIVE AND OBJECTIVE BOX
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 Pt denies numbness/tingling, AMS, difficulty speaking, vision changes or other new symptoms. (21 Sep 2019 16:37)    OVERNIGHT EVENTS: No significant events overnight. Feels left arm is improved in strength. Awaiting possible OR for LP.    Vital Signs Last 24 Hrs  T(C): 36.4 (24 Sep 2019 10:26), Max: 36.9 (23 Sep 2019 16:54)  T(F): 97.5 (24 Sep 2019 10:26), Max: 98.5 (23 Sep 2019 16:54)  HR: 72 (24 Sep 2019 10:26) (69 - 74)  BP: 106/61 (24 Sep 2019 10:26) (106/61 - 128/63)  BP(mean): --  RR: 17 (24 Sep 2019 10:26) (16 - 17)  SpO2: 99% (24 Sep 2019 10:26) (95% - 99%)    I&O's Summary    23 Sep 2019 07:01  -  24 Sep 2019 07:00  --------------------------------------------------------  IN: 0 mL / OUT: 800 mL / NET: -800 mL        PHYSICAL EXAM:  Gen: NAD, AAOx3  HEENT: PERRL. EOMI. Right scalp incision well healed.  Lungs: Clear b/l. Right chest chemoport.  Heart: S1, S2. NSR.  Abd: Soft, NT/ND. +BS  Exts: Pulses 2+ throughout  Neuro: CNs II-XII intact. Left UE 4+/5 throughout, o/w 5/5 in all extremities. Gait intact. Speech clear. Following commands.    TUBES/LINES:  [] Salcedo  [] Lumbar Drain  [] Wound Drains  [] Others      DIET:  [] NPO  [x] Mechanical  [] Tube feeds    LABS:                        11.9   7.71  )-----------( 291      ( 24 Sep 2019 09:09 )             37.9     09-24    140  |  104  |  17  ----------------------------<  94  4.1   |  25  |  0.85    Ca    10.1      24 Sep 2019 09:09              CAPILLARY BLOOD GLUCOSE          Drug Levels: [] N/A    CSF Analysis: [] N/A      Allergies    adhesives (Rash)  Keflex (Short breath)  Originally Entered as [Unknown] reaction to [Other][Tape] (Unknown)    Intolerances      MEDICATIONS:  Antibiotics:    Neuro:  acetaminophen   Tablet .. 650 milliGRAM(s) Oral every 6 hours PRN  ALPRAZolam 1 milliGRAM(s) Oral at bedtime  lacosamide Solution 75 milliGRAM(s) Oral two times a day  melatonin 5 milliGRAM(s) Oral at bedtime  metoclopramide Injectable 10 milliGRAM(s) IV Push every 6 hours PRN  ondansetron Injectable 4 milliGRAM(s) IV Push every 6 hours  scopolamine   Patch 1 Patch Transdermal every 72 hours  traZODone 100 milliGRAM(s) Oral at bedtime    Anticoagulation:    OTHER:  ALBUTerol    90 MICROgram(s) HFA Inhaler 2 Puff(s) Inhalation every 6 hours PRN  docusate sodium 100 milliGRAM(s) Oral three times a day  fluticasone propionate 50 MICROgram(s)/spray Nasal Spray 1 Spray(s) Both Nostrils two times a day  letrozole 2.5 milliGRAM(s) Oral daily  loratadine 10 milliGRAM(s) Oral daily  montelukast 10 milliGRAM(s) Oral at bedtime  oxybutynin 5 milliGRAM(s) Oral three times a day  pantoprazole    Tablet 40 milliGRAM(s) Oral before breakfast  senna 2 Tablet(s) Oral at bedtime PRN  tiotropium 2.5 MICROgram(s)/olodaterol 2.5 MICROgram(s) (STIOLTO) Inhaler 2 Puff(s) Inhalation daily    IVF:    CULTURES:    RADIOLOGY & ADDITIONAL TESTS:      ASSESSMENT:  73y Female with PMh of metastatic Breast Cancer, COPD/Asthma, with metastatic brain lesion s/p right frontal craniotomy with tumor resection 4/2017 with recent iscemic colitis secondary to IV Avastin admitted with left upper extremity weakness.    BRAIN MASS  No pertinent family history in first degree relatives  No pertinent family history in first degree relatives  Handoff  MEWS Score  Esophagitis  History of ischemic colitis  Carcinoid tumor  Metastatic breast cancer  Former smoker  COPD, severe  Brain metastasis  Liver metastasis  Cerebral edema  Asthma  GERD (gastroesophageal reflux disease)  Lung collapse  COPD (chronic obstructive pulmonary disease)  Brain tumor  Liver cancer  Carcinoid tumor  Malignant neoplasm of female breast  Malignant neoplasm of bronchus and lung, unspecified site  Brain mass  Liver cancer  History of ischemic colitis  Metastatic breast cancer  COPD, severe  Left arm weakness  Brain mass  H/O craniotomy  H/O hemicolectomy  S/P partial lobectomy of lung  H/O hemicolectomy  H/O craniotomy  H/O right hemicolectomy  History of craniotomy  History of colon resection  Surgery, elective  Surgery, elective  Surgery, elective  Cancer of liver  Breast cancer  WEAKNESS  90+  Left arm weakness      PLAN:  MRI Brain performed yesterday reviewed, no evidence of hydrocephalus or tumor recurrence, likely no OR today for high volume LP or shunt placement,  Possible candidate for IV Avastin, will d/w Dr. Castillo and Dr. Tellez,  Resume PO diet and stop IVF,  Possible DC home pending further plan,  Will d/w Dr. 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 Pt denies numbness/tingling, AMS, difficulty speaking, vision changes or other new symptoms. (21 Sep 2019 16:37)    OVERNIGHT EVENTS: No significant events overnight. Feels left arm is improved in strength. Awaiting possible OR for LP.    Vital Signs Last 24 Hrs  T(C): 36.4 (24 Sep 2019 10:26), Max: 36.9 (23 Sep 2019 16:54)  T(F): 97.5 (24 Sep 2019 10:26), Max: 98.5 (23 Sep 2019 16:54)  HR: 72 (24 Sep 2019 10:26) (69 - 74)  BP: 106/61 (24 Sep 2019 10:26) (106/61 - 128/63)  BP(mean): --  RR: 17 (24 Sep 2019 10:26) (16 - 17)  SpO2: 99% (24 Sep 2019 10:26) (95% - 99%)    I&O's Summary    23 Sep 2019 07:01  -  24 Sep 2019 07:00  --------------------------------------------------------  IN: 0 mL / OUT: 800 mL / NET: -800 mL        PHYSICAL EXAM:  Gen: NAD, AAOx3  HEENT: PERRL. EOMI. Right scalp incision well healed.  Lungs: Clear b/l. Right chest chemoport.  Heart: S1, S2. NSR.  Abd: Soft, NT/ND. +BS  Exts: Pulses 2+ throughout  Neuro: CNs II-XII intact. Left UE 4+/5 throughout, o/w 5/5 in all extremities. Gait intact. Speech clear. Following commands.    TUBES/LINES:  [] Salcedo  [] Lumbar Drain  [] Wound Drains  [] Others      DIET:  [] NPO  [x] Mechanical  [] Tube feeds    LABS:                        11.9   7.71  )-----------( 291      ( 24 Sep 2019 09:09 )             37.9     09-24    140  |  104  |  17  ----------------------------<  94  4.1   |  25  |  0.85    Ca    10.1      24 Sep 2019 09:09              CAPILLARY BLOOD GLUCOSE          Drug Levels: [] N/A    CSF Analysis: [] N/A      Allergies    adhesives (Rash)  Keflex (Short breath)  Originally Entered as [Unknown] reaction to [Other][Tape] (Unknown)    Intolerances      MEDICATIONS:  Antibiotics:    Neuro:  acetaminophen   Tablet .. 650 milliGRAM(s) Oral every 6 hours PRN  ALPRAZolam 1 milliGRAM(s) Oral at bedtime  lacosamide Solution 75 milliGRAM(s) Oral two times a day  melatonin 5 milliGRAM(s) Oral at bedtime  metoclopramide Injectable 10 milliGRAM(s) IV Push every 6 hours PRN  ondansetron Injectable 4 milliGRAM(s) IV Push every 6 hours  scopolamine   Patch 1 Patch Transdermal every 72 hours  traZODone 100 milliGRAM(s) Oral at bedtime    Anticoagulation:    OTHER:  ALBUTerol    90 MICROgram(s) HFA Inhaler 2 Puff(s) Inhalation every 6 hours PRN  docusate sodium 100 milliGRAM(s) Oral three times a day  fluticasone propionate 50 MICROgram(s)/spray Nasal Spray 1 Spray(s) Both Nostrils two times a day  letrozole 2.5 milliGRAM(s) Oral daily  loratadine 10 milliGRAM(s) Oral daily  montelukast 10 milliGRAM(s) Oral at bedtime  oxybutynin 5 milliGRAM(s) Oral three times a day  pantoprazole    Tablet 40 milliGRAM(s) Oral before breakfast  senna 2 Tablet(s) Oral at bedtime PRN  tiotropium 2.5 MICROgram(s)/olodaterol 2.5 MICROgram(s) (STIOLTO) Inhaler 2 Puff(s) Inhalation daily    IVF:    CULTURES:    RADIOLOGY & ADDITIONAL TESTS:      ASSESSMENT:  73y Female with PMh of metastatic Breast Cancer, COPD/Asthma, with metastatic brain lesion s/p right frontal craniotomy with tumor resection 4/2017 with recent iscemic colitis secondary to IV Avastin admitted with left upper extremity weakness.    BRAIN MASS  No pertinent family history in first degree relatives  No pertinent family history in first degree relatives  Handoff  MEWS Score  Esophagitis  History of ischemic colitis  Carcinoid tumor  Metastatic breast cancer  Former smoker  COPD, severe  Brain metastasis  Liver metastasis  Cerebral edema  Asthma  GERD (gastroesophageal reflux disease)  Lung collapse  COPD (chronic obstructive pulmonary disease)  Brain tumor  Liver cancer  Carcinoid tumor  Malignant neoplasm of female breast  Malignant neoplasm of bronchus and lung, unspecified site  Brain mass  Liver cancer  History of ischemic colitis  Metastatic breast cancer  COPD, severe  Left arm weakness  Brain mass  H/O craniotomy  H/O hemicolectomy  S/P partial lobectomy of lung  H/O hemicolectomy  H/O craniotomy  H/O right hemicolectomy  History of craniotomy  History of colon resection  Surgery, elective  Surgery, elective  Surgery, elective  Cancer of liver  Breast cancer  WEAKNESS  90+  Left arm weakness      PLAN:  MRI Brain performed yesterday reviewed, no evidence of hydrocephalus or tumor recurrence, likely no OR today for high volume LP or shunt placement,  Possible candidate for IV Avastin, will d/w Dr. Castillo and Dr. Tellez,  Resume PO diet and stop IVF,  Possible DC home pending further plan,  Recommended for acute rehab, however patient would like to go home with personal HHA and will do home PT for gait training with left side hemiparesis.	  Will d/w Dr. Muniz

## 2019-09-24 NOTE — PROGRESS NOTE ADULT - REASON FOR ADMISSION
left arm weakness

## 2019-09-24 NOTE — DISCHARGE NOTE PROVIDER - NSDCFUADDINST_GEN_ALL_CORE_FT
Call your doctor or return to ER for any new neurological changes such as worsening extremity weakness, visual or hearing changes, speech difficulties, falls, seizures, syncope.

## 2019-09-30 NOTE — PHYSICAL EXAM
[General Appearance - Alert] : alert [General Appearance - In No Acute Distress] : in no acute distress [Person] : oriented to person [Place] : oriented to place [Time] : oriented to time [Cranial Nerves Optic (II)] : visual acuity intact bilaterally,  pupils equal round and reactive to light [Cranial Nerves Oculomotor (III)] : extraocular motion intact [Cranial Nerves Trigeminal (V)] : facial sensation intact symmetrically [Cranial Nerves Facial (VII)] : face symmetrical [Cranial Nerves Vestibulocochlear (VIII)] : hearing was intact bilaterally [Cranial Nerves Accessory (XI - Cranial And Spinal)] : head turning and shoulder shrug symmetric [Cranial Nerves Glossopharyngeal (IX)] : tongue and palate midline [Motor Tone] : muscle tone was normal in all four extremities [Motor Strength] : muscle strength was normal in all four extremities [Cranial Nerves Hypoglossal (XII)] : there was no tongue deviation with protrusion [Motor Handedness Right-Handed] : the patient is right hand dominant [Sensation Tactile Decrease] : light touch was intact [Abnormal Walk] : normal gait [Extraocular Movements] : extraocular movements were intact [Full Visual Field] : full visual field [Neck Appearance] : the appearance of the neck was normal [Skin Color & Pigmentation] : normal skin color and pigmentation

## 2019-10-03 PROBLEM — F41.9 ANXIETY: Status: ACTIVE | Noted: 2017-04-18

## 2019-10-03 PROBLEM — K21.9 GERD (GASTROESOPHAGEAL REFLUX DISEASE): Status: ACTIVE | Noted: 2017-07-25

## 2019-10-03 NOTE — PHYSICAL EXAM
[No Acute Distress] : no acute distress [Well Nourished] : well nourished [Well Developed] : well developed [Normal Sclera/Conjunctiva] : normal sclera/conjunctiva [Well-Appearing] : well-appearing [EOMI] : extraocular movements intact [PERRL] : pupils equal round and reactive to light [Normal Outer Ear/Nose] : the outer ears and nose were normal in appearance [Normal Oropharynx] : the oropharynx was normal [No JVD] : no jugular venous distention [No Lymphadenopathy] : no lymphadenopathy [Supple] : supple [Thyroid Normal, No Nodules] : the thyroid was normal and there were no nodules present [No Respiratory Distress] : no respiratory distress  [No Accessory Muscle Use] : no accessory muscle use [Clear to Auscultation] : lungs were clear to auscultation bilaterally [Normal Rate] : normal rate  [Regular Rhythm] : with a regular rhythm [No Murmur] : no murmur heard [Normal S1, S2] : normal S1 and S2 [No Carotid Bruits] : no carotid bruits [No Varicosities] : no varicosities [No Abdominal Bruit] : a ~M bruit was not heard ~T in the abdomen [No Edema] : there was no peripheral edema [Pedal Pulses Present] : the pedal pulses are present [No Extremity Clubbing/Cyanosis] : no extremity clubbing/cyanosis [No Palpable Aorta] : no palpable aorta [Non Tender] : non-tender [Soft] : abdomen soft [No Masses] : no abdominal mass palpated [Non-distended] : non-distended [No HSM] : no HSM [Normal Bowel Sounds] : normal bowel sounds [Normal Anterior Cervical Nodes] : no anterior cervical lymphadenopathy [Normal Posterior Cervical Nodes] : no posterior cervical lymphadenopathy [No Spinal Tenderness] : no spinal tenderness [No CVA Tenderness] : no CVA  tenderness [Grossly Normal Strength/Tone] : grossly normal strength/tone [No Joint Swelling] : no joint swelling [No Rash] : no rash [No Focal Deficits] : no focal deficits [Coordination Grossly Intact] : coordination grossly intact [Normal Gait] : normal gait [Deep Tendon Reflexes (DTR)] : deep tendon reflexes were 2+ and symmetric [Normal Affect] : the affect was normal [Normal Insight/Judgement] : insight and judgment were intact

## 2019-10-03 NOTE — HISTORY OF PRESENT ILLNESS
[FreeTextEntry1] : Recent hospitalization for left arm weakness; . MRI revealed Radiation necrosis; Avastin suggested  but terrible side affects [de-identified] : In addition saw Dr Burrell and another medication added to Femara;Will see Dr. Enriquez tomorrow;  Patient complaining of reflux; \par Severe\par Will try Carafate again 4 times a day;

## 2019-10-03 NOTE — ASSESSMENT
[FreeTextEntry1] : Neurological findings improved although gait still off; Will see Dr. Muniz tomorrow; Will contact oncology about \par present treatment; \par Otherwise no change other medications

## 2019-10-04 NOTE — ASSESSMENT
[FreeTextEntry1] : The patient’s established problem of radiation necrosis is stable based on MRI with no evidence of progression of disease or hydrocephalus. The patient was extensively educated about the nature of the disease process and beneficial effects of Avastin in her case. Therapeutic and diagnostic tests include serial MRI and follow-up.  The patient should see me in 3 months with a new MRI. The patient should also visit Dr Castillo for a low dose of IV avastin.  I have explained the alternatives, risks and benefits to the patient and patient understands and agrees to proceed.

## 2019-10-04 NOTE — DATA REVIEWED
[de-identified] : I have reviewed the most recent MRI which shows Interval progression of rind of enhancement along the margins of \par the surgical cavity within the right frontal lobe as well as interval \par progression of surrounding FLAIR/T2 signal abnormality. These findings may \par represent treatment related changes rather tumoral disease.

## 2019-10-04 NOTE — REASON FOR VISIT
[Follow-Up: _____] : a [unfilled] follow-up visit [FreeTextEntry1] : Surgery: 5/10/18\par Pathology: High grade neuro-endocrine carcinoma

## 2019-10-04 NOTE — HISTORY OF PRESENT ILLNESS
[FreeTextEntry1] : 73yr old female with PMH of metastatic breast CA and neuroendocrine tumor. She is s/p right frontal resection of scalp mass on 5/10/18. She did not want to undergo any treatment following resection but agreed to SBRT, which completed on 7/30/18 with Dr. Cam. She does not want any chemotherapy. She follows with Dr. Hampton.\par \par Radiologist stated liver lesions were too small to bx at this time, recommendation was to do another abd U/S in March and evaluate the findings at that time for possible surgery per Dr. Givens. \par \par She was seen by Dr. Castillo 2/13/19 and started on IV Avastin 2/15/19, she is getting IV Avastin Q4 weeks\par \par She was recently hospitalized for ischemic colitis. Avastin was discontinued. She presents today with a new MRI. She is being managed conservatively for her colitis.\par \par She was most recently hospitalized for left arm weakness.

## 2019-10-18 NOTE — HISTORY OF PRESENT ILLNESS
[de-identified] : In addition to above left sided weakness persists;  Fell once a night ;  could not lift her;  [FreeTextEntry1] : No chemotherapy given\par Avastin should not been given; She had a bad reaction to this drug in past;

## 2019-10-18 NOTE — HEALTH RISK ASSESSMENT
[No] : No [One fall no injury in past year] : Patient reported one fall in the past year without injury [0] : 2) Feeling down, depressed, or hopeless: Not at all (0) [[id number]] : 0 [] : No

## 2019-10-18 NOTE — ASSESSMENT
[FreeTextEntry1] : Will discuss with the Dr. Michelle the use of Avastin; I am concerned she will experience another episode of \par ischemia; She also needs physical therapy since she is very unstable with ambulation and had a number of falls at home; \par \par

## 2019-10-28 PROBLEM — C7A.8: Status: ACTIVE | Noted: 2018-05-22

## 2019-10-28 NOTE — ASSESSMENT
[FreeTextEntry1] : \par \par \par 1) Metastatic breast Ca, pt on Femara...f/u with Dr. Burrell.\par \par 2)Pt,  aid and family friend will make decision re-further treatment with Avastin...for now Avastin is on hold...\par

## 2019-10-28 NOTE — HISTORY OF PRESENT ILLNESS
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 Burrell.\par \par 10- Pt doing well..not sure she wants more Avastin...afraid she will get colitis even on reduced dose...Discussed with Dr. Burrell he does NOT have objection for Avastin...

## 2019-12-04 PROBLEM — C34.90 ADENOCARCINOMA OF LUNG: Status: ACTIVE | Noted: 2017-06-22

## 2019-12-04 NOTE — ASSESSMENT
[FreeTextEntry1] : 1) patient is now wheelchair-bound alert oriented to self wants  martinis and chocolate\par \par \par 2) Metastatic breast Ca, pt on Femara...f/u with Dr. Burrell.\par \par 3)For now Avastin is on hold...\par

## 2019-12-04 NOTE — HISTORY OF PRESENT ILLNESS
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 Burrell.\par \par 10- Pt doing well..not sure she wants more Avastin...afraid she will get colitis even on reduced dose...Discussed with Dr. Burrell he does NOT have objection for Avastin...\par \par December 4, 2019 patient returns for follow-up with her entourage...  home health aide and a friend... According to the home health aide patient had an acute drop in mentation right after the intravenous Avastin...  however is not convinced...

## 2019-12-10 NOTE — PHYSICAL EXAM
[General Appearance - Alert] : alert [General Appearance - In No Acute Distress] : in no acute distress [Person] : oriented to person [Place] : oriented to place [Time] : oriented to time [Cranial Nerves Trigeminal (V)] : facial sensation intact symmetrically [Cranial Nerves Optic (II)] : visual acuity intact bilaterally,  pupils equal round and reactive to light [Cranial Nerves Oculomotor (III)] : extraocular motion intact [Cranial Nerves Vestibulocochlear (VIII)] : hearing was intact bilaterally [Cranial Nerves Glossopharyngeal (IX)] : tongue and palate midline [Cranial Nerves Facial (VII)] : face symmetrical [Cranial Nerves Accessory (XI - Cranial And Spinal)] : head turning and shoulder shrug symmetric [Motor Tone] : muscle tone was normal in all four extremities [Cranial Nerves Hypoglossal (XII)] : there was no tongue deviation with protrusion [Motor Strength] : muscle strength was normal in all four extremities [Motor Handedness Right-Handed] : the patient is right hand dominant [Sensation Tactile Decrease] : light touch was intact [Abnormal Walk] : normal gait [Full Visual Field] : full visual field [Extraocular Movements] : extraocular movements were intact [Skin Color & Pigmentation] : normal skin color and pigmentation [Neck Appearance] : the appearance of the neck was normal

## 2019-12-13 NOTE — ASSESSMENT
[FreeTextEntry1] : The patient’s established problem of radiation necrosis has progressed in the past, we have no new imaging available. The patient was extensively educated about the nature of the disease process and beneficial effects of IV Avastin in her case. Therapeutic and diagnostic tests include serial MRI and follow-up.  The patient should see me in a month with a new MRI. The patient should also visit Dr Castillo for a low dose of IV avastin. We will stop her morning dose of vimpat in an attempt to help decrease her cognitive complaints.  I have explained the alternatives, risks and benefits to the patient and patient understands and agrees to proceed.

## 2019-12-13 NOTE — HISTORY OF PRESENT ILLNESS
[FreeTextEntry1] : 73yr old female with PMH of metastatic breast CA and neuroendocrine tumor. She is s/p right frontal resection of scalp mass on 5/10/18. She did not want to undergo any treatment following resection but agreed to SBRT, which completed on 7/30/18 with Dr. Cam. She does not want any chemotherapy. She follows with Dr. Hampton.\par \par Radiologist stated liver lesions were too small to bx at this time, recommendation was to do another abd U/S in March and evaluate the findings at that time for possible surgery per Dr. Givens. \par \par She was seen by Dr. Castillo 2/13/19 and started on IV Avastin 2/15/19\par \par She was recently hospitalized for ischemic colitis. Avastin was discontinued. She is being managed conservatively for her colitis.\par \par She was most recently hospitalized for left arm weakness. SHe had one dose of IV Avastin with Dr. Castillo. She and her family have been hesitant to undergo any further treatment. She continues to have progressive LLE weakness and is now using a wheelchair for long distance mobility. She saw Dr. Castillo 12/4 and IV Avastin is on hold for now. \par \par She complains of worsening confusion and nausea everyday.

## 2019-12-26 PROBLEM — K52.9 COLITIS: Status: ACTIVE | Noted: 2019-04-22

## 2019-12-26 NOTE — ASSESSMENT
[FreeTextEntry1] : Appears stable; Will get another MRI; Of Avastatin since multiple side affects; Will discuss with Dr. Garcia

## 2019-12-26 NOTE — PHYSICAL EXAM
[No Acute Distress] : no acute distress [Well Nourished] : well nourished [Well Developed] : well developed [Well-Appearing] : well-appearing [Normal Sclera/Conjunctiva] : normal sclera/conjunctiva [PERRL] : pupils equal round and reactive to light [Normal Outer Ear/Nose] : the outer ears and nose were normal in appearance [EOMI] : extraocular movements intact [Normal Oropharynx] : the oropharynx was normal [No JVD] : no jugular venous distention [No Lymphadenopathy] : no lymphadenopathy [Supple] : supple [Thyroid Normal, No Nodules] : the thyroid was normal and there were no nodules present [No Respiratory Distress] : no respiratory distress  [No Accessory Muscle Use] : no accessory muscle use [Clear to Auscultation] : lungs were clear to auscultation bilaterally [Normal Rate] : normal rate  [Regular Rhythm] : with a regular rhythm [Normal S1, S2] : normal S1 and S2 [No Murmur] : no murmur heard [No Carotid Bruits] : no carotid bruits [No Varicosities] : no varicosities [No Abdominal Bruit] : a ~M bruit was not heard ~T in the abdomen [Pedal Pulses Present] : the pedal pulses are present [No Edema] : there was no peripheral edema [No Palpable Aorta] : no palpable aorta [No Extremity Clubbing/Cyanosis] : no extremity clubbing/cyanosis [Non Tender] : non-tender [Soft] : abdomen soft [Non-distended] : non-distended [No Masses] : no abdominal mass palpated [No HSM] : no HSM [Normal Bowel Sounds] : normal bowel sounds [Normal Posterior Cervical Nodes] : no posterior cervical lymphadenopathy [Normal Anterior Cervical Nodes] : no anterior cervical lymphadenopathy [No CVA Tenderness] : no CVA  tenderness [No Spinal Tenderness] : no spinal tenderness [No Joint Swelling] : no joint swelling [Grossly Normal Strength/Tone] : grossly normal strength/tone [No Rash] : no rash [No Focal Deficits] : no focal deficits [Normal Gait] : normal gait [Deep Tendon Reflexes (DTR)] : deep tendon reflexes were 2+ and symmetric [Normal Affect] : the affect was normal [Normal Insight/Judgement] : insight and judgment were intact [de-identified] : Left arm weakness

## 2019-12-26 NOTE — HISTORY OF PRESENT ILLNESS
[FreeTextEntry1] : All notes reviewed; Neurosurgery follow up noted; Vimpat dose decreased; No more IV Avastatin [de-identified] : In addition patient with confusion; Using a wheelchair to get  around; Appetite good but has been losing weight;  To get a MRI of brain as per Dr. Muniz;

## 2019-12-27 NOTE — ED PROVIDER NOTE - RESPIRATORY NEGATIVE STATEMENT, MLM
Patient refused morning medications, saying, \"I don't trust you.\"    Dr. Briones notified.  We are in the process of trying to find a  for the patient as she frequently calls out and then says, \"don't leave me\" when you try to exit the room.    Will continue to monitor.    no chest pain, no cough, and no shortness of breath.

## 2020-01-01 ENCOUNTER — OUTPATIENT (OUTPATIENT)
Dept: OUTPATIENT SERVICES | Facility: HOSPITAL | Age: 75
LOS: 1 days | End: 2020-01-01
Payer: MEDICARE

## 2020-01-01 ENCOUNTER — APPOINTMENT (OUTPATIENT)
Dept: NEUROSURGERY | Facility: CLINIC | Age: 75
End: 2020-01-01
Payer: MEDICARE

## 2020-01-01 ENCOUNTER — APPOINTMENT (OUTPATIENT)
Dept: INTERNAL MEDICINE | Facility: CLINIC | Age: 75
End: 2020-01-01
Payer: MEDICARE

## 2020-01-01 ENCOUNTER — APPOINTMENT (OUTPATIENT)
Dept: CT IMAGING | Facility: HOSPITAL | Age: 75
End: 2020-01-01
Payer: MEDICARE

## 2020-01-01 ENCOUNTER — FORM ENCOUNTER (OUTPATIENT)
Age: 75
End: 2020-01-01

## 2020-01-01 ENCOUNTER — RX RENEWAL (OUTPATIENT)
Age: 75
End: 2020-01-01

## 2020-01-01 ENCOUNTER — APPOINTMENT (OUTPATIENT)
Dept: MRI IMAGING | Facility: HOSPITAL | Age: 75
End: 2020-01-01
Payer: MEDICARE

## 2020-01-01 ENCOUNTER — APPOINTMENT (OUTPATIENT)
Dept: INTERNAL MEDICINE | Facility: CLINIC | Age: 75
End: 2020-01-01

## 2020-01-01 ENCOUNTER — APPOINTMENT (OUTPATIENT)
Dept: CT IMAGING | Facility: HOSPITAL | Age: 75
End: 2020-01-01

## 2020-01-01 VITALS
SYSTOLIC BLOOD PRESSURE: 136 MMHG | WEIGHT: 143 LBS | HEIGHT: 62 IN | HEART RATE: 81 BPM | BODY MASS INDEX: 26.31 KG/M2 | DIASTOLIC BLOOD PRESSURE: 70 MMHG | OXYGEN SATURATION: 97 % | TEMPERATURE: 98.9 F

## 2020-01-01 VITALS
SYSTOLIC BLOOD PRESSURE: 123 MMHG | OXYGEN SATURATION: 97 % | BODY MASS INDEX: 26.31 KG/M2 | HEIGHT: 62 IN | HEART RATE: 87 BPM | WEIGHT: 143 LBS | RESPIRATION RATE: 16 BRPM | TEMPERATURE: 97.9 F | DIASTOLIC BLOOD PRESSURE: 73 MMHG

## 2020-01-01 VITALS
OXYGEN SATURATION: 98 % | TEMPERATURE: 98.7 F | WEIGHT: 145 LBS | SYSTOLIC BLOOD PRESSURE: 95 MMHG | BODY MASS INDEX: 26.52 KG/M2 | HEART RATE: 90 BPM | DIASTOLIC BLOOD PRESSURE: 63 MMHG

## 2020-01-01 DIAGNOSIS — Z41.9 ENCOUNTER FOR PROCEDURE FOR PURPOSES OTHER THAN REMEDYING HEALTH STATE, UNSPECIFIED: Chronic | ICD-10-CM

## 2020-01-01 DIAGNOSIS — Z00.00 ENCOUNTER FOR GENERAL ADULT MEDICAL EXAMINATION W/OUT ABNORMAL FINDINGS: ICD-10-CM

## 2020-01-01 DIAGNOSIS — Z98.890 OTHER SPECIFIED POSTPROCEDURAL STATES: Chronic | ICD-10-CM

## 2020-01-01 DIAGNOSIS — Z90.49 ACQUIRED ABSENCE OF OTHER SPECIFIED PARTS OF DIGESTIVE TRACT: Chronic | ICD-10-CM

## 2020-01-01 DIAGNOSIS — Z90.2 ACQUIRED ABSENCE OF LUNG [PART OF]: Chronic | ICD-10-CM

## 2020-01-01 DIAGNOSIS — M79.602 PAIN IN LEFT ARM: ICD-10-CM

## 2020-01-01 DIAGNOSIS — R29.898 OTHER SYMPTOMS AND SIGNS INVOLVING THE MUSCULOSKELETAL SYSTEM: ICD-10-CM

## 2020-01-01 DIAGNOSIS — J44.9 CHRONIC OBSTRUCTIVE PULMONARY DISEASE, UNSPECIFIED: ICD-10-CM

## 2020-01-01 DIAGNOSIS — C50.919 MALIGNANT NEOPLASM OF UNSPECIFIED SITE OF UNSPECIFIED FEMALE BREAST: Chronic | ICD-10-CM

## 2020-01-01 DIAGNOSIS — G47.00 INSOMNIA, UNSPECIFIED: ICD-10-CM

## 2020-01-01 DIAGNOSIS — R35.0 FREQUENCY OF MICTURITION: ICD-10-CM

## 2020-01-01 DIAGNOSIS — I10 ESSENTIAL (PRIMARY) HYPERTENSION: ICD-10-CM

## 2020-01-01 DIAGNOSIS — R56.9 UNSPECIFIED CONVULSIONS: ICD-10-CM

## 2020-01-01 DIAGNOSIS — R41.0 DISORIENTATION, UNSPECIFIED: ICD-10-CM

## 2020-01-01 DIAGNOSIS — C50.919 MALIGNANT NEOPLASM OF UNSPECIFIED SITE OF UNSPECIFIED FEMALE BREAST: ICD-10-CM

## 2020-01-01 DIAGNOSIS — T66.XXXA RADIATION SICKNESS, UNSPECIFIED, INITIAL ENCOUNTER: ICD-10-CM

## 2020-01-01 DIAGNOSIS — C79.31 SECONDARY MALIGNANT NEOPLASM OF BRAIN: ICD-10-CM

## 2020-01-01 PROCEDURE — A9585: CPT

## 2020-01-01 PROCEDURE — 99442: CPT | Mod: 95

## 2020-01-01 PROCEDURE — 74177 CT ABD & PELVIS W/CONTRAST: CPT

## 2020-01-01 PROCEDURE — 74177 CT ABD & PELVIS W/CONTRAST: CPT | Mod: 26

## 2020-01-01 PROCEDURE — 99214 OFFICE O/P EST MOD 30 MIN: CPT

## 2020-01-01 PROCEDURE — 99213 OFFICE O/P EST LOW 20 MIN: CPT

## 2020-01-01 PROCEDURE — 71260 CT THORAX DX C+: CPT

## 2020-01-01 PROCEDURE — 73200 CT UPPER EXTREMITY W/O DYE: CPT

## 2020-01-01 PROCEDURE — 73200 CT UPPER EXTREMITY W/O DYE: CPT | Mod: 26,LT

## 2020-01-01 PROCEDURE — 71260 CT THORAX DX C+: CPT | Mod: 26

## 2020-01-01 PROCEDURE — 70553 MRI BRAIN STEM W/O & W/DYE: CPT | Mod: 26

## 2020-01-01 PROCEDURE — 70553 MRI BRAIN STEM W/O & W/DYE: CPT

## 2020-01-01 RX ORDER — OXYBUTYNIN CHLORIDE 10 MG/1
10 TABLET, EXTENDED RELEASE ORAL
Qty: 30 | Refills: 2 | Status: ACTIVE | COMMUNITY
Start: 2020-01-01 | End: 1900-01-01

## 2020-01-01 RX ORDER — LACOSAMIDE 150 MG/1
150 TABLET, FILM COATED ORAL
Qty: 60 | Refills: 5 | Status: ACTIVE | COMMUNITY
Start: 2018-10-28 | End: 1900-01-01

## 2020-01-01 RX ORDER — ALPRAZOLAM 0.5 MG/1
0.5 TABLET ORAL
Qty: 90 | Refills: 0 | Status: ACTIVE | COMMUNITY
Start: 2018-06-07 | End: 1900-01-01

## 2020-01-01 RX ORDER — MONTELUKAST 10 MG/1
10 TABLET, FILM COATED ORAL
Qty: 30 | Refills: 5 | Status: ACTIVE | COMMUNITY

## 2020-01-01 RX ORDER — TRAZODONE HYDROCHLORIDE 100 MG/1
100 TABLET ORAL
Qty: 30 | Refills: 5 | Status: ACTIVE | COMMUNITY
Start: 2019-03-08 | End: 1900-01-01

## 2020-01-01 RX ORDER — ONDANSETRON 8 MG/1
8 TABLET, ORALLY DISINTEGRATING ORAL 3 TIMES DAILY
Qty: 30 | Refills: 3 | Status: ACTIVE | COMMUNITY
Start: 2019-01-01 | End: 1900-01-01

## 2020-01-01 RX ORDER — LETROZOLE TABLETS 2.5 MG/1
2.5 TABLET, FILM COATED ORAL DAILY
Qty: 20 | Refills: 3 | Status: ACTIVE | COMMUNITY
Start: 2020-01-01 | End: 1900-01-01

## 2020-01-01 RX ORDER — PANTOPRAZOLE 40 MG/1
40 TABLET, DELAYED RELEASE ORAL TWICE DAILY
Qty: 60 | Refills: 5 | Status: ACTIVE | COMMUNITY
Start: 2020-01-01 | End: 1900-01-01

## 2020-01-01 RX ORDER — ONDANSETRON 8 MG/1
8 TABLET ORAL EVERY 8 HOURS
Qty: 60 | Refills: 5 | Status: ACTIVE | COMMUNITY
Start: 2018-09-28 | End: 1900-01-01

## 2020-01-01 RX ORDER — PREDNISONE 10 MG/1
10 TABLET ORAL
Qty: 60 | Refills: 3 | Status: ACTIVE | COMMUNITY
Start: 2020-01-01 | End: 1900-01-01

## 2020-01-10 PROBLEM — G47.00 INSOMNIA: Status: ACTIVE | Noted: 2020-01-01

## 2020-01-14 PROBLEM — M79.602 ARM PAIN, ANTERIOR, LEFT: Status: ACTIVE | Noted: 2020-01-01

## 2020-01-14 PROBLEM — J44.9 CHRONIC OBSTRUCTIVE PULMONARY DISEASE, UNSPECIFIED COPD TYPE: Status: ACTIVE | Noted: 2017-11-09

## 2020-01-14 NOTE — HISTORY OF PRESENT ILLNESS
[FreeTextEntry1] : Left arm painful between elbow and shoulder;  [de-identified] : In addition no further chemo because side affects;  Saw Novaslec; Getting treatment for liver mets;

## 2020-01-14 NOTE — PHYSICAL EXAM
[Normal Sclera/Conjunctiva] : normal sclera/conjunctiva [Normal Outer Ear/Nose] : the outer ears and nose were normal in appearance [Normal Oropharynx] : the oropharynx was normal [No Lymphadenopathy] : no lymphadenopathy [Normal TMs] : both tympanic membranes were normal [No JVD] : no jugular venous distention [Supple] : supple [No Respiratory Distress] : no respiratory distress  [No Accessory Muscle Use] : no accessory muscle use [Soft] : abdomen soft [No HSM] : no HSM [Non Tender] : non-tender [No Rash] : no rash [Normal Bowel Sounds] : normal bowel sounds [No Skin Lesions] : no skin lesions [de-identified] : Arm painfful with movement [de-identified] : Awake and alert

## 2020-01-14 NOTE — ASSESSMENT
[FreeTextEntry1] : Patient with left arm pain of questionable etiology; Will obtain of Left arm to rule out metastatic disease; She is following  up at oncology for the liver mets:

## 2020-02-03 NOTE — DATA REVIEWED
[de-identified] : I have reviewed the most recent MRI which shows a stable right frontal signal change related to treatment effect

## 2020-02-03 NOTE — PHYSICAL EXAM
[General Appearance - In No Acute Distress] : in no acute distress [General Appearance - Alert] : alert [Intact] : intact [5] : L3 quadriceps 5/5 [4] : L3 quadriceps 4/5

## 2020-02-03 NOTE — ASSESSMENT
[FreeTextEntry1] : The patient’s established problem of radiation necrosis is stable. The patient was extensively educated about the nature of the disease process. Therapeutic and diagnostic tests include serial MRI and follow-up.  The patient should see me in 6 months with a new MRI. The patient should also continue to see Dr. Gonzales regarding her primary disease and a new PET scan. I recommend she continue to hold off on IV Avastin given the stability of her disease on recent imaging.  I have explained the alternatives, risks and benefits to the patient and patient understands and agrees to proceed.

## 2020-02-03 NOTE — HISTORY OF PRESENT ILLNESS
[FreeTextEntry1] : 73yr old female with PMH of metastatic breast CA and neuroendocrine tumor. She is s/p right frontal resection of scalp mass on 5/10/18. She did not want to undergo any treatment following resection but agreed to SBRT, which completed on 7/30/18 with Dr. Cam. She does not want any chemotherapy. She follows with Dr. Hampton.\par \par Radiologist stated liver lesions were too small to bx at this time, recommendation was to do another abd U/S in March and evaluate the findings at that time for possible surgery per Dr. Gonzales\par \par She was seen by Dr. Castillo 2/13/19 and started on IV Avastin 2/15/19\par \par She was recently hospitalized for ischemic colitis. Avastin was discontinued. She is being managed conservatively for her colitis.\par \par She was most recently hospitalized for left arm weakness. SHe had one dose of IV Avastin with Dr. Castillo. She and her family have been hesitant to undergo any further treatment. She continues to have progressive LLE weakness and is now using a wheelchair for long distance mobility. She saw Dr. Castillo 12/4 and IV Avastin is on hold for now. \par \par She has had 2 falls from standing due to left leg weakness.

## 2020-02-25 PROBLEM — T66.XXXA: Status: ACTIVE | Noted: 2018-10-09

## 2020-02-25 PROBLEM — R56.9 SEIZURE: Status: ACTIVE | Noted: 2018-10-28

## 2020-02-25 PROBLEM — R41.0 CONFUSION: Status: ACTIVE | Noted: 2019-02-26

## 2020-02-25 PROBLEM — R29.898 WEAKNESS OF LEFT ARM: Status: ACTIVE | Noted: 2019-01-01

## 2020-02-25 PROBLEM — C79.31 BRAIN METASTASIS: Status: ACTIVE | Noted: 2017-04-24

## 2020-02-25 PROBLEM — R35.0 URINARY FREQUENCY: Status: ACTIVE | Noted: 2020-01-01

## 2020-02-25 PROBLEM — I10 HYPERTENSION: Status: ACTIVE | Noted: 2017-03-31

## 2020-02-25 NOTE — HISTORY OF PRESENT ILLNESS
[FreeTextEntry1] : Neuro follows up noted;  Some weakness;  [de-identified] : Some confusion at times. He is getting weaker. Imaging stable. Getting chemo as per Dr. Amador; \par Getting Physical therapy at the house

## 2020-02-25 NOTE — PHYSICAL EXAM
[No Acute Distress] : no acute distress [Well Nourished] : well nourished [Well Developed] : well developed [Well-Appearing] : well-appearing [Normal Sclera/Conjunctiva] : normal sclera/conjunctiva [PERRL] : pupils equal round and reactive to light [EOMI] : extraocular movements intact [Normal Outer Ear/Nose] : the outer ears and nose were normal in appearance [Normal Oropharynx] : the oropharynx was normal [No JVD] : no jugular venous distention [No Lymphadenopathy] : no lymphadenopathy [Supple] : supple [Thyroid Normal, No Nodules] : the thyroid was normal and there were no nodules present [No Respiratory Distress] : no respiratory distress  [No Accessory Muscle Use] : no accessory muscle use [Clear to Auscultation] : lungs were clear to auscultation bilaterally [Normal Rate] : normal rate  [Regular Rhythm] : with a regular rhythm [Normal S1, S2] : normal S1 and S2 [No Murmur] : no murmur heard [No Carotid Bruits] : no carotid bruits [No Abdominal Bruit] : a ~M bruit was not heard ~T in the abdomen [No Varicosities] : no varicosities [Pedal Pulses Present] : the pedal pulses are present [No Edema] : there was no peripheral edema [No Extremity Clubbing/Cyanosis] : no extremity clubbing/cyanosis [No Palpable Aorta] : no palpable aorta [Non Tender] : non-tender [Soft] : abdomen soft [Non-distended] : non-distended [No Masses] : no abdominal mass palpated [Normal Bowel Sounds] : normal bowel sounds [No HSM] : no HSM [Normal Posterior Cervical Nodes] : no posterior cervical lymphadenopathy [Normal Anterior Cervical Nodes] : no anterior cervical lymphadenopathy [No CVA Tenderness] : no CVA  tenderness [No Spinal Tenderness] : no spinal tenderness [No Joint Swelling] : no joint swelling [Grossly Normal Strength/Tone] : grossly normal strength/tone [No Rash] : no rash [Coordination Grossly Intact] : coordination grossly intact [No Focal Deficits] : no focal deficits [Normal Gait] : normal gait [Deep Tendon Reflexes (DTR)] : deep tendon reflexes were 2+ and symmetric [Normal Insight/Judgement] : insight and judgment were intact [Normal Affect] : the affect was normal

## 2020-02-25 NOTE — ASSESSMENT
[FreeTextEntry1] : Needs more physical therapy; Will do it at the facility; \par Also urinary frequency; Oxybutynin not helpful but will increase dose;

## 2020-04-28 NOTE — PHYSICAL THERAPY INITIAL EVALUATION ADULT - LEVEL OF CONSCIOUSNESS, REHAB EVAL
alert
Alert and oriented to person, place and time. Normal mood and affect, no apparent risk to self or others

## 2020-05-31 NOTE — PATIENT PROFILE ADULT - DO YOU FEEL LIKE HURTING OTHERS?
You can access the FollowMyHealth Patient Portal offered by [facility] by registering at the following website: [email or web address]. By joining [facility]’s FollowMyHealth portal, you will also be able to view your health information using other applications (apps) compatible with our system.
no

## 2020-07-02 NOTE — ASSESSMENT
[FreeTextEntry1] : The patient’s established problem of BLE weakness is progressing. The patient and her  were extensively educated about the nature of the disease process. She is too weak to undergo and MRI at this time. We discussed  returning to the hospital to re-start IV Avastin but her  wishes to proceed with hospice care at this time and I agree. Our  will reach out to help coordinate home hospice services.

## 2020-09-14 NOTE — PRE-OP CHECKLIST - SPO2 (%)
97 seen/discussed with resident.  Pt is without suicidal/homicidal ideation.  Will continue tx plan; active placement efforts seen/discussed with resident. No psychosis evident.  Pt prefers to be d/c'd to shelter and await or attempt placement from there. Possible d/c tomorrow

## 2020-09-29 NOTE — ED ADULT NURSE NOTE - CAS TRG GENERAL AIRWAY, MLM
9/29/2020         RE: Bill Catherine  [address]  [place] [address]        Dear Colleague,    Thank you for referring your patient, Bill Catherine, to the [facility]. Please see a copy of my visit note below.    HPI    This pleasant patient is having post nasal drip for years. Occurs anytime of the day but especially after a meal. Describes heartburn a couple times a week. States that he snores and needs to clear his throat from time to time. Denies any fever, runny nose, sneezing, coughing, environmental or food allergies, difficulty swallowing or voice changes.    Review of Systems   Constitutional: Negative.    HENT: Positive for congestion. Negative for ear discharge, ear pain, hearing loss, nosebleeds and tinnitus.    Eyes: Negative for blurred vision, double vision and photophobia.   Respiratory: Negative for cough, hemoptysis, sputum production, shortness of breath and wheezing.    Gastrointestinal: Positive for heartburn. Negative for nausea and vomiting.   Skin: Negative.    Neurological: Negative for dizziness, tingling, tremors and headaches.   Endo/Heme/Allergies: Negative for environmental allergies. Does not bruise/bleed easily.         Physical Exam  Vitals signs reviewed.   Constitutional:       Appearance: Normal appearance.   HENT:      Head: Normocephalic and atraumatic.      Right Ear: Hearing, tympanic membrane, ear canal and external ear normal. No decreased hearing noted. No middle ear effusion. There is impacted cerumen.      Left Ear: Hearing, tympanic membrane, ear canal and external ear normal. No decreased hearing noted.  No middle ear effusion. There is no impacted cerumen.      Nose: Mucosal edema, congestion and rhinorrhea present. No septal deviation or laceration.      Right Nostril: No epistaxis or septal hematoma.      Left Nostril: No epistaxis or septal hematoma.      Right Turbinates: Enlarged and swollen.      Left Turbinates: Enlarged and  swollen.      Mouth/Throat:      Mouth: Mucous membranes are moist.      Pharynx: Oropharynx is clear. Uvula midline.   Eyes:      Extraocular Movements: Extraocular movements intact.      Pupils: Pupils are equal, round, and reactive to light.   Neurological:      Mental Status: He is alert.       Nasal cavities are de-congested and visualized under the microscope. No acute infection.    A/P  This pleasant patient is having post nasal drip likely due to chronic rhinitis and laryngo-pharyngeal reflux. I will give him a topical nasal antihistaminic spray, Azelastine x2 for 6 weeks and see him in the f/u. In the meantime, he will stick with an antireflux diet. In the f/u, a scopic evaluation can be considered if he continues to have symptoms. His questions were answered.        Again, thank you for allowing me to participate in the care of your patient.        Sincerely,        Re Johnson MD     [employer]

## 2020-10-06 ENCOUNTER — RX RENEWAL (OUTPATIENT)
Age: 75
End: 2020-10-06

## 2020-10-06 RX ORDER — OXYBUTYNIN CHLORIDE 10 MG/1
10 TABLET, EXTENDED RELEASE ORAL TWICE DAILY
Qty: 60 | Refills: 3 | Status: ACTIVE | COMMUNITY
Start: 2020-01-01 | End: 1900-01-01

## 2020-10-22 ENCOUNTER — RX RENEWAL (OUTPATIENT)
Age: 75
End: 2020-10-22

## 2020-10-22 RX ORDER — DEXAMETHASONE 2 MG/1
2 TABLET ORAL TWICE DAILY
Qty: 60 | Refills: 0 | Status: ACTIVE | COMMUNITY
Start: 2020-01-01 | End: 1900-01-01

## 2020-10-26 RX ORDER — DEXLANSOPRAZOLE 60 MG/1
60 CAPSULE, DELAYED RELEASE ORAL DAILY
Qty: 90 | Refills: 3 | Status: ACTIVE | COMMUNITY
Start: 2019-01-01 | End: 1900-01-01

## 2020-11-02 ENCOUNTER — NON-APPOINTMENT (OUTPATIENT)
Age: 75
End: 2020-11-02

## 2021-03-10 NOTE — PATIENT PROFILE ADULT - DOES PATIENT HAVE ADVANCE DIRECTIVE
Medication; Crestor 20mg  Last visit; 6-27-20  Labs; 2-13-21  Next office appointment; 3-  Pharmacy; [facility]   
yes

## 2021-03-16 NOTE — ED PROVIDER NOTE - EYES, MLM
Pt Latest CIWA 6 given PRN lorazepam 1mg and sleeping on reassessment. Pt has good appetite able to tolerate snacks offered. Taking in fluids. Clear bilaterally, pupils equal, round and reactive to light.

## 2021-05-27 NOTE — DISCHARGE NOTE PROVIDER - NSDCHHASSISTDEVIC_GEN_ALL_CORE
Impression: Type 2 diabetes mellitus w/o complication: L79.3. Plan: Diabetes type II: no background retinopathy, no signs of neovascularization noted. Discussed ocular and systemic benefits of blood sugar control. Walker

## 2021-08-12 NOTE — ED PROVIDER NOTE - MUSCULOSKELETAL, MLM
Spine appears normal, range of motion is not limited, no muscle or joint tenderness Has The Patient Been Infected With Covid-19 In The Past?: No Detail Level: Simple Previous Infection Text: The patient has recovered from a previous COVID-19 infection.

## 2021-08-17 NOTE — PROGRESS NOTE ADULT - PROBLEM/PLAN-1
Sinus congestion and sneezing since cleaning out a cat's litter box. No pain, fever or cough.  
DISPLAY PLAN FREE TEXT

## 2021-11-19 NOTE — PHYSICAL EXAM
[General Appearance - Well Nourished] : well nourished [General Appearance - Well Developed] : well developed [General Appearance - Alert] : alert [General Appearance - In No Acute Distress] : in no acute distress [PERRL With Normal Accommodation] : pupils were equal in size, round, reactive to light [Extraocular Movements] : extraocular movements were intact [] : no respiratory distress [Oriented To Time, Place, And Person] : oriented to person, place, and time [Normal] : supple with no thyromegaly or masses appreciated [de-identified] : alopecia of the right frontal scalp. no nodules or masses in scalp.  [de-identified] : Uses a walker  [de-identified] : alopecia, healed surgical incisions on scalp [de-identified] : she has slight drift of her body to the left when eyes are closed. She has full strength and coordination of all extremities.  no

## 2022-06-26 NOTE — ED ADULT NURSE NOTE - PMH
Holding home seroquel in acute setting   - Resume when clinically appropriate   Brain tumor    Carcinoid tumor    COPD (chronic obstructive pulmonary disease)    Liver cancer    Lung collapse  2x  Malignant neoplasm of bronchus and lung, unspecified site  RUL Lobectomy, RLL wedge resection  Malignant neoplasm of female breast  Left breast CA with liver mets

## 2022-07-27 NOTE — ED ADULT NURSE NOTE - NSFALLRSKASSESSDT_ED_ALL_ED
I spoke with patient. She is aware of results. Pt also sent a [employer] message requesting refill on dicyclomine. I told pt I would have that sent to her pharmacy. Pt agreed to this plan.    17-May-2019 23:33

## 2022-08-29 NOTE — HISTORY OF PRESENT ILLNESS
[Home] : at home, [unfilled] , at the time of the visit. [Medical Office: ([facility])___] : at the medical office located in  [Spouse] : spouse [FreeTextEntry1] : 73yr old female with PMH of metastatic breast CA and neuroendocrine tumor. She is s/p right frontal resection of scalp mass on 5/10/18. She did not want to undergo any treatment following resection but agreed to SBRT, which completed on 7/30/18. She does not want any chemotherapy. She follows with Dr. Hampton.\par \par Radiologist stated liver lesions were too small to bx at this time, recommendation was to do another abd U/S in March and evaluate the findings at that time for possible surgery per Dr. Gonzales\par \par She was seen by Dr. Castillo 2/13/19 and started on IV Avastin 2/15/19\par \par She was recently hospitalized for ischemic colitis. Avastin was discontinued. She is being managed conservatively for her colitis.\par \par She was most recently hospitalized for left arm weakness. SHe had one dose of IV Avastin with Dr. Castillo. She and her family have been hesitant to undergo any further treatment. She continues to have progressive LLE weakness and is now using a wheelchair for long distance mobility. She saw Dr. Castillo 12/4 and IV Avastin is on hold for now. \par Her  Geovani, provides her history. She is now completely wheelchair dependent and has had significant decline in her lower extremity strength. She has limited activity tolerance and her cognition waxes and wanes.\par This was a telephone visit.  impaired

## 2022-08-30 NOTE — PRE-OP CHECKLIST - HEIGHT IN INCHES
[FreeTextEntry3] : I, Rossana Zarate, acted solely as a scribe for Dr. Mary Jacob, on 08/30/2022. \par \par All medical record entries made by the scribe were at my, Dr. Mary Jacob., direction and personally dictated by me on 08/30/2022. I have personally reviewed the chart and agree that the record accurately reflects my personal performance of the history, physical exam, assessment and plan.\par 
3

## 2022-11-30 NOTE — ASSESSMENT
[FreeTextEntry1] : My impression is that the patient suffers from high grade neuroendocrine tumor. Her KPS is 70. Her MRI brain and abdomen are stable s/p initiation of IV avastin therapy 2/15/19. The patient was extensively educated about the nature of her disease process. Therapeutic and diagnostic tests include MRI brain with and without contrast in 4 weeks. The patient should see an epileptologist for AED management, her last seizure was in October 2018. She should also continue to follow with Dr. Castillo. She should continue Dexamethasone 1mg PO QAM. I will see the patient back in in early May. I have explained the alternatives, risks and benefits to the patient and she understands and agrees to proceed. This risk of the procedure including but not limited to pain, infection, seizure, stroke, recurrence, residual disease, neurovascular injury, heart attack, pulmonary embolism, blindness, weakness, paralysis and death have been carefully explained to the patient who clearly understands and agrees to proceed.
done

## 2022-12-22 NOTE — DISCHARGE NOTE ADULT - NS MD DC FALL RISK RISK
See my original message. She should not be on this medication long term. There does not appear to be an indication based on her history. Advise her this is one less medication to contribute to side effects or medication interactions. We will continue to follow off of the medication long term. For information on Fall & Injury Prevention, visit [email or web address]

## 2022-12-29 NOTE — PATIENT PROFILE ADULT - FUNCTIONAL SCREEN CURRENT LEVEL: SWALLOWING (IF SCORE 2 OR MORE FOR ANY ITEM, CONSULT REHAB SERVICES), MLM)
0 = swallows foods/liquids without difficulty Burow's Advancement Flap Text: The defect edges were debeveled with a #15 scalpel blade.  Given the location of the defect and the proximity to free margins a Burow's advancement flap was deemed most appropriate.  Using a sterile surgical marker, the appropriate advancement flap was drawn incorporating the defect and placing the expected incisions within the relaxed skin tension lines where possible.    The area thus outlined was incised deep to adipose tissue with a #15 scalpel blade.  The skin margins were undermined to an appropriate distance in all directions utilizing iris scissors.

## 2023-02-02 NOTE — CONSULT NOTE ADULT - CONSULT REASON
Under control  Continue current medication  We will re-evaluate at next office visit  ischemic colitis

## 2023-02-07 NOTE — DISCHARGE NOTE ADULT - CLICK TO LAUNCH ORM
February 7, 2023      [facility]  [address]  [place] [address]  Phone: [phone]  Fax: [phone]       Patient: Marium Thomas   YOB: 2011  Date of Visit: 02/07/2023    To Whom It May Concern:    Feliciano Thomas  was at [facility] on 02/07/2023. The patient may return to work/school on 02.08.2023 with restrictions. Please allow student to perform physical activities as tolerated for 7 school days. Please allow her rests and or breaks as needed for pain and or to sit out for activities if needed. If you have any questions or concerns, or if I can be of further assistance, please do not hesitate to contact me.    Sincerely,    Samir Puentes LPN     
.

## 2023-03-04 NOTE — OCCUPATIONAL THERAPY INITIAL EVALUATION ADULT - VISUAL ASSESSMENT: SCANNING
LABS:                        17.5   11.15 )-----------( 277      ( 03 Mar 2023 21:20 )             51.5     03-03    137  |  99  |  7   ----------------------------<  161<H>  4.0   |  28  |  0.92    Ca    11.3<H>      03 Mar 2023 21:20    TPro  7.8  /  Alb  4.0  /  TBili  0.8  /  DBili  0.2  /  AST  88<H>  /  ALT  156<H>  /  AlkPhos  47  03-03      RADIOLOGY & ADDITIONAL TESTS: Reviewed.
WFL

## 2023-03-22 NOTE — BRIEF OPERATIVE NOTE - PROCEDURE
He was evaluated by Dr. Grace Perez on 3/22/2023 and needs reevaluation in 4 months.     Craniotomy  04/06/2017  Right Frontal Craniotomy for brain tumor removal  Active  [id number]

## 2023-08-10 NOTE — ED ADULT NURSE NOTE - NS ED NURSE LEVEL OF CONSCIOUSNESS SPEECH
Attending Addendum/ Teaching Attestation:    Data reviewed and discussed with the fellow. I agree with below charting, assessment and plan as documented. The plan of care was discussed with the fellow.     Afib with RVR better controlled today. , 103 this am. TFTs reviewed,cont to check daily for now. Continue methimazole and hydrocortisone.     Suzie Calhoun MD  ------------------------------------        ENDOCRINOLOGY PROGRESS NOTE    ADMISSION DATE:  7/25/2023  DATE:  8/10/2023  CURRENT HOSPITAL DAY:  Hospital Day: 17  ATTENDING PHYSICIAN:  Mili Park MD    ASSESSMENT/ PLAN:       65-year-old female PMH CVA x2 with significant deficits, nonverbal at baseline, chronic respiratory failure s/p trach and PEG who presents to ICU for management of acute hypoxic respiratory failure secondary to ARDS r/t COVID19. Our team is consulted for abnormal thyroid function test in setting of A-fib with RVR, concern for hyperthyroidism.     Hyperthyroidism  Impending Thyroid Storm  Differential at this time includes COVID induced thyroiditis versus hyperthyroidism from toxic adenoma(s)  -AB for Grave's disease negative  -Patient with previously normal thyroid function test with TSH 2.806 on 3/17/2022  - Thyroid function test this admission: TSH undetectable, FT3 2.2, FT4 2.6  - 8/3 FT3 and FT4 continue to trend up.  FT3 5.4  - Remote history of amiodarone use- last documented in 7/2022. Unclear when her last dose was, not on it PTA  8/10; Repeat FT3 4.2--> 4.1, FT4 3.6-> 4.0  Plan  -- Repeat FT3 and FT4 tomorrow  - Continue methimazole to 20 mg q8h   -- Continue Hydrocortisone 50mg q6h   -- Agree with stopping cholestyramine as patient is having GI issues.  -- Thyroid ultrasound ordered, currently pending  -  TSI and TRAb normal.   - Daily CBC and CMP to ensure patient not developing agranulocytosis or liver toxicity while on methimazole.  --If patient has to be strict NPO including medications due to severe ileus,  okay to hold methimazole at that time.  --If patient becomes hemodynamically unstable due to severe thyrotoxicosis consider plasmapheresis  -- Endocrinology will continue to follow    # Steroid-induced hyperglycemia  -Most recent A1c 5.8 (7/2023)   -Blood sugars well-controlled on medium dose sliding scale  -She is on hydrocortisone IV 50 every 6 hours for treatment of impending thyroid storm  -Continue to monitor blood sugar levels     A-fib with RVR, resolved  H/o CVA w deficits  Acute on chronic hypoxemic respiratory failure secondary to ARDS  Acute Covid 19 infection  -Rest of care per Primary    INTERVAL HISTORY:      Tashia Buckley is a 65 year old female patient admitted with COVID [U07.1].    Events over last 24 hours:    She has been afebrile overnight, heart rates in the 110s this morning, mentation at baseline. Patient is NPO for ileus noted on x-ray abdomen, tube feeds currently held    Patient's current diet is Tpn Diet Without Diet Tray.NPO        Blood sugar review:  Last 24 hours blood sugars ranged   Recent Labs   Lab 08/09/23  1742 08/10/23  0024 08/10/23  0616 08/10/23  1203   GLUCOSE BEDSIDE 119* 114* 124* 118*         MEDICATIONS:      The medication list was reviewed today.     MEDICATIONS  Current Facility-Administered Medications   Medication Dose Route Frequency Provider Last Rate Last Admin   • dextrose 5 % / sodium chloride 0.9% with KCl 20 mEq infusion   Intravenous Continuous Cindy Osorio MD 75 mL/hr at 08/10/23 1247 New Bag at 08/10/23 1247   • sodium chloride 0.9 % flush bag 25 mL  25 mL Intravenous PRN Cindy Osorio MD       • dextrose 10 % infusion  1,000 mL Intravenous Continuous PRN Cindy Osorio MD       • PARENTERAL NUTRITION - DIETITIAN/PHARMACIST MANAGED   Does not apply See Admin Instructions Cindy Osorio MD       • Fat Emulsion Plant Based (Soy) 20 % injection 250 mL  250 mL Intravenous Q24H Cindy Osorio MD       • parenteral nutrition adult standard refeeding  prevention/central or peripheral (1500 mL)   Intravenous Continuous PN Cnidy Osorio MD       • thiamine (VITAMIN B-1) injection 100 mg  100 mg Intravenous Daily Cindy Osorio MD   100 mg at 08/10/23 1410   • folic acid (FOLATE) injection 1 mg  1 mg Intravenous Daily Cindy Osorio MD       • Potassium Standard Replacement Protocol (Levels 3.5 and lower)   Does not apply See Admin Instructions Cindy Osorio MD       • Potassium Replacement (Levels 3.6 - 4)   Does not apply See Admin Instructions Cindy Osorio MD       • Magnesium Standard Replacement Protocol   Does not apply See Admin Instructions Cindy Osorio MD       • Phosphorus Standard Replacement Protocol   Does not apply See Admin Instructions Cindy Osorio MD       • magnesium sulfate 1 g in dextrose 5% 100 mL IVPB premix  1 g Intravenous Once Cindy Osorio MD       • heparin (porcine) 25,000 units/250 mL in dextrose 5 % infusion  1-40 Units/kg/hr (Dosing Weight) Intravenous Continuous Cindy Osorio MD 10.9 mL/hr at 08/10/23 1221 15 Units/kg/hr at 08/10/23 1221   • heparin (porcine) injection 5,800 Units  80 Units/kg (Dosing Weight) Intravenous PRN Cindy Osorio MD       • heparin (porcine) injection 2,900 Units  40 Units/kg (Dosing Weight) Intravenous PRN Cindy Osorio MD       • metoPROLOL (LOPRESSOR) injection 5 mg  5 mg Intravenous 4 times per day Cindy Osorio MD   5 mg at 08/10/23 1222   • famotidine (PEPCID) injection 20 mg  20 mg Intravenous 2 times per day Cindy Osorio MD   20 mg at 08/10/23 0956   • bisacodyl (DULCOLAX) suppository 10 mg  10 mg Rectal BID Maria Fernanda Medel, DO   10 mg at 08/10/23 0955   • hydroCORTisone (Solu-CORTEF) PF injection 50 mg  50 mg Intravenous 4 times per day Vaishali Johns DO   50 mg at 08/10/23 1222   • methiMAzole (TAPAZOLE) 0.5 MG/ML  oral suspension 20 mg  20 mg Per PEG Tube 3 times per day Paradolorestu, Vaishali, DO   20 mg at 08/10/23 1410   • polyethylene glycol  (MIRALAX) packet 17 g  17 g Per PEG Tube Daily Shania Arias MD   17 g at 08/10/23 0956   • polyethylene glycol (MIRALAX) packet 17 g  17 g Per PEG Tube Daily PRN Arthur Harden MD       • insulin lispro (ADMELOG,HumaLOG) - Correction Dose   Subcutaneous 4 times per day Denise Richter MD   2 Units at 08/08/23 0059   • ipratropium-albuterol (DUONEB) 0.5-2.5 (3) MG/3ML nebulizer solution 3 mL  3 mL Nebulization Q6H Resp PRN Luh Suarez MD       • guaiFENesin-codeine (GUAIFENESIN AC) 100-10 MG/5ML liquid 5 mL  5 mL Per PEG Tube Q4H PRN Ilya Nayak DO   5 mL at 07/29/23 0928   • sodium chloride 0.9 % flush bag 25 mL  25 mL Intravenous PRN Luh Suarez MD       • sodium chloride (PF) 0.9 % injection 2 mL  2 mL Intracatheter 2 times per day Luh Suarez MD   2 mL at 08/10/23 0956   • sodium chloride 0.9% infusion   Intravenous Continuous PRN Luh Suarez MD       • sodium chloride 0.9% infusion   Intravenous Continuous PRN Luh Suarez MD       • sodium chloride 0.9 % flush bag 25 mL  25 mL Intravenous PRN Luh Suarez MD       • levETIRAcetam ORAL (KepPRA) 100 MG/ML oral solution 750 mg  750 mg Per PEG Tube 2 times per day Luh Suarez MD   750 mg at 08/10/23 0955   • chlorhexidine gluconate (PERIDEX) 0.12 % solution 15 mL  15 mL Swish & Spit 2 times per day Luh Suarez MD   15 mL at 08/10/23 0955    And   • chlorhexidine gluconate (PERIDEX) 0.12 % solution 15 mL  15 mL Swish & Spit PRN Luh Suarez MD       • CARBOXYMethylcellulose (REFRESH TEARS) 0.5 % ophthalmic solution 1 drop  1 drop Both Eyes Q6H Luh Suarez MD   1 drop at 08/10/23 1412   • dextrose 50 % injection 25 g  25 g Intravenous PRN Luh Suarez MD       • dextrose 50 % injection 12.5 g  12.5 g Intravenous PRN Luh Suarez MD       • glucagon (GLUCAGEN) injection 1 mg  1 mg Intramuscular PRN Luh Suarez MD       • bisacodyl (DULCOLAX) suppository 10 mg  10 mg Rectal Daily PRN Luh Suarez MD       •  Speaking Coherently dextrose (GLUTOSE) 40 % gel 15 g  15 g Per PEG Tube PRLuh Martinez MD       • dextrose (GLUTOSE) 40 % gel 30 g  30 g Per PEG Tube Luh Chance MD           OBJECTIVE:      VITAL SIGNS:     Vital Last Value 24 Hour Range   Temperature 97.7 °F (36.5 °C) (08/10/23 1200) Temp  Min: 97.7 °F (36.5 °C)  Max: 98.1 °F (36.7 °C)   Pulse (!) 115 (08/10/23 1200) Pulse  Min: 97  Max: 124   Respiratory (!) 24 (08/10/23 1200) Resp  Min: 10  Max: 26   Non-Invasive  Blood Pressure (!) 150/116 (08/10/23 1200) BP  Min: 128/112  Max: 179/92   Pulse Oximetry 100 % (08/10/23 1200) SpO2  Min: 100 %  Max: 100 %     Vital Today Admitted   Weight 77.8 kg (171 lb 8.3 oz) (08/08/23 0452) Weight: 72.5 kg (159 lb 13.3 oz) (07/25/23 0758)   Height N/A Height: 5' 5\" (165.1 cm) (07/25/23 0758)   BMI N/A BMI (Calculated): 26.6 (07/25/23 0758)     INTAKE/OUTPUT:      Intake/Output Summary (Last 24 hours) at 8/10/2023 1446  Last data filed at 8/10/2023 1200  Gross per 24 hour   Intake 2018.85 ml   Output 1400 ml   Net 618.85 ml             PHYSICAL EXAM:    General: Patient is in no acute distress, non-verbal at baseline, mentation at baseline  Head, eyes, ears, nose, throat: Trach in place  Abdomen; Distended   Pulmonary: breathing comfortably, normal work of breathing    LABORATORY DATA:      No results found for: \"HGBA1C\"    Creatinine (mg/dL)   Date Value   08/10/2023 0.54   08/10/2023 0.48 (L)   08/09/2023 0.52       Lab Results   Component Value Date    TSH <0.008 (L) 07/27/2023    TSH 2.806 03/17/2022       Vaishali Johns DO  Endocrinology Fellow PGY-4    I can be reached out via [employer] with any questions    Case was discussed with Dr. Lj DOBBS Who agrees with above assessment and plan .

## 2023-11-03 NOTE — ED ADULT NURSE NOTE - NS ED NURSE LEVEL OF CONSCIOUSNESS AFFECT
Subjective:      Patient ID: Francoise Dykes is a 59 y.o. female.    Chief Complaint: Ingrown Toenail (L great toenail)    Sharp intense throbbing pain left big toe/toenail.  Gradual onset, rapidly worsening over the past few days.  Aggravated by socks shoes pressure ambulation.  No prior medical treatment.  No self-treatment.  Patient denies trauma and surgery both feet.    Review of Systems   Constitutional: Negative for chills, diaphoresis, fever, malaise/fatigue and night sweats.   Cardiovascular:  Negative for claudication, cyanosis, leg swelling and syncope.   Skin:  Positive for nail changes. Negative for color change, dry skin, rash, suspicious lesions and unusual hair distribution.   Musculoskeletal:  Negative for falls, joint pain, joint swelling, muscle cramps, muscle weakness and stiffness.   Gastrointestinal:  Negative for constipation, diarrhea, nausea and vomiting.   Neurological:  Negative for brief paralysis, disturbances in coordination, focal weakness, numbness, paresthesias, sensory change and tremors.         Objective:      Physical Exam  Constitutional:       General: She is not in acute distress.     Appearance: She is well-developed. She is not diaphoretic.   Cardiovascular:      Pulses:           Popliteal pulses are 2+ on the right side and 2+ on the left side.        Dorsalis pedis pulses are 2+ on the right side and 2+ on the left side.        Posterior tibial pulses are 2+ on the right side and 2+ on the left side.      Comments: Capillary refill 3 seconds all toes/distal feet, all toes/both feet warm to touch.      Negative lymphadenopathy bilateral popliteal fossa and tarsal tunnel.      Negavie lower extremity edema bilateral.    Musculoskeletal:      Right ankle: No swelling, deformity, ecchymosis or lacerations. Normal range of motion. Normal pulse.      Right Achilles Tendon: Normal. No defects. Medina's test negative.   Lymphadenopathy:      Lower Body: No right inguinal  adenopathy. No left inguinal adenopathy.      Comments: Negative lymphadenopathy bilateral popliteal fossa and tarsal tunnel.    Negative lymphangitic streaking bilateral feet/ankles/legs.   Skin:     General: Skin is warm and dry.      Capillary Refill: Capillary refill takes 2 to 3 seconds.      Coloration: Skin is not pale.      Findings: No abrasion, bruising, burn, ecchymosis, erythema, laceration, lesion or rash.      Nails: There is no clubbing.      Comments:   Visible and palpable ingrowth of toenail lateral border left hallux with pain to palpation, and focal localized erythema and edema,  without ulceration, drainage, pus, tracking, fluctuance, malodor, or cardinal signs infection.    Otherwise, Skin is normal age and health appropriate color, turgor, texture, and temperature bilateral lower extremities without ulceration, hyperpigmentation, discoloration, masses nodules or cords palpated.  No ecchymosis, erythema, edema, or cardinal signs of infection bilateral lower extremities.    Neurological:      Mental Status: She is alert and oriented to person, place, and time.      Sensory: No sensory deficit.      Motor: No tremor, atrophy or abnormal muscle tone.      Gait: Gait normal.      Deep Tendon Reflexes:      Reflex Scores:       Patellar reflexes are 2+ on the right side and 2+ on the left side.       Achilles reflexes are 2+ on the right side and 2+ on the left side.  Psychiatric:         Behavior: Behavior is cooperative.           Assessment:       Encounter Diagnoses   Name Primary?    Ingrown nail Yes    Toe pain, left     Paronychia, toe, left          Plan:       Francoise was seen today for ingrown toenail.    Diagnoses and all orders for this visit:    Ingrown nail    Toe pain, left    Paronychia, toe, left    Other orders  -     tobramycin sulfate 0.3% (TOBREX) 0.3 % ophthalmic solution; 1-2 drops topically twice daily to affected toe(s).      I counseled the patient on her conditions,  their implications and medical management.    Topical tobramycin drops twice daily lateral left hallux.      Cover left hallux all times with Band-Aid or similar changing daily.      Discussed conservative treatment with shoes of adequate dimensions, material, and style to alleviate symptoms and delay or prevent surgical intervention.    Utilizing sterile toenail clippers I aggressively debrided the offending nail border approximately 3 mm from its edge and carried the nail plate incision down at an angle in order to wedge out the offending cryptotic portion of the nail plate. The offending border was then removed in toto. The area was cleansed with alcohol. Patient tolerated the procedure well and related significant relief.          No follow-ups on file.         Calm/Appropriate

## 2023-11-05 NOTE — PHYSICAL THERAPY INITIAL EVALUATION ADULT - PATIENT PROFILE REVIEW, REHAB EVAL
yes
[place] train [place]. denies medical complaints  asking for bed to sleep and food, will allow pt to sleep and give po food>dc

## 2023-11-09 NOTE — PATIENT PROFILE ADULT. - FUNCTIONAL LEVEL PRIOR: AMBULATION
(0) independent Complex Repair And Flap Additional Text (Will Appearing After The Standard Complex Repair Text): The complex repair was not sufficient to completely close the primary defect. The remaining additional defect was repaired with the flap mentioned below.

## 2024-02-27 NOTE — PROGRESS NOTE ADULT - PROBLEM SELECTOR PLAN 4
Patient with known liver mets. MRI Abdomen in 3/2019 demonstrated no interval change in the 7-8 lesions within the Right and left lobe of the liver and postablation lesion in hepatic segment 4 lesion, unchanged. CT during admission demonstrated  hypodensities in the liver.  -continue management per onc outpatient. Patient will require outpatient follow up
Patient with known liver mets. MRI Abdomen in 3/2019 demonstrated no interval change in the 7-8 lesions within the Right and left lobe of the liver and postablation lesion in hepatic segment 4 lesion, unchanged. CT during admission demonstrated  hypodensities in the liver.  -continue management per onc outpatient
Patient with known liver mets. MRI Abdomen in 3/2019 demonstrated no interval change in the 7-8 lesions within the Right and left lobe of the liver and postablation lesion in hepatic segment 4 lesion, unchanged. CT during admission demonstrated  hypodensities in the liver.  -continue management per onc outpatient. Patient will require outpatient follow up
Patient with known liver mets. MRI Abdomen in 3/2019 demonstrated no interval change in the 7-8 lesions within the Right and left lobe of the liver and postablation lesion in hepatic segment 4 lesion, unchanged. CT during admission demonstrated  hypodensities in the liver.  -continue management per onc outpatient. Patient will require outpatient follow up
Stable
no assistance needed
Patient with known liver mets. MRI Abdomen in 3/2019 demonstrated no interval change in the 7-8 lesions within the Right and left lobe of the liver and postablation lesion in hepatic segment 4 lesion, unchanged. CT during admission demonstrated  hypodensities in the liver.  -continue management per onc outpatient

## 2024-04-09 NOTE — PATIENT PROFILE ADULT. - TYPE OF ADMISSION, PATIENT PROFILE
Physical Therapy - 9S      Patient not seen in therapy.     Unavailable due to request no therapy.      Re-attempt plan: per established plan of care      Attempted to see pt this date, however, pt declines PT session. Education on role of PT, continues to decline and does not elaborate when asked. Continue to treat per PT plan of care.                                  Scheduled/Direct

## 2024-04-09 NOTE — PROGRESS NOTE ADULT - MINUTES
Anesthesia Post-op Note    Patient: Sarina Fuentes  Procedure(s) Performed: COLONOSCOPY   Anesthesia type: MAC    Vitals Value Taken Time   Temp 36.1 °C (97 °F) 04/09/24 1300   Pulse 54 04/09/24 1320   Resp 12 04/09/24 1320   SpO2 99 % 04/09/24 1320   BP 94/59 04/09/24 1315         Patient Location: Phase II  Post-op Vital Signs:stable  Level of Consciousness: participates in exam, oriented, awake and alert  Respiratory Status: spontaneous ventilation  Cardiovascular blood pressure returned to baseline  Hydration: euvolemic  Pain Management: well controlled  Handoff: Handoff to receiving clinician was performed and questions were answered  Vomiting: none  Nausea: None  Airway Patency:patent  Post-op Assessment: awake, alert, appropriately conversant, or baseline, no complications, patient tolerated procedure well, no evidence of recall, dentition within defined limits, moving all extremities and No Corneal Abrasion      No notable events documented.                      
25
20
30
20
20
25

## 2025-03-17 NOTE — ED ADULT NURSE NOTE - CAS DISCH CONDITION
Pt and/or family provided with urine cup and wipes.  Pt and/or family instructed on clean catch urine sample technique.  Pt and/or family verbalized understanding.   Improved

## 2025-07-12 NOTE — H&P ADULT - PROBLEM/PLAN-6
"Arrived to ER room 3 via [facility] with c/o not feeling well after drinking alcohol and taking one unknown pill. EMS reports GCS of 11 on scene. Patient arrived with altered mental status and keeps needing reminders of where she is. C/o chest pain that she is unable to rate and states \"it feels like someone is stepping on my chest.\" States chest pain is the only pain she is having. Reports drinking 2 captain cokes at the bar. Speech is slurred. EMS denies any falls or trauma. Patient drifts to sleep often and require sternal rub to respond to questions. Changed into gown. Monitors in place. Attempted to get EKG but patient woke at that time and was very tense and trying to sit up in bed and would not relax for EKG. Will continue to attempt EKG.      Triage Assessment (Adult)       Row Name 07/12/25 0356          Triage Assessment    Airway WDL WDL        Respiratory WDL    Respiratory WDL X;rhythm/pattern     Rhythm/Pattern, Respiratory shortness of breath                     "
DISPLAY PLAN FREE TEXT